# Patient Record
Sex: FEMALE | Race: WHITE | NOT HISPANIC OR LATINO | Employment: OTHER | URBAN - METROPOLITAN AREA
[De-identification: names, ages, dates, MRNs, and addresses within clinical notes are randomized per-mention and may not be internally consistent; named-entity substitution may affect disease eponyms.]

---

## 2017-02-23 ENCOUNTER — PATIENT MESSAGE (OUTPATIENT)
Dept: ENDOCRINOLOGY | Facility: CLINIC | Age: 67
End: 2017-02-23

## 2017-02-23 ENCOUNTER — PATIENT MESSAGE (OUTPATIENT)
Dept: INTERNAL MEDICINE | Facility: CLINIC | Age: 67
End: 2017-02-23

## 2017-03-08 ENCOUNTER — OFFICE VISIT (OUTPATIENT)
Dept: INTERNAL MEDICINE | Facility: CLINIC | Age: 67
End: 2017-03-08
Payer: MEDICARE

## 2017-03-08 VITALS
HEART RATE: 81 BPM | DIASTOLIC BLOOD PRESSURE: 60 MMHG | OXYGEN SATURATION: 98 % | BODY MASS INDEX: 20.82 KG/M2 | HEIGHT: 68 IN | WEIGHT: 137.38 LBS | SYSTOLIC BLOOD PRESSURE: 108 MMHG

## 2017-03-08 DIAGNOSIS — Z13.220 SCREENING, LIPID: ICD-10-CM

## 2017-03-08 DIAGNOSIS — Z12.31 ENCOUNTER FOR SCREENING MAMMOGRAM FOR BREAST CANCER: ICD-10-CM

## 2017-03-08 DIAGNOSIS — Z87.74 H/O ATRIAL SEPTAL DEFECT REPAIR: ICD-10-CM

## 2017-03-08 DIAGNOSIS — Z13.6 ENCOUNTER FOR SCREENING FOR CARDIOVASCULAR DISORDERS: ICD-10-CM

## 2017-03-08 DIAGNOSIS — Z13.21 ENCOUNTER FOR VITAMIN DEFICIENCY SCREENING: ICD-10-CM

## 2017-03-08 DIAGNOSIS — Z12.83 SKIN CANCER SCREENING: ICD-10-CM

## 2017-03-08 DIAGNOSIS — Z79.899 OTHER LONG TERM (CURRENT) DRUG THERAPY: ICD-10-CM

## 2017-03-08 DIAGNOSIS — N32.81 OAB (OVERACTIVE BLADDER): ICD-10-CM

## 2017-03-08 DIAGNOSIS — Z00.00 ANNUAL PHYSICAL EXAM: Primary | ICD-10-CM

## 2017-03-08 DIAGNOSIS — M81.0 OSTEOPOROSIS: ICD-10-CM

## 2017-03-08 DIAGNOSIS — Z85.89 HISTORY OF SQUAMOUS CELL CARCINOMA: ICD-10-CM

## 2017-03-08 PROCEDURE — 99204 OFFICE O/P NEW MOD 45 MIN: CPT | Mod: S$PBB,,, | Performed by: FAMILY MEDICINE

## 2017-03-08 PROCEDURE — 99999 PR PBB SHADOW E&M-EST. PATIENT-LVL IV: CPT | Mod: PBBFAC,,, | Performed by: FAMILY MEDICINE

## 2017-03-08 PROCEDURE — 99214 OFFICE O/P EST MOD 30 MIN: CPT | Mod: PBBFAC | Performed by: FAMILY MEDICINE

## 2017-03-08 RX ORDER — TOLTERODINE 4 MG/1
CAPSULE, EXTENDED RELEASE ORAL
Qty: 90 CAPSULE | Refills: 1 | Status: SHIPPED | OUTPATIENT
Start: 2017-03-08 | End: 2017-03-27 | Stop reason: SDUPTHER

## 2017-03-08 RX ORDER — TOLTERODINE TARTRATE 2 MG/1
2 TABLET, EXTENDED RELEASE ORAL 2 TIMES DAILY
COMMUNITY
End: 2017-03-08 | Stop reason: SDUPTHER

## 2017-03-08 RX ORDER — TOLTERODINE TARTRATE 2 MG/1
2 TABLET, EXTENDED RELEASE ORAL 2 TIMES DAILY PRN
Qty: 90 TABLET | Refills: 1 | Status: SHIPPED | OUTPATIENT
Start: 2017-03-08 | End: 2017-09-05 | Stop reason: SDUPTHER

## 2017-03-08 RX ORDER — TOLTERODINE 4 MG/1
CAPSULE, EXTENDED RELEASE ORAL
Refills: 0 | COMMUNITY
Start: 2017-01-29 | End: 2017-03-08 | Stop reason: SDUPTHER

## 2017-03-08 RX ORDER — RALOXIFENE HYDROCHLORIDE 60 MG/1
TABLET, FILM COATED ORAL
Refills: 1 | COMMUNITY
Start: 2017-01-29 | End: 2017-03-16 | Stop reason: SDUPTHER

## 2017-03-08 NOTE — PROGRESS NOTES
Subjective:       Patient ID: Dia Ovalles is a 66 y.o. female.    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 Denies f/n/v/d/constipation/cp/sob/urinary sx.  Sleeping well, eating well, does cardio and weight training regularly.  ASD repair at age 26, she was developing heart failure when it was discovered.  Osteoporosis: no dexa in over 2 years, on Raloxifene for 2 years  OTC: Vitamin D, Biotin  GYN: March 2016 mmg utd and normal, has one abnormal in the past 8 years ago and biopsy 15 years ago, pap utd and normal, hx of abnormal pap 10 years ago but subsequent normal, last was April 2016  Colonoscopy done 10/2015 was normal  Eye exam utd  Dental utd  Vaccines: shingles utd, flu shot in Sept, she had both pneumonia vaccines, tdap due    Review of Systems   Constitutional: Negative for activity change, appetite change, fatigue and fever.   Respiratory: Negative for cough and shortness of breath.    Cardiovascular: Negative for chest pain, palpitations and leg swelling.   Gastrointestinal: Negative for constipation, diarrhea, nausea and vomiting.   Genitourinary: Negative for difficulty urinating and dysuria.   Skin: Negative for rash.   Neurological: Negative for dizziness and light-headedness.   Psychiatric/Behavioral: Negative for sleep disturbance.       Objective:      Physical Exam   Constitutional: She appears well-developed and well-nourished.   HENT:   Head: Normocephalic and atraumatic.   Mouth/Throat: No oropharyngeal exudate.   Neck: Normal range of motion. Neck supple. No thyromegaly present.   Cardiovascular: Normal rate, regular rhythm and normal heart sounds.    Pulmonary/Chest: Effort normal and breath sounds normal. No respiratory distress.   Abdominal: Soft. Bowel sounds are normal. She exhibits no distension.   Musculoskeletal: She exhibits no edema.    Lymphadenopathy:     She has no cervical adenopathy.   Neurological: She is alert.   Skin: Skin is warm and dry.   Psychiatric: She has a normal mood and affect.   Nursing note and vitals reviewed.      Assessment:       1. Annual physical exam    2. History of squamous cell carcinoma    3. H/O atrial septal defect repair    4. Osteoporosis    5. OAB (overactive bladder)    6. Encounter for screening mammogram for breast cancer    7. Encounter for vitamin deficiency screening    8. Screening, lipid    9. Encounter for screening for cardiovascular disorders     10. Other long term (current) drug therapy     11. Skin cancer screening        Plan:       Dia was seen today for establish care.    Diagnoses and all orders for this visit:    Annual physical exam  -     CBC auto differential; Future  -     Comprehensive metabolic panel; Future  -     TSH; Future    History of squamous cell carcinoma  -     Ambulatory Referral to Dermatology    H/O atrial septal defect repair    Osteoporosis  -     DXA Bone Density Spine And Hip; Future  -     TSH; Future  -     Vitamin D; Future    OAB (overactive bladder)  -     tolterodine (DETROL LA) 4 MG 24 hr capsule; TAKE 1 C PO D FOR BLADDER SPASM  -     tolterodine (DETROL) 2 MG Tab; Take 1 tablet (2 mg total) by mouth 2 (two) times daily as needed.  -     CBC auto differential; Future  -     Comprehensive metabolic panel; Future    Encounter for screening mammogram for breast cancer  -     Mammo Digital Screening Bilat with CAD; Future    Encounter for vitamin deficiency screening  -     Vitamin D; Future    Screening, lipid  -     Lipid panel; Future    Encounter for screening for cardiovascular disorders   -     Lipid panel; Future    Other long term (current) drug therapy   -     TSH; Future    Skin cancer screening  -     Ambulatory Referral to Dermatology

## 2017-03-08 NOTE — MR AVS SNAPSHOT
[facility]  [address]  [facility] [address]  Phone: [phone]  Fax: [phone]                  Dia Ovalles   3/8/2017 2:30 PM   Office Visit    Description:  Female : 1950   Provider:  Shelley Leija MD   Department:  [facility]           Reason for Visit     Establish Care           Diagnoses this Visit        Comments    Annual physical exam    -  Primary     History of squamous cell carcinoma         H/O atrial septal defect repair         Osteoporosis         OAB (overactive bladder)         Encounter for screening mammogram for breast cancer         Encounter for vitamin deficiency screening         Screening, lipid         Encounter for screening for cardiovascular disorders         Other long term (current) drug therapy         Skin cancer screening                To Do List           Future Appointments        Provider Department Dept Phone    3/16/2017 1:00 PM [facility] - Endo/Diab/Metab [phone]      Goals (5 Years of Data)     None      Follow-Up and Disposition     Return in about 1 year (around 3/8/2018), or if symptoms worsen or fail to improve.       These Medications        Disp Refills Start End    tolterodine (DETROL LA) 4 MG 24 hr capsule 90 capsule 1 3/8/2017     TAKE 1 C PO D FOR BLADDER SPASM    Pharmacy: [facility] - [address] Ph #: [phone]       tolterodine (DETROL) 2 MG Tab 90 tablet 1 3/8/2017     Take 1 tablet (2 mg total) by mouth 2 (two) times daily as needed. - Oral    Pharmacy: [facility] - [address] Ph #: [phone]         [facility] On Call     [facility] On Call Nurse Care Line -  Assistance  Registered nurses in the [facility] On Call Center provide clinical advisement, health education, appointment booking, and other advisory services.  Call for this free service at  "[phone].             Medications           Message regarding Medications     Verify the changes and/or additions to your medication regime listed below are the same as discussed with your clinician today.  If any of these changes or additions are incorrect, please notify your healthcare provider.        START taking these NEW medications        Refills    tolterodine (DETROL LA) 4 MG 24 hr capsule 1    Sig: TAKE 1 C PO D FOR BLADDER SPASM    Class: Normal    tolterodine (DETROL) 2 MG Tab 1    Sig: Take 1 tablet (2 mg total) by mouth 2 (two) times daily as needed.    Class: Normal    Route: Oral           Verify that the below list of medications is an accurate representation of the medications you are currently taking.  If none reported, the list may be blank. If incorrect, please contact your healthcare provider. Carry this list with you in case of emergency.           Current Medications     raloxifene (EVISTA) 60 mg tablet TK 1 T PO QD    tolterodine (DETROL LA) 4 MG 24 hr capsule TAKE 1 C PO D FOR BLADDER SPASM    tolterodine (DETROL) 2 MG Tab Take 1 tablet (2 mg total) by mouth 2 (two) times daily as needed.           Clinical Reference Information           Your Vitals Were     BP Pulse Height Weight SpO2 BMI    108/60 81 5' 8" (1.727 m) 62.3 kg (137 lb 5.6 oz) 98% 20.88 kg/m2      Blood Pressure          Most Recent Value    BP  108/60      Allergies as of 3/8/2017     Codeine      Immunizations Administered on Date of Encounter - 3/8/2017     None      Orders Placed During Today's Visit      Normal Orders This Visit    Ambulatory Referral to Dermatology     Future Labs/Procedures Expected by Expires    CBC auto differential  3/8/2017 5/7/2018    Comprehensive metabolic panel  3/8/2017 5/7/2018    DXA Bone Density Spine And Hip  3/8/2017 3/8/2018    Lipid panel  3/8/2017 5/7/2018    Mammo Digital Screening Bilat with CAD  3/8/2017 (Approximate) 3/8/2018    TSH  3/8/2017 5/7/2018    Vitamin D  3/8/2017 " 5/7/2018      Language Assistance Services     ATTENTION: Language assistance services are available, free of charge. Please call [phone].      ATENCIÓN: Si habla cosme, tiene a buck disposición servicios gratuitos de asistencia lingüística. Llame al [phone].     CHÚ Ý: N?u b?n nói Ti?ng Vi?t, có các d?ch v? h? tr? ngôn ng? mi?n phí dành cho b?n. G?i s? [phone].         [facility] complies with applicable Federal civil rights laws and does not discriminate on the basis of race, color, national origin, age, disability, or sex.

## 2017-03-13 ENCOUNTER — HOSPITAL ENCOUNTER (OUTPATIENT)
Dept: RADIOLOGY | Facility: CLINIC | Age: 67
Discharge: HOME | End: 2017-03-13
Attending: FAMILY MEDICINE
Payer: MEDICARE

## 2017-03-13 DIAGNOSIS — M81.0 OSTEOPOROSIS: ICD-10-CM

## 2017-03-13 PROCEDURE — 77080 DXA BONE DENSITY AXIAL: CPT | Mod: TC

## 2017-03-13 PROCEDURE — 77080 DXA BONE DENSITY AXIAL: CPT | Mod: 26,,, | Performed by: INTERNAL MEDICINE

## 2017-03-14 ENCOUNTER — TELEPHONE (OUTPATIENT)
Dept: INTERNAL MEDICINE | Facility: CLINIC | Age: 67
End: 2017-03-14

## 2017-03-15 ENCOUNTER — LAB VISIT (OUTPATIENT)
Dept: LAB | Facility: HOSPITAL | Age: 67
End: 2017-03-15
Attending: FAMILY MEDICINE
Payer: MEDICARE

## 2017-03-15 DIAGNOSIS — Z00.00 ANNUAL PHYSICAL EXAM: ICD-10-CM

## 2017-03-15 DIAGNOSIS — Z13.220 SCREENING, LIPID: ICD-10-CM

## 2017-03-15 DIAGNOSIS — M81.0 OSTEOPOROSIS: ICD-10-CM

## 2017-03-15 DIAGNOSIS — Z13.6 ENCOUNTER FOR SCREENING FOR CARDIOVASCULAR DISORDERS: ICD-10-CM

## 2017-03-15 DIAGNOSIS — Z79.899 OTHER LONG TERM (CURRENT) DRUG THERAPY: ICD-10-CM

## 2017-03-15 DIAGNOSIS — Z13.21 ENCOUNTER FOR VITAMIN DEFICIENCY SCREENING: ICD-10-CM

## 2017-03-15 DIAGNOSIS — N32.81 OAB (OVERACTIVE BLADDER): ICD-10-CM

## 2017-03-15 LAB
25(OH)D3+25(OH)D2 SERPL-MCNC: 91 NG/ML
ALBUMIN SERPL BCP-MCNC: 3.6 G/DL
ALP SERPL-CCNC: 51 U/L
ALT SERPL W/O P-5'-P-CCNC: 16 U/L
ANION GAP SERPL CALC-SCNC: 9 MMOL/L
AST SERPL-CCNC: 23 U/L
BASOPHILS # BLD AUTO: 0.03 K/UL
BASOPHILS NFR BLD: 0.5 %
BILIRUB SERPL-MCNC: 0.5 MG/DL
BUN SERPL-MCNC: 21 MG/DL
CALCIUM SERPL-MCNC: 8.9 MG/DL
CHLORIDE SERPL-SCNC: 106 MMOL/L
CHOLEST/HDLC SERPL: 3.5 {RATIO}
CO2 SERPL-SCNC: 25 MMOL/L
CREAT SERPL-MCNC: 0.9 MG/DL
DIFFERENTIAL METHOD: NORMAL
EOSINOPHIL # BLD AUTO: 0.2 K/UL
EOSINOPHIL NFR BLD: 3.3 %
ERYTHROCYTE [DISTWIDTH] IN BLOOD BY AUTOMATED COUNT: 13.9 %
EST. GFR  (AFRICAN AMERICAN): >60 ML/MIN/1.73 M^2
EST. GFR  (NON AFRICAN AMERICAN): >60 ML/MIN/1.73 M^2
GLUCOSE SERPL-MCNC: 102 MG/DL
HCT VFR BLD AUTO: 42.5 %
HDL/CHOLESTEROL RATIO: 28.7 %
HDLC SERPL-MCNC: 174 MG/DL
HDLC SERPL-MCNC: 50 MG/DL
HGB BLD-MCNC: 13.7 G/DL
LDLC SERPL CALC-MCNC: 111.2 MG/DL
LYMPHOCYTES # BLD AUTO: 1.8 K/UL
LYMPHOCYTES NFR BLD: 32.5 %
MCH RBC QN AUTO: 29.4 PG
MCHC RBC AUTO-ENTMCNC: 32.2 %
MCV RBC AUTO: 91 FL
MONOCYTES # BLD AUTO: 0.3 K/UL
MONOCYTES NFR BLD: 6 %
NEUTROPHILS # BLD AUTO: 3.2 K/UL
NEUTROPHILS NFR BLD: 57.5 %
NONHDLC SERPL-MCNC: 124 MG/DL
PLATELET # BLD AUTO: 242 K/UL
PMV BLD AUTO: 10.5 FL
POTASSIUM SERPL-SCNC: 4.1 MMOL/L
PROT SERPL-MCNC: 6.5 G/DL
RBC # BLD AUTO: 4.66 M/UL
SODIUM SERPL-SCNC: 140 MMOL/L
TRIGL SERPL-MCNC: 64 MG/DL
TSH SERPL DL<=0.005 MIU/L-ACNC: 1.9 UIU/ML
WBC # BLD AUTO: 5.48 K/UL

## 2017-03-15 PROCEDURE — 36415 COLL VENOUS BLD VENIPUNCTURE: CPT

## 2017-03-15 PROCEDURE — 84443 ASSAY THYROID STIM HORMONE: CPT

## 2017-03-15 PROCEDURE — 80061 LIPID PANEL: CPT

## 2017-03-15 PROCEDURE — 82306 VITAMIN D 25 HYDROXY: CPT

## 2017-03-15 PROCEDURE — 85025 COMPLETE CBC W/AUTO DIFF WBC: CPT

## 2017-03-15 PROCEDURE — 80053 COMPREHEN METABOLIC PANEL: CPT

## 2017-03-16 ENCOUNTER — OFFICE VISIT (OUTPATIENT)
Dept: ENDOCRINOLOGY | Facility: CLINIC | Age: 67
End: 2017-03-16
Payer: MEDICARE

## 2017-03-16 VITALS
HEART RATE: 84 BPM | BODY MASS INDEX: 20.78 KG/M2 | SYSTOLIC BLOOD PRESSURE: 100 MMHG | WEIGHT: 137.13 LBS | HEIGHT: 68 IN | DIASTOLIC BLOOD PRESSURE: 80 MMHG

## 2017-03-16 DIAGNOSIS — M81.0 OSTEOPOROSIS: Primary | ICD-10-CM

## 2017-03-16 PROCEDURE — 99204 OFFICE O/P NEW MOD 45 MIN: CPT | Mod: S$PBB,,, | Performed by: INTERNAL MEDICINE

## 2017-03-16 PROCEDURE — 99213 OFFICE O/P EST LOW 20 MIN: CPT | Mod: PBBFAC | Performed by: INTERNAL MEDICINE

## 2017-03-16 PROCEDURE — 99999 PR PBB SHADOW E&M-EST. PATIENT-LVL III: CPT | Mod: PBBFAC,,, | Performed by: INTERNAL MEDICINE

## 2017-03-16 RX ORDER — BIOTIN 1 MG
5000 TABLET ORAL DAILY
COMMUNITY

## 2017-03-16 RX ORDER — RALOXIFENE HYDROCHLORIDE 60 MG/1
60 TABLET, FILM COATED ORAL DAILY
Qty: 90 TABLET | Refills: 3 | Status: SHIPPED | OUTPATIENT
Start: 2017-03-16 | End: 2018-03-12 | Stop reason: SDUPTHER

## 2017-03-16 RX ORDER — ACETAMINOPHEN 500 MG
1000 TABLET ORAL EVERY OTHER DAY
COMMUNITY

## 2017-03-16 NOTE — PROGRESS NOTES
Chief Complaint: Osteoporosis      HPI:  Dia Ovalles is 66 y.o. female here today for the first time for evaluation and management of Osteoporosis. Diagnosed with BMD from 2017     The patient recently relocated to [place] for [facility] and was referred to our office by her Endocrinologist in [place], [place] - Dr. Luna   She has a long standing history of Osteoporosis   She reports taking Fosamax for 10 years or more     Started Evista in  per Endo in [place]   Tolerating well without complications   She denies family history of breast cancer   Denies personal history of DVT       Past medical history is significant for heart surgery at age 26 to repair Atrial septal defect     Last BMD - see below:   Results for orders placed during the hospital encounter of 17   DXA Bone Density Spine And Hip    Narrative : 1950 ORDERING PHYSICIAN: EIRKA Leija LOCATION: [facility]    HISTORY: 65 y/o female with no hx of fractures. She had menopausal symptoms at 51 y/o. Pts Mother had a wrist fx. Pt is taking Evista. She exercises daily and does not smoke.    TECHNIQUE: Bone Mineral Density performed using Hologic Horizon A (S/N [id number]) reveals good positioning of lumbar spine and hip.    BONE MINERAL DENSITY RESULTS:  Lumbar Spine: Lumbar bone mineral density L1-L4 is 0.752g/cm2, which is a t-score of -2.7. The z-score is -0.7.    Total Hip: The total hip bone mineral density is 0.764g/cm2.  The t-score is -1.5, and the z-score is -0.1.  Femoral neck BMD is 0.608g/cm2 and the t-score is -2.2.      COMPARISONS: No Previous studies available.    Impression Osteoporosis of the lumbar spine.     RECOMMENDATIONS:  1) Adequate calcium and Vitamin D therapy  2) Appropriate exercise  3) Consider continuing medical therapy with raloxifene if patient tolerates.  4) Consider repeat BMD in 2 years.      EXPLANATION OF RESULTS:  The t-score compares this results to the bone density of a 25 year old of the  same gender. The z-score compares this result to the average bone density to people of the same age and gender. The amounts indicate the number of standard deviations above or below the mean.  * Osteoporosis is generally defined as having a t-score between less than -2.5.  * Osteopenia is generally defined as having a t-score between -1 and -2.5.  * The normal range is generally defined as having a t-score between -1 to 1.      Electronically signed by: DONNA LUCAS MD  Date:     03/15/17  Time:    07:59      Patient reports Fractured foot - during college after a fall       Osteoporosis Risk Factor Assessment::   Menarche occurred at age 15  Menopause occurred at age 52  Denies past history of chronic illness  Denies restrictive dieting as a child/adolescent or young adult  Denies history of falls over age 50  Denies history of fractures to her wrist, hip or spine  Denies loss of height   Reports family history of Osteoporosis - mother   Reports family history of stooped posture and wrist fracture in her mother. States mother used chronic steroids for treatment of asthma   Reports use of hormone replacement therapy for <2 years after menopause   Denies exposure to steroid therapy, anticoagulants, PPI's, TZD's or antiseizure medications    She denies history of calcium disorders, thyroid disease, kidney stones, malabsorption symptoms, anemia or kidney disease    Osteoporosis Risk Factors:   Non modifiable   Denies personal history of fracture as an adult   History of fracture in first - degree relative _ mother with fractured wrist   66 y.o. White female  Dementia:No  Poor health / frail: No    Potentially Modifiable:   denies Tobacco use  Wt Readings from Last 1 Encounters:   03/16/17 62.2 kg (137 lb 2 oz)     denies early menopause  denies Prolonged premenopausal amenorrhea (>1yr )   denies Low calcium intake ( lifelong )   denies Alcoholism   denies Recurrent falls  denies Inadequate physical activity     Current  calcium and vitamin D intake: vitamin D3 _ 2,000 IU's daily   Dietary sources: yogurt, calcium fortified milk, cereal    Exercise: weight training 2x week for 60 minutes, yoga 3 times per week for 60 minutes   Cardio 30 minutes a few times a week     Review of Systems   Constitutional: Negative for malaise/fatigue.   HENT: Negative for hearing loss and sore throat.    Eyes: Negative for blurred vision.   Respiratory: Negative for cough and shortness of breath.    Cardiovascular: Negative for chest pain, palpitations, claudication and leg swelling.   Gastrointestinal: Negative for abdominal pain, constipation, diarrhea, heartburn, nausea and vomiting.   Genitourinary: Positive for frequency.   Musculoskeletal: Negative for falls, joint pain and myalgias.   Skin: Negative for rash.   Neurological: Negative for dizziness, tremors, seizures, weakness and headaches.   Endo/Heme/Allergies: Negative for polydipsia.   Psychiatric/Behavioral: Negative for depression and memory loss. The patient is not nervous/anxious.      Physical Exam   Constitutional: She appears well-developed and well-nourished. No distress.   HENT:   Right Ear: External ear normal.   Left Ear: External ear normal.   Nose: Nose normal.   Hearing normal  Dentition good    Neck: No tracheal deviation present. No thyromegaly present.   Cardiovascular: Normal rate.    No murmur heard.  Pulmonary/Chest: Effort normal and breath sounds normal.   Abdominal: Soft. There is no tenderness. No hernia.   Musculoskeletal: She exhibits no edema.   Gait normal  No clubbing or cyanosis noted   Neurological: She is alert. She displays normal reflexes. No cranial nerve deficit.   Skin: Skin is warm. No rash noted.   No nodules       Psychiatric: She has a normal mood and affect. Judgment normal.   Nursing note and vitals reviewed.      Labs:  Lab Results   Component Value Date     03/15/2017    K 4.1 03/15/2017     03/15/2017    CO2 25 03/15/2017      03/15/2017    BUN 21 03/15/2017    CREATININE 0.9 03/15/2017    CALCIUM 8.9 03/15/2017    PROT 6.5 03/15/2017    ALBUMIN 3.6 03/15/2017    BILITOT 0.5 03/15/2017    ALKPHOS 51 (L) 03/15/2017    AST 23 03/15/2017    ALT 16 03/15/2017    ANIONGAP 9 03/15/2017    ESTGFRAFRICA >60.0 03/15/2017    EGFRNONAA >60.0 03/15/2017     Lab Results   Component Value Date    FBCOLCPY19ZL 91 03/15/2017         Assessment and Plan:  1. Osteoporosis  Risks include race, small frame, family history    Reviewed basics of quantity vs quality  Reassuring she is not fracturing  Emphasized the importance of calcium and vitamin D on bone health, calcium from food sources preferred  Encouraged fall safety and fall precautions   Continue weight bearing exercises as tolerated   Continue current medical therapy with Evista   Repeat BMD due in 2019 , will evaluate need for change in therapy at that time   Continue with yearly mammogram screenings  -     raloxifene (EVISTA) 60 mg tablet; Take 1 tablet (60 mg total) by mouth once daily.  Dispense: 90 tablet; Refill: 3        Patient personally interviewed and examined by Dr. Zambrano   Recommendations were discussed with the patient in detail   The patient verbalized understanding and agrees with the treatment plan as outlined above       RTC in 2 years with repeat BMD

## 2017-03-16 NOTE — MR AVS SNAPSHOT
[facility] - Endo/Diab/Metab  [address]  [facility] [address]  Phone: [phone]  Fax: [phone]                  Dia Ovalles   3/16/2017 1:00 PM   Office Visit    Description:  Female : 1950   Provider:  Mague Cottrell NP   Department:  [facility] - Endo/Diab/Metab           Reason for Visit     Osteoporosis           Diagnoses this Visit        Comments    Osteoporosis    -  Primary            To Do List           Future Appointments        Provider Department Dept Phone    2017 10:00 AM [facility] [id number] SCREEN INT MED [facility] [phone]    2017 11:40 AM Ash Mcduffie MD [facility] Dermatology [phone]      Goals (5 Years of Data)     None      Follow-Up and Disposition     Return in about 2 years (around 3/16/2019).       These Medications        Disp Refills Start End    raloxifene (EVISTA) 60 mg tablet 90 tablet 3 3/16/2017     Take 1 tablet (60 mg total) by mouth once daily. - Oral    Pharmacy: [facility] - [address] #: [phone]         [facility] On Call     [facility] On Call Nurse Care Line -  Assistance  Registered nurses in the [facility] On Call Center provide clinical advisement, health education, appointment booking, and other advisory services.  Call for this free service at [phone].             Medications           Message regarding Medications     Verify the changes and/or additions to your medication regime listed below are the same as discussed with your clinician today.  If any of these changes or additions are incorrect, please notify your healthcare provider.        CHANGE how you are taking these medications     Start Taking Instead of    raloxifene (EVISTA) 60 mg tablet raloxifene (EVISTA) 60 mg tablet    Dosage:  Take 1 tablet (60 mg total) by mouth once daily. Dosage:  TK 1 T PO QD    Reason for Change:  Reorder            Verify that the below  "list of medications is an accurate representation of the medications you are currently taking.  If none reported, the list may be blank. If incorrect, please contact your healthcare provider. Carry this list with you in case of emergency.           Current Medications     biotin 1 mg tablet Take 5,000 mcg by mouth once daily.    cholecalciferol, vitamin D3, (VITAMIN D3) 2,000 unit Cap Take 1 capsule by mouth once daily.    raloxifene (EVISTA) 60 mg tablet Take 1 tablet (60 mg total) by mouth once daily.    tolterodine (DETROL LA) 4 MG 24 hr capsule TAKE 1 C PO D FOR BLADDER SPASM    tolterodine (DETROL) 2 MG Tab Take 1 tablet (2 mg total) by mouth 2 (two) times daily as needed.           Clinical Reference Information           Your Vitals Were     BP Pulse Height Weight BMI    100/80 84 5' 8" (1.727 m) 62.2 kg (137 lb 2 oz) 20.85 kg/m2      Blood Pressure          Most Recent Value    BP  100/80      Allergies as of 3/16/2017     Codeine      Immunizations Administered on Date of Encounter - 3/16/2017     None      Language Assistance Services     ATTENTION: Language assistance services are available, free of charge. Please call [phone].      ATENCIÓN: Si habla cosme, tiene a buck disposición servicios gratuitos de asistencia lingüística. Llame al [phone].     MEJIA Ý: N?u b?n nói Ti?ng Vi?t, có các d?ch v? h? tr? ngôn ng? mi?n phí dành cho b?n. G?i s? [phone].         [facility] complies with applicable Federal civil rights laws and does not discriminate on the basis of race, color, national origin, age, disability, or sex.        "

## 2017-03-27 ENCOUNTER — PATIENT MESSAGE (OUTPATIENT)
Dept: INTERNAL MEDICINE | Facility: CLINIC | Age: 67
End: 2017-03-27

## 2017-03-27 DIAGNOSIS — N32.81 OAB (OVERACTIVE BLADDER): ICD-10-CM

## 2017-03-27 RX ORDER — TOLTERODINE 4 MG/1
CAPSULE, EXTENDED RELEASE ORAL
Qty: 90 CAPSULE | Refills: 1 | Status: SHIPPED | OUTPATIENT
Start: 2017-03-27 | End: 2017-09-27 | Stop reason: SDUPTHER

## 2017-03-27 RX ORDER — TOLTERODINE 4 MG/1
CAPSULE, EXTENDED RELEASE ORAL
Qty: 90 CAPSULE | Refills: 1 | Status: SHIPPED | OUTPATIENT
Start: 2017-03-27 | End: 2017-09-05 | Stop reason: SDUPTHER

## 2017-04-20 ENCOUNTER — HOSPITAL ENCOUNTER (OUTPATIENT)
Dept: RADIOLOGY | Facility: HOSPITAL | Age: 67
Discharge: HOME | End: 2017-04-20
Attending: FAMILY MEDICINE
Payer: MEDICARE

## 2017-04-20 DIAGNOSIS — Z12.31 ENCOUNTER FOR SCREENING MAMMOGRAM FOR BREAST CANCER: ICD-10-CM

## 2017-04-20 PROCEDURE — 77067 SCR MAMMO BI INCL CAD: CPT | Mod: 26,,, | Performed by: RADIOLOGY

## 2017-04-20 PROCEDURE — 77067 SCR MAMMO BI INCL CAD: CPT | Mod: TC

## 2017-05-02 ENCOUNTER — INITIAL CONSULT (OUTPATIENT)
Dept: DERMATOLOGY | Facility: CLINIC | Age: 67
End: 2017-05-02
Payer: MEDICARE

## 2017-05-02 DIAGNOSIS — D23.9 DERMATOFIBROMA: ICD-10-CM

## 2017-05-02 DIAGNOSIS — Z85.828 HISTORY OF NONMELANOMA SKIN CANCER: Primary | ICD-10-CM

## 2017-05-02 DIAGNOSIS — L82.1 SK (SEBORRHEIC KERATOSIS): ICD-10-CM

## 2017-05-02 DIAGNOSIS — L82.0 INFLAMED SEBORRHEIC KERATOSIS: ICD-10-CM

## 2017-05-02 DIAGNOSIS — L81.4 SOLAR LENTIGO: ICD-10-CM

## 2017-05-02 DIAGNOSIS — D22.9 MULTIPLE BENIGN NEVI: ICD-10-CM

## 2017-05-02 DIAGNOSIS — D18.01 ANGIOMA OF SKIN: ICD-10-CM

## 2017-05-02 PROCEDURE — 99212 OFFICE O/P EST SF 10 MIN: CPT | Mod: PBBFAC | Performed by: PATHOLOGY

## 2017-05-02 PROCEDURE — 99999 PR PBB SHADOW E&M-EST. PATIENT-LVL II: CPT | Mod: PBBFAC,,, | Performed by: PATHOLOGY

## 2017-05-02 PROCEDURE — 99203 OFFICE O/P NEW LOW 30 MIN: CPT | Mod: S$PBB,,, | Performed by: PATHOLOGY

## 2017-05-02 NOTE — PROGRESS NOTES
Subjective:       Patient ID:  Dia Ovalles is a 66 y.o. female who presents for   Chief Complaint   Patient presents with    Skin Check     TBSE     HPI  Pt with h/o NMSC (she thinks SCC) to right arm about 5 years ago - both occurred within 1 year.  Recently moved from [place].  This is a high risk patient here to check for the development of new lesions.  Denies any new or changing lesions today.    Review of Systems   Constitutional: Negative for fever, chills, weight loss, weight gain, fatigue, night sweats and malaise.   Skin: Positive for daily sunscreen use and activity-related sunscreen use. Negative for itching, rash and recent sunburn.   Hematologic/Lymphatic: Bruises/bleeds easily.        Objective:    Physical Exam   Constitutional: She appears well-developed and well-nourished. No distress.   Neurological: She is alert and oriented to person, place, and time. She is not disoriented.   Psychiatric: She has a normal mood and affect.   Skin:   Areas Examined (abnormalities noted in diagram):   Scalp / Hair Palpated and Inspected  Head / Face Inspection Performed  Neck Inspection Performed  Chest / Axilla Inspection Performed  Abdomen Inspection Performed  Genitals / Buttocks / Groin Inspection Performed  Back Inspection Performed  RUE Inspected  LUE Inspection Performed  RLE Inspected  LLE Inspection Performed  Nails and Digits Inspection Performed                   Diagram Legend     Erythematous scaling macule/papule c/w actinic keratosis       Vascular papule c/w angioma      Pigmented verrucoid papule/plaque c/w seborrheic keratosis      Yellow umbilicated papule c/w sebaceous hyperplasia      Irregularly shaped tan macule c/w lentigo     1-2 mm smooth white papules consistent with Milia      Movable subcutaneous cyst with punctum c/w epidermal inclusion cyst      Subcutaneous movable cyst c/w pilar cyst      Firm pink to brown papule c/w dermatofibroma      Pedunculated fleshy papule(s) c/w  skin tag(s)      Evenly pigmented macule c/w junctional nevus     Mildly variegated pigmented, slightly irregular-bordered macule c/w mildly atypical nevus      Flesh colored to evenly pigmented papule c/w intradermal nevus       Pink pearly papule/plaque c/w basal cell carcinoma      Erythematous hyperkeratotic cursted plaque c/w SCC      Surgical scar with no sign of skin cancer recurrence      Open and closed comedones      Inflammatory papules and pustules      Verrucoid papule consistent consistent with wart     Erythematous eczematous patches and plaques     Dystrophic onycholytic nail with subungual debris c/w onychomycosis     Umbilicated papule    Erythematous-base heme-crusted tan verrucoid plaque consistent with inflamed seborrheic keratosis     Erythematous Silvery Scaling Plaque c/w Psoriasis     See annotation      Assessment / Plan:        History of nonmelanoma skin cancer - right forearm and arm - no evidence of recurrence  Patient instructed in importance in daily sun protection of at least spf 30. Sun avoidance and topical protection discussed.       Inflamed seborrheic keratosis - Reassurance given to patient. No treatment is necessary.       SK (seborrheic keratosis) - These are benign inherited growths without a malignant potential. Reassurance given to patient. No treatment is necessary.       Solar lentigo - Reassurance given to patient. No treatment is necessary.         Angioma of skin - This is a benign vascular lesion. Reassurance given. No treatment required.       Multiple benign nevi - Patient with several benign appearing nevi. Instructed patient to observe lesion(s) for changes and follow up in clinic if changes are noted.       Dermatofibroma - Reassurance given to patient. No treatment is necessary.   Treatment of benign, asymptomatic lesions may be considered cosmetic.               Return in about 1 year (around 5/2/2018), or if symptoms worsen or fail to improve.

## 2017-05-02 NOTE — MR AVS SNAPSHOT
[facility]  [address]  [place] [address]  Phone: [phone]  Fax: [phone]                  Dia Ovalles   2017 11:40 AM   Initial consult    Description:  Female : 1950   Provider:  Ash Mcduffie MD   Department:  [facility]           Reason for Visit     Skin Check                To Do List           Goals (5 Years of Data)     None      Follow-Up and Disposition     Return in about 1 year (around 2018).      [facility]     [facility] On Call Nurse Care Line -  Assistance  Unless otherwise directed by your provider, please contact [facility], our nurse care line that is available for  assistance.     Registered nurses in the [facility] provide: appointment scheduling, clinical advisement, health education, and other advisory services.  Call: [phone] (toll free)               Medications           Message regarding Medications     Verify the changes and/or additions to your medication regime listed below are the same as discussed with your clinician today.  If any of these changes or additions are incorrect, please notify your healthcare provider.             Verify that the below list of medications is an accurate representation of the medications you are currently taking.  If none reported, the list may be blank. If incorrect, please contact your healthcare provider. Carry this list with you in case of emergency.           Current Medications     biotin 1 mg tablet Take 5,000 mcg by mouth once daily.    cholecalciferol, vitamin D3, (VITAMIN D3) 2,000 unit Cap Take 1 capsule by mouth once daily.    raloxifene (EVISTA) 60 mg tablet Take 1 tablet (60 mg total) by mouth once daily.    tolterodine (DETROL LA) 4 MG 24 hr capsule TAKE 1 C PO D FOR BLADDER SPASM    tolterodine (DETROL LA) 4 MG 24 hr capsule TAKE 1 C PO D FOR BLADDER SPASM    tolterodine (DETROL) 2 MG Tab Take 1 tablet (2 mg total) by mouth 2 (two)  times daily as needed.           Clinical Reference Information           Allergies as of 5/2/2017     Codeine      Immunizations Administered on Date of Encounter - 5/2/2017     None      Instructions    Summer Sun Protection      The [facility] would like to remind you of the importance of sun protection all year round and particularly during the summer when the suns rays are the strongest. It has been proven that both acute and chronic sun exposure damages our cells and leads to skin cancer. Beyond skin cancer, the sun causes 90% of the symptoms of pre-mature skin aging, including wrinkles, lentigines (brown spots), and thin, easily bruised skin. Proper sun protection can help prevent these unwanted conditions.    Many patients report that the dont go in the sun. It has been shown that the average person receives 18 hours of incidental sun exposure per week during activities such as walking through parking lots, driving, or sitting next to windows. This accumulates to several bad sunburns per year!    In choosing sunscreen, you want one that protects against both UVA and UVB rays. It is recommended that you use one of SPF 30 or higher. It is important to apply the sunscreen about 20 minutes prior to sun exposure. Most sunscreens are chemical sunscreens and a reaction must take place in the skin so that they are effective. If they are applied and then you are immediately exposed to the sun or start sweating, this reaction has not had time to take place and you are therefore unprotected. Sunscreen needs to be reapplied every 2 hours if you are participating in water sports or sweating. We recommend Elta MD or Neutrogena Ultra Sheer Dry Touch SPF 55 for daily use; however there are many options and it is most important for you to find one that you will use on a consistent basis.    If you have sensitive skin, you may do best with a sunscreen that contains only physical blockers such as  titanium dioxide or zinc oxide. These are typically thicker and harder to apply, however they afford very good protection. Neutrogena Sensitive Skin, Blue Lizard Sensitive Skin (pink top) or Neutrogena Pure and Free are popular ones.     Aside from sunscreen, clothes with UV protection, wide brimmed hats, and sunglasses are other means of sun protection that we recommend.                        [facility] - DERMATOLOGY  [address]  [place] [address]  Dept: [phone]  Dept Fax: [phone]                                                                               SEBORRHEIC KERATOSES        What causes seborrheic keratoses?    Seborrheic keratoses are harmless, common skin growths that first appear during adult life.  As time goes by, more growths appear.  Some persons have a very large number of them.  Seborrheic keratoses appear on both covered and uncovered parts of the body; they are not caused by sunlight.  The tendency to develop seborrheic keratoses is inherited.    Seborrheic keratoses are harmless and never become malignant.  They begin as slightly raised, light brown spots.  Gradually they thicken and take on a rough wartlike surface.  They slowly darken and may turn black.  These color changes are harmless.  Seborrheic keratoses are superficial and look as if they were stuck on the skin.  Persons who have had several seborrheic keratoses can usually recognize this type of benign growth.  However, if you are concerned or unsure about any growth, consult me.    Treatment    Seborrheic keratoses can easily be removed in the office.  The only reason for removing a seborrheic keratosis is your wish to get rid of it.    ]       Language Assistance Services     ATTENTION: Language assistance services are available, free of charge. Please call [phone].      ATENCIÓN: Si habla cosme, tiene a buck disposición servicios gratuitos de asistencia lingüística. William  al [phone].     MEJIA Ý: N?u b?n nói Ti?ng Vi?t, có các d?ch v? h? tr? ngôn ng? mi?n phí dành cho b?n. G?i s? [phone].         [facility] complies with applicable Federal civil rights laws and does not discriminate on the basis of race, color, national origin, age, disability, or sex.

## 2017-05-02 NOTE — LETTER
May 2, 2017      Shelley Leija MD  [address]  [facility] [address]           [facility]  [address]  [facility] [address]  Phone: [phone]  Fax: [phone]          Patient: Dia Ovalles   MR Number: [id number]   YOB: 1950   Date of Visit: 5/2/2017       Dear Dr. Shelley Leija:    Thank you for referring Dia Ovalles to me for evaluation. Attached you will find relevant portions of my assessment and plan of care.    If you have questions, please do not hesitate to call me. I look forward to following Dia Ovalles along with you.    Sincerely,    Ash Mcduffie MD    Enclosure  CC:  No Recipients    If you would like to receive this communication electronically, please contact [email or web address] or [phone] to request more information on [employer] Link access.    For providers and/or their staff who would like to refer a patient to [facility], please contact us through our one-stop-shop provider referral line, [facility], at [phone].    If you feel you have received this communication in error or would no longer like to receive these types of communications, please e-mail [email or web address]

## 2017-05-02 NOTE — PATIENT INSTRUCTIONS
Summer Sun Protection      The [facility] would like to remind you of the importance of sun protection all year round and particularly during the summer when the suns rays are the strongest. It has been proven that both acute and chronic sun exposure damages our cells and leads to skin cancer. Beyond skin cancer, the sun causes 90% of the symptoms of pre-mature skin aging, including wrinkles, lentigines (brown spots), and thin, easily bruised skin. Proper sun protection can help prevent these unwanted conditions.    Many patients report that the dont go in the sun. It has been shown that the average person receives 18 hours of incidental sun exposure per week during activities such as walking through parking lots, driving, or sitting next to windows. This accumulates to several bad sunburns per year!    In choosing sunscreen, you want one that protects against both UVA and UVB rays. It is recommended that you use one of SPF 30 or higher. It is important to apply the sunscreen about 20 minutes prior to sun exposure. Most sunscreens are chemical sunscreens and a reaction must take place in the skin so that they are effective. If they are applied and then you are immediately exposed to the sun or start sweating, this reaction has not had time to take place and you are therefore unprotected. Sunscreen needs to be reapplied every 2 hours if you are participating in water sports or sweating. We recommend Elta MD or Neutrogena Ultra Sheer Dry Touch SPF 55 for daily use; however there are many options and it is most important for you to find one that you will use on a consistent basis.    If you have sensitive skin, you may do best with a sunscreen that contains only physical blockers such as titanium dioxide or zinc oxide. These are typically thicker and harder to apply, however they afford very good protection. Neutrogena Sensitive Skin, Blue Lizard Sensitive Skin (pink top) or Neutrogena Pure  and Free are popular ones.     Aside from sunscreen, clothes with UV protection, wide brimmed hats, and sunglasses are other means of sun protection that we recommend.                        [facility] - DERMATOLOGY  [address]  [place] [address]  Dept: [phone]  Dept Fax: [phone]                                                                               SEBORRHEIC KERATOSES        What causes seborrheic keratoses?    Seborrheic keratoses are harmless, common skin growths that first appear during adult life.  As time goes by, more growths appear.  Some persons have a very large number of them.  Seborrheic keratoses appear on both covered and uncovered parts of the body; they are not caused by sunlight.  The tendency to develop seborrheic keratoses is inherited.    Seborrheic keratoses are harmless and never become malignant.  They begin as slightly raised, light brown spots.  Gradually they thicken and take on a rough wartlike surface.  They slowly darken and may turn black.  These color changes are harmless.  Seborrheic keratoses are superficial and look as if they were stuck on the skin.  Persons who have had several seborrheic keratoses can usually recognize this type of benign growth.  However, if you are concerned or unsure about any growth, consult me.    Treatment    Seborrheic keratoses can easily be removed in the office.  The only reason for removing a seborrheic keratosis is your wish to get rid of it.    ]

## 2017-06-07 ENCOUNTER — TELEPHONE (OUTPATIENT)
Dept: INTERNAL MEDICINE | Facility: CLINIC | Age: 67
End: 2017-06-07

## 2017-06-07 ENCOUNTER — OFFICE VISIT (OUTPATIENT)
Dept: INTERNAL MEDICINE | Facility: CLINIC | Age: 67
End: 2017-06-07
Payer: MEDICARE

## 2017-06-07 ENCOUNTER — HOSPITAL ENCOUNTER (OUTPATIENT)
Dept: RADIOLOGY | Facility: HOSPITAL | Age: 67
Discharge: HOME | End: 2017-06-07
Attending: INTERNAL MEDICINE
Payer: MEDICARE

## 2017-06-07 VITALS
TEMPERATURE: 99 F | OXYGEN SATURATION: 98 % | WEIGHT: 141.13 LBS | BODY MASS INDEX: 21.39 KG/M2 | HEIGHT: 68 IN | DIASTOLIC BLOOD PRESSURE: 90 MMHG | SYSTOLIC BLOOD PRESSURE: 128 MMHG | HEART RATE: 88 BPM

## 2017-06-07 DIAGNOSIS — M25.461 EFFUSION, RIGHT KNEE: Primary | ICD-10-CM

## 2017-06-07 DIAGNOSIS — M25.461 EFFUSION, RIGHT KNEE: ICD-10-CM

## 2017-06-07 PROCEDURE — 73562 X-RAY EXAM OF KNEE 3: CPT | Mod: 26,RT,, | Performed by: RADIOLOGY

## 2017-06-07 PROCEDURE — 99999 PR PBB SHADOW E&M-EST. PATIENT-LVL IV: CPT | Mod: PBBFAC,,, | Performed by: INTERNAL MEDICINE

## 2017-06-07 PROCEDURE — 73562 X-RAY EXAM OF KNEE 3: CPT | Mod: TC,RT

## 2017-06-07 PROCEDURE — 1159F MED LIST DOCD IN RCRD: CPT | Mod: ,,, | Performed by: INTERNAL MEDICINE

## 2017-06-07 PROCEDURE — 99213 OFFICE O/P EST LOW 20 MIN: CPT | Mod: S$PBB,,, | Performed by: INTERNAL MEDICINE

## 2017-06-07 PROCEDURE — 1125F AMNT PAIN NOTED PAIN PRSNT: CPT | Mod: ,,, | Performed by: INTERNAL MEDICINE

## 2017-06-07 NOTE — TELEPHONE ENCOUNTER
----- Message from Boris Adkins sent at 6/7/2017  9:51 AM CDT -----  Contact: self  Pt called in about wanting to schedule appt. Pt fell couple of weeks ago. Pt is still having pain in knee. Next appt is 7/31. Pt would like to be seen sooner than that. Pt would like the nurse to give her a call back in regards to this matter        Pt can be reached at [phone]      Thank You

## 2017-06-07 NOTE — PROGRESS NOTES
Subjective:       Patient ID: Dia Ovalles is a 66 y.o. female.    Chief Complaint: Knee Pain (rt knee pain from fall 1 mo ago. )    Knee Pain    Incident onset: about 5 weeks ago[place] The injury mechanism was a fall. The pain is present in the right knee. The quality of the pain is described as aching. The pain is at a severity of 3/10. The pain is mild. The pain has been fluctuating since onset. Pertinent negatives include no inability to bear weight, loss of motion, loss of sensation, muscle weakness, numbness or tingling. She reports no foreign bodies present. The symptoms are aggravated by weight bearing. She has tried NSAIDs and ice for the symptoms. The treatment provided mild relief.     Review of Systems   Constitutional: Negative for activity change, chills, fatigue, fever and unexpected weight change.   HENT: Negative for congestion, ear pain, hearing loss, nosebleeds, postnasal drip, rhinorrhea, sinus pressure, sore throat and trouble swallowing.    Eyes: Negative.  Negative for discharge and visual disturbance.   Respiratory: Negative for cough, chest tightness, shortness of breath and wheezing.    Cardiovascular: Negative for chest pain and palpitations.   Gastrointestinal: Negative for abdominal pain, blood in stool, constipation, diarrhea, nausea and vomiting.   Endocrine: Negative for polydipsia and polyuria.   Genitourinary: Negative for difficulty urinating, dysuria, frequency, hematuria, menstrual problem and urgency.   Musculoskeletal: Positive for arthralgias and joint swelling. Negative for neck pain and neck stiffness.   Skin: Negative for rash.   Neurological: Negative for dizziness, tingling, weakness, numbness and headaches.   Psychiatric/Behavioral: Negative for dysphoric mood and sleep disturbance. The patient is not nervous/anxious.        Objective:      Physical Exam   Musculoskeletal:        Right knee: She exhibits effusion. She exhibits normal range  of motion, no swelling, no ecchymosis, no deformity, no laceration, no erythema, normal alignment, no LCL laxity, no bony tenderness, normal meniscus and no MCL laxity. No tenderness found.       Assessment:       1. Effusion, right knee        Plan:   Dia was seen today for knee pain.    Diagnoses and all orders for this visit:    Effusion, right knee  -     Ambulatory referral to Orthopedics  -     X-Ray Knee 3 View Right; Future

## 2017-06-07 NOTE — TELEPHONE ENCOUNTER
Spoke with pt, pt states she fell [place] [employer] hitting her right knee. Pt thought by now the pain would have decreased. She is still experience pain in right knee. U/c apt made for today 05/07/2017 with Dr. Martinez.

## 2017-06-08 ENCOUNTER — HOSPITAL ENCOUNTER (OUTPATIENT)
Dept: RADIOLOGY | Facility: HOSPITAL | Age: 67
Discharge: HOME | End: 2017-06-08
Attending: NURSE PRACTITIONER
Payer: MEDICARE

## 2017-06-08 ENCOUNTER — OFFICE VISIT (OUTPATIENT)
Dept: ORTHOPEDICS | Facility: CLINIC | Age: 67
End: 2017-06-08
Payer: MEDICARE

## 2017-06-08 VITALS — RESPIRATION RATE: 16 BRPM | HEIGHT: 68 IN | WEIGHT: 141.13 LBS | BODY MASS INDEX: 21.39 KG/M2

## 2017-06-08 DIAGNOSIS — M25.561 ACUTE PAIN OF RIGHT KNEE: ICD-10-CM

## 2017-06-08 DIAGNOSIS — M25.561 ACUTE PAIN OF RIGHT KNEE: Primary | ICD-10-CM

## 2017-06-08 PROCEDURE — 99213 OFFICE O/P EST LOW 20 MIN: CPT | Mod: S$PBB,,, | Performed by: NURSE PRACTITIONER

## 2017-06-08 PROCEDURE — 99213 OFFICE O/P EST LOW 20 MIN: CPT | Mod: PBBFAC,25,PO | Performed by: NURSE PRACTITIONER

## 2017-06-08 PROCEDURE — 73560 X-RAY EXAM OF KNEE 1 OR 2: CPT | Mod: 26,50,, | Performed by: RADIOLOGY

## 2017-06-08 PROCEDURE — 1125F AMNT PAIN NOTED PAIN PRSNT: CPT | Mod: ,,, | Performed by: NURSE PRACTITIONER

## 2017-06-08 PROCEDURE — 99999 PR PBB SHADOW E&M-EST. PATIENT-LVL III: CPT | Mod: PBBFAC,,, | Performed by: NURSE PRACTITIONER

## 2017-06-08 PROCEDURE — 1159F MED LIST DOCD IN RCRD: CPT | Mod: ,,, | Performed by: NURSE PRACTITIONER

## 2017-06-08 RX ORDER — MELOXICAM 15 MG/1
15 TABLET ORAL DAILY
Qty: 30 TABLET | Refills: 1 | Status: SHIPPED | OUTPATIENT
Start: 2017-06-08 | End: 2018-03-08

## 2017-06-08 NOTE — LETTER
June 8, 2017      Cheli Martinez MD  [address]  [place] [address]           [facility]  [address]  [place] [address]  Phone: [phone]  Fax: [phone]          Patient: Dia Ovalles   MR Number: [id number]   YOB: 1950   Date of Visit: 6/8/2017       Dear Dr. Cheli Martinez:    Thank you for referring Dia Ovalles to me for evaluation. Attached you will find relevant portions of my assessment and plan of care.    If you have questions, please do not hesitate to call me. I look forward to following Dia Ovalles along with you.    Sincerely,    Jaelyn Gallardo NP    Enclosure  CC:  No Recipients    If you would like to receive this communication electronically, please contact [email or web address] or [phone] to request more information on [employer] Link access.    For providers and/or their staff who would like to refer a patient to [facility], please contact us through our one-stop-shop provider referral line, [facility], at [phone].    If you feel you have received this communication in error or would no longer like to receive these types of communications, please e-mail [email or web address]

## 2017-06-09 NOTE — PROGRESS NOTES
CC: Pain of the Right Knee      HPI: Pt with right knee pain for the past 2 months, since tripping on a blanket at [employer] and falling on the knee. The swelling and bruising have resolved, but there is still aching pain medially. She has taken ibuprofen with some relief, but the pain has not completely resolved and she comes in for further evaluation. She is retired. She is ambulating without assistive device. There is not a limp.    ROS  General: denies fever and chills  Resp: no c/o sob  CVS: no c/o cp  MSK: c/o right knee pain which is medial and aching    PE  General: AAOx3, pleasant and cooperative  Resp: respirations even and unlabored  MSK: right knee exam  0 degrees extension  120 degrees flexion  No warmth or erythema   - effusion    Xray:  Ordered and reviewed by me: Slight valgus position is present on the right. The lateral tibiofemoral joint space at the right knee is moderately narrowed. The other joint spaces are better preserved. Only slight spurring is noted. No erosive change or focal soft tissue swelling is detected    Assessment:  Right knee injury    Plan:  Discussed treatment options with patient and she would like to try a prescription antiinflammatory for the next 2 weeks. If she does not have any relief, she will return for a cortisone injection  Contact information given

## 2017-09-05 DIAGNOSIS — N32.81 OAB (OVERACTIVE BLADDER): ICD-10-CM

## 2017-09-05 RX ORDER — TOLTERODINE 4 MG/1
CAPSULE, EXTENDED RELEASE ORAL
Qty: 90 CAPSULE | Refills: 1 | Status: SHIPPED | OUTPATIENT
Start: 2017-09-05 | End: 2017-09-21 | Stop reason: SDUPTHER

## 2017-09-05 RX ORDER — TOLTERODINE TARTRATE 2 MG/1
2 TABLET, EXTENDED RELEASE ORAL 2 TIMES DAILY PRN
Qty: 90 TABLET | Refills: 1 | Status: SHIPPED | OUTPATIENT
Start: 2017-09-05 | End: 2017-09-13 | Stop reason: SDUPTHER

## 2017-09-13 DIAGNOSIS — N32.81 OAB (OVERACTIVE BLADDER): ICD-10-CM

## 2017-09-13 RX ORDER — TOLTERODINE TARTRATE 2 MG/1
2 TABLET, EXTENDED RELEASE ORAL 2 TIMES DAILY PRN
Qty: 90 TABLET | Refills: 1 | Status: SHIPPED | OUTPATIENT
Start: 2017-09-13 | End: 2019-03-13 | Stop reason: SDUPTHER

## 2017-09-18 ENCOUNTER — PATIENT MESSAGE (OUTPATIENT)
Dept: ORTHOPEDICS | Facility: CLINIC | Age: 67
End: 2017-09-18

## 2017-09-18 ENCOUNTER — TELEPHONE (OUTPATIENT)
Dept: ORTHOPEDICS | Facility: CLINIC | Age: 67
End: 2017-09-18

## 2017-09-18 DIAGNOSIS — M25.531 RIGHT WRIST PAIN: Primary | ICD-10-CM

## 2017-09-18 NOTE — TELEPHONE ENCOUNTER
Pt had surgery for her right wrist fx on 9/14. She will need to make post op and follow up appointments.

## 2017-09-18 NOTE — TELEPHONE ENCOUNTER
LM on  VM asking pt to request Plan of Care and detailed Op notes from the surgery she had out of town on her wrist. We can see her at her 2 week post op, but we require that information at the time of the appt.

## 2017-09-18 NOTE — TELEPHONE ENCOUNTER
----- Message from Kelly Chidi sent at 9/18/2017  8:28 AM CDT -----  Contact: ANA LALA [[id number]]  _  1st Request  x_  2nd Request  _  3rd Request        Who: ANA LALA [[id number]]    Why: New patient states she fractured her right her wrist on 9/14 and would like to schedule an appt to be seen as soon as possible.     What Number to Call Back: [phone]    When to Expect a call back: (Before the end of the day)   -- if call after 3:00 call back will be tomorrow.

## 2017-09-21 DIAGNOSIS — N32.81 OAB (OVERACTIVE BLADDER): ICD-10-CM

## 2017-09-21 RX ORDER — TOLTERODINE 4 MG/1
4 CAPSULE, EXTENDED RELEASE ORAL DAILY
Qty: 90 CAPSULE | Refills: 1 | Status: SHIPPED | OUTPATIENT
Start: 2017-09-21 | End: 2018-03-12 | Stop reason: SDUPTHER

## 2017-09-22 ENCOUNTER — TELEPHONE (OUTPATIENT)
Dept: INTERNAL MEDICINE | Facility: CLINIC | Age: 67
End: 2017-09-22

## 2017-09-22 DIAGNOSIS — Z12.11 COLON CANCER SCREENING: ICD-10-CM

## 2017-09-27 ENCOUNTER — HOSPITAL ENCOUNTER (OUTPATIENT)
Dept: RADIOLOGY | Facility: OTHER | Age: 67
Discharge: HOME | End: 2017-09-27
Attending: ORTHOPAEDIC SURGERY
Payer: MEDICARE

## 2017-09-27 ENCOUNTER — OFFICE VISIT (OUTPATIENT)
Dept: ORTHOPEDICS | Facility: CLINIC | Age: 67
End: 2017-09-27
Payer: MEDICARE

## 2017-09-27 VITALS
HEART RATE: 78 BPM | SYSTOLIC BLOOD PRESSURE: 124 MMHG | WEIGHT: 141 LBS | RESPIRATION RATE: 18 BRPM | DIASTOLIC BLOOD PRESSURE: 78 MMHG | BODY MASS INDEX: 21.37 KG/M2 | HEIGHT: 68 IN

## 2017-09-27 DIAGNOSIS — M25.531 RIGHT WRIST PAIN: ICD-10-CM

## 2017-09-27 DIAGNOSIS — S52.541A CLOSED SMITH'S FRACTURE OF RIGHT RADIUS, INITIAL ENCOUNTER: Primary | ICD-10-CM

## 2017-09-27 PROCEDURE — 99203 OFFICE O/P NEW LOW 30 MIN: CPT | Mod: S$PBB,,, | Performed by: PHYSICIAN ASSISTANT

## 2017-09-27 PROCEDURE — 99999 PR PBB SHADOW E&M-EST. PATIENT-LVL IV: CPT | Mod: PBBFAC,,, | Performed by: PHYSICIAN ASSISTANT

## 2017-09-27 PROCEDURE — 73110 X-RAY EXAM OF WRIST: CPT | Mod: TC,RT

## 2017-09-27 PROCEDURE — 1125F AMNT PAIN NOTED PAIN PRSNT: CPT | Mod: ,,, | Performed by: PHYSICIAN ASSISTANT

## 2017-09-27 PROCEDURE — 1159F MED LIST DOCD IN RCRD: CPT | Mod: ,,, | Performed by: PHYSICIAN ASSISTANT

## 2017-09-27 PROCEDURE — 73110 X-RAY EXAM OF WRIST: CPT | Mod: 26,RT,, | Performed by: INTERNAL MEDICINE

## 2017-09-27 PROCEDURE — 99214 OFFICE O/P EST MOD 30 MIN: CPT | Mod: PBBFAC,25 | Performed by: PHYSICIAN ASSISTANT

## 2017-09-27 RX ORDER — SULFAMETHOXAZOLE AND TRIMETHOPRIM 800; 160 MG/1; MG/1
1 TABLET ORAL 2 TIMES DAILY
Qty: 20 TABLET | Refills: 0 | Status: SHIPPED | OUTPATIENT
Start: 2017-09-27 | End: 2017-09-27 | Stop reason: SDUPTHER

## 2017-09-27 RX ORDER — SULFAMETHOXAZOLE AND TRIMETHOPRIM 800; 160 MG/1; MG/1
1 TABLET ORAL 2 TIMES DAILY
Qty: 20 TABLET | Refills: 0 | Status: SHIPPED | OUTPATIENT
Start: 2017-09-27 | End: 2017-10-07

## 2017-09-27 NOTE — PROGRESS NOTES
"Ms. Ovalels is here today for a follow up visit.  She is 13 days status post right distal radius fracture ORIF at a hospital in [place] on 9/14/17. She reports that she is doing well with mild to moderate pain.  Pain is currently 4/10.  She is taking Tylenol for pain and has been using medical marijuana.  She does not use the codeine pain medication prescribed by her surgeon postoperatively.  She denies fever, chills, and sweats since the time of the surgery.  She reports that she was placed on 7 days of an antibiotic at the time surgery, she is unsure which antibiotic she was taking.    ROS:  Constitutional: no fever or chills  Skin: no rash or suspicious lesions  Musculoskeletal: See HPI.   Neurological: no headaches, lightheadedness, or dizziness.   Psychological/behavioral: no anxiety or depression    Physical exam:    Vitals:    09/27/17 1304   BP: 124/78   Pulse: 78   Resp: 18   Weight: 64 kg (141 lb)   Height: 5' 8" (1.727 m)   PainSc:   4   PainLoc: Wrist     Vital signs are stable, patient is afebrile.  Patient is well dressed and well groomed, no acute distress.  Alert and oriented to person, place, and time.  Right upper extremity: Postoperative splint and dressing removed.  Surgical scar with Prolene sutures intact on the volar aspect of the right wrist.  There are for superficial skin abrasions covered with Xeroform gauze.  Discharge noted on the postoperative dressing.  No purulent drainage noted with wounds were inspected.  No erythema or increased warmth of the skin.  Sutures were removed without difficulty.  Abrasions were redressed and patient was placed in a forearm brace.  Mild edema noted.  Good finger range of motion.  Neurovascularly intact.    RADIOLOGY:  Right Wrist X-Ray, 9/27/17  3 views obtained.  There is cast material partly obscured osseous detail.  There are surgical changes with internal fixation of the distal radius fracture.  Overall alignment is satisfactory and hardware is " intact.   Impression    As above       Comments: I have personally reviewed the imaging and I agree with the above radiologist's report.    Assessment: 13 days status post ORIF right distal radius    Plan:  Dia was seen today for pain.    Diagnoses and all orders for this visit:    Closed Ortiz's fracture of right radius, initial encounter  -     Ambulatory Referral to Physical/Occupational Therapy  -     X-Ray Wrist Complete Right; Future    Other orders  -     Discontinue: sulfamethoxazole-trimethoprim 800-160mg (BACTRIM DS) 800-160 mg Tab; Take 1 tablet by mouth 2 (two) times daily.  -     sulfamethoxazole-trimethoprim 800-160mg (BACTRIM DS) 800-160 mg Tab; Take 1 tablet by mouth 2 (two) times daily.        - PO instruction reviewed and provided to patient  - Orders placed for OT  - Forearm brace provided - full time use  - Bactrim DS prescribed  - Follow up in 4 weeks with X-Ray  - Discussed analgesia options with patient  - Call with questions or concerns

## 2017-09-29 ENCOUNTER — PATIENT MESSAGE (OUTPATIENT)
Dept: INTERNAL MEDICINE | Facility: CLINIC | Age: 67
End: 2017-09-29

## 2017-10-06 ENCOUNTER — CLINICAL SUPPORT (OUTPATIENT)
Dept: REHABILITATION | Facility: HOSPITAL | Age: 67
End: 2017-10-06
Attending: PHYSICIAN ASSISTANT
Payer: MEDICARE

## 2017-10-06 DIAGNOSIS — M25.531 WRIST PAIN, RIGHT: ICD-10-CM

## 2017-10-06 DIAGNOSIS — M25.60 RANGE OF MOTION DEFICIT: ICD-10-CM

## 2017-10-06 DIAGNOSIS — R29.898 DECREASED GRIP STRENGTH OF RIGHT HAND: ICD-10-CM

## 2017-10-06 DIAGNOSIS — S52.541D CLOSED SMITH'S FRACTURE OF RIGHT RADIUS WITH ROUTINE HEALING, SUBSEQUENT ENCOUNTER: Primary | ICD-10-CM

## 2017-10-06 PROCEDURE — G8984 CARRY CURRENT STATUS: HCPCS | Mod: CL

## 2017-10-06 PROCEDURE — G8985 CARRY GOAL STATUS: HCPCS | Mod: CK

## 2017-10-06 PROCEDURE — 97165 OT EVAL LOW COMPLEX 30 MIN: CPT

## 2017-10-06 PROCEDURE — 97018 PARAFFIN BATH THERAPY: CPT | Mod: 59
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
## 2017-10-11 ENCOUNTER — CLINICAL SUPPORT (OUTPATIENT)
Dept: REHABILITATION | Facility: HOSPITAL | Age: 67
End: 2017-10-11
Payer: MEDICARE

## 2017-10-11 DIAGNOSIS — M25.531 WRIST PAIN, RIGHT: ICD-10-CM

## 2017-10-11 DIAGNOSIS — R29.898 DECREASED GRIP STRENGTH OF RIGHT HAND: ICD-10-CM

## 2017-10-11 DIAGNOSIS — S52.541D CLOSED SMITH'S FRACTURE OF RIGHT RADIUS WITH ROUTINE HEALING, SUBSEQUENT ENCOUNTER: Primary | ICD-10-CM

## 2017-10-11 DIAGNOSIS — M25.60 RANGE OF MOTION DEFICIT: ICD-10-CM

## 2017-10-11 PROCEDURE — 97140 MANUAL THERAPY 1/> REGIONS: CPT

## 2017-10-11 PROCEDURE — 97018 PARAFFIN BATH THERAPY: CPT | Mod: 59

## 2017-10-11 PROCEDURE — 97110 THERAPEUTIC EXERCISES: CPT

## 2017-10-11 NOTE — PROGRESS NOTES
"OT Daily Progress Note    Patient:  Dia Ovalles  [facility] #:  [id number]   Date of Note: 10/11/2017   Referring Physician:  Alisha Brasher PA; Dr. ROXANN Rendon   Diagnosis:    Encounter Diagnoses   Name Primary?    Closed Ortiz's fracture of right radius with routine healing, subsequent encounter Yes    Range of motion deficit     Wrist pain, right     Decreased  strength of right hand       Visit 2 of 12, expires 12/31/2017    [facility] / MEDICARE-  10/11/2017    Start Time: 815am  End Time: 915am  Total Time: 60 min  Group Time: 0    Subjective:   Pt reports "My arm is progressing for sure".     Pain: 5 out of 10     Objective:   Patient seen by OT this session.Patient received paraffin bath to R hand(s) for 10 minutes to increase blood flow, circulation, pain management and for tissue elasticity for pain/scar management. Performed brief debriment of  ESCHAR and remement stitch on volar wrist. Performed scar massage with massage wand and manual massage to decrease adhesion and improve tensile glide. Performed desensitization with different textures on volar wrist to decrease hypersensitivity and for nerve re-education.  Provided 4 textures for home use 1-2 x daily x 1-2 min each. .  Performed  Active wrist flex, ext, abd/add, palmar abd/radial add, RD/UD, sup/pro, thumb flex, and opposition x10 reps.  Patient given HEP for  Thumb radial/palmar abduction  to improve flexibility and dexterity. Patient reported good understanding of HEP.     Treatment: Paraffin x 10 min, Therapeutic exercises x 20 min and Manual therapy x 15 min     Assessment:  Continued hypersensitivity along scar adhesion. ROM improving.  Decreased edema noted, moderate scar adhesions remain.   Pt will continue to benefit from skilled OT intervention.   Patient is making good progress toward established goals.   Patient continues to demonstrate limitation  with  ROM, Joint mobility, Stiffness, Decreased gross motor coordination, Decreased " "functional use, Impaired sensation, Decreased strength, Continued pain and scar adhesions and edema.    Goals: ( 6 weeks)   1)  Patient to be IND with HEP and modalities for pain management  2)  Increase ROM 3-5 degrees to increase functional hand use for ADLs/work/leisure activities  3) Decrease edema .2-.3 mm to increase joint mobility /flexibility for functional hand use.   4)  Assess  and pinch and set goals accordingly.   5)  Patient to score at 40-60%  or less on FOTO to demonstrate improved perception of functional R hand  Use.     Plan:   Patient to be treated by Occupational Therapy    1-2    times per week for   6-8                   weeks  during the certification period from   10/6/2017     to  12/6/2017   to achieve the established goals.      Student: Jessie Hollins     " I certify that I was present in the room directing the student in service delivery and guiding them using my skilled judgement. As the co-signing therapist, I have reviewed the student's documentation and am responsible for the treatment, assessment and plan."    Therapist: RODRIGUEZ Ye, RAJESH      "

## 2017-10-18 ENCOUNTER — CLINICAL SUPPORT (OUTPATIENT)
Dept: REHABILITATION | Facility: HOSPITAL | Age: 67
End: 2017-10-18
Payer: MEDICARE

## 2017-10-18 DIAGNOSIS — M25.531 WRIST PAIN, RIGHT: ICD-10-CM

## 2017-10-18 DIAGNOSIS — S52.541D CLOSED SMITH'S FRACTURE OF RIGHT RADIUS WITH ROUTINE HEALING, SUBSEQUENT ENCOUNTER: ICD-10-CM

## 2017-10-18 DIAGNOSIS — M25.60 RANGE OF MOTION DEFICIT: ICD-10-CM

## 2017-10-18 PROCEDURE — 97022 WHIRLPOOL THERAPY: CPT

## 2017-10-18 PROCEDURE — 97140 MANUAL THERAPY 1/> REGIONS: CPT

## 2017-10-18 PROCEDURE — 97110 THERAPEUTIC EXERCISES: CPT

## 2017-10-18 NOTE — PROGRESS NOTES
"OT Daily Progress Note    Patient:  Dia Ovalles  [facility] #:  [id number]   Date of Note: 10/18/2017   Referring Physician:  Alisha Brasher PA; Dr. ROXANN Rendon   Diagnosis:  R distal radius fracture with ORIF 9/14/2017   Encounter Diagnoses   Name Primary?    Closed Ortiz's fracture of right radius with routine healing, subsequent encounter Yes    Range of motion deficit     Wrist pain, right     Decreased  strength of right hand       Visit 3 of 12, expires 12/31/2017  [facility] [place] / MEDICARE-  10/11/2017 (visit 1)     Start Time: 820am  End Time: 915am  Total Time: 55 min  Group Time: 0    Subjective:   Pt reports "My arm is feeling better with the pain but I'm still limited with my range of motion."      Pain: 0 out of 10 (Stiffness)     Objective:   Patient seen by OT this session.Patient received paraffin bath to R hand(s) for 10 minutes to increase blood flow, circulation, pain management and for tissue elasticity for pain/scar management. Performed scar massage with massage wand and manual massage to decrease adhesion and improve tensile glide. Performed desensitization with different textures on volar wrist to decrease hypersensitivity and for nerve re-education. Utilized the fluidotherapy modality x 10 min to decrease hypersensitivity and improve ROM.   Performed Active wrist flex, ext, abd/add, palmar abd/radial add, RD/UD, sup/pro, prayer stretch, thumb flex, and opposition x10 reps each .Thumb radial/palmar abduction  X 10 reps to improve flexibility and dexterity. Patient reported good understanding of HEP.     Treatment: Paraffin x 10 min, Therapeutic exercises x 20 min, Manual therapy x 15 min and fluidotherapy x10      Assessment:  Mild to moderate hypersensitivity along scar adhesion. ROM (supanation) improving. Decreased edema noted, moderate scar adhesions remain.Patient noted that the fludiotherapy improved her dexterity while performing her active range of motion exercises. Pt will continue to " "benefit from skilled OT intervention.   Patient is making good progress toward established goals. Patient continues to demonstrate limitation  with  ROM, Joint mobility, Stiffness, Decreased gross motor coordination, Decreased functional use, Impaired sensation, Decreased strength, Continued pain and scar adhesions and edema.    Goals: ( 6 weeks)   1)  Patient to be IND with HEP and modalities for pain management  2)  Increase ROM 3-5 degrees to increase functional hand use for ADLs/work/leisure activities  3) Decrease edema .2-.3 mm to increase joint mobility /flexibility for functional hand use.   4)  Assess  and pinch and set goals accordingly.   5)  Patient to score at 40-60%  or less on FOTO to demonstrate improved perception of functional R hand  Use.     Plan:   Patient to be treated by Occupational Therapy    1-2    times per week for   6-8                   weeks  during the certification period from   10/6/2017     to  12/6/2017   to achieve the established goals.      Student: PINA Zavaleta    " I certify that I was present in the room directing the student in service delivery and guiding them using my skilled judgement. As the co-signing therapist, I have reviewed the student's documentation and am responsible for the treatment, assessment and plan."    Therapist: RODRIGUEZ Ye,       "

## 2017-10-25 ENCOUNTER — HOSPITAL ENCOUNTER (OUTPATIENT)
Dept: RADIOLOGY | Facility: OTHER | Age: 67
Discharge: HOME | End: 2017-10-25
Attending: PHYSICIAN ASSISTANT
Payer: MEDICARE

## 2017-10-25 ENCOUNTER — OFFICE VISIT (OUTPATIENT)
Dept: ORTHOPEDICS | Facility: CLINIC | Age: 67
End: 2017-10-25
Payer: MEDICARE

## 2017-10-25 VITALS
BODY MASS INDEX: 21.37 KG/M2 | WEIGHT: 141 LBS | DIASTOLIC BLOOD PRESSURE: 79 MMHG | SYSTOLIC BLOOD PRESSURE: 114 MMHG | HEART RATE: 97 BPM | RESPIRATION RATE: 18 BRPM | HEIGHT: 68 IN

## 2017-10-25 DIAGNOSIS — S52.541A CLOSED SMITH'S FRACTURE OF RIGHT RADIUS, INITIAL ENCOUNTER: Primary | ICD-10-CM

## 2017-10-25 DIAGNOSIS — S52.541A CLOSED SMITH'S FRACTURE OF RIGHT RADIUS, INITIAL ENCOUNTER: ICD-10-CM

## 2017-10-25 PROCEDURE — 73110 X-RAY EXAM OF WRIST: CPT | Mod: TC,RT

## 2017-10-25 PROCEDURE — 99213 OFFICE O/P EST LOW 20 MIN: CPT | Mod: PBBFAC,25 | Performed by: PHYSICIAN ASSISTANT

## 2017-10-25 PROCEDURE — 99213 OFFICE O/P EST LOW 20 MIN: CPT | Mod: S$PBB,,, | Performed by: PHYSICIAN ASSISTANT

## 2017-10-25 PROCEDURE — 73110 X-RAY EXAM OF WRIST: CPT | Mod: 26,RT,, | Performed by: RADIOLOGY

## 2017-10-25 PROCEDURE — 99999 PR PBB SHADOW E&M-EST. PATIENT-LVL III: CPT | Mod: PBBFAC,,, | Performed by: PHYSICIAN ASSISTANT
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
## 2017-10-26 ENCOUNTER — CLINICAL SUPPORT (OUTPATIENT)
Dept: REHABILITATION | Facility: HOSPITAL | Age: 67
End: 2017-10-26
Payer: MEDICARE

## 2017-10-26 DIAGNOSIS — R29.898 DECREASED GRIP STRENGTH OF RIGHT HAND: ICD-10-CM

## 2017-10-26 DIAGNOSIS — M25.531 WRIST PAIN, RIGHT: ICD-10-CM

## 2017-10-26 DIAGNOSIS — S52.541D CLOSED SMITH'S FRACTURE OF RIGHT RADIUS WITH ROUTINE HEALING, SUBSEQUENT ENCOUNTER: ICD-10-CM

## 2017-10-26 DIAGNOSIS — M25.60 RANGE OF MOTION DEFICIT: Primary | ICD-10-CM

## 2017-10-26 PROCEDURE — 97022 WHIRLPOOL THERAPY: CPT

## 2017-10-26 PROCEDURE — 97140 MANUAL THERAPY 1/> REGIONS: CPT

## 2017-10-26 PROCEDURE — 97110 THERAPEUTIC EXERCISES: CPT

## 2017-10-26 NOTE — PROGRESS NOTES
"OT Daily Progress Note    Patient:  Dia Ovalles  Clinic #:  [id number]   Date of Note: 10/26/2017   Referring Physician:  Alisha Brasher PA; Dr. ROXANN Rendon   Diagnosis:  R distal radius fracture with ORIF 9/14/2017   Encounter Diagnoses   Name Primary?    Closed Ortiz's fracture of right radius with routine healing, subsequent encounter Yes    Range of motion deficit     Wrist pain, right     Decreased  strength of right hand       Visit 4 of 12, expires 12/31/2017  [facility] / MEDICARE-  10/11/2017 (visit 1)     Start Time: 925am  End Time: 10:25am  Total Time:  60 mins  Group Time: 0    Subjective:   Pt reports " I still have pain- I think it's because I'm not icing it like I use to".     Pain: 2 out of 10 (Infrequent pain)    Objective:   Patient seen by OT this session.Patient received paraffin bath to R hand(s) for 10 minutes to increase blood flow, circulation, pain management and for tissue elasticity for pain/scar management. Utilized the fluidotherapy modality x 10 min to decrease hypersensitivity, increase blood flow, tissue elasticity and improve ROM while performing exercises within the machine. Performed scar massage with massage wand and manual massage to decrease adhesion and improve tensile glide. Performed desensitization with different textures on volar wrist to decrease hypersensitivity and for nerve re-education.  Performed Active wrist flex, ext, abd/add, palmar abd/radial add, RD/UD, sup/pro, prayer stretch, thumb flex, and opposition x10 reps each.   Added #1 wrist flex/ext, RD, UD and 1# hammer stretch for sup and pron x 10 each. Perform yellow theraputty for gross sustained  x 10 reps, key, 2 and 3 pt pinch x 10 reps each. Yellow digiflex for isolated and composite finger flexion for strengthening.  Encouraged continuation of HEP, modality use .    Treatment: Paraffin x 10 min, Fluidotherapy x 10 min, Manual therapy x 15 min and Therapeutic exercises x 25 mins.    " "  Assessment:  Pt continues to have mild to moderate hypersensitivity along scar adhesion. Light purple bruising on mid to proximal forearm on volar side, and around incision. Mild edema and moderate scar adhesions remains. Added dycem for scar management.  Wrist flexion and extension remains limited. Gripping and pinching remains limited. Added theraputty to HEP to improve  and pinch. Advanced strengthening this date as tolerated.   Pt will continue to benefit from skilled OT intervention. Patient is making good progress toward established goals. Patient continues to demonstrate limitation  with  ROM, Joint mobility, Stiffness, Decreased gross motor coordination, Decreased functional use, Impaired sensation, Decreased strength, Continued pain and scar adhesions and edema.    Goals: ( 6 weeks)   1)  Patient to be IND with HEP and modalities for pain management  2)  Increase ROM 3-5 degrees to increase functional hand use for ADLs/work/leisure activities  3) Decrease edema .2-.3 mm to increase joint mobility /flexibility for functional hand use.   4)  Assess  and pinch and set goals accordingly.   5)  Patient to score at 40-60%  or less on FOTO to demonstrate improved perception of functional R hand  Use.     Plan:   Patient to be treated by Occupational Therapy    1-2    times per week for   6-8                   weeks  during the certification period from   10/6/2017     to  12/6/2017   to achieve the established goals.    Student: PINA Zavaleta    " I certify that I was present in the room directing the student in service delivery and guiding them using my skilled judgement. As the co-signing therapist, I have reviewed the student's documentation and am responsible for the treatment, assessment and plan."    Therapist: RODRIGUEZ Martínez, ASHLEY      "

## 2017-11-01 ENCOUNTER — CLINICAL SUPPORT (OUTPATIENT)
Dept: REHABILITATION | Facility: HOSPITAL | Age: 67
End: 2017-11-01
Payer: MEDICARE

## 2017-11-01 DIAGNOSIS — M25.531 WRIST PAIN, RIGHT: Primary | ICD-10-CM

## 2017-11-01 DIAGNOSIS — S52.541A CLOSED SMITH'S FRACTURE OF RIGHT RADIUS, INITIAL ENCOUNTER: ICD-10-CM

## 2017-11-01 DIAGNOSIS — M25.60 RANGE OF MOTION DEFICIT: ICD-10-CM

## 2017-11-01 DIAGNOSIS — R29.898 DECREASED GRIP STRENGTH OF RIGHT HAND: ICD-10-CM

## 2017-11-01 PROCEDURE — 97110 THERAPEUTIC EXERCISES: CPT

## 2017-11-01 PROCEDURE — 97035 APP MDLTY 1+ULTRASOUND EA 15: CPT

## 2017-11-01 PROCEDURE — 97140 MANUAL THERAPY 1/> REGIONS: CPT

## 2017-11-01 NOTE — PROGRESS NOTES
"OT Daily Progress Note    Patient:  Dia Ovalles  [facility] #:  [id number]   Date of Note: 11/01/2017   Referring Physician:  Alisha Brasher PA; Dr. ROXANN Rendon   Diagnosis:  R distal radius fracture with ORIF 9/14/2017   Encounter Diagnoses   Name Primary?    Closed Ortiz's fracture of right radius with routine healing, subsequent encounter Yes    Range of motion deficit     Wrist pain, right     Decreased  strength of right hand       Visit 5 of 12, expires 12/31/2017  [facility] / MEDICARE-  10/11/2017 (visit 1)     Start Time: 9:20am  End Time: 10:20 am  Total Time: 60 mins  Group Time: 0    Subjective:   Pt reports " I'm really worried about the scar- it still looks very red around the incision and still has a lot of scar tissue buildup".    Pain: 2 out of 10    Objective:   Patient seen by OT this session.Patient received paraffin bath to R hand(s) for 10 minutes to increase blood flow, circulation, pain management and for tissue elasticity for pain/scar management. Utilized the fluidotherapy modality x 10 min to decrease hypersensitivity and improve ROM. Performed US 3 mhz with 0.5 cm2 X 8 mins x 100% over dorsal wrist. Performed DTM scar massage with massage wand to decrease adhesion and improve tensile glide. Performed manual passive range of motion on patient's wrist to promote deep muscle stretch and joint stability.  PROM wrist flex, ext, RD/UD and sup/pro. Active wrist flex, ext, abd/add, palmar abd/radial add, RD/UD, sup/pro, prayer stretch, thumb flex, and opposition x10 reps each. Patient reported good understanding of HEP.  Added #1 wrist flex/ext, RD, UD and 1# hammer stretch for sup and pron x 10 each. Perform yellow theraputty for gross sustained  x 10 reps, key, 2 and 3 pt pinch x 10 reps each. Yellow digiflex for isolated and composite finger flexion for strengthening.     Treatment: US x 8 min, Fluidotherapy x 10 min, Manual therapy x 20 min and Therapeutic exercises x 22 mins.    " "  Assessment:  Pt has mild hypersensitivity along scar adhesion. No more visible bruising on mid to proximal forearm on volar side, and around incision. Moderate scar adhesions and redness remains. ROM flexion and extension improving yet remains limited. Gripping and pinching improving.   Advanced strengthening this date as tolerated.  Pt will continue to benefit from skilled OT intervention. Patient is making good progress toward established goals. Patient continues to demonstrate limitation  with  ROM, Joint mobility, Stiffness, Decreased gross motor coordination, Decreased functional use, Impaired sensation, Decreased strength, Continued pain and scar adhesions and edema.    Goals: ( 6 weeks)   1)  Patient to be IND with HEP and modalities for pain management  2)  Increase ROM 3-5 degrees to increase functional hand use for ADLs/work/leisure activities  3) Decrease edema .2-.3 mm to increase joint mobility /flexibility for functional hand use.   4)  Assess  and pinch and set goals accordingly.   5)  Patient to score at 40-60%  or less on FOTO to demonstrate improved perception of functional R hand  Use.     Plan:   Patient to be treated by Occupational Therapy    1-2    times per week for   6-8                   weeks  during the certification period from   10/6/2017     to  12/6/2017   to achieve the established goals.    Student: PINA Zavaleta    " I certify that I was present in the room directing the student in service delivery and guiding them using my skilled judgement. As the co-signing therapist, I have reviewed the student's documentation and am responsible for the treatment, assessment and plan."    Therapist: RODRIGUEZ Hebert CHT      "

## 2017-11-15 ENCOUNTER — CLINICAL SUPPORT (OUTPATIENT)
Dept: REHABILITATION | Facility: HOSPITAL | Age: 67
End: 2017-11-15
Payer: MEDICARE

## 2017-11-15 DIAGNOSIS — S52.541D CLOSED SMITH'S FRACTURE OF RIGHT RADIUS WITH ROUTINE HEALING, SUBSEQUENT ENCOUNTER: ICD-10-CM

## 2017-11-15 DIAGNOSIS — M25.60 RANGE OF MOTION DEFICIT: ICD-10-CM

## 2017-11-15 DIAGNOSIS — R29.898 DECREASED GRIP STRENGTH OF RIGHT HAND: ICD-10-CM

## 2017-11-15 DIAGNOSIS — M25.531 WRIST PAIN, RIGHT: Primary | ICD-10-CM

## 2017-11-15 PROCEDURE — 97140 MANUAL THERAPY 1/> REGIONS: CPT

## 2017-11-15 PROCEDURE — 97022 WHIRLPOOL THERAPY: CPT

## 2017-11-15 PROCEDURE — 97110 THERAPEUTIC EXERCISES: CPT

## 2017-11-15 NOTE — PROGRESS NOTES
"OT Daily Progress Note    Patient:  Dia Ovalles  [facility] #:  [id number]   Date of Note: 11/15/2017 c v  Referring Physician:  Alisha Brasher PA; Dr. ROXANN Rendon   Diagnosis:  R distal radius fracture with ORIF 9/14/2017   Encounter Diagnoses   Name Primary?    Closed Ortiz's fracture of right radius with routine healing, subsequent encounter Yes    Range of motion deficit     Wrist pain, right     Decreased  strength of right hand       Visit 6 of 12, expires 12/31/2017  [facility] / MEDICARE-  10/11/2017 (visit 1)     Start Time: 9:23am  End Time: 10:20 am  Total Time: 57 mins  Group Time: 0    Subjective:   Pt reports " I've noticed the scar isn't as red as it normally is. I have these little bumps at this end of my scar (distal on volar forearm).   Pain: 2 out of 10    Objective:   Patient seen by OT this session.   Patient received paraffin bath to R hand(s) for 10 minutes to increase blood flow, circulation, pain management and for tissue elasticity for pain/scar management.  Performed US 3 mhz with 0.5 cm2 X 8 mins x 100% over dorsal wrist. Performed DTM scar massage with massage wand to decrease adhesion and improve tensile glide. Performed manual passive range of motion on patient's wrist to promote deep muscle stretch and joint stability.  PROM wrist flex, ext, RD/UD and sup/pro. Performed Active wrist flex, ext, abd/add, palmar abd/radial add, RD/UD, sup/pro, prayer stretch, thumb flex, and opposition x10 reps each. Patient reported good understanding of HEP.  #1 wrist flex/ext, RD, UD and 1# hammer stretch for sup and pron x 10 each. Performed  yellow theraputty for gross sustained  x 10 reps, key, 2 and 3 pt pinch x 10 reps each.   Added Yellow digiflex for isolated and composite finger flexion for strengthening. Utilized the fluidotherapy modality x 10 min to decrease hypersensitivity, increase blood flow, tissue elasticity and improve ROM while performing exercises within the machine.  " "Encouraged continuation of HEP, modality use.     Treatment: US x 8 min, Fluidotherapy x 10 min, Manual therapy x 17 min and Therapeutic exercises x 22 mins.      Assessment:  Pt continues to have mild hypersensitivity along scar adhesion.   Pt continues to have moderate scar adhesions however redness has decreased significantly.   ROM flexion and extension is slightly improving. Pt was given silcon scar tape on this visit for scar management. Pt will continue to benefit from skilled OT intervention. Patient is making good progress toward established goals. Patient continues to demonstrate limitation  with  ROM, Joint mobility, Stiffness, Decreased gross motor coordination, Decreased functional use, Impaired sensation, Decreased strength, Continued pain and scar adhesions and edema.    Goals: ( 6 weeks)   1)  Patient to be IND with HEP and modalities for pain management  2)  Increase ROM 3-5 degrees to increase functional hand use for ADLs/work/leisure activities  3) Decrease edema .2-.3 mm to increase joint mobility /flexibility for functional hand use.   4)  Assess  and pinch and set goals accordingly.   5)  Patient to score at 40-60%  or less on FOTO to demonstrate improved perception of functional R hand  Use.     Plan:   Patient to be treated by Occupational Therapy    1-2    times per week for   6-8                   weeks  during the certification period from   10/6/2017     to  12/6/2017   to achieve the established goals.    Student: PINA Zavaleta    " I certify that I was present in the room directing the student in service delivery and guiding them using my skilled judgement. As the co-signing therapist, I have reviewed the student's documentation and am responsible for the treatment, assessment and plan."    Therapist: RODRIGUEZ Hebert, ASHLEY    "

## 2017-11-22 ENCOUNTER — CLINICAL SUPPORT (OUTPATIENT)
Dept: REHABILITATION | Facility: HOSPITAL | Age: 67
End: 2017-11-22
Payer: MEDICARE

## 2017-11-22 DIAGNOSIS — M25.531 WRIST PAIN, RIGHT: Primary | ICD-10-CM

## 2017-11-22 DIAGNOSIS — S52.541D CLOSED SMITH'S FRACTURE OF RIGHT RADIUS WITH ROUTINE HEALING, SUBSEQUENT ENCOUNTER: ICD-10-CM

## 2017-11-22 DIAGNOSIS — M25.60 RANGE OF MOTION DEFICIT: ICD-10-CM

## 2017-11-22 DIAGNOSIS — R29.898 DECREASED GRIP STRENGTH OF RIGHT HAND: ICD-10-CM

## 2017-11-22 PROCEDURE — 97022 WHIRLPOOL THERAPY: CPT

## 2017-11-22 PROCEDURE — 97035 APP MDLTY 1+ULTRASOUND EA 15: CPT

## 2017-11-22 PROCEDURE — 97140 MANUAL THERAPY 1/> REGIONS: CPT

## 2017-11-22 PROCEDURE — 97110 THERAPEUTIC EXERCISES: CPT

## 2017-11-22 NOTE — PROGRESS NOTES
"OT Daily Progress Note    Patient:  Dia Ovalles  Clinic #:  [id number]   Date of Note: 11/22/2017  Referring Physician:  Alisha Brasher PA; Dr. ROXANN Rendon   Diagnosis:  R distal radius fracture with ORIF 9/14/2017   Encounter Diagnoses   Name Primary?    Closed Ortiz's fracture of right radius with routine healing, subsequent encounter Yes    Range of motion deficit     Wrist pain, right     Decreased  strength of right hand       Visit 7 of 12, expires 12/31/2017  [facility] / MEDICARE-  10/11/2017 (visit 1)     Start Time: 8:00am  End Time: 9:00 am  Total Time: 60 mins  Group Time: 0    Subjective:   "I've noticed that my scar is looking A LOT better- it's not as red. Also, I noticed it's flattened down quite a bit. I think that scar tape helped".    Pain: 2-3 out of 10    Objective:   Patient seen by OT this session.   Patient received paraffin bath to R hand(s) for 10 minutes to increase blood flow, circulation, pain management and for tissue elasticity for pain/scar management.  Performed US 3 mhz with 0.5 cm2 X 8 mins x 100% over dorsal wrist. Performed DTM scar massage with massage wand to decrease adhesion and improve tensile glide. Performed manual passive range of motion on patient's wrist to promote deep muscle stretch and joint stability.  PROM wrist flex, ext, RD/UD and sup/pro.   Performed Active wrist flex, ext, abd/add, palmar abd/radial add, RD/UD, sup/pro, prayer stretch, thumb flex, and opposition x10 reps each.   Added   #1 wrist flex/ext, RD, UD and 1# hammer stretch for sup and pron x 10 each. Upgraded red theraputty for gross sustained  x 10 reps, key, 2 and 3 pt pinch x 10 reps each.   Upgraded to  green digiflex for isolated and composite finger flexion for strengthening x 10 reps each. Utilized the fluidotherapy modality x 10 min to decrease hypersensitivity, increase blood flow, tissue elasticity and improve ROM while performing exercises within the machine.  Encouraged " "continuation of HEP, modality use.     Treatment: Para 10 min US x 8 min, Fluidotherapy x 10 min, Manual therapy x 12 min and Therapeutic exercises x 20 mins.      Assessment:  Pt states that her hypersensitive has reduced since her last visit. Pt currently has mild to moderate scar adhesions however redness has decreased significantly. ROM flexion and extension continues to improve. Pt states that the  silcon scar tape has helped with the appearence of the scar. Pt will continue to benefit from skilled OT intervention. Patient is making good progress toward established goals. Patient continues to demonstrate limitation  with  ROM, Joint mobility, Stiffness, Decreased gross motor coordination, Decreased functional use, Impaired sensation, Decreased strength, Continued pain and scar adhesions and edema.    Goals: ( 6 weeks)   1)  Patient to be IND with HEP and modalities for pain management  2)  Increase ROM 3-5 degrees to increase functional hand use for ADLs/work/leisure activities  3) Decrease edema .2-.3 mm to increase joint mobility /flexibility for functional hand use.   4)  Assess  and pinch and set goals accordingly.   5)  Patient to score at 40-60%  or less on FOTO to demonstrate improved perception of functional R hand  Use.     Plan:   Patient to be treated by Occupational Therapy    1-2    times per week for   6-8                   weeks  during the certification period from   10/6/2017     to  12/6/2017   to achieve the established goals.    Student: PINA Zavaleta    " I certify that I was present in the room directing the student in service delivery and guiding them using my skilled judgement. As the co-signing therapist, I have reviewed the student's documentation and am responsible for the treatment, assessment and plan."    Therapist: RODRIGUEZ Hebert, RAJESH    "

## 2017-11-26 ENCOUNTER — OFFICE VISIT (OUTPATIENT)
Dept: URGENT CARE | Facility: CLINIC | Age: 67
End: 2017-11-26
Payer: MEDICARE

## 2017-11-26 VITALS
SYSTOLIC BLOOD PRESSURE: 118 MMHG | HEIGHT: 68 IN | TEMPERATURE: 99 F | RESPIRATION RATE: 20 BRPM | OXYGEN SATURATION: 97 % | BODY MASS INDEX: 20.92 KG/M2 | HEART RATE: 85 BPM | DIASTOLIC BLOOD PRESSURE: 83 MMHG | WEIGHT: 138 LBS

## 2017-11-26 DIAGNOSIS — J06.9 UPPER RESPIRATORY TRACT INFECTION, UNSPECIFIED TYPE: Primary | ICD-10-CM

## 2017-11-26 DIAGNOSIS — H10.33 ACUTE CONJUNCTIVITIS OF BOTH EYES, UNSPECIFIED ACUTE CONJUNCTIVITIS TYPE: ICD-10-CM

## 2017-11-26 PROCEDURE — 96372 THER/PROPH/DIAG INJ SC/IM: CPT | Mod: S$GLB,,, | Performed by: EMERGENCY MEDICINE

## 2017-11-26 PROCEDURE — 99214 OFFICE O/P EST MOD 30 MIN: CPT | Mod: 25,S$GLB,, | Performed by: EMERGENCY MEDICINE

## 2017-11-26 RX ORDER — DOXYCYCLINE HYCLATE 100 MG
100 TABLET ORAL 2 TIMES DAILY
Qty: 20 TABLET | Refills: 0 | Status: SHIPPED | OUTPATIENT
Start: 2017-11-26 | End: 2017-12-06

## 2017-11-26 RX ORDER — BETAMETHASONE SODIUM PHOSPHATE AND BETAMETHASONE ACETATE 3; 3 MG/ML; MG/ML
6 INJECTION, SUSPENSION INTRA-ARTICULAR; INTRALESIONAL; INTRAMUSCULAR; SOFT TISSUE ONCE
Status: COMPLETED | OUTPATIENT
Start: 2017-11-26 | End: 2017-11-26

## 2017-11-26 RX ORDER — POLYMYXIN B SULFATE AND TRIMETHOPRIM 1; 10000 MG/ML; [USP'U]/ML
SOLUTION OPHTHALMIC
Qty: 1 BOTTLE | Refills: 0 | Status: SHIPPED | OUTPATIENT
Start: 2017-11-26 | End: 2018-03-08

## 2017-11-26 RX ORDER — BENZONATATE 200 MG/1
200 CAPSULE ORAL 3 TIMES DAILY PRN
Qty: 30 CAPSULE | Refills: 0 | Status: SHIPPED | OUTPATIENT
Start: 2017-11-26 | End: 2017-12-06

## 2017-11-26 RX ORDER — ERYTHROMYCIN 5 MG/G
OINTMENT OPHTHALMIC
Qty: 1 TUBE | Refills: 0 | Status: SHIPPED | OUTPATIENT
Start: 2017-11-26 | End: 2018-03-08

## 2017-11-26 RX ADMIN — BETAMETHASONE SODIUM PHOSPHATE AND BETAMETHASONE ACETATE 6 MG: 3; 3 INJECTION, SUSPENSION INTRA-ARTICULAR; INTRALESIONAL; INTRAMUSCULAR; SOFT TISSUE at 10:11

## 2017-11-26 NOTE — PATIENT INSTRUCTIONS
Conjunctivitis, Nonspecific    The membrane that covers the white part of your eye (the conjunctiva) is inflamed. Inflammation happens when your body responds to an injury, allergic reaction, infection, or illness. Symptoms of inflammation in the eye may include redness, irritation, itching, swelling, or burning. These symptoms should go away within the next 24 hours. Conjunctivitis may be related to a particle that was in your eye. If so, it may wash out with your tears or irrigation treatment. Being exposed to liquid chemicals or fumes may also cause this reaction.   Home care  · Apply a cold pack (ice in a plastic bag, wrapped in a towel) over the eye for 20 minutes at a time. This will reduce pain.  · Artificial tears may be prescribed to reduce irritation or redness.  These should be used 3 to 4 times a day.  · You may use acetaminophen or ibuprofen to control pain, unless another medicine was prescribed.(Note: If you have chronic liver or kidney disease, or if you have ever had a stomach ulcer or gastrointestinal bleeding, talk with your healthcare provider before using these medicines.)  Follow-up care  Follow up with your healthcare provider, or as advised.  When to seek medical advice  Call your healthcare provider right away if any of these occur:  · Increased eyelid swelling  · Increased eye pain  · Increased redness or drainage from the eye  · Increased blurry vision or increased sensitivity to light  · Failure of normal vision to return within 24 to 48 hours  Date Last Reviewed: 6/14/2015  © 6581-7961 [employer]. [address] [place] [address], [place] [address]. All rights reserved. This information is not intended as a substitute for professional medical care. Always follow your healthcare professional's instructions.        Viral Upper Respiratory Illness (Adult)  You have a viral upper respiratory illness (URI), which is another term for the common cold. This illness is contagious during  the first few days. It is spread through the air by coughing and sneezing. It may also be spread by direct contact (touching the sick person and then touching your own eyes, nose, or mouth). Frequent handwashing will decrease risk of spread. Most viral illnesses go away within 7 to 10 days with rest and simple home remedies. Sometimes the illness may last for several weeks. Antibiotics will not kill a virus, and they are generally not prescribed for this condition.    Home care  · If symptoms are severe, rest at home for the first 2 to 3 days. When you resume activity, don't let yourself get too tired.  · Avoid being exposed to cigarette smoke (yours or others).  · You may use acetaminophen or ibuprofen to control pain and fever, unless another medicine was prescribed. (Note: If you have chronic liver or kidney disease, have ever had a stomach ulcer or gastrointestinal bleeding, or are taking blood-thinning medicines, talk with your healthcare provider before using these medicines.) Aspirin should never be given to anyone under 18 years of age who is ill with a viral infection or fever. It may cause severe liver or brain damage.  · Your appetite may be poor, so a light diet is fine. Avoid dehydration by drinking 6 to 8 glasses of fluids per day (water, soft drinks, juices, tea, or soup). Extra fluids will help loosen secretions in the nose and lungs.  · Over-the-counter cold medicines will not shorten the length of time youre sick, but they may be helpful for the following symptoms: cough, sore throat, and nasal and sinus congestion. (Note: Do not use decongestants if you have high blood pressure.)  Follow-up care  Follow up with your healthcare provider, or as advised.  When to seek medical advice  Call your healthcare provider right away if any of these occur:  · Cough with lots of colored sputum (mucus)  · Severe headache; face, neck, or ear pain  · Difficulty swallowing due to throat pain  · Fever of 100.4°F  "(38°C)  Call 911, or get immediate medical care  Call emergency services right away if any of these occur:  · Chest pain, shortness of breath, wheezing, or difficulty breathing  · Coughing up blood  · Inability to swallow due to throat pain  Date Last Reviewed: 9/13/2015  © 9285-5300 [employer]. [address] [place] [address], [place] [address]. All rights reserved. This information is not intended as a substitute for professional medical care. Always follow your healthcare professional's instructions.                                                  EYE   If your condition worsens or fails to improve we recommend that you receive another evaluation at the ER immediately or contact your PCP to discuss your concerns or return here. You must understand that you've received an urgent care treatment only and that you may be released before all your medical problems are known or treated. You the patient will arrange for followup care as instructed.                                                            URI   If your condition worsens or fails to improve we recommend that you receive another evaluation at the ER immediately or contact your PCP to discuss your concerns or return here. You must understand that you've received an urgent care treatment only and that you may be released before all your medical problems are known or treated. You the patient will arrange for follouwp care as instructed.   If we discussed that I think your illness is viral, it will not respond to antibiotics and will last 10-14 days. If we discussed "wait and see" antibiotics and if over the next few days the symptoms worsen start the antibiotics I have given you.   If you are female and on BCP and do take the antibiotics, use additional methods to prevent pregnancy while on the antibiotics and for one cycle after.   Flonase (fluticasone) is a nasal spray which is available over the counter and may help with your symptoms.   Zyrtec D, " "Claritin D or Allegra D can also help with symptoms of congestion and drainage.   If you have hypertension avoid using the "D" which is the decongestant   If you just have drainage you can take plain zyrtec, claritin or allegra   If you just have a congested feeling you can take pseudoephedrine (unless you have high blood pressure) which you have to sign for behind the counter. Do not buy the phenylephrine which is on the shelf as it is not effective   Rest and fluids are also important.   Tylenol or ibuprofen can also be used as directed for pain unless you have an allergy to them or medical condition such as stomach ulcers, kidney or liver disease or blood thinners etc for which you should not be taking these type of medications.   If you are flying in the next few days Afrin nose drops for the airplane flight upon take off and landing may help. Other than at those times refrain from using afrin.   If you were prescribed a narcotic do not drive or operate heavy machinery while taking these medications.    Use the eye drops for 24 hours after the last day of eye symptoms.   Do not wear your contact lens ( if you use them) for at least 5 days after you stop having symptoms and are rechecked by your doctor. Throw away the contacts, contact solution and carrying case you were using and start with new material.   If you develop increase eye symptoms or change in your vision seek medical care immediately either with your ophthalomologist or the ER or return here.     "

## 2017-11-26 NOTE — PROGRESS NOTES
"Subjective:       Patient ID: Dia Ovalles is a 67 y.o. female.    Vitals:  height is 5' 8" (1.727 m) and weight is 62.6 kg (138 lb). Her oral temperature is 98.6 °F (37 °C). Her blood pressure is 118/83 and her pulse is 85. Her respiration is 20 and oxygen saturation is 97%.     Chief Complaint: Eye Problem and Cough    Pt with a cold and cough for about one week. She also has a hoarse voice and can't seem to shake it. Now her eyes are itchy and red. She stopped wearing her contacts. No fever      Eye Problem    Both eyes are affected.This is a new problem. The current episode started in the past 7 days. The problem occurs constantly. The problem has been unchanged. The injury mechanism was contact lenses. The pain is at a severity of 0/10. The patient is experiencing no pain. There is known exposure to pink eye. She wears contacts. Associated symptoms include an eye discharge, eye redness and photophobia. Pertinent negatives include no blurred vision, fever, nausea or vomiting. She has tried eye drops for the symptoms. The treatment provided no relief.   Cough   This is a new problem. The current episode started 1 to 4 weeks ago. The problem has been unchanged. The problem occurs every few minutes. The cough is non-productive. Associated symptoms include eye redness, a sore throat and shortness of breath. Pertinent negatives include no chest pain, chills, ear pain, fever, headaches, myalgias or wheezing. Nothing aggravates the symptoms. She has tried OTC cough suppressant (Sudafed, Nyquil) for the symptoms. The treatment provided mild relief.     Review of Systems   Constitution: Negative for chills, fever and malaise/fatigue.   HENT: Positive for congestion, hoarse voice and sore throat. Negative for ear pain.    Eyes: Positive for discharge, photophobia and redness. Negative for blurred vision and pain.   Cardiovascular: Negative for chest pain, dyspnea on exertion and leg swelling.   Respiratory: Positive for " cough and shortness of breath. Negative for sputum production and wheezing.    Musculoskeletal: Negative for myalgias.   Gastrointestinal: Negative for abdominal pain, nausea and vomiting.   Neurological: Negative for headaches.       Objective:      Physical Exam   Constitutional: She is oriented to person, place, and time. She appears well-developed and well-nourished. She is cooperative.  Non-toxic appearance. She does not appear ill. No distress.   HENT:   Head: Normocephalic and atraumatic.   Right Ear: Hearing, tympanic membrane, external ear and ear canal normal.   Left Ear: Hearing, tympanic membrane, external ear and ear canal normal.   Nose: Nose normal. No mucosal edema, rhinorrhea or nasal deformity. No epistaxis. Right sinus exhibits no maxillary sinus tenderness and no frontal sinus tenderness. Left sinus exhibits no maxillary sinus tenderness and no frontal sinus tenderness.   Mouth/Throat: Uvula is midline and mucous membranes are normal. No trismus in the jaw. Normal dentition. No uvula swelling. Posterior oropharyngeal erythema present.   hoarse   Eyes: EOM and lids are normal. Pupils are equal, round, and reactive to light. Right conjunctiva is injected. Left conjunctiva is injected. No scleral icterus.       Sclera clear bilat   Neck: Trachea normal, full passive range of motion without pain and phonation normal. Neck supple.   Cardiovascular: Normal rate, regular rhythm, normal heart sounds, intact distal pulses and normal pulses.    Pulmonary/Chest: Effort normal and breath sounds normal. No respiratory distress.   Abdominal: Soft. Normal appearance and bowel sounds are normal. She exhibits no distension. There is no tenderness.   Musculoskeletal: Normal range of motion. She exhibits no edema or deformity.   Neurological: She is alert and oriented to person, place, and time. She exhibits normal muscle tone. Coordination normal.   Skin: Skin is warm, dry and intact. She is not diaphoretic. No  pallor.   Psychiatric: She has a normal mood and affect. Her speech is normal and behavior is normal. Judgment and thought content normal. Cognition and memory are normal.   Nursing note and vitals reviewed.    VA 20/25 OD 20/40 OS 20/25 OU  Assessment:       1. Upper respiratory tract infection, unspecified type    2. Acute conjunctivitis of both eyes, unspecified acute conjunctivitis type        Plan:         Upper respiratory tract infection, unspecified type    Acute conjunctivitis of both eyes, unspecified acute conjunctivitis type    Other orders  -     betamethasone acetate-betamethasone sodium phosphate injection 6 mg; Inject 1 mL (6 mg total) into the muscle once.  -     benzonatate (TESSALON) 200 MG capsule; Take 1 capsule (200 mg total) by mouth 3 (three) times daily as needed for Cough.  Dispense: 30 capsule; Refill: 0  -     erythromycin (ROMYCIN) ophthalmic ointment; Apply to affected eye nightly  Dispense: 1 Tube; Refill: 0  -     polymyxin B sulf-trimethoprim (POLYTRIM) 10,000 unit- 1 mg/mL Drop; One drop in affected eye(s) tid  Dispense: 1 Bottle; Refill: 0  -     doxycycline (VIBRA-TABS) 100 MG tablet; Take 1 tablet (100 mg total) by mouth 2 (two) times daily.  Dispense: 20 tablet; Refill: 0

## 2017-11-30 ENCOUNTER — CLINICAL SUPPORT (OUTPATIENT)
Dept: REHABILITATION | Facility: HOSPITAL | Age: 67
End: 2017-11-30
Payer: MEDICARE

## 2017-11-30 DIAGNOSIS — M25.531 WRIST PAIN, RIGHT: Primary | ICD-10-CM

## 2017-11-30 DIAGNOSIS — M25.60 RANGE OF MOTION DEFICIT: ICD-10-CM

## 2017-11-30 PROCEDURE — 97110 THERAPEUTIC EXERCISES: CPT

## 2017-11-30 PROCEDURE — 97035 APP MDLTY 1+ULTRASOUND EA 15: CPT

## 2017-11-30 PROCEDURE — 97140 MANUAL THERAPY 1/> REGIONS: CPT

## 2017-11-30 PROCEDURE — 97022 WHIRLPOOL THERAPY: CPT

## 2017-11-30 NOTE — PROGRESS NOTES
"OT Daily Progress Note    Patient:  Dia Ovalles  [facility] #:  [id number]   Date of Note: 11/22/2017  Referring Physician:  Alisha Brasher PA; Dr. ROXANN Rendon   Diagnosis:  R distal radius fracture with ORIF 9/14/2017   Encounter Diagnoses   Name Primary?    Closed Ortiz's fracture of right radius with routine healing, subsequent encounter Yes    Range of motion deficit     Wrist pain, right     Decreased  strength of right hand       Visit 8 of 12, expires 12/31/2017  [facility] / MEDICARE-  10/11/2017 (visit 1) , visit 8    Start Time: 8:10am  End Time: 9:20 am  Total Time: 70mins  Group Time: 0    Subjective:   "I've noticed that my scar is looking A LOT better- it's not as red. Also, I noticed it's flattened down quite a bit. I think that scar tape helped".    Pain: 2-3 out of 10    Objective:   Wrist ROM taken pre heat  Date 11/30/2017    R AROM   Supination/Pronation 90/70 (+8/-5)   Wrist ext/flex 55/40 (+35/+15)   Wrist RD/UD 25/14 (+17/-4)        Strength: (in pounds/psi)    Date 11/30/2017    L/R*   Rung II avg 3 45/18   Lateral pinch 7/4   Tripod pinch 6/4   Tip pinch 5/4       Focus on Therapeutic Outcomes (FOTO) Symptom Scale: Patient score indicates self perception of 41% impairment, limitation or restriction of function upon today's assessment.       Patient seen by OT this session.   Patient received paraffin bath to R hand(s) for 10 minutes to increase blood flow, circulation, pain management and for tissue elasticity for pain/scar management.  Performed US 3 mhz with 0.5 cm2 X 8 mins x 100% over dorsal wrist. Performed DTM scar massage with massage wand to decrease adhesion and improve tensile glide. Performed manual passive range of motion on patient's wrist to promote deep muscle stretch and joint stability.  PROM wrist flex, ext, RD/UD and sup/pro.   Performed Active wrist flex, ext, abd/add, palmar abd/radial add, RD/UD, sup/pro, prayer stretch (3 x 30 sec), thumb flex, and opposition x10 " reps each.     #3 wrist flex/ext, RD, UD and 1.5 # hammer stretch for sup and pron x 10 each. red theraputty for gross sustained  x 10 reps, key, 2 and 3 pt pinch x 10 reps each.  green digiflex for isolated and composite finger flexion for strengthening x 10 reps each. Encouraged continuation of HEP, modality use.       Treatment: Para 10 min (NC) US x 8 min, , Manual therapy x 10 min and Therapeutic exercises x 32 mins. Fluido x 10 min     Assessment:  Pt. Is 11 weeks post op. She is having difficulty with the amount of weight she can lift.  Objective measurements taken pre-heat. AROM with significant improvement noted. Baseline  and pinch taken with ~50% deficit noted. FOTO with 24% improvement noted demonstrating improvement in pt's functional use.  Pt states that her hypersensitive has reduced since her last visit. Pt currently has mild to moderate scar adhesions however redness has decreased significantly. Pt states that the  silcon scar tape has helped with the appearence of the scar. Pt will continue to benefit from skilled OT intervention. Patient is making good progress toward established goals. Patient continues to demonstrate limitation  with  ROM, Joint mobility, Stiffness, Decreased gross motor coordination, Decreased functional use, Impaired sensation, Decreased strength, Continued pain and scar adhesions and edema.    Goals: ( 6 weeks)   1)  Patient to be IND with HEP and modalities for pain management  2)  Increase ROM 3-5 degrees to increase functional hand use for ADLs/work/leisure activities  3) Decrease edema .2-.3 mm to increase joint mobility /flexibility for functional hand use.   4)  Assess  and pinch and set goals accordingly.   5)  Patient to score at 40-60%  or less on FOTO to demonstrate improved perception of functional R hand  Use.     Plan:   Patient to be treated by Occupational Therapy    1-2    times per week for   6-8                   weeks  during the certification  period from   10/6/2017     to  12/6/2017   to achieve the established goals.

## 2017-12-06 ENCOUNTER — OFFICE VISIT (OUTPATIENT)
Dept: ORTHOPEDICS | Facility: CLINIC | Age: 67
End: 2017-12-06
Payer: MEDICARE

## 2017-12-06 ENCOUNTER — HOSPITAL ENCOUNTER (OUTPATIENT)
Dept: RADIOLOGY | Facility: OTHER | Age: 67
Discharge: HOME | End: 2017-12-06
Attending: PHYSICIAN ASSISTANT
Payer: MEDICARE

## 2017-12-06 VITALS
HEIGHT: 68 IN | HEART RATE: 139 BPM | SYSTOLIC BLOOD PRESSURE: 117 MMHG | DIASTOLIC BLOOD PRESSURE: 83 MMHG | BODY MASS INDEX: 20.92 KG/M2 | WEIGHT: 138 LBS | RESPIRATION RATE: 20 BRPM

## 2017-12-06 DIAGNOSIS — S52.541A CLOSED SMITH'S FRACTURE OF RIGHT RADIUS, INITIAL ENCOUNTER: ICD-10-CM

## 2017-12-06 DIAGNOSIS — Z47.89 ORTHOPEDIC AFTERCARE: ICD-10-CM

## 2017-12-06 DIAGNOSIS — S52.541A CLOSED SMITH'S FRACTURE OF RIGHT RADIUS, INITIAL ENCOUNTER: Primary | ICD-10-CM

## 2017-12-06 PROCEDURE — 73110 X-RAY EXAM OF WRIST: CPT | Mod: TC,RT

## 2017-12-06 PROCEDURE — 73110 X-RAY EXAM OF WRIST: CPT | Mod: 26,RT,, | Performed by: RADIOLOGY

## 2017-12-06 PROCEDURE — 99213 OFFICE O/P EST LOW 20 MIN: CPT | Mod: PBBFAC,25 | Performed by: PHYSICIAN ASSISTANT

## 2017-12-06 PROCEDURE — 99999 PR PBB SHADOW E&M-EST. PATIENT-LVL III: CPT | Mod: PBBFAC,,, | Performed by: PHYSICIAN ASSISTANT

## 2017-12-06 PROCEDURE — 99213 OFFICE O/P EST LOW 20 MIN: CPT | Mod: S$PBB,,, | Performed by: PHYSICIAN ASSISTANT

## 2017-12-06 NOTE — PROGRESS NOTES
"Ms. Ovalles is here today for a follow up visit.  She is 12 weeks status post right distal radius fracture ORIF at a hospital in [place] on 9/14/17. She reports that she is doing well with intermittent pains.  She denies any current pain  She is not taking anything for pain.  She is regularly attending OT.  She denies fever, chills, and sweats since the time of the surgery. She does report some continued wrist weakness.    ROS:  Constitutional: no fever or chills  Skin: no rash or suspicious lesions  Musculoskeletal: See HPI.   Neurological: no headaches, lightheadedness, or dizziness.   Psychological/behavioral: no anxiety or depression    Physical exam:    Vitals:    12/06/17 0959   BP: 117/83   Pulse: (!) 139   Resp: 20   Weight: 62.6 kg (138 lb)   Height: 5' 8" (1.727 m)   PainSc:   4   PainLoc: Wrist     Vital signs are stable, patient is afebrile.  Patient is well dressed and well groomed, no acute distress.  Alert and oriented to person, place, and time.  Right upper extremity: Well healing volar surgical scar noted.  No erythema or increased warmth of the skin.  Improved mild edema noted.  Good finger and wrist range of motion.  Neurovascularly intact.    RADIOLOGY:  Right Wrist X-Ray, 12/6/17  3 views right wrist, comparison 10/25/2017.  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  Postop changes distal radius with healing fracture deformity.  Decreased soft tissue swelling at operative site.   Impression    See results above.     Comments: I have personally reviewed the imaging and I agree with the above radiologist's report.    Assessment: 12 weeks status post ORIF right distal radius    Plan:  Dia was seen today for post-op evaluation.    Diagnoses and all orders for this visit:    Closed Ortiz's fracture of right radius, initial encounter    Orthopedic aftercare        - Continue regular OT  - Reviewed X-Ray  - Follow up as needed  - Discussed gradual return to " normal activity level  - Call with questions or concerns

## 2017-12-08 ENCOUNTER — CLINICAL SUPPORT (OUTPATIENT)
Dept: REHABILITATION | Facility: HOSPITAL | Age: 67
End: 2017-12-08
Payer: MEDICARE

## 2017-12-08 DIAGNOSIS — M25.531 WRIST PAIN, RIGHT: Primary | ICD-10-CM

## 2017-12-08 DIAGNOSIS — M25.60 RANGE OF MOTION DEFICIT: ICD-10-CM

## 2017-12-08 PROCEDURE — 97018 PARAFFIN BATH THERAPY: CPT | Mod: 59

## 2017-12-08 PROCEDURE — G8985 CARRY GOAL STATUS: HCPCS | Mod: CJ

## 2017-12-08 PROCEDURE — 97140 MANUAL THERAPY 1/> REGIONS: CPT

## 2017-12-08 PROCEDURE — G8984 CARRY CURRENT STATUS: HCPCS | Mod: CK

## 2017-12-08 PROCEDURE — 97110 THERAPEUTIC EXERCISES: CPT

## 2017-12-08 NOTE — PLAN OF CARE
"OT Daily Progress Note and Updated POC    Patient:  Dia Ovalles  [facility] #:  [id number]   Date of Note: 11/22/2017  Referring Physician:  Alisha Brasher PA; Dr. ROXANN Rendon   Diagnosis:  R distal radius fracture with ORIF 9/14/2017   Encounter Diagnoses   Name Primary?    Closed Ortiz's fracture of right radius with routine healing, subsequent encounter Yes    Range of motion deficit     Wrist pain, right     Decreased  strength of right hand       Visit 9 of 12, expires 12/31/2017  FOTO / MEDICARE-  10/11/2017 (visit 1) , visit 8    G-code: carry  Current: CK  Goal: CJ    Start Time: 9:10 am  End Time:  9:55 am  Total Time: 45 mins  Group Time: 0    Subjective:   "It's feeling stiff and kind of achy, but I know it's because of the cold weather". Pt reports compliance with HEP.     Pain: 2-3 out of 10    Objective:   Pt reassessed on 11/30/17.  Wrist ROM taken pre heat  Date 11/30/2017     R AROM   Supination/Pronation 90/70 (+8/-5)   Wrist ext/flex 55/40 (+35/+15)   Wrist RD/UD 25/14 (+17/-4)         Strength: (in pounds/psi)     Date 11/30/2017     L/R*   Rung II avg 3 45/18   Lateral pinch 7/4   Tripod pinch 6/4   Tip pinch 5/4         Focus on Therapeutic Outcomes (FOTO) Symptom Scale: Patient score indicates self perception of 41% impairment, limitation or restriction of function upon today's assessment.       Patient seen by OT this session.   Patient received paraffin bath to R hand(s) for 10 minutes to increase blood flow, circulation, pain management and for tissue elasticity for pain/scar management.  Performed DTM scar massage with massage wand to decrease adhesion and improve tensile glide. Performed manual passive range of motion on patient's wrist to promote deep muscle stretch and joint stability.  PROM wrist flex, ext, RD/UD and sup/pro.   Performed Active wrist flex, ext, abd/add, palmar abd/radial add, RD/UD, sup/pro, prayer stretch (3 x 30 sec), thumb flex, and opposition x10 reps " each.   Pt performed  #3 wrist flex/ext, RD, UD and 1.5 # hammer stretch for sup and pron x 10 each; performed red theraputty for gross sustained  x 10 reps, key, 2 and 3 pt pinch x 10 reps each.  Pt performed PHG, 25# x 10 reps; green digiflex for composite finger flexion for strengthening x 10 reps each; pinch strengthening with green clothespin (4#) 3 point and lateral key pinch x 10 reps each. Encouraged continuation of HEP, modality use.        Treatment: Para 10 min , Manual therapy x 15 min and Therapeutic exercises x 20 mins     Assessment:  Pt. Is 12 weeks post op. She cont to c/o difficulty with lifting heavier items.  Measurements taken last session, demonstrating improvement. However, she cont to present with weaker  and pinch strength, as well as, limited wrist strength.  AROM with significant improvement noted. Baseline  and pinch taken with ~50% deficit noted.  Pt denied hypersensitivity today.   Pt currently has mild to moderate scar adhesions.   Pt will continue to benefit from skilled OT intervention to cont to advance strengthening and maximize functional use of her R hand. Patient is making good progress toward established goals. Patient continues to demonstrate limitation  with  ROM, Joint mobility, Stiffness, Decreased gross motor coordination, Decreased functional use, Impaired sensation, Decreased strength, Continued pain and scar adhesions and edema.    Goals: ( 6 weeks)   1)  Patient to be IND with HEP and modalities for pain management---met  2)  Increase ROM 3-5 degrees to increase functional hand use for ADLs/work/leisure activities---met  3) Decrease edema .2-.3 mm to increase joint mobility /flexibility for functional hand use. ---not reassessed  4)  Assess  and pinch and set goals accordingly. ---met  5)  Patient to score at 40-60%  or less on FOTO to demonstrate improved perception of functional R hand  Use.---met  Added: 6) Pt will demonstrate increased  strength  by at least 3-5# for picking up heavier items   7)  Pt will demonstrate increased pinch strength by at least 1-3 psi for activities such as opening items and turning keys     Plan:   Patient to cont to be treated by Occupational Therapy    1-2    times per week for   4 more weeks                  weeks  during the certification period from 12/6/17  to  1/6/18  to achieve the established goals. Cont to advance with strengthening as tolerated    Therapist: RODRIGUEZ Ashford        I certify the need for these services furnished under the plan of treatment and while under my care.     ____________________________________________________________________  Physician/Referring Practitioner   Date of Signature

## 2017-12-08 NOTE — PROGRESS NOTES
"OT Daily Progress Note and Updated POC    Patient:  Dia Ovalles  [facility] #:  [id number]   Date of Note: 12/8/2017  Referring Physician:  Alisha Brasher PA; Dr. ROXANN Rendon   Diagnosis:  R distal radius fracture with ORIF 9/14/2017   Encounter Diagnoses   Name Primary?    Closed Ortiz's fracture of right radius with routine healing, subsequent encounter Yes    Range of motion deficit     Wrist pain, right     Decreased  strength of right hand       Visit 9 of 12, expires 12/31/2017  FO / MEDICARE-  10/11/2017 (visit 1) , visit 8    Start Time: 9:10 am  End Time:  9:55 am  Total Time: 45 mins  Group Time: 0    Subjective:   "It's feeling stiff and kind of achy, but I know it's because of the cold weather". Pt reports compliance with HEP.     Pain: 2-3 out of 10    Objective:   Pt reassessed on 11/30/17.  Wrist ROM taken pre heat  Date 11/30/2017     R AROM   Supination/Pronation 90/70 (+8/-5)   Wrist ext/flex 55/40 (+35/+15)   Wrist RD/UD 25/14 (+17/-4)         Strength: (in pounds/psi)     Date 11/30/2017     L/R*   Rung II avg 3 45/18   Lateral pinch 7/4   Tripod pinch 6/4   Tip pinch 5/4         Focus on Therapeutic Outcomes (FOTO) Symptom Scale: Patient score indicates self perception of 41% impairment, limitation or restriction of function upon today's assessment.       Patient seen by OT this session.   Patient received paraffin bath to R hand(s) for 10 minutes to increase blood flow, circulation, pain management and for tissue elasticity for pain/scar management.  Performed DTM scar massage with massage wand to decrease adhesion and improve tensile glide. Performed manual passive range of motion on patient's wrist to promote deep muscle stretch and joint stability.  PROM wrist flex, ext, RD/UD and sup/pro.   Performed Active wrist flex, ext, abd/add, palmar abd/radial add, RD/UD, sup/pro, prayer stretch (3 x 30 sec), thumb flex, and opposition x10 reps each.   Pt performed  #3 wrist flex/ext, " RD, UD and 1.5 # hammer stretch for sup and pron x 10 each; performed red theraputty for gross sustained  x 10 reps, key, 2 and 3 pt pinch x 10 reps each.  Pt performed PHG, 25# x 10 reps; green digiflex for composite finger flexion for strengthening x 10 reps each; pinch strengthening with green clothespin (4#) 3 point and lateral key pinch x 10 reps each. Encouraged continuation of HEP, modality use.        Treatment: Para 10 min , Manual therapy x 15 min and Therapeutic exercises x 20 mins     Assessment:  Pt. Is 12 weeks post op. She cont to c/o difficulty with lifting heavier items.  Measurements taken last session, demonstrating improvement. However, she cont to present with weaker  and pinch strength, as well as, limited wrist strength.  AROM with significant improvement noted. Baseline  and pinch taken with ~50% deficit noted.  Pt denied hypersensitivity today.   Pt currently has mild to moderate scar adhesions.   Pt will continue to benefit from skilled OT intervention to cont to advance strengthening and maximize functional use of her R hand. Patient is making good progress toward established goals. Patient continues to demonstrate limitation  with  ROM, Joint mobility, Stiffness, Decreased gross motor coordination, Decreased functional use, Impaired sensation, Decreased strength, Continued pain and scar adhesions and edema.    Goals: ( 6 weeks)   1)  Patient to be IND with HEP and modalities for pain management---met  2)  Increase ROM 3-5 degrees to increase functional hand use for ADLs/work/leisure activities---met  3) Decrease edema .2-.3 mm to increase joint mobility /flexibility for functional hand use. ---not reassessed  4)  Assess  and pinch and set goals accordingly. ---met  5)  Patient to score at 40-60%  or less on FOTO to demonstrate improved perception of functional R hand  Use.---met  Added: 6) Pt will demonstrate increased  strength by at least 3-5# for picking up heavier  items   7)  Pt will demonstrate increased pinch strength by at least 1-3 psi for activities such as opening items and turning keys     Plan:   Patient to cont to be treated by Occupational Therapy    1-2    times per week for   4 more weeks                  weeks  during the certification period from 12/6/17  to  1/6/18  to achieve the established goals. Cont to advance with strengthening as tolerated    Therapist: RODRIGUEZ Ashford

## 2017-12-14 ENCOUNTER — CLINICAL SUPPORT (OUTPATIENT)
Dept: REHABILITATION | Facility: HOSPITAL | Age: 67
End: 2017-12-14
Payer: MEDICARE

## 2017-12-14 DIAGNOSIS — M25.60 RANGE OF MOTION DEFICIT: ICD-10-CM

## 2017-12-14 DIAGNOSIS — M25.531 WRIST PAIN, RIGHT: Primary | ICD-10-CM

## 2017-12-14 DIAGNOSIS — S52.541A CLOSED SMITH'S FRACTURE OF RIGHT RADIUS, INITIAL ENCOUNTER: Chronic | ICD-10-CM

## 2017-12-14 PROCEDURE — 97110 THERAPEUTIC EXERCISES: CPT

## 2017-12-14 PROCEDURE — 97018 PARAFFIN BATH THERAPY: CPT

## 2017-12-14 NOTE — PROGRESS NOTES
"OT Daily Progress Note and Updated POC    Patient:  Dia Ovalles  Clinic #:  [id number]   Date of Note: 12/14/2017  Referring Physician:  Alisha Brasher PA; Dr. ROXANN Rendon   Diagnosis:  R distal radius fracture with ORIF 9/14/2017   Encounter Diagnoses   Name Primary?    Closed Ortiz's fracture of right radius with routine healing, subsequent encounter Yes    Range of motion deficit     Wrist pain, right     Decreased  strength of right hand       Visit 10 of 12, expires 12/31/2017  FO / MEDICARE-  10/11/2017 (visit 1) , visit 8    Start Time: 10:10AM  End Time:  11  Total Time: 45 mins  Group Time: 0    Subjective:   " I returned to weightbearing in yoga yesterday. I really wasn't in pain and am not sore after."    Pain: 1 out of 10     Objective:   Wrist ROM taken pre heat  Date 11/30/2017 12/14/2017     R AROM R AROM   Supination/Pronation 90/70 (+8/-5) 90/75 (+5)   Wrist ext/flex 55/40 (+35/+15) 63/50 (+7/+10)   Wrist RD/UD  15/28         Strength: (in pounds/psi)     Date 11/30/2017 12/14/2017     L/R* L/R*   Rung II avg 3 45/18 /20 (+2)   Lateral pinch 7/4 Pinch Gauge   Tripod pinch 6/4 Needed to be   Tip pinch 5/4 Calibrated          Focus on Therapeutic Outcomes (FOTO) Symptom Scale: Patient score indicates self perception of 41% impairment, limitation or restriction of function upon today's assessment.       Patient seen by OT this session.   Patient received paraffin bath to R hand(s) for 10 minutes to increase blood flow, circulation, pain management and for tissue elasticity for pain/scar management. Performed DTM scar massage with massage wand to decrease adhesion and improve tensile glide. Performed manual passive range of motion on patient's wrist to promote deep muscle stretch and joint stability.    PROM wrist flex, ext, RD/UD and sup/pro.     Pt performed  #3 wrist flex/ext, RD, UD and 1.5 # hammer stretch for sup and pron 2x10 each  Performed red theraputty for gross sustained "  x 10 reps, key, 2 and 3 pt pinch x 10 reps each.   Pt performed PHG, 35# x 20 reps  Green CP 10 key, 10 3pt, and 10 2pt pinches     Updated HEP to include wrist and FA PREs to be performed 1x/day but skipping on yoga days.     Treatment: Para 10 min , Manual therapy x 10 min and Therapeutic exercises x 30 mins  Paraffin x1  TE x2     Assessment:   Patient is making good progress toward established goals. Wrist and FA ROM both notably improved over the past 2 weeks. Overall strength progressing as well demonstrated by patient's return to yoga activities. Patient continues to demonstrate limitation  with  ROM, Joint mobility, Stiffness, Decreased gross motor coordination, Decreased functional use, Impaired sensation, Decreased strength, Continued pain and scar adhesions and edema.    Updated Goals: to be met by discharge.   1) Patient to achieve FOTO score of 80 points or higher by discharge.   2) Pt will demonstrate increased  strength by at least 5# for picking up heavier items.  3) Pt will demonstrate increased pinch strength by at least 1-3 psi for activities such as opening items and turning keys     Plan:   Patient to cont to be treated by Occupational Therapy    1-2    times per week for  2 more weeks.              weeks  during the certification period from 12/6/17  to  1/6/18  to achieve the established goals. Cont to advance with strengthening as tolerated    Therapist: ANTHONY Montero LOTR, CHT

## 2017-12-14 NOTE — PATIENT INSTRUCTIONS
[facility]  OCCUPATIONAL THERAPY  HOME EXERCISE PROGRAM     Complete the following strengthening exercises using 3 pound weight.  Do 20 repetitions of each, 1x per day.       With right palm up and weight in hand, bend wrist up. Return slowly.        With right palm down and weight in hand, bend wrist up. Then bend wrist down.       With right thumb up and weight in hand, bend wrist up. Return slowly.      Copyright © I. All rights reserved.     ANTHONY Montero, RODRIGUEZ, ASHLEY  Certified Hand Therapist  Occupational Therapist

## 2017-12-20 ENCOUNTER — CLINICAL SUPPORT (OUTPATIENT)
Dept: REHABILITATION | Facility: HOSPITAL | Age: 67
End: 2017-12-20
Payer: MEDICARE

## 2017-12-20 DIAGNOSIS — M25.531 WRIST PAIN, RIGHT: Primary | ICD-10-CM

## 2017-12-20 DIAGNOSIS — M25.60 RANGE OF MOTION DEFICIT: ICD-10-CM

## 2017-12-20 PROCEDURE — 97018 PARAFFIN BATH THERAPY: CPT | Mod: 59

## 2017-12-20 PROCEDURE — 97140 MANUAL THERAPY 1/> REGIONS: CPT

## 2017-12-20 PROCEDURE — 97110 THERAPEUTIC EXERCISES: CPT

## 2017-12-20 NOTE — PROGRESS NOTES
"OT Daily Progress Note    Patient:  Dia Ovalles  [facility] #:  [id number]   Date of Note: 12/20/2017  Referring Physician:  Alisha Brasher PA; Dr. ROXANN Rendon   Diagnosis:  R distal radius fracture with ORIF 9/14/2017   Encounter Diagnoses   Name Primary?    Closed Ortiz's fracture of right radius with routine healing, subsequent encounter Yes    Range of motion deficit     Wrist pain, right     Decreased  strength of right hand       Visit 11 of 12, expires 12/31/2017  [facility] / MEDICARE-  10/11/2017 (visit 1, visit 8)    Start Time: 815 AM  End Time:  9  Total Time: 45 mins  Group Time: 0    Subjective:   "I just have trouble lifting something heavy.But I can tell its progessing."    Pain: 1 out of 10     Objective:   Wrist ROM taken pre heat  Date 11/30/2017 12/14/2017     R AROM R AROM   Supination/Pronation 90/70 (+8/-5) 90/75 (+5)   Wrist ext/flex 55/40 (+35/+15) 63/50 (+7/+10)   Wrist RD/UD  15/28         Strength: (in pounds/psi)  Date 11/30/2017 12/14/2017     L/R* L/R*   Rung II avg 3 45/18 /20 (+2)   Lateral pinch 7/4 Pinch Gauge   Tripod pinch 6/4 Needed to be   Tip pinch 5/4 Calibrated          Focus on Therapeutic Outcomes (FOTO) Symptom Scale: Patient score indicates self perception of 41% impairment, limitation or restriction of function upon today's assessment.       Patient seen by OT this session.   Patient received paraffin bath to R hand(s) for 10 minutes to increase blood flow, circulation, pain management and for tissue elasticity for pain/scar management. Performed DTM scar massage with massage wand to decrease adhesion and improve tensile glide. Performed manual passive range of motion on patient's wrist to promote deep muscle stretch and joint stability.    PROM wrist flex, ext, RD/UD and sup/pro.     Pt performed  #3 wrist flex/ext, RD, UD and 1.5 # hammer stretch for sup and pron 2x10 each  Performed green theraputty for gross sustained  x 10 reps, key, 2 and 3 pt pinch x 10 " reps each.   Pt performed PHG, 55# x 20 reps  Green CP 10 key, 10 3pt, and 10 2pt pinches   Provided green theraputty for upgraded HEP.     Updated HEP to include wrist and FA PREs to be performed 1x/day but skipping on yoga days.     Treatment: Para 10 min , Manual therapy x 10 min and Therapeutic exercises x 25 mins       Assessment:   Patient is making good progress toward established goals. Wrist and FA ROM both notably improved over the past 2 weeks. Overall strength progressing as well demonstrated by patient's return to yoga activities. Patient continues to demonstrate limitation  with  ROM, Joint mobility, Stiffness, Decreased gross motor coordination, Decreased functional use, Impaired sensation, Decreased strength, Continued pain and scar adhesions and edema.    Updated Goals: to be met by discharge.   1) Patient to achieve FOTO score 40% or less to demonstrate improved function of Left hand   2) Pt will demonstrate increased  strength by at least 2-5# for picking up heavier items.  3) Pt will demonstrate increased pinch strength by at least 1-3 psi for activities such as opening items and turning keys     Plan:   Patient to cont to be treated by Occupational Therapy    1-2    times per week for  2 more weeks.              weeks  during the certification period from 12/6/17  to  1/6/18  to achieve the established goals. Cont to advance with strengthening as tolerated

## 2017-12-28 ENCOUNTER — CLINICAL SUPPORT (OUTPATIENT)
Dept: REHABILITATION | Facility: HOSPITAL | Age: 67
End: 2017-12-28
Payer: MEDICARE

## 2017-12-28 DIAGNOSIS — M25.531 WRIST PAIN, RIGHT: Primary | ICD-10-CM

## 2017-12-28 DIAGNOSIS — M25.60 RANGE OF MOTION DEFICIT: ICD-10-CM

## 2017-12-28 DIAGNOSIS — S52.541D CLOSED SMITH'S FRACTURE OF RIGHT RADIUS WITH ROUTINE HEALING, SUBSEQUENT ENCOUNTER: Chronic | ICD-10-CM

## 2017-12-28 DIAGNOSIS — R29.898 DECREASED GRIP STRENGTH OF RIGHT HAND: ICD-10-CM

## 2017-12-28 PROCEDURE — G8984 CARRY CURRENT STATUS: HCPCS | Mod: CJ

## 2017-12-28 PROCEDURE — 97110 THERAPEUTIC EXERCISES: CPT

## 2017-12-28 PROCEDURE — G8985 CARRY GOAL STATUS: HCPCS | Mod: CK

## 2017-12-28 PROCEDURE — 97140 MANUAL THERAPY 1/> REGIONS: CPT

## 2017-12-28 PROCEDURE — 97018 PARAFFIN BATH THERAPY: CPT | Mod: 59

## 2017-12-28 PROCEDURE — G8986 CARRY D/C STATUS: HCPCS | Mod: CJ
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
## 2018-01-30 ENCOUNTER — TELEPHONE (OUTPATIENT)
Dept: ENDOCRINOLOGY | Facility: CLINIC | Age: 68
End: 2018-01-30

## 2018-01-30 NOTE — TELEPHONE ENCOUNTER
----- Message from Elsa Boone sent at 1/30/2018  2:33 PM CST -----  Contact: Self [phone]  ..Refill request.  erythromycin (ROMYCIN) ophthalmic ointment     ..  [facility] - [place], [place] - [address] [place] [id number]  Phone: [phone] Fax: [phone]

## 2018-02-15 ENCOUNTER — OFFICE VISIT (OUTPATIENT)
Dept: OPTOMETRY | Facility: CLINIC | Age: 68
End: 2018-02-15
Payer: MEDICARE

## 2018-02-15 DIAGNOSIS — H52.4 PRESBYOPIA: ICD-10-CM

## 2018-02-15 DIAGNOSIS — H25.13 NS (NUCLEAR SCLEROSIS), BILATERAL: Primary | ICD-10-CM

## 2018-02-15 DIAGNOSIS — H52.03 HYPEROPIA, BILATERAL: ICD-10-CM

## 2018-02-15 DIAGNOSIS — Z46.0 FITTING AND ADJUSTMENT OF SPECTACLES AND CONTACT LENSES: Primary | ICD-10-CM

## 2018-02-15 DIAGNOSIS — H43.393 VITREOUS SYNERESIS OF BOTH EYES: ICD-10-CM

## 2018-02-15 PROCEDURE — 92004 COMPRE OPH EXAM NEW PT 1/>: CPT | Mod: S$PBB,,, | Performed by: OPTOMETRIST

## 2018-02-15 PROCEDURE — 99212 OFFICE O/P EST SF 10 MIN: CPT | Mod: PBBFAC | Performed by: OPTOMETRIST

## 2018-02-15 PROCEDURE — 92015 DETERMINE REFRACTIVE STATE: CPT | Mod: ,,, | Performed by: OPTOMETRIST

## 2018-02-15 PROCEDURE — 92310 CONTACT LENS FITTING OU: CPT | Mod: ,,, | Performed by: OPTOMETRIST

## 2018-02-15 PROCEDURE — 99999 PR PBB SHADOW E&M-EST. PATIENT-LVL II: CPT | Mod: PBBFAC,,, | Performed by: OPTOMETRIST

## 2018-02-15 RX ORDER — CEPHALEXIN 500 MG/1
500 CAPSULE ORAL
COMMUNITY
Start: 2017-09-14 | End: 2018-03-08

## 2018-02-15 RX ORDER — TOLTERODINE TARTRATE 2 MG/1
2 TABLET, EXTENDED RELEASE ORAL
COMMUNITY
End: 2018-03-08

## 2018-02-15 RX ORDER — MELOXICAM 15 MG/1
15 TABLET ORAL
COMMUNITY
End: 2018-03-08

## 2018-02-15 RX ORDER — HYDROCODONE BITARTRATE AND ACETAMINOPHEN 5; 325 MG/1; MG/1
1 TABLET ORAL
COMMUNITY
Start: 2017-09-14 | End: 2018-03-08

## 2018-02-16 NOTE — PROGRESS NOTES
HPI     Patient's age: 67 y.o.    Approximate date of last eye examination:  1 yr   Name of last eye doctor seen: [place]    Pt states that she is here for yearly eye exam and contacts not having any   trouble with eyes just want the current glasses checked    Wears glasses? yes       Wears CLs?:  yes           If yes:              Type of CL worn:  Biofinity MF              Wears full-time or part-time:  full               Sleeps with contact lenses:  no                Headaches?  no  Eye pain/discomfort?  no                                                                                     Flashes?  no  Floaters?  no  Diplopia/Double vision?  no    Patient's Ocular History:         Any eye surgeries? no         Any eye injury?  no         Any treatment for eye disease?  no  Family history of eye disease?  no    Significant patient medical history:         1. Diabetes?  no       If yes, IDDM or NIDDM? no   2. HBP?  no                 ! OTC eyedrops currently using:  Occasional dry eye med - OU   ! Prescription eye meds currently using:  none        Last edited by Edwige Brizuela MA on 2/15/2018 10:06 AM. (History)            Assessment /Plan     For exam results, see Encounter Report.    NS (nuclear sclerosis), bilateral    Vitreous syneresis of both eyes    Presbyopia    Hyperopia, bilateral      Rx specs    CL fit today: changed power ou   Dispensed trials   Ok to order if happy    Remove nightly, replace monthly   Ok to order if happy    RTC 1 year, sooner PRN

## 2018-02-21 ENCOUNTER — PATIENT MESSAGE (OUTPATIENT)
Dept: OPTOMETRY | Facility: CLINIC | Age: 68
End: 2018-02-21

## 2018-02-22 ENCOUNTER — OFFICE VISIT (OUTPATIENT)
Dept: OPTOMETRY | Facility: CLINIC | Age: 68
End: 2018-02-22
Payer: MEDICARE

## 2018-02-22 DIAGNOSIS — H52.4 PRESBYOPIA: Primary | ICD-10-CM

## 2018-02-22 PROCEDURE — 99499 UNLISTED E&M SERVICE: CPT | Mod: S$PBB,,, | Performed by: OPTOMETRIST

## 2018-02-22 NOTE — PROGRESS NOTES
HPI     Patient in for contact lens follow-up. Given trial contacts unable to   read with them    Contact lenses patient currently wearing:  Biofinity      Any problems with Contact Lens comfort?  Yes, seem little scratch, itching    Contact lens wearing time (hours/per day): 7 hour    How long have Contact Lenses been in today?  4 hours    Does patient sleep with the contact lenses in?  no              Last edited by Edwige Brizulea MA on 2/22/2018  4:01 PM. (History)            Assessment /Plan     For exam results, see Encounter Report.    Presbyopia      Switch to monovision OD distance, OS near

## 2018-02-28 ENCOUNTER — CLINICAL SUPPORT (OUTPATIENT)
Dept: AUDIOLOGY | Facility: CLINIC | Age: 68
End: 2018-02-28
Payer: MEDICARE

## 2018-02-28 ENCOUNTER — OFFICE VISIT (OUTPATIENT)
Dept: OTOLARYNGOLOGY | Facility: CLINIC | Age: 68
End: 2018-02-28
Payer: MEDICARE

## 2018-02-28 VITALS — HEIGHT: 68 IN | WEIGHT: 142 LBS | BODY MASS INDEX: 21.52 KG/M2

## 2018-02-28 DIAGNOSIS — H80.91 OTOSCLEROSIS OF RIGHT EAR: ICD-10-CM

## 2018-02-28 DIAGNOSIS — H90.3 SENSORINEURAL HEARING LOSS, BILATERAL: Primary | ICD-10-CM

## 2018-02-28 PROCEDURE — 92550 TYMPANOMETRY & REFLEX THRESH: CPT | Mod: PBBFAC | Performed by: AUDIOLOGIST

## 2018-02-28 PROCEDURE — 92557 COMPREHENSIVE HEARING TEST: CPT | Mod: PBBFAC | Performed by: AUDIOLOGIST

## 2018-02-28 PROCEDURE — 99204 OFFICE O/P NEW MOD 45 MIN: CPT | Mod: S$PBB,,, | Performed by: OTOLARYNGOLOGY

## 2018-02-28 PROCEDURE — 99212 OFFICE O/P EST SF 10 MIN: CPT | Mod: PBBFAC | Performed by: OTOLARYNGOLOGY

## 2018-02-28 PROCEDURE — 99999 PR PBB SHADOW E&M-EST. PATIENT-LVL II: CPT | Mod: PBBFAC,,, | Performed by: OTOLARYNGOLOGY

## 2018-02-28 NOTE — PROGRESS NOTES
Dia Ovalles was seen in the clinic today for an audiological evaluation.  Ms. Ovalles reported bilateral hearing loss.    Audiological testing revealed a mild to moderately-severe mixed hearing loss, AD and a mild to moderate SNHL, AS.  A speech reception threshold was obtained at 40 dBHL for the right ear and at 30 dBHL for the left ear.  Speech discrimination testing was 100% at 80 dBHL for the right ear and 100% at 70 dBHL for the left ear.      Tympanometry testing revealed Type A tympanograms, AU.  Ipsilateral acoustic reflexes were present at 95 dBHL for 1000 Hz and 2000 Hz, AD and at 85 dBHL for 1000 Hz and at 95 dBHL for 2000 Hz, AS.    Recommendations:  1. Otologic evaluation  2. Annual hearing evaluation  3. Hearing protection when in noise   4. Hearing aid consultation following medical clearance

## 2018-02-28 NOTE — PROGRESS NOTES
Subjective:       Patient ID: Dia Ovalles is a 67 y.o. female.    Chief Complaint: No chief complaint on file.    HPI     Ms Ovalles is a 68 yo F who recently retired to [employer] from [place]. She has noticed a decrease in hear hearing over the years. No tinnitus, aural fullness, or otorrhea. She reports episodes of dizziness that happen about twice a year, and last about 12 hours. She states that she feels that the floor is unsteady, but does not feel the room spinning around. She states that she does not have any  head aches or changes in her vision during these episodes. Has audiogram from 2015 that shows mild SNHL in high frequencies.     He currently denies any otologic stx including hearing loss, otalgia, otorrhea, tinnitus, facial nn weakness.    No h/o ear infections, ear surgery, head trauma, loud noise exposure. No FH hearing loss, migraines.        Past Medical History:   Diagnosis Date    Cancer     SCC x 2 on arms         Current Outpatient Prescriptions:     biotin 1 mg tablet, Take 5,000 mcg by mouth once daily., Disp: , Rfl:     cephALEXin (KEFLEX) 500 MG capsule, Take 500 mg by mouth., Disp: , Rfl:     cholecalciferol, vitamin D3, (VITAMIN D3) 2,000 unit Cap, Take 1 capsule by mouth once daily., Disp: , Rfl:     erythromycin (ROMYCIN) ophthalmic ointment, Apply to affected eye nightly, Disp: 1 Tube, Rfl: 0    hydrocodone-acetaminophen 5-325mg (NORCO) 5-325 mg per tablet, Take 1 tablet by mouth., Disp: , Rfl:     meloxicam (MOBIC) 15 MG tablet, Take 1 tablet (15 mg total) by mouth once daily., Disp: 30 tablet, Rfl: 1    meloxicam (MOBIC) 15 MG tablet, Take 15 mg by mouth., Disp: , Rfl:     polymyxin B sulf-trimethoprim (POLYTRIM) 10,000 unit- 1 mg/mL Drop, One drop in affected eye(s) tid, Disp: 1 Bottle, Rfl: 0    raloxifene (EVISTA) 60 mg tablet, Take 1 tablet (60 mg total) by mouth once daily., Disp: 90 tablet, Rfl: 3    tolterodine (DETROL LA) 4 MG 24 hr capsule, Take 1 capsule (4 mg  total) by mouth once daily., Disp: 90 capsule, Rfl: 1    tolterodine (DETROL) 2 MG Tab, Take 1 tablet (2 mg total) by mouth 2 (two) times daily as needed., Disp: 90 tablet, Rfl: 1    tolterodine (DETROL) 2 MG Tab, Take 2 mg by mouth., Disp: , Rfl:     Past Surgical History:   Procedure Laterality Date    ASD REPAIR      BREAST BIOPSY       SECTION      x2    KNEE SURGERY      meniscal repair    open heart surgery      SKIN CANCER EXCISION         Social History   Substance Use Topics    Smoking status: Never Smoker    Smokeless tobacco: Never Used    Alcohol use Yes      Comment: social       Family History   Problem Relation Age of Onset    Stroke Mother     Hypertension Mother     Asthma Mother     Osteoporosis Mother     Cancer Father      lung cancer    Heart disease Maternal Grandfather     Stroke Maternal Grandfather     Diabetes Neg Hx     Melanoma Neg Hx     Psoriasis Neg Hx     Lupus Neg Hx        Review of patient's allergies indicates:   Allergen Reactions    Codeine Nausea Only       Review of Systems   Constitutional: Negative for fever.   HENT: Positive for hearing loss. Negative for congestion, ear discharge, ear pain, postnasal drip, sinus pain, sore throat, trouble swallowing and voice change.    Eyes: Negative for visual disturbance.   Respiratory: Negative for apnea and chest tightness.    Cardiovascular: Negative for chest pain.   Gastrointestinal: Negative for nausea.   Endocrine: Negative for polydipsia.   Genitourinary: Negative for difficulty urinating.   Musculoskeletal: Negative for gait problem.   Skin: Negative for rash and wound.   Neurological: Negative for seizures, facial asymmetry, speech difficulty and numbness.   Psychiatric/Behavioral: Negative for agitation and behavioral problems.       Objective:      Physical Exam   Constitutional: She is oriented to person, place, and time. She appears well-developed and well-nourished.   HENT:   Right Ear:  Tympanic membrane is not scarred, not perforated and not retracted. No middle ear effusion. Decreased hearing is noted.   Left Ear: Tympanic membrane and ear canal normal. Tympanic membrane is not scarred, not perforated and not retracted.  No middle ear effusion. Decreased hearing is noted.   Eyes: EOM are normal. Pupils are equal, round, and reactive to light.   Neck: Normal range of motion. Neck supple.   Cardiovascular: Normal rate.    Pulmonary/Chest: Effort normal. No stridor. No respiratory distress.   Abdominal: Soft.   Musculoskeletal: Normal range of motion. She exhibits no edema.   Lymphadenopathy:     She has no cervical adenopathy.   Neurological: She is oriented to person, place, and time. No cranial nerve deficit.   Skin: Skin is warm and dry. Capillary refill takes less than 2 seconds.   Psychiatric: She has a normal mood and affect. Her behavior is normal.               Assessment:       No diagnosis found.    Plan:         Diagnoses and all orders for this visit:    Asymmetrical hearing loss of both ears    Otosclerosis of right ear        Patient to see Audiology for hearing aid evaluation.    RTC 1 yr with audio.

## 2018-03-07 ENCOUNTER — PATIENT OUTREACH (OUTPATIENT)
Dept: INTERNAL MEDICINE | Facility: CLINIC | Age: 68
End: 2018-03-07

## 2018-03-07 NOTE — PROGRESS NOTES
[facility] is committed to your overall health.  To help you get the most out of each of your visits, we will review your information to make sure you are up to date on all of your recommended tests and/or procedures.       Your PCP  Shelley Leija MD   found that you may be due for:       Health Maintenance Due   Topic Date Due    Hepatitis C Screening  1950    Colonoscopy  08/31/1999    Zoster Vaccine  08/31/2009    Pneumococcal (65+) (1 of 2 - PCV13) 08/31/2014     Medicare does not cover all immunizations to be given in the clinic. Check your benefits to ensure that you do not need to receive your immunizations at the pharmacy.          If you have had any of the above done at another facility, please bring the records or information with you so that your record at [facility] will be complete.  If you would like to schedule any of these, please contact me.     If you are currently taking medication, please bring it with you to your appointment for review.     Also, if you have any type of Advanced Directives, please bring them with you to your office visit so we may scan them into your chart.       Thank you for Choosing [facility] for your healthcare needs.        Additional Information  If you have questions, you can email [email or web address] or call [phone]  to talk to our [facility] staff. Remember, [facility] is NOT to be used for urgent needs. For medical emergencies, dial 911.

## 2018-03-08 ENCOUNTER — OFFICE VISIT (OUTPATIENT)
Dept: INTERNAL MEDICINE | Facility: CLINIC | Age: 68
End: 2018-03-08
Payer: MEDICARE

## 2018-03-08 VITALS
SYSTOLIC BLOOD PRESSURE: 110 MMHG | BODY MASS INDEX: 21.05 KG/M2 | HEART RATE: 82 BPM | WEIGHT: 138.88 LBS | DIASTOLIC BLOOD PRESSURE: 76 MMHG | HEIGHT: 68 IN | OXYGEN SATURATION: 99 %

## 2018-03-08 DIAGNOSIS — Z87.74 H/O ATRIAL SEPTAL DEFECT REPAIR: ICD-10-CM

## 2018-03-08 DIAGNOSIS — Z79.899 OTHER LONG TERM (CURRENT) DRUG THERAPY: ICD-10-CM

## 2018-03-08 DIAGNOSIS — N32.81 OAB (OVERACTIVE BLADDER): ICD-10-CM

## 2018-03-08 DIAGNOSIS — Z13.6 ENCOUNTER FOR LIPID SCREENING FOR CARDIOVASCULAR DISEASE: ICD-10-CM

## 2018-03-08 DIAGNOSIS — M25.511 RIGHT SHOULDER PAIN, UNSPECIFIED CHRONICITY: ICD-10-CM

## 2018-03-08 DIAGNOSIS — M25.50 ARTHRALGIA, UNSPECIFIED JOINT: ICD-10-CM

## 2018-03-08 DIAGNOSIS — Z11.59 NEED FOR HEPATITIS C SCREENING TEST: ICD-10-CM

## 2018-03-08 DIAGNOSIS — Z79.83 LONG TERM USE OF BISPHOSPHONATES: ICD-10-CM

## 2018-03-08 DIAGNOSIS — Z13.220 ENCOUNTER FOR LIPID SCREENING FOR CARDIOVASCULAR DISEASE: ICD-10-CM

## 2018-03-08 DIAGNOSIS — Z00.00 ANNUAL PHYSICAL EXAM: Primary | ICD-10-CM

## 2018-03-08 DIAGNOSIS — Z85.89 HISTORY OF SQUAMOUS CELL CARCINOMA: ICD-10-CM

## 2018-03-08 DIAGNOSIS — M81.0 OSTEOPOROSIS, UNSPECIFIED OSTEOPOROSIS TYPE, UNSPECIFIED PATHOLOGICAL FRACTURE PRESENCE: ICD-10-CM

## 2018-03-08 DIAGNOSIS — M25.60 JOINT STIFFNESS: ICD-10-CM

## 2018-03-08 PROCEDURE — 99397 PER PM REEVAL EST PAT 65+ YR: CPT | Mod: S$PBB,,, | Performed by: FAMILY MEDICINE

## 2018-03-08 PROCEDURE — 99213 OFFICE O/P EST LOW 20 MIN: CPT | Mod: PBBFAC,PN | Performed by: FAMILY MEDICINE

## 2018-03-08 PROCEDURE — 99999 PR PBB SHADOW E&M-EST. PATIENT-LVL III: CPT | Mod: PBBFAC,,, | Performed by: FAMILY MEDICINE
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
## 2018-03-12 ENCOUNTER — HOSPITAL ENCOUNTER (OUTPATIENT)
Dept: RADIOLOGY | Facility: OTHER | Age: 68
Discharge: HOME | End: 2018-03-12
Attending: FAMILY MEDICINE
Payer: MEDICARE

## 2018-03-12 DIAGNOSIS — N32.81 OAB (OVERACTIVE BLADDER): ICD-10-CM

## 2018-03-12 DIAGNOSIS — M25.511 RIGHT SHOULDER PAIN, UNSPECIFIED CHRONICITY: ICD-10-CM

## 2018-03-12 DIAGNOSIS — M25.50 ARTHRALGIA, UNSPECIFIED JOINT: ICD-10-CM

## 2018-03-12 DIAGNOSIS — M25.60 JOINT STIFFNESS: ICD-10-CM

## 2018-03-12 DIAGNOSIS — M81.0 OSTEOPOROSIS: ICD-10-CM

## 2018-03-12 PROCEDURE — 73030 X-RAY EXAM OF SHOULDER: CPT | Mod: TC,FY,RT

## 2018-03-12 PROCEDURE — 73030 X-RAY EXAM OF SHOULDER: CPT | Mod: 26,RT,, | Performed by: RADIOLOGY

## 2018-03-12 RX ORDER — TOLTERODINE 4 MG/1
4 CAPSULE, EXTENDED RELEASE ORAL DAILY
Qty: 90 CAPSULE | Refills: 1 | Status: SHIPPED | OUTPATIENT
Start: 2018-03-12 | End: 2018-08-28 | Stop reason: SDUPTHER

## 2018-03-13 ENCOUNTER — PATIENT MESSAGE (OUTPATIENT)
Dept: ENDOCRINOLOGY | Facility: CLINIC | Age: 68
End: 2018-03-13

## 2018-03-14 RX ORDER — RALOXIFENE HYDROCHLORIDE 60 MG/1
60 TABLET, FILM COATED ORAL DAILY
Qty: 90 TABLET | Refills: 3 | Status: SHIPPED | OUTPATIENT
Start: 2018-03-14 | End: 2019-03-13 | Stop reason: SDUPTHER

## 2018-03-20 ENCOUNTER — CLINICAL SUPPORT (OUTPATIENT)
Dept: REHABILITATION | Facility: HOSPITAL | Age: 68
End: 2018-03-20
Attending: FAMILY MEDICINE
Payer: MEDICARE

## 2018-03-20 DIAGNOSIS — M25.611 DECREASED ROM OF RIGHT SHOULDER: ICD-10-CM

## 2018-03-20 DIAGNOSIS — M25.60 RANGE OF MOTION DEFICIT: ICD-10-CM

## 2018-03-20 DIAGNOSIS — R29.898 DECREASED STRENGTH OF UPPER EXTREMITY: ICD-10-CM

## 2018-03-20 DIAGNOSIS — M25.531 WRIST PAIN, RIGHT: Primary | ICD-10-CM

## 2018-03-20 PROCEDURE — 97161 PT EVAL LOW COMPLEX 20 MIN: CPT | Mod: PO

## 2018-03-20 PROCEDURE — 97140 MANUAL THERAPY 1/> REGIONS: CPT | Mod: PO

## 2018-03-20 PROCEDURE — G8978 MOBILITY CURRENT STATUS: HCPCS | Mod: CK,PO

## 2018-03-20 PROCEDURE — G8979 MOBILITY GOAL STATUS: HCPCS | Mod: CJ,PO

## 2018-03-20 NOTE — PLAN OF CARE
"                                                  Physical Therapy Initial Evaluation       Date: 03/20/2018    Patient Name: Dia Ovalles  Clinic Number: [id number]  Age: 67 y.o.  Gender: female    Diagnosis:   Encounter Diagnoses   Name Primary?    Range of motion deficit     Wrist pain, right Yes    Decreased ROM of right shoulder     Decreased strength of upper extremity        Referring Physician: Shelley Leija MD  Treatment Orders: PT Eval and Treat    Evaluation Date: 3/20/18  Visit # authorized: 20  Authorization period: 12/31/18   Plan of care Expiration: 5/20/18   MD referral: y    History     Past Medical History:   Diagnosis Date    Cancer     SCC x 2 on arms       Current Outpatient Prescriptions   Medication Sig    biotin 1 mg tablet Take 5,000 mcg by mouth once daily.    cholecalciferol, vitamin D3, (VITAMIN D3) 2,000 unit Cap Take 1 capsule by mouth once daily.    raloxifene (EVISTA) 60 mg tablet Take 1 tablet (60 mg total) by mouth once daily.    tolterodine (DETROL LA) 4 MG 24 hr capsule Take 1 capsule (4 mg total) by mouth once daily.    tolterodine (DETROL) 2 MG Tab Take 1 tablet (2 mg total) by mouth 2 (two) times daily as needed.     No current facility-administered medications for this visit.        Review of patient's allergies indicates:   Allergen Reactions    Codeine Nausea Only         Subjective     Patient states:  The pt reports that she broke her R wrist and had a plate put in mid Sept 2017.  She notes that she was immobilized for a couple of months in a large immobilizer and then after that was in a brace  She notes she did not really start using her arm at all until December 2017.  She notes that she finished therapy in the beginning of January 2018.  The pt reports that she went back to Yoga in attempt to "loosen it up". She reports with hx of frozen shoulder on the L about 2 years ago. She reports that therapy really helped with that.  She notes that her R shoulder " "began feeling similarly to the L so she mentioned it to her MD and PT was recommended.  She notes she is R hand dominant. The pt reports that lifting, especially overhead or out to the side increase pain.  The pt also reports that reaching back, "like to put on a jacket" also increases her pain.  The pt reports that her shoulder and arm in general were really weak initially following the R wrist surgery but she feels that is improving with the yoga and just day to day activity. The pt is primarily limited by ROM loss and pain.  The pt does report waking at night due to pain if she attempts to sleep on that side.   Diagnostic Tests: Xray + for mild AC joint degeneration   Pain Scale: Dia rates pain on a scale of 0-10 to be 4 at worst; 2 currently; 2 at best .  Onset: gradual and after immobilization from the R wrist surgery   Radicular symptoms:  Denies   Aggravating factors:   Overhead activity, lifting out to the side and reaching behind her back   Easing factors:  Stretching, aleeve very occasionally   Prior Therapy: PT for L shoulder and OT for R wrist   Functional Deficits Leading to Referral: decreased ROM and mobility and pain   Prior functional status: Independent prior to R wrist surgery   DME owned/used: none   Occupation:  Retired, yoga                        Pts goals:  To reduce pain and improve mobility     Objective     Posture:  Very minimally increased Thoracic kyphosis and rounded shoulders   Dermatomes: NL   Myotomes: WNL   DTRs: WNL   Palpation: No tenderness or notable soft tissue or body deformity noted     Shoulder Range of Motion:   ACTIVE ROM LEFT RIGHT   Flexion 155 146   Abduction 155 145   Horizontal Abd 40 33   ER/0 60 52   ER/90 75  75   IR T12 T12     PASSIVE ROM LEFT RIGHT   Flexion 152 160   Abduction 150 128   IR/90deg 80 85   ER/90deg 85 85   ER/0deg 45 49         STRENGTH LEFT RIGHT   Flexion 5/5 5/5   Abduction 4+/5 4/5   Extension 5/5 5/5   IR (neutral) 4+/5 4/5   ER " (neutral) 4+/5 4/5   Middle Trap 4/5 4/5   Lower Trap 4/5 4/5       Joint Mobility: 2/3 mildly restricted on the R (posterior and inferior)    Scapular Control/Dyskinesis:    Normal / Subtle / Obvious   Left Normal    Right Normal        Functional Limitations Reports - G Codes  Category: Mobility  Tool: FOTO Shoulder Survey  Score: 40% Limitation    TEST SCORE  Modifier  Impairment Limitation Restriction    0  CH  0 % impaired, limited or restricted   1-23  CI  @ least 1% but less than 20% impaired, limited or restricted   24-47  CJ  @ least 20%<40% impaired, limited or restricted   48-71  CK  @ least 40%<60% impaired, limited or restricted   72-95  CL  @ least 60% <80% impaired, limited or restricted     CM  @ least 80%<100% impaired limited or restricted   120  CN  100% impaired, limited or restricted     Current ():  40% Limitation  Goal (): 31% Limitation      TREATMENT     Time In: 2:30 pm   Time Out: 3:30 pm     PT Evaluation Completed? Yes  Discussed Plan of Care with patient: Yes    Dia received 10 minutes of manual therapy including:  GH joint mobilization (psoterior and inferior glide Grade IV)   GH joint distraction grade IV   Posterior capsule stretch R     Written Home Exercises Provided: tband brestuart, tband row and ext   Dia demo good understanding of the education provided. Patient demo good return demo of skill of exercises.      Assessment     Patient presents to the clinic with minimally limited A/PROM and GH joint mobility B. The pt also with mildly limited RTC strength and postural endurance B. The pt would benefit from postural education and endurance training, RTC and periscapular musculature strengthening, joint mobilization and HEP to improve mobility and function.   Pt prognosis is Excellent.  Pt will benefit from skilled outpatient physical therapy to address the above stated deficits, provide pt/family education and to maximize pt's level of independence.      Medical necessity is demonstrated by the following IMPAIRMENTS/PROBLEMS:  1. Increased Pain  2. Decreased GHJ Mobility & Decreased ROM  3. Decreased Core & UE Strength  4. Postural Imbalance  5. Decreased Tolerance to Functional Activities    Pt's spiritual, cultural and educational needs considered and pt agreeable to plan of care and goals as stated below:     Anticipated Barriers for physical therapy: none     History  Co-morbidities and personal factors that may impact the plan of care Examination  Body Structures and Functions, activity limitations and participation restrictions that may impact the plan of care    Clinical Presentation   Co-morbidities:   prior wrist surgery    Prior cardiac surgery         Personal Factors:   no deficits Body Regions:   upper extremities    Body Systems:    ROM  strength            Participation Restrictions:   None      Activity limitations:   Learning and applying knowledge  no deficits    General Tasks and Commands  no deficits    Communication  no deficits    Mobility  no deficits    Self care  no deficits    Domestic Life  no deficits    Interactions/Relationships  no deficits    Life Areas  no deficits    Community and Social Life  no deficits         stable and uncomplicated                      low   low  low Decision Making/ Complexity Score:  low       Short Term GOALS: 3 weeks. Pt agrees with goals set.  1. Patient demonstrates independence with HEP.   2. Patient demonstrates independence with Postural Awareness.   3. Patient demonstrates independence with body mechanics.     Long Term Goals 6 Weeks. Pt agrees with goals set:  1. Pt will increase ROM to B WNL to improve functional reach pain free.   2. Pt will increase strength to 5/5 to improve tolerance to all functional activities pain free.   3. Pt demonstrates improved function per FOTO Shoulder Survey to 30% Limitation or less.       Plan     Outpatient physical therapy 1 time weekly to include: pt ed,  hep, therapeutic exercises, neuromuscular re-education/ balance exercises, joint mobilizations, aquatic therapy and modalities prn. Cont PT for  4-6 weeks. Pt may be seen by PTA as part of the rehabilitation team.    Therapist: Nuha Jansen, PT  3/20/2018

## 2018-03-29 ENCOUNTER — CLINICAL SUPPORT (OUTPATIENT)
Dept: REHABILITATION | Facility: HOSPITAL | Age: 68
End: 2018-03-29
Attending: FAMILY MEDICINE
Payer: MEDICARE

## 2018-03-29 DIAGNOSIS — M25.611 DECREASED ROM OF RIGHT SHOULDER: ICD-10-CM

## 2018-03-29 DIAGNOSIS — M25.60 RANGE OF MOTION DEFICIT: Primary | ICD-10-CM

## 2018-03-29 PROCEDURE — 97110 THERAPEUTIC EXERCISES: CPT | Mod: PO

## 2018-03-29 NOTE — PROGRESS NOTES
Name: Dia Ovalles  Clinic Number: [id number]  Date of Treatment: 03/29/2018  Diagnosis:   Encounter Diagnoses   Name Primary?    Range of motion deficit Yes    Decreased ROM of right shoulder        Physician: Shelley Leija MD    Evaluation Date: 3/20/18  Visit # authorized: 20  Authorization period: 12/31/18   Plan of care Expiration: 5/20/18   MD referral: y    Time in: 08:00 am   Time Out: 08:45 am   Total Treatment Time: 45 minutes   Billable Time: 45 minutes       Subjective:    Dia Ovalles reports improvement of symptoms.  Patient reports their pain to be 2/10 on a 0-10 scale with 0 being no pain and 10 being the worst pain imaginable.    Objective      Dia Ovalles was instructed in and performed therapeutic exercises to develop strength, endurance, ROM, flexibility and posture for 45  minutes including:     Supine wand flexion: x 20   Prone rows/ext: 2 x 10   Theraband rows: 2 x 10 Orange  Theraband extension: 2 x 10 Orange        Dia Ovalles received the following manual therapy techniques: Joint mobilizations were applied to the: GH joint  for 8 minutes.       Written Home Exercises Provided: Patient given updated home exercise program print out via [employer]  Pt demo good understanding of the education provided. Dia Ovalles demonstrated good return demonstration of activities.     Assessment:     Patient tolerated therapy session well. Demos decreased periscap / rotator cuff muscle strength and will continue to benefit from skilled physical therapy to address.   Pt will continue to benefit from skilled PT intervention. Medical Necessity is demonstrated by:  Unable to participate in daily activities, Continued inability to participate in vocational pursuits, Pain limits function of effected part for some activities, Unable to participate fully in daily activities, Requires skilled supervision to complete and progress HEP and Weakness.    Patient is making good progress towards established  goals.    Short Term GOALS: 3 weeks. Pt agrees with goals set.  1. Patient demonstrates independence with HEP.   2. Patient demonstrates independence with Postural Awareness.   3. Patient demonstrates independence with body mechanics.      Long Term Goals 6 Weeks. Pt agrees with goals set:  1. Pt will increase ROM to B WNL to improve functional reach pain free.   2. Pt will increase strength to 5/5 to improve tolerance to all functional activities pain free.   3. Pt demonstrates improved function per FOTO Shoulder Survey to 30% Limitation or less.     New/Revised goals: None at this time.       Plan:  Continue with established Plan of Care towards PT goals.

## 2018-04-05 ENCOUNTER — CLINICAL SUPPORT (OUTPATIENT)
Dept: REHABILITATION | Facility: HOSPITAL | Age: 68
End: 2018-04-05
Attending: FAMILY MEDICINE
Payer: MEDICARE

## 2018-04-05 DIAGNOSIS — M25.611 DECREASED ROM OF RIGHT SHOULDER: ICD-10-CM

## 2018-04-05 DIAGNOSIS — R29.898 DECREASED STRENGTH OF UPPER EXTREMITY: ICD-10-CM

## 2018-04-05 PROCEDURE — 97140 MANUAL THERAPY 1/> REGIONS: CPT | Mod: PO

## 2018-04-05 PROCEDURE — 97110 THERAPEUTIC EXERCISES: CPT | Mod: PO

## 2018-04-05 NOTE — PROGRESS NOTES
Name: Dia Ovalles  Clinic Number: [id number]  Date of Treatment: 04/05/2018  Diagnosis:   Encounter Diagnoses   Name Primary?    Decreased ROM of right shoulder     Decreased strength of upper extremity        Physician: Shelley Leija MD    Evaluation Date: 3/20/18  Visit # authorized: 3/8 (POC) 20 INS  Authorization period: 12/31/18   Plan of care Expiration: 5/20/18   MD referral: y    Time in: 2:00 pm   Time Out: 2:55 pm   Total Treatment Time: 55 minutes   Billable Time: 55 minutes       Subjective:    Dia Ovalles reports she continues to feel improvement and notes the stretching really helped her mobility last session. Patient reports their pain to be 2/10 on a 0-10 scale with 0 being no pain and 10 being the worst pain imaginable.    Objective      Dia Ovalles was instructed in and performed therapeutic exercises to develop strength, endurance, ROM, flexibility and posture for 45  minutes including:     Supine flexion 1# B 30x   Side lying coronal ABD 15 x 1# and 15 x 0# R, 30 x 1# L  Prone rows/ext: 2 x 10 1# B   Side lying ER 1# B   Theraband rows: 2 x 10 Orange  Theraband extension: 2 x 10 Orange    Dia Ovalles received the following manual therapy techniques: Joint mobilizations were applied to the: GH joint  for 10 minutes.       Written Home Exercises Provided: Patient given updated home exercise program print out via [employer]  Pt demo good understanding of the education provided. Dia Ovalles demonstrated good return demonstration of activities.     Assessment:     Patient tolerated all treatment well and demos good scapular mechanics and stabilization with all exercises.  The pt also demos good muscular endurance with the addition of weights with exercises. Will continue to increase functional strengthening as tolerated.   Pt will continue to benefit from skilled PT intervention. Medical Necessity is demonstrated by:  Unable to participate in daily activities, Continued inability to  participate in vocational pursuits, Pain limits function of effected part for some activities, Unable to participate fully in daily activities, Requires skilled supervision to complete and progress HEP and Weakness.    Patient is making good progress towards established goals.    Short Term GOALS: 3 weeks. Pt agrees with goals set.  1. Patient demonstrates independence with HEP.   2. Patient demonstrates independence with Postural Awareness.   3. Patient demonstrates independence with body mechanics.      Long Term Goals 6 Weeks. Pt agrees with goals set:  1. Pt will increase ROM to B WNL to improve functional reach pain free.   2. Pt will increase strength to 5/5 to improve tolerance to all functional activities pain free.   3. Pt demonstrates improved function per FOTO Shoulder Survey to 30% Limitation or less.     New/Revised goals: None at this time.       Plan:  Continue with established Plan of Care towards PT goals.

## 2018-04-12 ENCOUNTER — CLINICAL SUPPORT (OUTPATIENT)
Dept: REHABILITATION | Facility: HOSPITAL | Age: 68
End: 2018-04-12
Attending: FAMILY MEDICINE
Payer: MEDICARE

## 2018-04-12 DIAGNOSIS — M25.611 DECREASED ROM OF RIGHT SHOULDER: ICD-10-CM

## 2018-04-12 DIAGNOSIS — R29.898 DECREASED STRENGTH OF UPPER EXTREMITY: ICD-10-CM

## 2018-04-12 PROCEDURE — 97140 MANUAL THERAPY 1/> REGIONS: CPT | Mod: PO

## 2018-04-12 PROCEDURE — 97110 THERAPEUTIC EXERCISES: CPT | Mod: PO

## 2018-04-12 NOTE — PROGRESS NOTES
Name: Dia Ovalles  Clinic Number: [id number]  Date of Treatment: 04/12/2018  Diagnosis:   Encounter Diagnoses   Name Primary?    Decreased ROM of right shoulder     Decreased strength of upper extremity        Physician: Shelley Leija MD    Evaluation Date: 3/20/18  Visit # authorized: 3/8 (POC) 20 INS  Authorization period: 12/31/18   Plan of care Expiration: 5/20/18   MD referral: y    Time in: 2:00 pm   Time Out: 3:00 pm   Total Treatment Time: 60 minutes   Billable Time: 60 minutes       Subjective:    Dia Ovalles reports that she definitely feels improvement and the only time she really has stiffness or discomfort anymore is when she lifts her arm out to the side.  Patient reports their pain to be 2/10 on a 0-10 scale with 0 being no pain and 10 being the worst pain imaginable.    Objective      Dia Ovalles was instructed in and performed therapeutic exercises to develop strength, endurance, ROM, flexibility and posture for 50 minutes including:     Supine flexion 1# B 30x   Side lying coronal ABD 15 x 1# and 15 x 0# R, 30 x 1# L  Prone rows/ext: 2 x 10 1# B   Side lying ER 1# B   Theraband rows: 2 x 10 Orange  Theraband extension: 2 x 10 Orange    Dia Ovalles received the following manual therapy techniques: Joint mobilizations were applied to the: GH joint  for 10 minutes.   GH PROM   GH distraction grade III  GH posterior glides grade IV  GH inferior glides grade IV     Written Home Exercises Provided: Patient given updated home exercise program print out via [employer]  Pt demo good understanding of the education provided. Dia Ovalles demonstrated good return demonstration of activities.     Assessment:   The pt tolerated all treatment well and demos improved GH joint mobility and A/PROM.  The pt also with improved RTC strength and endurance.  Will continue to increase as tolerated.   Pt will continue to benefit from skilled PT intervention. Medical Necessity is demonstrated by:  Unable to  participate in daily activities, Continued inability to participate in vocational pursuits, Pain limits function of effected part for some activities, Unable to participate fully in daily activities, Requires skilled supervision to complete and progress HEP and Weakness.    Patient is making good progress towards established goals.    Short Term GOALS: 3 weeks. Pt agrees with goals set.  1. Patient demonstrates independence with HEP.   2. Patient demonstrates independence with Postural Awareness.   3. Patient demonstrates independence with body mechanics.      Long Term Goals 6 Weeks. Pt agrees with goals set:  1. Pt will increase ROM to B WNL to improve functional reach pain free.   2. Pt will increase strength to 5/5 to improve tolerance to all functional activities pain free.   3. Pt demonstrates improved function per FOTO Shoulder Survey to 30% Limitation or less.     New/Revised goals: None at this time.       Plan:  Continue with established Plan of Care towards PT goals.

## 2018-04-13 ENCOUNTER — PATIENT MESSAGE (OUTPATIENT)
Dept: INTERNAL MEDICINE | Facility: CLINIC | Age: 68
End: 2018-04-13

## 2018-04-13 DIAGNOSIS — Z12.39 SCREENING FOR BREAST CANCER: Primary | ICD-10-CM

## 2018-04-19 ENCOUNTER — OFFICE VISIT (OUTPATIENT)
Dept: DERMATOLOGY | Facility: CLINIC | Age: 68
End: 2018-04-19
Payer: MEDICARE

## 2018-04-19 DIAGNOSIS — D22.9 MULTIPLE BENIGN NEVI: ICD-10-CM

## 2018-04-19 DIAGNOSIS — L81.4 SOLAR LENTIGO: ICD-10-CM

## 2018-04-19 DIAGNOSIS — L82.0 INFLAMED SEBORRHEIC KERATOSIS: Primary | ICD-10-CM

## 2018-04-19 DIAGNOSIS — D18.01 ANGIOMA OF SKIN: ICD-10-CM

## 2018-04-19 DIAGNOSIS — L82.1 SK (SEBORRHEIC KERATOSIS): ICD-10-CM

## 2018-04-19 PROCEDURE — 99212 OFFICE O/P EST SF 10 MIN: CPT | Mod: PBBFAC | Performed by: PATHOLOGY

## 2018-04-19 PROCEDURE — 99213 OFFICE O/P EST LOW 20 MIN: CPT | Mod: S$PBB,,, | Performed by: PATHOLOGY

## 2018-04-19 PROCEDURE — 99999 PR PBB SHADOW E&M-EST. PATIENT-LVL II: CPT | Mod: PBBFAC,,, | Performed by: PATHOLOGY

## 2018-04-19 NOTE — PROGRESS NOTES
Subjective:       Patient ID:  Dia Ovalles is a 67 y.o. female who presents for   Chief Complaint   Patient presents with    Skin Check     UBSE     HPI  Pt with h/o NMSC (she thinks SCC) to right arm about 6 years ago - both occurred within 1 year.  This is a high risk patient here to check for the development of new lesions.  Denies any new or changing lesions today.    Review of Systems   Constitutional: Negative for fever, chills and fatigue.   Skin: Positive for daily sunscreen use.   Hematologic/Lymphatic: Bruises/bleeds easily.        Objective:    Physical Exam   Constitutional: She appears well-developed and well-nourished. No distress.   Neurological: She is alert and oriented to person, place, and time. She is not disoriented.   Psychiatric: She has a normal mood and affect.   Skin:   Areas Examined (abnormalities noted in diagram):   Scalp / Hair Palpated and Inspected  Head / Face Inspection Performed  Neck Inspection Performed  Chest / Axilla Inspection Performed  Abdomen Inspection Performed  Genitals / Buttocks / Groin Inspection Performed  Back Inspection Performed  RUE Inspected  LUE Inspection Performed  RLE Inspected  LLE Inspection Performed  Nails and Digits Inspection Performed                   Diagram Legend     Erythematous scaling macule/papule c/w actinic keratosis       Vascular papule c/w angioma      Pigmented verrucoid papule/plaque c/w seborrheic keratosis      Yellow umbilicated papule c/w sebaceous hyperplasia      Irregularly shaped tan macule c/w lentigo     1-2 mm smooth white papules consistent with Milia      Movable subcutaneous cyst with punctum c/w epidermal inclusion cyst      Subcutaneous movable cyst c/w pilar cyst      Firm pink to brown papule c/w dermatofibroma      Pedunculated fleshy papule(s) c/w skin tag(s)      Evenly pigmented macule c/w junctional nevus     Mildly variegated pigmented, slightly irregular-bordered macule c/w mildly atypical nevus      Flesh  colored to evenly pigmented papule c/w intradermal nevus       Pink pearly papule/plaque c/w basal cell carcinoma      Erythematous hyperkeratotic cursted plaque c/w SCC      Surgical scar with no sign of skin cancer recurrence      Open and closed comedones      Inflammatory papules and pustules      Verrucoid papule consistent consistent with wart     Erythematous eczematous patches and plaques     Dystrophic onycholytic nail with subungual debris c/w onychomycosis     Umbilicated papule    Erythematous-base heme-crusted tan verrucoid plaque consistent with inflamed seborrheic keratosis     Erythematous Silvery Scaling Plaque c/w Psoriasis     See annotation      Assessment / Plan:        Inflamed seborrheic keratosis - Cryosurgery procedure note:    Verbal consent from the patient is obtained. Liquid nitrogen cryosurgery is applied to 2 lesions to produce a freeze injury. The patient is aware that blisters may form and is instructed on wound care with gentle cleansing and use of vaseline ointment to keep moist until healed. The patient is supplied a handout on cryosurgery and is instructed to call if lesions do not completely resolve.      Solar lentigo - Reassurance given to patient. No treatment is necessary.       SK (seborrheic keratosis) - These are benign inherited growths without a malignant potential. Reassurance given to patient. No treatment is necessary.       Angioma of skin - This is a benign vascular lesion. Reassurance given. No treatment required.       Multiple benign nevi - Patient with several benign appearing nevi. Instructed patient to observe lesion(s) for changes and follow up in clinic if changes are noted.                No Follow-up on file.

## 2018-04-19 NOTE — PROGRESS NOTES
Dia Ovalles is a 67 y.o. female patient.   No diagnosis found.  Past Medical History:   Diagnosis Date    Cancer     SCC x 2 on arms     Past Surgical History Pertinent Negatives:   Procedure Date Noted    APPENDECTOMY 03/08/2017    CATARACT EXTRACTION 02/15/2018    CHOLECYSTECTOMY 03/08/2017    CORNEAL TRANSPLANT 02/15/2018    HYSTERECTOMY 03/08/2017    RETINAL DETACHMENT SURGERY 02/15/2018    STRABISMUS SURGERY 02/15/2018    TONSILLECTOMY 03/08/2017     Scheduled Meds:  Continuous Infusions:  PRN Meds:    Review of patient's allergies indicates:   Allergen Reactions    Codeine Nausea Only     There are no hospital problems to display for this patient.    There were no vitals taken for this visit.    Subjective  Objective   Assessment & Plan       Enrique Steward MA  4/19/2018

## 2018-04-20 ENCOUNTER — CLINICAL SUPPORT (OUTPATIENT)
Dept: REHABILITATION | Facility: HOSPITAL | Age: 68
End: 2018-04-20
Attending: FAMILY MEDICINE
Payer: MEDICARE

## 2018-04-20 DIAGNOSIS — M25.611 DECREASED ROM OF RIGHT SHOULDER: ICD-10-CM

## 2018-04-20 DIAGNOSIS — R29.898 DECREASED STRENGTH OF UPPER EXTREMITY: ICD-10-CM

## 2018-04-20 PROCEDURE — 97110 THERAPEUTIC EXERCISES: CPT | Mod: PO

## 2018-04-20 PROCEDURE — 97140 MANUAL THERAPY 1/> REGIONS: CPT | Mod: PO

## 2018-04-27 NOTE — PROGRESS NOTES
Name: Dia Ovalles  Clinic Number: [id number]  Date of Treatment: 04/27/2018  Diagnosis:   Encounter Diagnoses   Name Primary?    Decreased ROM of right shoulder     Decreased strength of upper extremity        Physician: Shelley Leija MD    Evaluation Date: 3/20/18  Visit # authorized: 4/8 (POC) 20 INS  Authorization period: 12/31/18   Plan of care Expiration: 5/20/18   MD referral: y    Time in: 1:30 pm   Time Out: 2:30 pm   Total Treatment Time: 60 minutes   Billable Time: 30 minutes       Subjective:    Dia Ovalles reports that she continues to feel significant improvement and notes that she feels her ROM has improved a lot.  Patient reports their pain to be 2/10 on a 0-10 scale with 0 being no pain and 10 being the worst pain imaginable.    Objective      Dia Ovalles was instructed in and performed therapeutic exercises to develop strength, endurance, ROM, flexibility and posture for 50 minutes including:     Supine flexion 1# B 30x   Side lying coronal ABD 15 x 1# and 15 x 0# R, 30 x 1# L  Prone rows/ext: 2 x 10 1# B   Side lying ER 1# B   Theraband rows: 2 x 10 Orange  Theraband extension: 2 x 10 Orange    Dia Ovalles received the following manual therapy techniques: Joint mobilizations were applied to the: GH joint  for 10 minutes.   GH PROM   GH distraction grade III  GH posterior glides grade IV  GH inferior glides grade IV     Written Home Exercises Provided: Patient given updated home exercise program print out via HEP 2Go  Pt demo good understanding of the education provided. Dia Ovalles demonstrated good return demonstration of activities.     Assessment:   The pt tolerated all treatment well and continues to demo improving ROM and GH joint mobility. The pt also with improving RTC strength and postural awareness.  Will see 1-2 more times and will then d/c.   Pt will continue to benefit from skilled PT intervention. Medical Necessity is demonstrated by:  Unable to participate in daily  activities, Continued inability to participate in vocational pursuits, Pain limits function of effected part for some activities, Unable to participate fully in daily activities, Requires skilled supervision to complete and progress HEP and Weakness.    Patient is making good progress towards established goals.    Short Term GOALS: 3 weeks. Pt agrees with goals set.  1. Patient demonstrates independence with HEP.   2. Patient demonstrates independence with Postural Awareness.   3. Patient demonstrates independence with body mechanics.      Long Term Goals 6 Weeks. Pt agrees with goals set:  1. Pt will increase ROM to B WNL to improve functional reach pain free.   2. Pt will increase strength to 5/5 to improve tolerance to all functional activities pain free.   3. Pt demonstrates improved function per FOTO Shoulder Survey to 30% Limitation or less.     New/Revised goals: None at this time.       Plan:  Continue with established Plan of Care towards PT goals.

## 2018-05-03 ENCOUNTER — CLINICAL SUPPORT (OUTPATIENT)
Dept: REHABILITATION | Facility: HOSPITAL | Age: 68
End: 2018-05-03
Attending: FAMILY MEDICINE
Payer: MEDICARE

## 2018-05-03 DIAGNOSIS — R29.898 DECREASED STRENGTH OF UPPER EXTREMITY: ICD-10-CM

## 2018-05-03 DIAGNOSIS — M25.611 DECREASED ROM OF RIGHT SHOULDER: ICD-10-CM

## 2018-05-03 PROCEDURE — G8979 MOBILITY GOAL STATUS: HCPCS | Mod: CI,PO

## 2018-05-03 PROCEDURE — G8980 MOBILITY D/C STATUS: HCPCS | Mod: CI,PO

## 2018-05-03 PROCEDURE — 97140 MANUAL THERAPY 1/> REGIONS: CPT | Mod: PO

## 2018-05-03 PROCEDURE — 97110 THERAPEUTIC EXERCISES: CPT | Mod: PO

## 2018-05-11 NOTE — PROGRESS NOTES
Name: Dia Ovalles  Clinic Number: [id number]  Date of Treatment: 05/10/2018  Diagnosis:   Encounter Diagnoses   Name Primary?    Decreased ROM of right shoulder     Decreased strength of upper extremity        Physician: Shelley Leija MD    Evaluation Date: 3/20/18  Visit # authorized: 5/8 (POC) 20 INS  Authorization period: 12/31/18   Plan of care Expiration: 5/20/18   MD referral: y    Time in: 1:30 pm   Time Out: 2:30 pm   Total Treatment Time: 60 minutes   Billable Time: 30 minutes       Subjective:    Dia Ovalles reports that she feels she is much improved and finally feels normal again.  She notes that she no longer feels pain or stiffness in the R shoulder.    Objective      Dia Ovalles was instructed in and performed therapeutic exercises to develop strength, endurance, ROM, flexibility and posture for 50 minutes including:     Supine flexion 1# B 30x   Side lying coronal ABD 15 x 1# and 15 x 0# R, 30 x 1# L  Prone rows/ext: 2 x 10 1# B   Side lying ER 1# B   Theraband rows: 2 x 10 Orange  Theraband extension: 2 x 10 Orange    Dia Ovalles received the following manual therapy techniques: Joint mobilizations were applied to the: GH joint  for 10 minutes.   GH PROM   GH distraction grade III  GH posterior glides grade IV  GH inferior glides grade IV     Written Home Exercises Provided: Patient given updated home exercise program print out via HEP 2Go  Pt demo good understanding of the education provided. Dia Ovalles demonstrated good return demonstration of activities.     Assessment:   The pt demos ROM recovery, improved RTC strength, decreased pain and improved postural awareness and endurance.  The pt has met all goals set by the PT and is appropriate for d/c at this time.  The pt demos good understanding of HEP.     Patient is making good progress towards established goals.    Short Term GOALS: 3 weeks. Pt agrees with goals set.  1. Patient demonstrates independence with HEP-MET.   2. Patient  demonstrates independence with Postural Awareness-MET.   3. Patient demonstrates independence with body mechanics-MET.      Long Term Goals 6 Weeks. Pt agrees with goals set:  1. Pt will increase ROM to B WNL to improve functional reach pain free-MET.   2. Pt will increase strength to 5/5 to improve tolerance to all functional activities pain free-MET.   3. Pt demonstrates improved function per FOTO Shoulder Survey to 30% Limitation or less.-MET.    Plan:  D/C with HEP at this time.  Nuha Jansen, PT

## 2018-05-14 ENCOUNTER — HOSPITAL ENCOUNTER (OUTPATIENT)
Dept: RADIOLOGY | Facility: OTHER | Age: 68
Discharge: HOME | End: 2018-05-14
Attending: FAMILY MEDICINE
Payer: MEDICARE

## 2018-05-14 VITALS — WEIGHT: 138 LBS | BODY MASS INDEX: 20.92 KG/M2 | HEIGHT: 68 IN

## 2018-05-14 DIAGNOSIS — Z12.39 SCREENING FOR BREAST CANCER: ICD-10-CM

## 2018-05-14 PROCEDURE — 77067 SCR MAMMO BI INCL CAD: CPT | Mod: 26,,, | Performed by: RADIOLOGY

## 2018-05-14 PROCEDURE — 77067 SCR MAMMO BI INCL CAD: CPT | Mod: TC

## 2018-05-14 PROCEDURE — 77063 BREAST TOMOSYNTHESIS BI: CPT | Mod: 26,,, | Performed by: RADIOLOGY

## 2018-07-19 ENCOUNTER — OFFICE VISIT (OUTPATIENT)
Dept: OPTOMETRY | Facility: CLINIC | Age: 68
End: 2018-07-19
Payer: MEDICARE

## 2018-07-19 DIAGNOSIS — H52.4 PRESBYOPIA: Primary | ICD-10-CM

## 2018-07-19 PROCEDURE — 99499 UNLISTED E&M SERVICE: CPT | Mod: S$PBB,,, | Performed by: OPTOMETRIST

## 2018-07-19 NOTE — PROGRESS NOTES
HPI     67yr old female here for trouble with CL. Patient notes having trouble   with reading small print when wearing CL. Pt states she has to take CL out   to see reading material. Pt just put a new pair of lens in this AM. Pt   feel she did better when wearing Multifocal Contact lens. Pt currently   wearing Monovision CL.     Last edited by Benoit Gracia on 7/19/2018 10:47 AM. (History)            Assessment /Plan     For exam results, see Encounter Report.    Presbyopia      Change to modified monovision  OD biofinity toric  OS monovision near MF    Dispensed +4.00/+2.50N, order +4.50/+2.50N   Ok to give to pt to try, ok to order supply if happy with vision and comfort

## 2018-08-06 ENCOUNTER — OFFICE VISIT (OUTPATIENT)
Dept: OBSTETRICS AND GYNECOLOGY | Facility: CLINIC | Age: 68
End: 2018-08-06
Payer: MEDICARE

## 2018-08-06 ENCOUNTER — PATIENT MESSAGE (OUTPATIENT)
Dept: OPTOMETRY | Facility: CLINIC | Age: 68
End: 2018-08-06

## 2018-08-06 VITALS
DIASTOLIC BLOOD PRESSURE: 74 MMHG | SYSTOLIC BLOOD PRESSURE: 122 MMHG | WEIGHT: 138.88 LBS | HEIGHT: 68 IN | BODY MASS INDEX: 21.05 KG/M2

## 2018-08-06 DIAGNOSIS — Z01.419 WELL WOMAN EXAM WITH ROUTINE GYNECOLOGICAL EXAM: Primary | ICD-10-CM

## 2018-08-06 DIAGNOSIS — N89.5 VAGINAL STENOSIS: ICD-10-CM

## 2018-08-06 DIAGNOSIS — N95.2 VAGINAL ATROPHY: ICD-10-CM

## 2018-08-06 DIAGNOSIS — Z78.0 POSTMENOPAUSE: ICD-10-CM

## 2018-08-06 PROCEDURE — 99999 PR PBB SHADOW E&M-EST. PATIENT-LVL III: CPT | Mod: PBBFAC,,, | Performed by: NURSE PRACTITIONER

## 2018-08-06 PROCEDURE — 99213 OFFICE O/P EST LOW 20 MIN: CPT | Mod: 25,PBBFAC | Performed by: NURSE PRACTITIONER

## 2018-08-06 PROCEDURE — 88175 CYTOPATH C/V AUTO FLUID REDO: CPT

## 2018-08-06 PROCEDURE — G0101 CA SCREEN;PELVIC/BREAST EXAM: HCPCS | Mod: S$PBB,,, | Performed by: NURSE PRACTITIONER

## 2018-08-06 RX ORDER — ESTRADIOL 10 UG/1
INSERT VAGINAL
Qty: 8 TABLET | Refills: 0 | Status: SHIPPED | OUTPATIENT
Start: 2018-08-06 | End: 2018-08-28 | Stop reason: SDUPTHER

## 2018-08-06 RX ORDER — ESTRADIOL 10 UG/1
INSERT VAGINAL
Qty: 24 TABLET | Refills: 4 | Status: SHIPPED | OUTPATIENT
Start: 2018-08-06 | End: 2018-08-06 | Stop reason: SDUPTHER

## 2018-08-06 NOTE — PROGRESS NOTES
HISTORY OF PRESENT ILLNESS:    Dia Ovalles is a 67 y.o. female , presents for a routine exam and has no complaints.    -Here to get established, moved from [place] to retire here (went to school in [place]  -Not currently sexually active, but hoping to meet someone.   -States her previous Gyn told her she would need vaginal hormones.     Past Medical History:   Diagnosis Date    Breast cyst     Cancer     SCC x 2 on arms    OAB (overactive bladder) 3/8/2017    Osteoporosis 3/8/2017       Past Surgical History:   Procedure Laterality Date    ASD REPAIR      open heart surgery    BREAST BIOPSY       SECTION      x2    KNEE SURGERY      meniscal repair    SKIN CANCER EXCISION          MEDICATIONS AND ALLERGIES:      Current Outpatient Prescriptions:     biotin 1 mg tablet, Take 5,000 mcg by mouth once daily., Disp: , Rfl:     cholecalciferol, vitamin D3, (VITAMIN D3) 2,000 unit Cap, Take 1 capsule by mouth once daily., Disp: , Rfl:     raloxifene (EVISTA) 60 mg tablet, Take 1 tablet (60 mg total) by mouth once daily., Disp: 90 tablet, Rfl: 3    tolterodine (DETROL LA) 4 MG 24 hr capsule, Take 1 capsule (4 mg total) by mouth once daily., Disp: 90 capsule, Rfl: 1    tolterodine (DETROL) 2 MG Tab, Take 1 tablet (2 mg total) by mouth 2 (two) times daily as needed., Disp: 90 tablet, Rfl: 1    estradiol (VAGIFEM) 10 mcg Tab, Insert one tablet vaginally twice weekly H.S., Disp: 8 tablet, Rfl: 0    Review of patient's allergies indicates:   Allergen Reactions    Codeine Nausea Only       Family History   Problem Relation Age of Onset    Stroke Mother     Hypertension Mother     Asthma Mother     Osteoporosis Mother     Cancer Father         lung cancer    Heart disease Maternal Grandfather     Stroke Maternal Grandfather     Breast cancer Neg Hx     Colon cancer Neg Hx     Ovarian cancer Neg Hx        Social History     Social History    Marital status:      Spouse name:  "N/A    Number of children: 2    Years of education: N/A     Occupational History    retired      Social History Main Topics    Smoking status: Never Smoker    Smokeless tobacco: Never Used    Alcohol use Yes      Comment: social    Drug use: Unknown    Sexual activity: Not on file     Other Topics Concern    Not on file     Social History Narrative    No narrative on file       OB HISTORY: Number of C/S:2    COMPREHENSIVE GYN HISTORY:  PAP History:  Denies abnormal Paps. UNKNOWN DATE OF LAST PAP.  Infection History: Denies STDs. Denies PID.  Benign History: Denies uterine fibroids. Denies ovarian cysts. Denies endometriosis.  Denies other conditions.  Cancer History: Denies cervical cancer. Denies uterine cancer or hyperplasia. Denies ovarian cancer. Denies vulvar cancer or pre-cancer. Denies vaginal cancer or pre-cancer. Denies breast cancer. Denies colon cancer.  Sexual Activity History: Reports currently being sexually active  Menstrual History: Denies menses. Pt is  not on HRT.     ROS:  GENERAL: No weight changes. No swelling. No fatigue. No fever.  CARDIOVASCULAR: No chest pain. No shortness of breath. No leg cramps.   NEUROLOGICAL: No headaches. No vision changes.  BREASTS: No pain. No lumps. No discharge.  ABDOMEN: No pain. No nausea. No vomiting. No diarrhea. No constipation.  REPRODUCTIVE: No abnormal bleeding.   VULVA: No pain. No lesions. No itching.  VAGINA: No relaxation. No itching. No odor. No discharge. No lesions.  URINARY: No incontinence. No nocturia. No frequency. No dysuria.    /74   Ht 5' 8" (1.727 m)   Wt 63 kg (138 lb 14.2 oz)   BMI 21.12 kg/m²     PE:  APPEARANCE: Well nourished, well developed, in no acute distress.  AFFECT: WNL, alert and oriented x 3.  SKIN: No hirsutism or acne.  NECK: Neck symmetric without masses or thyromegaly.  NODES: No inguinal, cervical, axillary or femoral lymph node enlargement.  CHEST: Good respiratory effort.   ABDOMEN: Soft. No " tenderness or masses.  BREASTS: Symmetrical, no skin changes or visible lesions. No palpable masses, nipple discharge bilaterally.  PELVIC: ATROPHIC EXTERNAL FEMALE GENITALIA without lesions. Normal hair distribution. Adequate perineal body, normal urethral meatus. VAGINA DRY / ATROPHIC /STENOTIC without lesions or discharge. CERVIX STENOTIC without lesions, discharge or tenderness. CAN OPEN SPECULUM ONE CLICK, EXAM PAINFUL. No significant cystocele or rectocele. Bimanual exam shows uterus to be normal size, regular, mobile and nontender. Adnexa without masses or tenderness.  RECTAL: Rectovaginal exam confirms above with normal sphincter tone, no masses.  EXTREMITIES: No edema.    DIAGNOSIS:  1. Well woman exam with routine gynecological exam    2. Postmenopause    3. Vaginal atrophy    4. Vaginal stenosis        PLAN:    Orders Placed This Encounter    Liquid-based pap smear, screening    estradiol (VAGIFEM) 10 mcg Tab   Up to date on mammogram  Reports normal colonoscopy in [place] 2 years ago.  Reports last BMD 2018,in [place].    COUNSELING:  The patient was counseled today on:  -options for treatment of vaginal atrophy with water based vaginal lubricants vs vaginal estrogen cream, tablets, ring and she chose tablets;  -use of Vagifem, potential side effects and need for vaginal dilators;  -A.C.S. Pap and pelvic exam guidelines (no pap after age 65) and recommendations for yearly mammogram;  -to see her PCP for other health maintenance.    FOLLOW-UP in two years.

## 2018-08-28 ENCOUNTER — PATIENT MESSAGE (OUTPATIENT)
Dept: OBSTETRICS AND GYNECOLOGY | Facility: CLINIC | Age: 68
End: 2018-08-28

## 2018-08-28 DIAGNOSIS — N95.2 VAGINAL ATROPHY: ICD-10-CM

## 2018-08-28 DIAGNOSIS — N32.81 OAB (OVERACTIVE BLADDER): ICD-10-CM

## 2018-08-28 RX ORDER — ESTRADIOL 10 UG/1
INSERT VAGINAL
Qty: 24 TABLET | Refills: 4 | Status: SHIPPED | OUTPATIENT
Start: 2018-08-28 | End: 2018-08-28 | Stop reason: SDUPTHER

## 2018-08-28 RX ORDER — ESTRADIOL 10 UG/1
INSERT VAGINAL
Qty: 24 TABLET | Refills: 4 | Status: SHIPPED | OUTPATIENT
Start: 2018-08-28 | End: 2019-10-08 | Stop reason: SDUPTHER

## 2018-08-28 RX ORDER — TOLTERODINE 4 MG/1
4 CAPSULE, EXTENDED RELEASE ORAL DAILY
Qty: 90 CAPSULE | Refills: 1 | Status: SHIPPED | OUTPATIENT
Start: 2018-08-28 | End: 2019-01-20 | Stop reason: SDUPTHER

## 2018-09-13 ENCOUNTER — HOSPITAL ENCOUNTER (OUTPATIENT)
Dept: RADIOLOGY | Facility: HOSPITAL | Age: 68
Discharge: HOME | End: 2018-09-13
Attending: PHYSICIAN ASSISTANT
Payer: MEDICARE

## 2018-09-13 ENCOUNTER — OFFICE VISIT (OUTPATIENT)
Dept: ORTHOPEDICS | Facility: CLINIC | Age: 68
End: 2018-09-13
Payer: MEDICARE

## 2018-09-13 VITALS
DIASTOLIC BLOOD PRESSURE: 79 MMHG | WEIGHT: 141.44 LBS | BODY MASS INDEX: 21.44 KG/M2 | HEIGHT: 68 IN | HEART RATE: 78 BPM | SYSTOLIC BLOOD PRESSURE: 120 MMHG

## 2018-09-13 DIAGNOSIS — M17.11 PRIMARY OSTEOARTHRITIS OF RIGHT KNEE: Primary | ICD-10-CM

## 2018-09-13 DIAGNOSIS — M25.561 RIGHT KNEE PAIN, UNSPECIFIED CHRONICITY: ICD-10-CM

## 2018-09-13 PROCEDURE — 99213 OFFICE O/P EST LOW 20 MIN: CPT | Mod: 25,S$PBB,, | Performed by: PHYSICIAN ASSISTANT

## 2018-09-13 PROCEDURE — 73564 X-RAY EXAM KNEE 4 OR MORE: CPT | Mod: 26,RT,, | Performed by: RADIOLOGY

## 2018-09-13 PROCEDURE — 99213 OFFICE O/P EST LOW 20 MIN: CPT | Mod: PBBFAC,25 | Performed by: PHYSICIAN ASSISTANT

## 2018-09-13 PROCEDURE — 73562 X-RAY EXAM OF KNEE 3: CPT | Mod: TC,LT

## 2018-09-13 PROCEDURE — 99999 PR PBB SHADOW E&M-EST. PATIENT-LVL III: CPT | Mod: PBBFAC,,, | Performed by: PHYSICIAN ASSISTANT

## 2018-09-13 PROCEDURE — 20610 DRAIN/INJ JOINT/BURSA W/O US: CPT | Mod: S$PBB,RT,, | Performed by: PHYSICIAN ASSISTANT

## 2018-09-13 PROCEDURE — 73562 X-RAY EXAM OF KNEE 3: CPT | Mod: 26,XS,LT, | Performed by: RADIOLOGY

## 2018-09-13 PROCEDURE — 20610 DRAIN/INJ JOINT/BURSA W/O US: CPT | Mod: PBBFAC | Performed by: PHYSICIAN ASSISTANT

## 2018-09-13 RX ORDER — METHYLPREDNISOLONE ACETATE 80 MG/ML
80 INJECTION, SUSPENSION INTRA-ARTICULAR; INTRALESIONAL; INTRAMUSCULAR; SOFT TISSUE
Status: COMPLETED | OUTPATIENT
Start: 2018-09-13 | End: 2018-09-13

## 2018-09-13 RX ORDER — MELOXICAM 15 MG/1
15 TABLET ORAL DAILY
Qty: 30 TABLET | Refills: 1 | Status: SHIPPED | OUTPATIENT
Start: 2018-09-13 | End: 2018-10-13

## 2018-09-13 RX ADMIN — METHYLPREDNISOLONE ACETATE 80 MG: 80 INJECTION, SUSPENSION INTRALESIONAL; INTRAMUSCULAR; INTRASYNOVIAL; SOFT TISSUE at 02:09

## 2018-09-13 NOTE — PROGRESS NOTES
Subjective:      Patient ID: Dia Ovalles is a 68 y.o. female.    Chief Complaint: No chief complaint on file.    HPI  68 year old female presents with chief complaint of right knee pain x 2.5 weeks. Pain began after she was hiking in [place]. It hurts when she does yoga. Pain is lateral. Advil prn gives some relief. She took mobic in the past with good relief. She had some swelling but she iced. She does not use assistive devices.   Review of Systems   Constitution: Negative for chills, fever and night sweats.   Cardiovascular: Negative for chest pain.   Respiratory: Negative for cough and shortness of breath.    Hematologic/Lymphatic: Does not bruise/bleed easily.   Skin: Negative for color change.   Gastrointestinal: Negative for heartburn.   Genitourinary: Negative for dysuria.   Neurological: Negative for numbness and paresthesias.   Psychiatric/Behavioral: Negative for altered mental status.   Allergic/Immunologic: Negative for persistent infections.         Objective:            General    Vitals reviewed.  Constitutional: She is oriented to person, place, and time. She appears well-developed and well-nourished.   Cardiovascular: Normal rate.    Neurological: She is alert and oriented to person, place, and time.             Right Knee Exam:  ROM 0-130 degrees  +crepitus  No effusion  TTP lateral joint line      X-ray: ordered and reviewed by myself. No fracture or dislocation.  Small effusion. Moderate narrowing of lateral tibiofemoral cartilage space accompanied by osteophyte production.      Assessment:       Encounter Diagnosis   Name Primary?    Primary osteoarthritis of right knee Yes          Plan:       Discussed treatment options with patient. She is going to [place] soon and would like an injection today. Mobic sent to pharmacy. She will d/c advil. RTC prn.     PROCEDURE:  I have explained the risks, benefits, and alternatives of the procedure in detail.  The patient voices understanding and all  questions have been answered.  The patient agrees to proceed as planned. So after I performed a sterile prep of the skin in the normal fashion the right knee is injected using a 22 gauge needle from the anterolateral approach with a combination of 4cc 1% plain lidocaine and 80 mg of depo medrol.  The patient is cautioned and immediate relief of pain is secondary to the local anesthetic and will be temporary.  After the anesthetic wears off there may be a increase in pain that may last for a few hours or a few days and they should use ice to help alleviate this flair up of pain.

## 2018-11-07 ENCOUNTER — PATIENT MESSAGE (OUTPATIENT)
Dept: ORTHOPEDICS | Facility: CLINIC | Age: 68
End: 2018-11-07

## 2018-11-08 RX ORDER — MELOXICAM 15 MG/1
15 TABLET ORAL DAILY
Qty: 30 TABLET | Refills: 2 | Status: SHIPPED | OUTPATIENT
Start: 2018-11-08 | End: 2018-12-06 | Stop reason: SDUPTHER

## 2018-12-06 RX ORDER — MELOXICAM 15 MG/1
15 TABLET ORAL DAILY
Qty: 90 TABLET | Refills: 0 | Status: SHIPPED | OUTPATIENT
Start: 2018-12-06 | End: 2019-01-05

## 2019-01-18 ENCOUNTER — OFFICE VISIT (OUTPATIENT)
Dept: ORTHOPEDICS | Facility: CLINIC | Age: 69
End: 2019-01-18
Payer: MEDICARE

## 2019-01-18 VITALS
HEIGHT: 68 IN | BODY MASS INDEX: 21.18 KG/M2 | WEIGHT: 139.75 LBS | SYSTOLIC BLOOD PRESSURE: 131 MMHG | DIASTOLIC BLOOD PRESSURE: 91 MMHG | HEART RATE: 98 BPM

## 2019-01-18 DIAGNOSIS — M17.11 PRIMARY OSTEOARTHRITIS OF RIGHT KNEE: Primary | ICD-10-CM

## 2019-01-18 PROCEDURE — 99213 OFFICE O/P EST LOW 20 MIN: CPT | Mod: PBBFAC,25 | Performed by: NURSE PRACTITIONER

## 2019-01-18 PROCEDURE — 20610 PR DRAIN/INJECT LARGE JOINT/BURSA: ICD-10-PCS | Mod: S$PBB,RT,, | Performed by: NURSE PRACTITIONER

## 2019-01-18 PROCEDURE — 99999 PR PBB SHADOW E&M-EST. PATIENT-LVL III: CPT | Mod: PBBFAC,,, | Performed by: NURSE PRACTITIONER

## 2019-01-18 PROCEDURE — 99213 PR OFFICE/OUTPT VISIT, EST, LEVL III, 20-29 MIN: ICD-10-PCS | Mod: S$PBB,25,, | Performed by: NURSE PRACTITIONER

## 2019-01-18 PROCEDURE — 20610 DRAIN/INJ JOINT/BURSA W/O US: CPT | Mod: PBBFAC | Performed by: NURSE PRACTITIONER

## 2019-01-18 PROCEDURE — 99213 OFFICE O/P EST LOW 20 MIN: CPT | Mod: S$PBB,25,, | Performed by: NURSE PRACTITIONER

## 2019-01-18 PROCEDURE — 99999 PR PBB SHADOW E&M-EST. PATIENT-LVL III: ICD-10-PCS | Mod: PBBFAC,,, | Performed by: NURSE PRACTITIONER

## 2019-01-18 PROCEDURE — 20610 DRAIN/INJ JOINT/BURSA W/O US: CPT | Mod: S$PBB,RT,, | Performed by: NURSE PRACTITIONER

## 2019-01-18 RX ORDER — TRIAMCINOLONE ACETONIDE 40 MG/ML
40 INJECTION, SUSPENSION INTRA-ARTICULAR; INTRAMUSCULAR
Status: COMPLETED | OUTPATIENT
Start: 2019-01-18 | End: 2019-01-18

## 2019-01-18 RX ADMIN — TRIAMCINOLONE ACETONIDE 40 MG: 40 INJECTION, SUSPENSION INTRA-ARTICULAR; INTRAMUSCULAR at 04:01

## 2019-01-18 NOTE — PROGRESS NOTES
CC: Pain of the Right Knee      HPI: Pt with right knee pain for the past several months. The pain is located to the outside of the knee and is aching. It started after she went on a hike with her daughter in [place]. It is worse when going down stairs. She was seen by ROCKY Wayne in September and had a cortisone injection which helped her pain until now. She does have a history of meniscus repair in the right knee many years ago. She has taken mobic with good relief. She is ambulating without assistive device. There is not a limp.    ROS  General: denies fever and chills  Resp: no c/o sob  CVS: no c/o cp  MSK: c/o right knee pain which is aching and lateral     PE  General: AAOx3, pleasant and cooperative  Resp: respirations even and unlabored  MSK: right knee exam  0 degrees extension  120 degrees flexion  No warmth or erythema   - effusion    Xray:  Reviewed by me: Right: No fracture or dislocation.  Small effusion. Moderate narrowing of lateral tibiofemoral cartilage space accompanied by osteophyte production    Assessment:  Right knee djd    Plan:  Cortisone injection right knee today  Appt with Dr. Jane to discuss unicompartmental arthroplasty  RICE  mobic prn    Knee Injection Procedure Note    Diagnosis: right knee degenerative arthritis  Indications: right knee pain  Procedure Details: Verbal consent was obtained for the procedure. The injection site was identified and the skin was prepared with alcohol. The right knee was injected from an anterolateral approach with 1 ml of Kenalog and 4 ml Lidocaine under sterile technique using a 22 gauge needle. The needle was removed and the area cleansed and dressed.  Complications:  Patient tolerated the procedure well.    she was advised to rest the knee today, using ice and elevation as needed for comfort and swelling.Immediate relief of the knee pain may be short lived and secondary to the lidocaine. she may have an increase in discomfort tonight followed  by steady improvement over the next several days. It may take 1-2 weeks following the injection to get the full benefit of the medication.

## 2019-01-20 DIAGNOSIS — N32.81 OAB (OVERACTIVE BLADDER): ICD-10-CM

## 2019-01-21 RX ORDER — TOLTERODINE 4 MG/1
CAPSULE, EXTENDED RELEASE ORAL
Qty: 90 CAPSULE | Refills: 0 | Status: SHIPPED | OUTPATIENT
Start: 2019-01-21 | End: 2019-03-13 | Stop reason: SDUPTHER

## 2019-02-15 DIAGNOSIS — M25.561 RIGHT KNEE PAIN, UNSPECIFIED CHRONICITY: Primary | ICD-10-CM

## 2019-02-18 ENCOUNTER — OFFICE VISIT (OUTPATIENT)
Dept: OPTOMETRY | Facility: CLINIC | Age: 69
End: 2019-02-18

## 2019-02-18 ENCOUNTER — OFFICE VISIT (OUTPATIENT)
Dept: OPTOMETRY | Facility: CLINIC | Age: 69
End: 2019-02-18
Payer: MEDICARE

## 2019-02-18 DIAGNOSIS — H04.123 DRY EYE SYNDROME, BILATERAL: Primary | ICD-10-CM

## 2019-02-18 DIAGNOSIS — H25.13 NS (NUCLEAR SCLEROSIS), BILATERAL: ICD-10-CM

## 2019-02-18 DIAGNOSIS — Z46.0 FITTING AND ADJUSTMENT OF SPECTACLES AND CONTACT LENSES: ICD-10-CM

## 2019-02-18 DIAGNOSIS — H52.4 PRESBYOPIA: ICD-10-CM

## 2019-02-18 DIAGNOSIS — Z46.0 FITTING AND ADJUSTMENT OF SPECTACLES AND CONTACT LENSES: Primary | ICD-10-CM

## 2019-02-18 PROCEDURE — 92012 INTRM OPH EXAM EST PATIENT: CPT | Mod: S$PBB,,, | Performed by: OPTOMETRIST

## 2019-02-18 PROCEDURE — 92015 PR REFRACTION: ICD-10-PCS | Mod: ,,, | Performed by: OPTOMETRIST

## 2019-02-18 PROCEDURE — 99212 OFFICE O/P EST SF 10 MIN: CPT | Mod: PBBFAC | Performed by: OPTOMETRIST

## 2019-02-18 PROCEDURE — 92012 PR EYE EXAM, EST PATIENT,INTERMED: ICD-10-PCS | Mod: S$PBB,,, | Performed by: OPTOMETRIST

## 2019-02-18 PROCEDURE — 99999 PR PBB SHADOW E&M-EST. PATIENT-LVL II: CPT | Mod: PBBFAC,,, | Performed by: OPTOMETRIST

## 2019-02-18 PROCEDURE — 99999 PR PBB SHADOW E&M-EST. PATIENT-LVL I: ICD-10-PCS | Mod: PBBFAC,,, | Performed by: OPTOMETRIST

## 2019-02-18 PROCEDURE — 92015 DETERMINE REFRACTIVE STATE: CPT | Mod: ,,, | Performed by: OPTOMETRIST

## 2019-02-18 PROCEDURE — 92310 CONTACT LENS FITTING OU: CPT | Mod: S$GLB,,, | Performed by: OPTOMETRIST

## 2019-02-18 PROCEDURE — 92310 PR CONTACT LENS FITTING (NO CHANGE): ICD-10-PCS | Mod: S$GLB,,, | Performed by: OPTOMETRIST

## 2019-02-18 PROCEDURE — 99999 PR PBB SHADOW E&M-EST. PATIENT-LVL II: ICD-10-PCS | Mod: PBBFAC,,, | Performed by: OPTOMETRIST

## 2019-02-18 PROCEDURE — 99999 PR PBB SHADOW E&M-EST. PATIENT-LVL I: CPT | Mod: PBBFAC,,, | Performed by: OPTOMETRIST

## 2019-02-20 NOTE — PROGRESS NOTES
HPI     Here for annual exam  Happy with current contact lenses.  Not happy with glasses she scratched them last month, and had lenses   remade  Occasional floater  No flashes      Last edited by Lina Brennan on 2/18/2019  3:10 PM. (History)            Assessment /Plan     For exam results, see Encounter Report.    Dry eye syndrome, bilateral   Pt only able to wear contacts for a few hours per day 2/2 comfort/ dryness issues    NS (nuclear sclerosis), bilateral   MIld, monitor    Presbyopia  Fitting and adjustment of spectacles and contact lenses    Rx specs     CL fit today: changed power ou              Dispensed trials of dailies              Ok to order if happy                  Remove nightly, replace daily              Ok to order if happy     RTC 1 year, sooner PRN

## 2019-02-25 ENCOUNTER — OFFICE VISIT (OUTPATIENT)
Dept: ORTHOPEDICS | Facility: CLINIC | Age: 69
End: 2019-02-25
Payer: MEDICARE

## 2019-02-25 ENCOUNTER — PATIENT MESSAGE (OUTPATIENT)
Dept: OPTOMETRY | Facility: CLINIC | Age: 69
End: 2019-02-25

## 2019-02-25 ENCOUNTER — HOSPITAL ENCOUNTER (OUTPATIENT)
Dept: RADIOLOGY | Facility: HOSPITAL | Age: 69
Discharge: HOME | End: 2019-02-25
Attending: ORTHOPAEDIC SURGERY
Payer: MEDICARE

## 2019-02-25 VITALS — HEIGHT: 68 IN | WEIGHT: 137.56 LBS | BODY MASS INDEX: 20.85 KG/M2

## 2019-02-25 DIAGNOSIS — M17.11 PRIMARY OSTEOARTHRITIS OF RIGHT KNEE: Primary | ICD-10-CM

## 2019-02-25 DIAGNOSIS — M25.561 RIGHT KNEE PAIN, UNSPECIFIED CHRONICITY: ICD-10-CM

## 2019-02-25 PROCEDURE — 73560 XR KNEE ORTHO RIGHT: ICD-10-PCS | Mod: 26,59,LT, | Performed by: RADIOLOGY

## 2019-02-25 PROCEDURE — 99999 PR PBB SHADOW E&M-EST. PATIENT-LVL III: ICD-10-PCS | Mod: PBBFAC,,, | Performed by: ORTHOPAEDIC SURGERY

## 2019-02-25 PROCEDURE — 99999 PR PBB SHADOW E&M-EST. PATIENT-LVL III: CPT | Mod: PBBFAC,,, | Performed by: ORTHOPAEDIC SURGERY

## 2019-02-25 PROCEDURE — 99213 OFFICE O/P EST LOW 20 MIN: CPT | Mod: PBBFAC,25 | Performed by: ORTHOPAEDIC SURGERY

## 2019-02-25 PROCEDURE — 73560 X-RAY EXAM OF KNEE 1 OR 2: CPT | Mod: 26,59,LT, | Performed by: RADIOLOGY

## 2019-02-25 PROCEDURE — 99214 PR OFFICE/OUTPT VISIT, EST, LEVL IV, 30-39 MIN: ICD-10-PCS | Mod: S$PBB,,, | Performed by: ORTHOPAEDIC SURGERY

## 2019-02-25 PROCEDURE — 73562 XR KNEE ORTHO RIGHT: ICD-10-PCS | Mod: 26,RT,, | Performed by: RADIOLOGY

## 2019-02-25 PROCEDURE — 73560 X-RAY EXAM OF KNEE 1 OR 2: CPT | Mod: TC,RT

## 2019-02-25 PROCEDURE — 99214 OFFICE O/P EST MOD 30 MIN: CPT | Mod: S$PBB,,, | Performed by: ORTHOPAEDIC SURGERY

## 2019-02-25 PROCEDURE — 73562 X-RAY EXAM OF KNEE 3: CPT | Mod: 26,RT,, | Performed by: RADIOLOGY

## 2019-02-25 NOTE — LETTER
February 25, 2019      Jaelyn Gallardo NP  [address]  [place] [id number]           [facility]  [address]  [place] [address]  Phone: [phone]          Patient: Dia Ovalles   MR Number: [id number]   YOB: 1950   Date of Visit: 2/25/2019       Dear Jaelyn Gallardo:    Thank you for referring Dia Ovalles to me for evaluation. Attached you will find relevant portions of my assessment and plan of care.    If you have questions, please do not hesitate to call me. I look forward to following Dia Ovalles along with you.    Sincerely,    Manjit Jane MD    Enclosure  CC:  No Recipients    If you would like to receive this communication electronically, please contact [email or web address] or [phone] to request more information on [facility] access.    For providers and/or their staff who would like to refer a patient to [facility], please contact us through our one-stop-shop provider referral line, [facility], at [phone].    If you feel you have received this communication in error or would no longer like to receive these types of communications, please e-mail [email or web address]

## 2019-02-25 NOTE — PROGRESS NOTES
Subjective:      Patient ID: Dia Ovalles is a 68 y.o. female.    Chief Complaint: Pain of the Right Knee    HPI  Dia Ovalles is a 68 year old female here with a 6 month history of right knee pain. The patient is retired.  She used to run an insurance brokerage. There was not a history of trauma.  The pain is mild to severe. The pain is located in the lateral aspect of the knee. There is radiation.  The pain radaites to the a little distal on the lateral leg.  The pain is described as achy. The patient has had prior surgery - R lateral meniscus repair  at [facility]. It is aggravated by Other: stairs, yoga.  It is alleviated by rest. There is not numbness or tingling of the lower extremity.  There is not back pain.  Mobic helps some.  CSI help - last 2019.  She does not have difficulty getting in or out of a car, getting dressed, or going up or down stairs.  The patient does not use an assistive device.    Past Medical History:   Diagnosis Date    Breast cyst     Cancer     SCC x 2 on arms    OAB (overactive bladder) 3/8/2017    Osteoporosis 3/8/2017       Past Surgical History:   Procedure Laterality Date    ASD REPAIR      open heart surgery    BREAST BIOPSY       SECTION      x2    KNEE SURGERY      meniscal repair    SKIN CANCER EXCISION         Family History   Problem Relation Age of Onset    Stroke Mother     Hypertension Mother     Asthma Mother     Osteoporosis Mother     Cancer Father         lung cancer    Heart disease Maternal Grandfather     Stroke Maternal Grandfather     Breast cancer Neg Hx     Colon cancer Neg Hx     Ovarian cancer Neg Hx        Social History     Socioeconomic History    Marital status:      Spouse name: Not on file    Number of children: 2    Years of education: Not on file    Highest education level: Not on file   Social Needs    Financial resource strain: Not on file    Food insecurity - worry: Not on file    Food insecurity -  inability: Not on file    Transportation needs - medical: Not on file    Transportation needs - non-medical: Not on file   Occupational History    Occupation: retired   Tobacco Use    Smoking status: Never Smoker    Smokeless tobacco: Never Used   Substance and Sexual Activity    Alcohol use: Yes     Comment: social    Drug use: Not on file    Sexual activity: Not on file   Other Topics Concern    Are you pregnant or think you may be? Not Asked    Breast-feeding Not Asked   Social History Narrative    Not on file       Current Outpatient Medications on File Prior to Visit   Medication Sig Dispense Refill    biotin 1 mg tablet Take 5,000 mcg by mouth once daily.      cholecalciferol, vitamin D3, (VITAMIN D3) 2,000 unit Cap Take 1 capsule by mouth once daily.      estradiol (VAGIFEM) 10 mcg Tab Insert one tablet vaginally twice weekly H.S. 24 tablet 4    raloxifene (EVISTA) 60 mg tablet Take 1 tablet (60 mg total) by mouth once daily. 90 tablet 3    tolterodine (DETROL LA) 4 MG 24 hr capsule TAKE 1 CAPSULE DAILY 90 capsule 0    tolterodine (DETROL) 2 MG Tab Take 1 tablet (2 mg total) by mouth 2 (two) times daily as needed. 90 tablet 1     No current facility-administered medications on file prior to visit.        Review of patient's allergies indicates:   Allergen Reactions    Codeine Nausea Only       Review of Systems   Constitution: Negative for chills, fever and night sweats.   HENT: Negative for hearing loss and stridor.    Eyes: Negative for blurred vision, double vision and redness.   Cardiovascular: Negative for chest pain, claudication and leg swelling.   Respiratory: Negative for cough and shortness of breath.    Endocrine: Negative for polydipsia, polyphagia and polyuria.   Hematologic/Lymphatic: Negative for adenopathy and bleeding problem. Does not bruise/bleed easily.   Skin: Negative for poor wound healing and rash.   Musculoskeletal: Positive for joint pain.   Gastrointestinal:  Negative for abdominal pain, diarrhea and heartburn.   Genitourinary: Negative for bladder incontinence and dysuria.   Neurological: Negative for aphonia, focal weakness, headaches, numbness, paresthesias and sensory change.   Psychiatric/Behavioral: Negative for altered mental status. The patient is not nervous/anxious.    Allergic/Immunologic: Negative for persistent infections.         Objective:        Body mass index is 20.92 kg/m².      General    Constitutional: She is oriented to person, place, and time. She appears well-developed and well-nourished. No distress.   HENT:   Head: Normocephalic and atraumatic.   Right Ear: External ear normal.   Left Ear: External ear normal.   Nose: Nose normal.   Mouth/Throat: No oropharyngeal exudate.   Eyes: Conjunctivae and EOM are normal. Right eye exhibits no discharge. Left eye exhibits no discharge. No scleral icterus.   Neck: Normal range of motion. No tracheal deviation present.   Cardiovascular: Normal rate and intact distal pulses.    Pulmonary/Chest: Effort normal. No stridor. No respiratory distress. She has no wheezes.   Abdominal: She exhibits no distension.   Neurological: She is alert and oriented to person, place, and time. No cranial nerve deficit. She exhibits normal muscle tone. Coordination normal.   Psychiatric: She has a normal mood and affect. Her behavior is normal. Judgment and thought content normal.     General Musculoskeletal Exam   Gait: normal       Right Knee Exam     Inspection   Erythema: absent  Scars: absent  Swelling: absent  Effusion: present  Deformity: present (valgus)  Bruising: absent    Tenderness   The patient is tender to palpation of the lateral joint line.    Range of Motion   Extension: 0   Flexion: 120     Tests   Ligament Examination Lachman: normal (-1 to 2mm) PCL-Posterior Drawer: normal (0 to 2mm)     MCL - Valgus: normal (0 to 2mm)  LCL - Varus: normal  Posterior Sag Test: negative  Patella   Passive Patellar Tilt:  neutral    Other   Sensation: normal    Left Knee Exam     Inspection   Erythema: absent  Scars: absent  Swelling: absent  Effusion: absent  Deformity: absent  Bruising: absent    Tenderness   The patient is experiencing no tenderness.     Range of Motion   Extension: 0   Flexion: 130     Tests   Stability Lachman: normal (-1 to 2mm) PCL-Posterior Drawer: normal (0 to 2mm)  MCL - Valgus: normal (0 to 2mm)  LCL - Varus: normal (0 to 2mm)  Posterior Sag Test: negative  Patella   Passive Patellar Tilt: neutral    Other   Sensation: normal    Muscle Strength   Right Lower Extremity   Hip Abduction: 5/5   Quadriceps:  5/5   Hamstrin/5   Left Lower Extremity   Hip Abduction: 5/5   Quadriceps:  5/5   Hamstrin/5     Reflexes     Left Side  Quadriceps:  2+    Right Side   Quadriceps:  2+    Vascular Exam     Right Pulses  Dorsalis Pedis:      2+          Left Pulses  Dorsalis Pedis:      2+          Edema  Right Lower Leg: absent  Left Lower Leg: absent      Radiographs taken today and reviewed by me demonstrate moderate arthritic change of the right KNEE(s).There is not bone destruction.  There is not a fracture. The lateral compartment is most involved.  There is a mild valgus deformity.            Assessment:       Encounter Diagnosis   Name Primary?    Primary osteoarthritis of right knee Yes          Plan:       Dia was seen today for pain.    Diagnoses and all orders for this visit:    Primary osteoarthritis of right knee    Treatment options as well as risks and benefits have been discussed.  She as decided to consider Euflexxa injections.  Literature was given.  Dia Ovalles will call if  she wishes to proceed.

## 2019-02-26 ENCOUNTER — OFFICE VISIT (OUTPATIENT)
Dept: OPTOMETRY | Facility: CLINIC | Age: 69
End: 2019-02-26
Payer: MEDICARE

## 2019-02-26 DIAGNOSIS — H52.4 PRESBYOPIA: Primary | ICD-10-CM

## 2019-02-26 PROCEDURE — 99499 UNLISTED E&M SERVICE: CPT | Mod: S$PBB,,, | Performed by: OPTOMETRIST

## 2019-02-26 PROCEDURE — 99211 OFF/OP EST MAY X REQ PHY/QHP: CPT | Mod: PBBFAC | Performed by: OPTOMETRIST

## 2019-02-26 PROCEDURE — 99999 PR PBB SHADOW E&M-EST. PATIENT-LVL I: ICD-10-PCS | Mod: PBBFAC,,, | Performed by: OPTOMETRIST

## 2019-02-26 PROCEDURE — 99499 NO LOS: ICD-10-PCS | Mod: S$PBB,,, | Performed by: OPTOMETRIST

## 2019-02-26 PROCEDURE — 99999 PR PBB SHADOW E&M-EST. PATIENT-LVL I: CPT | Mod: PBBFAC,,, | Performed by: OPTOMETRIST

## 2019-02-26 NOTE — PROGRESS NOTES
Assessment /Plan     For exam results, see Encounter Report.    Presbyopia    Increased power OU    OK to order if happy with vision and comfort

## 2019-02-28 ENCOUNTER — PATIENT OUTREACH (OUTPATIENT)
Dept: ADMINISTRATIVE | Facility: HOSPITAL | Age: 69
End: 2019-02-28

## 2019-02-28 DIAGNOSIS — Z78.0 POSTMENOPAUSAL: Primary | ICD-10-CM

## 2019-03-07 RX ORDER — MELOXICAM 15 MG/1
15 TABLET ORAL DAILY
Qty: 30 TABLET | Refills: 1 | Status: SHIPPED | OUTPATIENT
Start: 2019-03-07 | End: 2019-03-07 | Stop reason: SDUPTHER

## 2019-03-07 RX ORDER — MELOXICAM 15 MG/1
15 TABLET ORAL DAILY
Qty: 90 TABLET | Refills: 0 | Status: SHIPPED | OUTPATIENT
Start: 2019-03-07 | End: 2019-04-06

## 2019-03-13 ENCOUNTER — TELEPHONE (OUTPATIENT)
Dept: INTERNAL MEDICINE | Facility: CLINIC | Age: 69
End: 2019-03-13

## 2019-03-13 ENCOUNTER — OFFICE VISIT (OUTPATIENT)
Dept: INTERNAL MEDICINE | Facility: CLINIC | Age: 69
End: 2019-03-13
Payer: MEDICARE

## 2019-03-13 VITALS
OXYGEN SATURATION: 98 % | HEIGHT: 68 IN | DIASTOLIC BLOOD PRESSURE: 78 MMHG | SYSTOLIC BLOOD PRESSURE: 110 MMHG | BODY MASS INDEX: 20.75 KG/M2 | WEIGHT: 136.88 LBS | HEART RATE: 81 BPM

## 2019-03-13 DIAGNOSIS — M81.0 OSTEOPOROSIS, UNSPECIFIED OSTEOPOROSIS TYPE, UNSPECIFIED PATHOLOGICAL FRACTURE PRESENCE: ICD-10-CM

## 2019-03-13 DIAGNOSIS — N32.81 OAB (OVERACTIVE BLADDER): ICD-10-CM

## 2019-03-13 DIAGNOSIS — Z79.899 OTHER LONG TERM (CURRENT) DRUG THERAPY: ICD-10-CM

## 2019-03-13 DIAGNOSIS — Z13.220 ENCOUNTER FOR LIPID SCREENING FOR CARDIOVASCULAR DISEASE: ICD-10-CM

## 2019-03-13 DIAGNOSIS — Z13.6 ENCOUNTER FOR LIPID SCREENING FOR CARDIOVASCULAR DISEASE: ICD-10-CM

## 2019-03-13 DIAGNOSIS — M17.11 OSTEOARTHRITIS OF RIGHT KNEE, UNSPECIFIED OSTEOARTHRITIS TYPE: ICD-10-CM

## 2019-03-13 DIAGNOSIS — M19.019 OSTEOARTHRITIS OF SHOULDER, UNSPECIFIED LATERALITY, UNSPECIFIED OSTEOARTHRITIS TYPE: ICD-10-CM

## 2019-03-13 DIAGNOSIS — Z00.00 ANNUAL PHYSICAL EXAM: Primary | ICD-10-CM

## 2019-03-13 PROCEDURE — 99999 PR PBB SHADOW E&M-EST. PATIENT-LVL III: CPT | Mod: PBBFAC,,, | Performed by: FAMILY MEDICINE

## 2019-03-13 PROCEDURE — 99397 PR PREVENTIVE VISIT,EST,65 & OVER: ICD-10-PCS | Mod: S$PBB,,, | Performed by: FAMILY MEDICINE

## 2019-03-13 PROCEDURE — 99213 OFFICE O/P EST LOW 20 MIN: CPT | Mod: PBBFAC,PN | Performed by: FAMILY MEDICINE

## 2019-03-13 PROCEDURE — 99397 PER PM REEVAL EST PAT 65+ YR: CPT | Mod: S$PBB,,, | Performed by: FAMILY MEDICINE

## 2019-03-13 PROCEDURE — 99999 PR PBB SHADOW E&M-EST. PATIENT-LVL III: ICD-10-PCS | Mod: PBBFAC,,, | Performed by: FAMILY MEDICINE

## 2019-03-13 RX ORDER — TOLTERODINE 4 MG/1
4 CAPSULE, EXTENDED RELEASE ORAL DAILY
Qty: 90 CAPSULE | Refills: 1 | Status: SHIPPED | OUTPATIENT
Start: 2019-03-13 | End: 2019-10-31 | Stop reason: SDUPTHER

## 2019-03-13 RX ORDER — DULOXETIN HYDROCHLORIDE 20 MG/1
20 CAPSULE, DELAYED RELEASE ORAL DAILY
Qty: 30 CAPSULE | Refills: 3 | Status: SHIPPED | OUTPATIENT
Start: 2019-03-13 | End: 2019-05-06 | Stop reason: SDUPTHER

## 2019-03-13 RX ORDER — RALOXIFENE HYDROCHLORIDE 60 MG/1
60 TABLET, FILM COATED ORAL DAILY
Qty: 60 TABLET | Refills: 0 | Status: SHIPPED | OUTPATIENT
Start: 2019-03-13 | End: 2019-04-17 | Stop reason: SDUPTHER

## 2019-03-13 RX ORDER — TOLTERODINE TARTRATE 2 MG/1
2 TABLET, EXTENDED RELEASE ORAL 2 TIMES DAILY PRN
Qty: 90 TABLET | Refills: 1 | Status: SHIPPED | OUTPATIENT
Start: 2019-03-13 | End: 2020-03-05
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
## 2019-03-15 ENCOUNTER — HOSPITAL ENCOUNTER (OUTPATIENT)
Dept: RADIOLOGY | Facility: OTHER | Age: 69
Discharge: HOME | End: 2019-03-15
Attending: FAMILY MEDICINE
Payer: MEDICARE

## 2019-03-15 DIAGNOSIS — Z78.0 POSTMENOPAUSAL: ICD-10-CM

## 2019-03-15 PROCEDURE — 77080 DEXA BONE DENSITY SPINE HIP: ICD-10-PCS | Mod: 26,,, | Performed by: RADIOLOGY

## 2019-03-15 PROCEDURE — 77080 DXA BONE DENSITY AXIAL: CPT | Mod: TC

## 2019-03-15 PROCEDURE — 77080 DXA BONE DENSITY AXIAL: CPT | Mod: 26,,, | Performed by: RADIOLOGY

## 2019-03-19 ENCOUNTER — OFFICE VISIT (OUTPATIENT)
Dept: DERMATOLOGY | Facility: CLINIC | Age: 69
End: 2019-03-19
Payer: MEDICARE

## 2019-03-19 DIAGNOSIS — L81.4 SOLAR LENTIGO: ICD-10-CM

## 2019-03-19 DIAGNOSIS — D18.00 ANGIOMA: ICD-10-CM

## 2019-03-19 DIAGNOSIS — D22.9 MULTIPLE BENIGN NEVI: ICD-10-CM

## 2019-03-19 DIAGNOSIS — Z85.828 HISTORY OF SKIN CANCER: ICD-10-CM

## 2019-03-19 DIAGNOSIS — L82.1 SEBORRHEIC KERATOSES: Primary | ICD-10-CM

## 2019-03-19 DIAGNOSIS — Z12.83 SCREENING FOR SKIN CANCER: ICD-10-CM

## 2019-03-19 PROCEDURE — 99213 OFFICE O/P EST LOW 20 MIN: CPT | Mod: PBBFAC | Performed by: PATHOLOGY

## 2019-03-19 PROCEDURE — 99999 PR PBB SHADOW E&M-EST. PATIENT-LVL III: CPT | Mod: PBBFAC,,, | Performed by: PATHOLOGY

## 2019-03-19 PROCEDURE — 99214 OFFICE O/P EST MOD 30 MIN: CPT | Mod: S$PBB,,, | Performed by: PATHOLOGY

## 2019-03-19 PROCEDURE — 99214 PR OFFICE/OUTPT VISIT, EST, LEVL IV, 30-39 MIN: ICD-10-PCS | Mod: S$PBB,,, | Performed by: PATHOLOGY

## 2019-03-19 PROCEDURE — 99999 PR PBB SHADOW E&M-EST. PATIENT-LVL III: ICD-10-PCS | Mod: PBBFAC,,, | Performed by: PATHOLOGY

## 2019-03-19 NOTE — PROGRESS NOTES
Subjective:       Patient ID:  Dia Ovalles is a 68 y.o. female who presents for   Chief Complaint   Patient presents with    Skin Check     skin check      68 year old female established patient with history of NMSC to right arm. Diagnosed and treated in approximately 2012 with no recurrence.   Last seen April 2018 with no concerning lesions noted at that time.   This is a high risk patient here to check for the development of new lesions.    Patient complains of lesion(s): spots to left torso, posterior right knee  Location: torso, knee  Duration: 1 year  Symptoms: no itching bleeding growth change in color  Relieving factors/Previous treatments: no treatment          Review of Systems   Constitutional: Negative for fever, chills, weight loss, weight gain, fatigue, night sweats and malaise.   Skin: Positive for daily sunscreen use, activity-related sunscreen use and wears hat. Negative for recent sunburn.   Hematologic/Lymphatic: Bruises/bleeds easily ( on meloxicam).        Objective:    Physical Exam   Constitutional: She appears well-developed and well-nourished. No distress.   Neurological: She is alert and oriented to person, place, and time. She is not disoriented.   Psychiatric: She has a normal mood and affect.   Skin:   Areas Examined (abnormalities noted in diagram):   Scalp / Hair Palpated and Inspected  Head / Face Inspection Performed  Neck Inspection Performed  Chest / Axilla Inspection Performed  Abdomen Inspection Performed  Genitals / Buttocks / Groin Inspection Performed  Back Inspection Performed  RUE Inspected  LUE Inspection Performed  RLE Inspected  LLE Inspection Performed  Nails and Digits Inspection Performed                   Diagram Legend     Erythematous scaling macule/papule c/w actinic keratosis       Vascular papule c/w angioma      Pigmented verrucoid papule/plaque c/w seborrheic keratosis      Yellow umbilicated papule c/w sebaceous hyperplasia      Irregularly shaped tan  macule c/w lentigo     1-2 mm smooth white papules consistent with Milia      Movable subcutaneous cyst with punctum c/w epidermal inclusion cyst      Subcutaneous movable cyst c/w pilar cyst      Firm pink to brown papule c/w dermatofibroma      Pedunculated fleshy papule(s) c/w skin tag(s)      Evenly pigmented macule c/w junctional nevus     Mildly variegated pigmented, slightly irregular-bordered macule c/w mildly atypical nevus      Flesh colored to evenly pigmented papule c/w intradermal nevus       Pink pearly papule/plaque c/w basal cell carcinoma      Erythematous hyperkeratotic cursted plaque c/w SCC      Surgical scar with no sign of skin cancer recurrence      Open and closed comedones      Inflammatory papules and pustules      Verrucoid papule consistent consistent with wart     Erythematous eczematous patches and plaques     Dystrophic onycholytic nail with subungual debris c/w onychomycosis     Umbilicated papule    Erythematous-base heme-crusted tan verrucoid plaque consistent with inflamed seborrheic keratosis     Erythematous Silvery Scaling Plaque c/w Psoriasis     See annotation      Assessment / Plan:        Seborrheic keratoses - These are benign inherited growths without a malignant potential. Reassurance given to patient. No treatment is necessary.       History of skin cancer  - Area of previous NMSC examined. Site well healed with no signs of recurrence.    Total body skin examination performed today including at least 12 points as noted in physical examination. No lesions suspicious for malignancy noted.      Screening for skin cancer    Angioma - This is a benign vascular lesion. Reassurance given. No treatment required.       Multiple benign nevi - Patient with several benign appearing nevi. Instructed patient to observe lesion(s) for changes and follow up in clinic if changes are noted.     Solar lentigo - This is a benign hyperpigmented sun induced lesion. Daily sun protection will  reduce the number of new lesions. Treatment of these benign lesions are considered cosmetic.        Follow-up in about 1 year (around 3/19/2020).

## 2019-03-26 ENCOUNTER — PATIENT MESSAGE (OUTPATIENT)
Dept: ORTHOPEDICS | Facility: CLINIC | Age: 69
End: 2019-03-26

## 2019-03-26 ENCOUNTER — PATIENT MESSAGE (OUTPATIENT)
Dept: OPTOMETRY | Facility: CLINIC | Age: 69
End: 2019-03-26

## 2019-03-27 DIAGNOSIS — M17.11 PRIMARY OSTEOARTHRITIS OF RIGHT KNEE: Primary | ICD-10-CM

## 2019-04-04 ENCOUNTER — TELEPHONE (OUTPATIENT)
Dept: OPTOMETRY | Facility: CLINIC | Age: 69
End: 2019-04-04

## 2019-04-08 ENCOUNTER — OFFICE VISIT (OUTPATIENT)
Dept: ORTHOPEDICS | Facility: CLINIC | Age: 69
End: 2019-04-08
Payer: MEDICARE

## 2019-04-08 ENCOUNTER — PATIENT MESSAGE (OUTPATIENT)
Dept: INTERNAL MEDICINE | Facility: CLINIC | Age: 69
End: 2019-04-08

## 2019-04-08 VITALS — DIASTOLIC BLOOD PRESSURE: 84 MMHG | HEART RATE: 71 BPM | RESPIRATION RATE: 20 BRPM | SYSTOLIC BLOOD PRESSURE: 141 MMHG

## 2019-04-08 DIAGNOSIS — M17.11 PRIMARY OSTEOARTHRITIS OF RIGHT KNEE: ICD-10-CM

## 2019-04-08 PROCEDURE — 99499 UNLISTED E&M SERVICE: CPT | Mod: S$PBB,,, | Performed by: NURSE PRACTITIONER

## 2019-04-08 PROCEDURE — 20610 DRAIN/INJ JOINT/BURSA W/O US: CPT | Mod: S$PBB,RT,, | Performed by: NURSE PRACTITIONER

## 2019-04-08 PROCEDURE — 99499 NO LOS: ICD-10-PCS | Mod: S$PBB,,, | Performed by: NURSE PRACTITIONER

## 2019-04-08 PROCEDURE — 20610 DRAIN/INJ JOINT/BURSA W/O US: CPT | Mod: PBBFAC | Performed by: NURSE PRACTITIONER

## 2019-04-08 PROCEDURE — 20610 PR DRAIN/INJECT LARGE JOINT/BURSA: ICD-10-PCS | Mod: S$PBB,RT,, | Performed by: NURSE PRACTITIONER

## 2019-04-08 PROCEDURE — 99213 OFFICE O/P EST LOW 20 MIN: CPT | Mod: PBBFAC | Performed by: NURSE PRACTITIONER

## 2019-04-08 PROCEDURE — 99999 PR PBB SHADOW E&M-EST. PATIENT-LVL III: CPT | Mod: PBBFAC,,, | Performed by: NURSE PRACTITIONER

## 2019-04-08 PROCEDURE — 99999 PR PBB SHADOW E&M-EST. PATIENT-LVL III: ICD-10-PCS | Mod: PBBFAC,,, | Performed by: NURSE PRACTITIONER

## 2019-04-08 RX ADMIN — Medication 20 MG: at 08:04

## 2019-04-08 NOTE — PROGRESS NOTES
Dia Ovalles presents to clinic today for the first right knee Euflexxa injection.    Exam demonstrates the no effusion in the  right knee, and the skin is intact.    Diagnosis: osteoarthritis knee    After time out was performed and patient ID, side, and site were verified, the  right  knee was sterilly prepped in the standard fashion.  A 22-gauge needle was introduced into right knee joint from an danielle-lateral site without complication and knee was then injected with 2 ml of Euflexxa.  Sterile dressing was applied.  The patient was instructed to resume activities as tolerated and to call with any problems.     We will see Dia Ovalles back next week for the second injection.

## 2019-04-15 ENCOUNTER — OFFICE VISIT (OUTPATIENT)
Dept: ORTHOPEDICS | Facility: CLINIC | Age: 69
End: 2019-04-15
Payer: MEDICARE

## 2019-04-15 VITALS
HEART RATE: 83 BPM | WEIGHT: 136.69 LBS | DIASTOLIC BLOOD PRESSURE: 77 MMHG | BODY MASS INDEX: 20.78 KG/M2 | SYSTOLIC BLOOD PRESSURE: 111 MMHG

## 2019-04-15 DIAGNOSIS — M17.11 PRIMARY OSTEOARTHRITIS OF RIGHT KNEE: ICD-10-CM

## 2019-04-15 PROCEDURE — 99999 PR PBB SHADOW E&M-EST. PATIENT-LVL III: ICD-10-PCS | Mod: PBBFAC,,, | Performed by: NURSE PRACTITIONER

## 2019-04-15 PROCEDURE — 99999 PR PBB SHADOW E&M-EST. PATIENT-LVL III: CPT | Mod: PBBFAC,,, | Performed by: NURSE PRACTITIONER

## 2019-04-15 PROCEDURE — 20610 DRAIN/INJ JOINT/BURSA W/O US: CPT | Mod: S$PBB,RT,, | Performed by: NURSE PRACTITIONER

## 2019-04-15 PROCEDURE — 99499 NO LOS: ICD-10-PCS | Mod: S$PBB,,, | Performed by: NURSE PRACTITIONER

## 2019-04-15 PROCEDURE — 99499 UNLISTED E&M SERVICE: CPT | Mod: S$PBB,,, | Performed by: NURSE PRACTITIONER

## 2019-04-15 PROCEDURE — 20610 DRAIN/INJ JOINT/BURSA W/O US: CPT | Mod: PBBFAC | Performed by: NURSE PRACTITIONER

## 2019-04-15 PROCEDURE — 99213 OFFICE O/P EST LOW 20 MIN: CPT | Mod: PBBFAC | Performed by: NURSE PRACTITIONER

## 2019-04-15 PROCEDURE — 20610 PR DRAIN/INJECT LARGE JOINT/BURSA: ICD-10-PCS | Mod: S$PBB,RT,, | Performed by: NURSE PRACTITIONER

## 2019-04-15 RX ADMIN — Medication 20 MG: at 09:04

## 2019-04-15 NOTE — PROGRESS NOTES
Dia Ovalles presents to clinic today for the second right knee Euflexxa injection.    Exam demonstrates the no effusion in the  right knee, and the skin is intact.    Diagnosis: osteoarthritis knee    After time out was performed and patient ID, side, and site were verified, the  right knee was sterilly prepped in the standard fashion.  A 22-gauge needle was introduced into right knee joint from an danielle-lateral site without complication and knee was then injected with 2 ml of Euflexxa.  Sterile dressing was applied.  The patient was instructed to resume activities as tolerated and to call with any problems.     We will see Dia Ovalles back next week for the third injection.

## 2019-04-17 ENCOUNTER — OFFICE VISIT (OUTPATIENT)
Dept: ENDOCRINOLOGY | Facility: CLINIC | Age: 69
End: 2019-04-17
Payer: MEDICARE

## 2019-04-17 VITALS
WEIGHT: 138.13 LBS | HEART RATE: 72 BPM | HEIGHT: 68 IN | SYSTOLIC BLOOD PRESSURE: 106 MMHG | DIASTOLIC BLOOD PRESSURE: 80 MMHG | BODY MASS INDEX: 20.93 KG/M2

## 2019-04-17 DIAGNOSIS — M81.0 OSTEOPOROSIS, UNSPECIFIED OSTEOPOROSIS TYPE, UNSPECIFIED PATHOLOGICAL FRACTURE PRESENCE: Primary | ICD-10-CM

## 2019-04-17 PROCEDURE — 99213 OFFICE O/P EST LOW 20 MIN: CPT | Mod: S$PBB,,, | Performed by: INTERNAL MEDICINE

## 2019-04-17 PROCEDURE — 99213 PR OFFICE/OUTPT VISIT, EST, LEVL III, 20-29 MIN: ICD-10-PCS | Mod: S$PBB,,, | Performed by: INTERNAL MEDICINE

## 2019-04-17 PROCEDURE — 99999 PR PBB SHADOW E&M-EST. PATIENT-LVL III: ICD-10-PCS | Mod: PBBFAC,,, | Performed by: INTERNAL MEDICINE

## 2019-04-17 PROCEDURE — 99999 PR PBB SHADOW E&M-EST. PATIENT-LVL III: CPT | Mod: PBBFAC,,, | Performed by: INTERNAL MEDICINE

## 2019-04-17 PROCEDURE — 99213 OFFICE O/P EST LOW 20 MIN: CPT | Mod: PBBFAC | Performed by: INTERNAL MEDICINE

## 2019-04-17 RX ORDER — RALOXIFENE HYDROCHLORIDE 60 MG/1
60 TABLET, FILM COATED ORAL DAILY
Qty: 90 TABLET | Refills: 4 | Status: SHIPPED | OUTPATIENT
Start: 2019-04-17 | End: 2020-02-27 | Stop reason: SDUPTHER
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
## 2019-04-22 ENCOUNTER — OFFICE VISIT (OUTPATIENT)
Dept: ORTHOPEDICS | Facility: CLINIC | Age: 69
End: 2019-04-22
Payer: MEDICARE

## 2019-04-22 VITALS
HEART RATE: 93 BPM | DIASTOLIC BLOOD PRESSURE: 75 MMHG | SYSTOLIC BLOOD PRESSURE: 120 MMHG | BODY MASS INDEX: 20.72 KG/M2 | HEIGHT: 68 IN | WEIGHT: 136.69 LBS

## 2019-04-22 DIAGNOSIS — M17.11 PRIMARY OSTEOARTHRITIS OF RIGHT KNEE: ICD-10-CM

## 2019-04-22 PROCEDURE — 20610 PR DRAIN/INJECT LARGE JOINT/BURSA: ICD-10-PCS | Mod: S$PBB,RT,, | Performed by: NURSE PRACTITIONER

## 2019-04-22 PROCEDURE — 99499 NO LOS: ICD-10-PCS | Mod: S$PBB,,, | Performed by: NURSE PRACTITIONER

## 2019-04-22 PROCEDURE — 20610 DRAIN/INJ JOINT/BURSA W/O US: CPT | Mod: PBBFAC | Performed by: NURSE PRACTITIONER

## 2019-04-22 PROCEDURE — 99499 UNLISTED E&M SERVICE: CPT | Mod: S$PBB,,, | Performed by: NURSE PRACTITIONER

## 2019-04-22 PROCEDURE — 20610 DRAIN/INJ JOINT/BURSA W/O US: CPT | Mod: S$PBB,RT,, | Performed by: NURSE PRACTITIONER

## 2019-04-22 PROCEDURE — 99213 OFFICE O/P EST LOW 20 MIN: CPT | Mod: PBBFAC,25 | Performed by: NURSE PRACTITIONER

## 2019-04-22 PROCEDURE — 99999 PR PBB SHADOW E&M-EST. PATIENT-LVL III: ICD-10-PCS | Mod: PBBFAC,,, | Performed by: NURSE PRACTITIONER

## 2019-04-22 PROCEDURE — 99999 PR PBB SHADOW E&M-EST. PATIENT-LVL III: CPT | Mod: PBBFAC,,, | Performed by: NURSE PRACTITIONER

## 2019-04-22 RX ADMIN — Medication 20 MG: at 09:04

## 2019-04-22 NOTE — PROGRESS NOTES
Dia Ovalles presents to clinic today for the third right knee Euflexxa injection.    Exam demonstrates the no effusion in the  right knee, and the skin is intact.    Diagnosis: osteoarthritis knee    After time out was performed and patient ID, side, and site were verified, the  right  knee was sterilly prepped in the standard fashion.  A 22-gauge needle was introduced into right knee joint from an danielle-lateral site without complication and knee was then injected with 2 ml of Euflexxa.  Sterile dressing was applied.  The patient was instructed to resume activities as tolerated and to call with any problems.     She reports good relief and will f/u as needed.

## 2019-05-06 ENCOUNTER — OFFICE VISIT (OUTPATIENT)
Dept: INTERNAL MEDICINE | Facility: CLINIC | Age: 69
End: 2019-05-06
Payer: MEDICARE

## 2019-05-06 VITALS
HEART RATE: 81 BPM | SYSTOLIC BLOOD PRESSURE: 98 MMHG | DIASTOLIC BLOOD PRESSURE: 62 MMHG | WEIGHT: 138.13 LBS | OXYGEN SATURATION: 98 % | HEIGHT: 68 IN | BODY MASS INDEX: 20.93 KG/M2

## 2019-05-06 DIAGNOSIS — M17.11 OSTEOARTHRITIS OF RIGHT KNEE, UNSPECIFIED OSTEOARTHRITIS TYPE: ICD-10-CM

## 2019-05-06 DIAGNOSIS — M19.019 OSTEOARTHRITIS OF SHOULDER, UNSPECIFIED LATERALITY, UNSPECIFIED OSTEOARTHRITIS TYPE: ICD-10-CM

## 2019-05-06 PROCEDURE — 99999 PR PBB SHADOW E&M-EST. PATIENT-LVL III: ICD-10-PCS | Mod: PBBFAC,,, | Performed by: FAMILY MEDICINE

## 2019-05-06 PROCEDURE — 99213 PR OFFICE/OUTPT VISIT, EST, LEVL III, 20-29 MIN: ICD-10-PCS | Mod: S$PBB,,, | Performed by: FAMILY MEDICINE

## 2019-05-06 PROCEDURE — 99213 OFFICE O/P EST LOW 20 MIN: CPT | Mod: S$PBB,,, | Performed by: FAMILY MEDICINE

## 2019-05-06 PROCEDURE — 99999 PR PBB SHADOW E&M-EST. PATIENT-LVL III: CPT | Mod: PBBFAC,,, | Performed by: FAMILY MEDICINE

## 2019-05-06 PROCEDURE — 99213 OFFICE O/P EST LOW 20 MIN: CPT | Mod: PBBFAC,PN | Performed by: FAMILY MEDICINE

## 2019-05-06 RX ORDER — DULOXETIN HYDROCHLORIDE 20 MG/1
40 CAPSULE, DELAYED RELEASE ORAL DAILY
Qty: 60 CAPSULE | Refills: 3 | Status: SHIPPED | OUTPATIENT
Start: 2019-05-06 | End: 2019-09-21 | Stop reason: SDUPTHER
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
## 2019-05-15 ENCOUNTER — HOSPITAL ENCOUNTER (OUTPATIENT)
Dept: RADIOLOGY | Facility: OTHER | Age: 69
Discharge: HOME | End: 2019-05-15
Attending: FAMILY MEDICINE
Payer: MEDICARE

## 2019-05-15 DIAGNOSIS — Z12.39 SCREENING FOR BREAST CANCER: ICD-10-CM

## 2019-05-15 PROCEDURE — 77063 BREAST TOMOSYNTHESIS BI: CPT | Mod: 26,,, | Performed by: INTERNAL MEDICINE

## 2019-05-15 PROCEDURE — 77067 MAMMO DIGITAL SCREENING BILAT WITH TOMOSYNTHESIS_CAD: ICD-10-PCS | Mod: 26,,, | Performed by: INTERNAL MEDICINE

## 2019-05-15 PROCEDURE — 77067 SCR MAMMO BI INCL CAD: CPT | Mod: TC

## 2019-05-15 PROCEDURE — 77067 SCR MAMMO BI INCL CAD: CPT | Mod: 26,,, | Performed by: INTERNAL MEDICINE

## 2019-05-15 PROCEDURE — 77063 MAMMO DIGITAL SCREENING BILAT WITH TOMOSYNTHESIS_CAD: ICD-10-PCS | Mod: 26,,, | Performed by: INTERNAL MEDICINE

## 2019-05-30 RX ORDER — MELOXICAM 15 MG/1
15 TABLET ORAL DAILY
Qty: 90 TABLET | Refills: 0 | Status: SHIPPED | OUTPATIENT
Start: 2019-05-30 | End: 2019-06-06 | Stop reason: SDUPTHER

## 2019-06-06 RX ORDER — MELOXICAM 15 MG/1
15 TABLET ORAL DAILY
Qty: 90 TABLET | Refills: 0 | Status: SHIPPED | OUTPATIENT
Start: 2019-06-06 | End: 2019-06-27 | Stop reason: SDUPTHER

## 2019-06-27 RX ORDER — MELOXICAM 15 MG/1
15 TABLET ORAL DAILY
Qty: 90 TABLET | Refills: 0 | Status: SHIPPED | OUTPATIENT
Start: 2019-06-27 | End: 2019-07-27

## 2019-08-28 ENCOUNTER — OFFICE VISIT (OUTPATIENT)
Dept: INTERNAL MEDICINE | Facility: CLINIC | Age: 69
End: 2019-08-28
Payer: MEDICARE

## 2019-08-28 VITALS
WEIGHT: 135.5 LBS | OXYGEN SATURATION: 98 % | BODY MASS INDEX: 20.54 KG/M2 | SYSTOLIC BLOOD PRESSURE: 118 MMHG | HEART RATE: 82 BPM | DIASTOLIC BLOOD PRESSURE: 70 MMHG | HEIGHT: 68 IN

## 2019-08-28 DIAGNOSIS — R09.81 NASAL CONGESTION: ICD-10-CM

## 2019-08-28 DIAGNOSIS — M17.11 OSTEOARTHRITIS OF RIGHT KNEE, UNSPECIFIED OSTEOARTHRITIS TYPE: ICD-10-CM

## 2019-08-28 DIAGNOSIS — R05.9 COUGH: ICD-10-CM

## 2019-08-28 DIAGNOSIS — R05.9 COUGH: Primary | ICD-10-CM

## 2019-08-28 PROCEDURE — 99999 PR PBB SHADOW E&M-EST. PATIENT-LVL IV: ICD-10-PCS | Mod: PBBFAC,,, | Performed by: FAMILY MEDICINE

## 2019-08-28 PROCEDURE — 99999 PR PBB SHADOW E&M-EST. PATIENT-LVL IV: CPT | Mod: PBBFAC,,, | Performed by: FAMILY MEDICINE

## 2019-08-28 PROCEDURE — 90686 IIV4 VACC NO PRSV 0.5 ML IM: CPT | Mod: PBBFAC,PN

## 2019-08-28 PROCEDURE — 99214 PR OFFICE/OUTPT VISIT, EST, LEVL IV, 30-39 MIN: ICD-10-PCS | Mod: S$PBB,,, | Performed by: FAMILY MEDICINE

## 2019-08-28 PROCEDURE — 99214 OFFICE O/P EST MOD 30 MIN: CPT | Mod: S$PBB,,, | Performed by: FAMILY MEDICINE

## 2019-08-28 PROCEDURE — 99214 OFFICE O/P EST MOD 30 MIN: CPT | Mod: PBBFAC,PN | Performed by: FAMILY MEDICINE

## 2019-08-28 RX ORDER — MELOXICAM 15 MG/1
TABLET ORAL
COMMUNITY
Start: 2019-08-12 | End: 2021-08-20 | Stop reason: SDUPTHER

## 2019-08-28 RX ORDER — OMEPRAZOLE 40 MG/1
CAPSULE, DELAYED RELEASE ORAL
Qty: 90 CAPSULE | Refills: 0 | OUTPATIENT
Start: 2019-08-28

## 2019-08-28 RX ORDER — FLUTICASONE PROPIONATE 50 MCG
1 SPRAY, SUSPENSION (ML) NASAL DAILY
Qty: 16 G | Refills: 6 | Status: SHIPPED | OUTPATIENT
Start: 2019-08-28 | End: 2021-04-13 | Stop reason: CLARIF

## 2019-08-28 RX ORDER — OMEPRAZOLE 40 MG/1
40 CAPSULE, DELAYED RELEASE ORAL DAILY
Qty: 30 CAPSULE | Refills: 0 | Status: SHIPPED | OUTPATIENT
Start: 2019-08-28 | End: 2019-09-23 | Stop reason: SDUPTHER
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
## 2019-08-28 NOTE — PROGRESS NOTES
After obtaining consent, and per orders of Dr. Leija, injection of FLU Lot [id number] Exp 06/30/20 given in the LD by Arielle Tsai. Patient tolerated well and band aid applied. Patient instructed to remain in clinic for 15 minutes afterwards, and to report any adverse reaction to me immediately.

## 2019-09-10 ENCOUNTER — HOSPITAL ENCOUNTER (OUTPATIENT)
Dept: RADIOLOGY | Facility: OTHER | Age: 69
Discharge: HOME | End: 2019-09-10
Attending: FAMILY MEDICINE
Payer: MEDICARE

## 2019-09-10 DIAGNOSIS — R05.9 COUGH: ICD-10-CM

## 2019-09-10 PROCEDURE — 71046 X-RAY EXAM CHEST 2 VIEWS: CPT | Mod: 26,,, | Performed by: RADIOLOGY

## 2019-09-10 PROCEDURE — 71046 XR CHEST PA AND LATERAL: ICD-10-PCS | Mod: 26,,, | Performed by: RADIOLOGY

## 2019-09-10 PROCEDURE — 71046 X-RAY EXAM CHEST 2 VIEWS: CPT | Mod: TC,FY

## 2019-09-15 ENCOUNTER — PATIENT MESSAGE (OUTPATIENT)
Dept: INTERNAL MEDICINE | Facility: CLINIC | Age: 69
End: 2019-09-15

## 2019-09-17 ENCOUNTER — TELEPHONE (OUTPATIENT)
Dept: INTERNAL MEDICINE | Facility: CLINIC | Age: 69
End: 2019-09-17

## 2019-09-17 NOTE — TELEPHONE ENCOUNTER
LM for pt to call to see what's going on with her chest pain and set up an urgent appt or referral to ER.

## 2019-09-17 NOTE — TELEPHONE ENCOUNTER
Please call and find out what's going on with regard to her chest pain.  Offer urgent visit or possible referral to ER.

## 2019-09-18 ENCOUNTER — TELEPHONE (OUTPATIENT)
Dept: INTERNAL MEDICINE | Facility: CLINIC | Age: 69
End: 2019-09-18

## 2019-09-18 NOTE — TELEPHONE ENCOUNTER
Spoke to patient, she was coughing a lot over the weekend, she forgot her omeprazole at home, and was out of town in [place]. She went to the pharmacy and purchased the omeprazole OTC and the cough has subsided quite a bit. She states the chest pain has since resolved as well. Advised that if she feels like the chest pain comes back, to go to UC or ER. Patient understood and verbalized.

## 2019-09-18 NOTE — TELEPHONE ENCOUNTER
----- Message from Anabela Salmeron sent at 9/18/2019 10:47 AM CDT -----  Contact: Pt  Type:  Patient Returning Call     Who Called: ANA LALA [[id number]]   Who Left Message for Patient: Ryanne Cohen MA       Does the patient know what this is regarding?:returning a call     Best Call Back Number:[phone]     Additional Information: No

## 2019-09-21 DIAGNOSIS — M17.11 OSTEOARTHRITIS OF RIGHT KNEE, UNSPECIFIED OSTEOARTHRITIS TYPE: ICD-10-CM

## 2019-09-21 DIAGNOSIS — M19.019 OSTEOARTHRITIS OF SHOULDER, UNSPECIFIED LATERALITY, UNSPECIFIED OSTEOARTHRITIS TYPE: ICD-10-CM

## 2019-09-23 DIAGNOSIS — R05.9 COUGH: ICD-10-CM

## 2019-09-23 RX ORDER — DULOXETIN HYDROCHLORIDE 20 MG/1
CAPSULE, DELAYED RELEASE ORAL
Qty: 180 CAPSULE | Refills: 1 | Status: SHIPPED | OUTPATIENT
Start: 2019-09-23 | End: 2020-03-16 | Stop reason: SDUPTHER

## 2019-09-24 RX ORDER — OMEPRAZOLE 40 MG/1
CAPSULE, DELAYED RELEASE ORAL
Qty: 30 CAPSULE | Refills: 0 | Status: SHIPPED | OUTPATIENT
Start: 2019-09-24 | End: 2019-11-01 | Stop reason: SDUPTHER

## 2019-10-02 ENCOUNTER — OFFICE VISIT (OUTPATIENT)
Dept: INTERNAL MEDICINE | Facility: CLINIC | Age: 69
End: 2019-10-02
Payer: MEDICARE

## 2019-10-02 VITALS
OXYGEN SATURATION: 98 % | TEMPERATURE: 98 F | HEART RATE: 88 BPM | HEIGHT: 68 IN | BODY MASS INDEX: 20.43 KG/M2 | SYSTOLIC BLOOD PRESSURE: 108 MMHG | WEIGHT: 134.81 LBS | DIASTOLIC BLOOD PRESSURE: 72 MMHG

## 2019-10-02 DIAGNOSIS — R49.0 HOARSENESS OF VOICE: Primary | ICD-10-CM

## 2019-10-02 DIAGNOSIS — R05.9 COUGH: ICD-10-CM

## 2019-10-02 PROCEDURE — 99214 OFFICE O/P EST MOD 30 MIN: CPT | Mod: PBBFAC,PN | Performed by: FAMILY MEDICINE

## 2019-10-02 PROCEDURE — 99999 PR PBB SHADOW E&M-EST. PATIENT-LVL IV: CPT | Mod: PBBFAC,,, | Performed by: FAMILY MEDICINE

## 2019-10-02 PROCEDURE — 99999 PR PBB SHADOW E&M-EST. PATIENT-LVL IV: ICD-10-PCS | Mod: PBBFAC,,, | Performed by: FAMILY MEDICINE

## 2019-10-02 PROCEDURE — 99213 OFFICE O/P EST LOW 20 MIN: CPT | Mod: S$PBB,,, | Performed by: FAMILY MEDICINE

## 2019-10-02 PROCEDURE — 99213 PR OFFICE/OUTPT VISIT, EST, LEVL III, 20-29 MIN: ICD-10-PCS | Mod: S$PBB,,, | Performed by: FAMILY MEDICINE

## 2019-10-02 RX ORDER — FAMOTIDINE 20 MG/1
20 TABLET, FILM COATED ORAL 2 TIMES DAILY
Qty: 60 TABLET | Refills: 0 | Status: SHIPPED | OUTPATIENT
Start: 2019-10-02 | End: 2021-03-29

## 2019-10-02 RX ORDER — NEOMYCIN SULFATE, POLYMYXIN B SULFATE AND DEXAMETHASONE 3.5; 10000; 1 MG/ML; [USP'U]/ML; MG/ML
1 SUSPENSION/ DROPS OPHTHALMIC 3 TIMES DAILY
Qty: 10 ML | Refills: 1 | Status: CANCELLED | OUTPATIENT
Start: 2019-10-02
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
## 2019-10-04 ENCOUNTER — TELEPHONE (OUTPATIENT)
Dept: INTERNAL MEDICINE | Facility: CLINIC | Age: 69
End: 2019-10-04

## 2019-10-04 NOTE — TELEPHONE ENCOUNTER
Pharmacy requesting RX change:    Famotidine 20mg tablets BID NOT COVERED    Alternatives: Ranitidine, Cimetidine

## 2019-10-08 ENCOUNTER — TELEPHONE (OUTPATIENT)
Dept: OBSTETRICS AND GYNECOLOGY | Facility: CLINIC | Age: 69
End: 2019-10-08

## 2019-10-08 ENCOUNTER — OFFICE VISIT (OUTPATIENT)
Dept: OBSTETRICS AND GYNECOLOGY | Facility: CLINIC | Age: 69
End: 2019-10-08
Payer: MEDICARE

## 2019-10-08 VITALS
BODY MASS INDEX: 22.85 KG/M2 | DIASTOLIC BLOOD PRESSURE: 68 MMHG | SYSTOLIC BLOOD PRESSURE: 103 MMHG | HEIGHT: 64 IN | WEIGHT: 133.81 LBS

## 2019-10-08 DIAGNOSIS — Z78.0 POSTMENOPAUSE: ICD-10-CM

## 2019-10-08 DIAGNOSIS — N95.2 VAGINAL ATROPHY: ICD-10-CM

## 2019-10-08 DIAGNOSIS — N89.5 VAGINAL STENOSIS: ICD-10-CM

## 2019-10-08 DIAGNOSIS — Z01.419 WELL WOMAN EXAM WITH ROUTINE GYNECOLOGICAL EXAM: Primary | ICD-10-CM

## 2019-10-08 PROCEDURE — 99999 PR PBB SHADOW E&M-EST. PATIENT-LVL III: ICD-10-PCS | Mod: PBBFAC,,, | Performed by: NURSE PRACTITIONER

## 2019-10-08 PROCEDURE — G0101 PR CA SCREEN;PELVIC/BREAST EXAM: ICD-10-PCS | Mod: ,,, | Performed by: NURSE PRACTITIONER

## 2019-10-08 PROCEDURE — 99999 PR PBB SHADOW E&M-EST. PATIENT-LVL III: CPT | Mod: PBBFAC,,, | Performed by: NURSE PRACTITIONER

## 2019-10-08 PROCEDURE — G0101 CA SCREEN;PELVIC/BREAST EXAM: HCPCS | Mod: ,,, | Performed by: NURSE PRACTITIONER

## 2019-10-08 PROCEDURE — 99213 OFFICE O/P EST LOW 20 MIN: CPT | Mod: PBBFAC | Performed by: NURSE PRACTITIONER

## 2019-10-08 RX ORDER — ESTRADIOL 10 UG/1
INSERT VAGINAL
Qty: 24 TABLET | Refills: 4 | Status: SHIPPED | OUTPATIENT
Start: 2019-10-08 | End: 2020-02-04 | Stop reason: SDUPTHER

## 2019-10-08 NOTE — PROGRESS NOTES
HISTORY OF PRESENT ILLNESS:    Dia Ovalles is a 69 y.o. female , presents for a routine exam and Vagifem refills for vaginal dryness.    Past Medical History:   Diagnosis Date    Breast cyst     Cancer     SCC x 2 on arms    OA (osteoarthritis) of shoulder 3/13/2019    OAB (overactive bladder) 3/8/2017    Osteoporosis 3/8/2017       Past Surgical History:   Procedure Laterality Date    ASD REPAIR      open heart surgery    BREAST BIOPSY       SECTION      x2    KNEE SURGERY      meniscal repair    SKIN CANCER EXCISION          MEDICATIONS AND ALLERGIES:      Current Outpatient Medications:     biotin 1 mg tablet, Take 5,000 mcg by mouth once daily., Disp: , Rfl:     calcium carbonate (CALCIUM 600 ORAL), Take 1,200 mg by mouth., Disp: , Rfl:     cholecalciferol, vitamin D3, (VITAMIN D3) 2,000 unit Cap, Take 1 capsule by mouth every other day. , Disp: , Rfl:     DULoxetine (CYMBALTA) 20 MG capsule, TAKE 2 CAPSULES ONCE DAILY, Disp: 180 capsule, Rfl: 1    estradiol (VAGIFEM) 10 mcg Tab, Insert one tablet vaginally twice weekly H.S., Disp: 24 tablet, Rfl: 4    famotidine (PEPCID) 20 MG tablet, Take 1 tablet (20 mg total) by mouth 2 (two) times daily., Disp: 60 tablet, Rfl: 0    fluticasone propionate (FLONASE) 50 mcg/actuation nasal spray, 1 spray (50 mcg total) by Each Nostril route once daily., Disp: 16 g, Rfl: 6    meloxicam (MOBIC) 15 MG tablet, as needed. , Disp: , Rfl:     omeprazole (PRILOSEC) 40 MG capsule, TAKE 1 CAPSULE(40 MG) BY MOUTH EVERY DAY, Disp: 30 capsule, Rfl: 0    raloxifene (EVISTA) 60 mg tablet, Take 1 tablet (60 mg total) by mouth once daily., Disp: 90 tablet, Rfl: 4    tolterodine (DETROL LA) 4 MG 24 hr capsule, Take 1 capsule (4 mg total) by mouth once daily., Disp: 90 capsule, Rfl: 1    tolterodine (DETROL) 2 MG Tab, Take 1 tablet (2 mg total) by mouth 2 (two) times daily as needed., Disp: 90 tablet, Rfl: 1    Review of patient's allergies indicates:    Allergen Reactions    Codeine Nausea Only       Family History   Problem Relation Age of Onset    Stroke Mother     Hypertension Mother     Asthma Mother     Osteoporosis Mother     Cancer Father         lung cancer    Heart disease Maternal Grandfather     Stroke Maternal Grandfather     Breast cancer Neg Hx     Colon cancer Neg Hx     Ovarian cancer Neg Hx        Social History     Socioeconomic History    Marital status:      Spouse name: Not on file    Number of children: 2    Years of education: Not on file    Highest education level: Not on file   Occupational History    Occupation: retired   Social Needs    Financial resource strain: Not on file    Food insecurity:     Worry: Not on file     Inability: Not on file    Transportation needs:     Medical: Not on file     Non-medical: Not on file   Tobacco Use    Smoking status: Never Smoker    Smokeless tobacco: Never Used   Substance and Sexual Activity    Alcohol use: Yes     Comment: social    Drug use: Never    Sexual activity: Yes     Partners: Male     Birth control/protection: Post-menopausal   Lifestyle    Physical activity:     Days per week: Not on file     Minutes per session: Not on file    Stress: Not on file   Relationships    Social connections:     Talks on phone: Not on file     Gets together: Not on file     Attends Sabianist service: Not on file     Active member of club or organization: Not on file     Attends meetings of clubs or organizations: Not on file     Relationship status: Not on file   Other Topics Concern    Are you pregnant or think you may be? Not Asked    Breast-feeding Not Asked   Social History Narrative    Not on file       OB HISTORY: Number of C/S:2     COMPREHENSIVE GYN HISTORY:  PAP History:  Denies abnormal Paps. LAST PAP 8-6-18 NORMAL.  Infection History: Denies STDs. Denies PID.  Benign History: Denies uterine fibroids. Denies ovarian cysts. Denies endometriosis.  Denies other  "conditions.  Cancer History: Denies cervical cancer. Denies uterine cancer or hyperplasia. Denies ovarian cancer. Denies vulvar cancer or pre-cancer. Denies vaginal cancer or pre-cancer. Denies breast cancer. Denies colon cancer.  Sexual Activity History: Reports currently being sexually active  Menstrual History: Denies menses. Pt is  not on HRT.     ROS:  GENERAL: No weight changes. No swelling. No fatigue. No fever.  CARDIOVASCULAR: No chest pain. No shortness of breath. No leg cramps.   NEUROLOGICAL: No headaches. No vision changes.  MSK: OCC RIGHT KNEE PAIN.  BREASTS: No pain. No lumps. No discharge.  ABDOMEN: No pain. No nausea. No vomiting. No diarrhea. No constipation.  REPRODUCTIVE: No abnormal bleeding.   VULVA: No pain. No lesions. No itching.  VAGINA: No relaxation. No itching. No odor. No discharge. No lesions.  URINARY: No incontinence. No nocturia. No frequency. No dysuria.    /68 (BP Location: Left arm, Patient Position: Sitting, BP Method: Small (Automatic))   Ht 5' 4" (1.626 m)   Wt 60.7 kg (133 lb 12.8 oz)   BMI 22.97 kg/m²   8-6-18 Wt 63 kg (138 lb 14.2 oz)     PE:  APPEARANCE: Well nourished, well developed, in no acute distress.  AFFECT: WNL, alert and oriented x 3.  SKIN: No hirsutism or acne.  NECK: Neck symmetric without masses or thyromegaly.  NODES: No inguinal, cervical, axillary or femoral lymph node enlargement.  CHEST: Good respiratory effort.   ABDOMEN: Soft. No tenderness or masses.  BREASTS: Symmetrical, no skin changes or visible lesions. No palpable masses, nipple discharge bilaterally.  PELVIC: ATROPHIC EXTERNAL FEMALE GENITALIA without lesions. Normal hair distribution. Adequate perineal body, normal urethral meatus. VAGINA DRY/STENOTIC without lesions or discharge. CERVIX STENOTIC without lesions, discharge or tenderness. No significant cystocele or rectocele. Bimanual exam shows uterus to be normal size, regular, mobile and nontender. Adnexa without masses or " tenderness.  RECTAL: Rectovaginal exam confirms above with normal sphincter tone, no masses.  EXTREMITIES: No edema.    DIAGNOSIS:  1. Well woman exam with routine gynecological exam    2. Postmenopause    3. Vaginal atrophy    4. Vaginal stenosis        PLAN:    Orders Placed This Encounter    estradiol (VAGIFEM) 10 mcg Tab   up to date on mammogram and DEXA and pt states up to date on colon screening    COUNSELING:  The patient was counseled today on:  -Vagifem use and potential side effects;  -osteoporosis prevention and regular weight bearing exercise;  -A.C.S. Pap and pelvic exam guidelines (pap every 3 years, no pap after age 65) and recommendations for yearly mammogram;  -to see her PCP for other health maintenance.    FOLLOW-UP with Dr MIRANDA Clemens annually for Rx refills.

## 2019-10-31 DIAGNOSIS — N32.81 OAB (OVERACTIVE BLADDER): ICD-10-CM

## 2019-10-31 RX ORDER — TOLTERODINE 4 MG/1
CAPSULE, EXTENDED RELEASE ORAL
Qty: 90 CAPSULE | Refills: 0 | Status: SHIPPED | OUTPATIENT
Start: 2019-10-31 | End: 2020-01-13

## 2019-11-01 DIAGNOSIS — R05.9 COUGH: ICD-10-CM

## 2019-11-01 RX ORDER — OMEPRAZOLE 40 MG/1
40 CAPSULE, DELAYED RELEASE ORAL EVERY MORNING
Qty: 90 CAPSULE | Refills: 0 | Status: SHIPPED | OUTPATIENT
Start: 2019-11-01 | End: 2020-02-21 | Stop reason: SDUPTHER

## 2019-11-01 NOTE — TELEPHONE ENCOUNTER
Spoke with pt and informed her that her prescription for a 90 day supply of Omeprazole was sent to [facility].

## 2019-11-13 ENCOUNTER — TELEPHONE (OUTPATIENT)
Dept: OTOLARYNGOLOGY | Facility: CLINIC | Age: 69
End: 2019-11-13

## 2019-11-13 NOTE — TELEPHONE ENCOUNTER
----- Message from Maylin Jerez sent at 11/13/2019  1:53 PM CST -----  Contact: pt at [phone]  Lloyd [id number] your call about her upcoming appt.

## 2019-11-20 ENCOUNTER — OFFICE VISIT (OUTPATIENT)
Dept: OTOLARYNGOLOGY | Facility: CLINIC | Age: 69
End: 2019-11-20
Payer: MEDICARE

## 2019-11-20 VITALS
DIASTOLIC BLOOD PRESSURE: 90 MMHG | SYSTOLIC BLOOD PRESSURE: 134 MMHG | BODY MASS INDEX: 21.02 KG/M2 | HEART RATE: 86 BPM | HEIGHT: 68 IN | WEIGHT: 138.69 LBS

## 2019-11-20 DIAGNOSIS — K21.9 LARYNGOPHARYNGEAL REFLUX (LPR): Primary | ICD-10-CM

## 2019-11-20 DIAGNOSIS — R05.3 CHRONIC COUGH: ICD-10-CM

## 2019-11-20 PROCEDURE — 99999 PR PBB SHADOW E&M-EST. PATIENT-LVL III: CPT | Mod: PBBFAC,,, | Performed by: OTOLARYNGOLOGY

## 2019-11-20 PROCEDURE — 1126F PR PAIN SEVERITY QUANTIFIED, NO PAIN PRESENT: ICD-10-PCS | Mod: ,,, | Performed by: OTOLARYNGOLOGY

## 2019-11-20 PROCEDURE — 31575 LARYNGOSCOPY: ICD-10-PCS | Mod: S$PBB,,, | Performed by: OTOLARYNGOLOGY

## 2019-11-20 PROCEDURE — 1159F PR MEDICATION LIST DOCUMENTED IN MEDICAL RECORD: ICD-10-PCS | Mod: ,,, | Performed by: OTOLARYNGOLOGY

## 2019-11-20 PROCEDURE — 1126F AMNT PAIN NOTED NONE PRSNT: CPT | Mod: ,,, | Performed by: OTOLARYNGOLOGY

## 2019-11-20 PROCEDURE — 99214 OFFICE O/P EST MOD 30 MIN: CPT | Mod: 25,S$PBB,, | Performed by: OTOLARYNGOLOGY

## 2019-11-20 PROCEDURE — 99999 PR PBB SHADOW E&M-EST. PATIENT-LVL III: ICD-10-PCS | Mod: PBBFAC,,, | Performed by: OTOLARYNGOLOGY

## 2019-11-20 PROCEDURE — 31575 DIAGNOSTIC LARYNGOSCOPY: CPT | Mod: PBBFAC | Performed by: OTOLARYNGOLOGY

## 2019-11-20 PROCEDURE — 99214 PR OFFICE/OUTPT VISIT, EST, LEVL IV, 30-39 MIN: ICD-10-PCS | Mod: 25,S$PBB,, | Performed by: OTOLARYNGOLOGY

## 2019-11-20 PROCEDURE — 1159F MED LIST DOCD IN RCRD: CPT | Mod: ,,, | Performed by: OTOLARYNGOLOGY

## 2019-11-20 PROCEDURE — 99213 OFFICE O/P EST LOW 20 MIN: CPT | Mod: PBBFAC | Performed by: OTOLARYNGOLOGY

## 2019-11-20 NOTE — PROCEDURES
Laryngoscopy  Date/Time: 11/20/2019 4:00 PM  Performed by: Abad Cherry MD  Authorized by: Abad Cherry MD     Consent Done?:  Yes (Verbal)  Anesthesia:     Local anesthetic:  Lidocaine 4% and Jose Martin-Synephrine 1/2%    Patient tolerance:  Patient tolerated the procedure well with no immediate complications  Laryngoscopy:     Areas examined:  Nasopharynx, oropharynx, hypopharynx, larynx, vocal cords and nasal cavities    Laryngoscope size:  4 mm  Nose Intranasal:      Mucosa no polyps     No mucosa lesions present     Septum gross deformity     Turbinates not enlarged  Nasopharynx:      No mucosa lesions     Adenoids not present     Posterior choanae patent     Eustachian tube patent  Larynx/hypopharynx:      No epiglottis lesions     No epiglottis edema     No AE folds lesions     No vocal cord polyps     Equal and normal bilateral     No hypopharynx lesions     No piriform sinus pooling     No piriform sinus lesions     Post cricoid edema     No post cricoid erythema

## 2019-11-20 NOTE — Clinical Note
November 20, 2019      Shelley Leija MD  [address]  [address]  [place] [address]           [facility] Otorhinol[facility]ology  [address]  [facility] [address]  Phone: [phone]  Fax: [phone]          Patient: Dia Ovalles   MR Number: [id number]   YOB: 1950   Date of Visit: 11/20/2019       Dear Dr. Shelley Leija:    Thank you for referring Dia Ovalles to me for evaluation. Attached you will find relevant portions of my assessment and plan of care.    If you have questions, please do not hesitate to call me. I look forward to following Dia Ovalles along with you.    Sincerely,    Abad Cherry MD    Enclosure  CC:  No Recipients    If you would like to receive this communication electronically, please contact [email or web address] or [phone] to request more information on [employer] access.    For providers and/or their staff who would like to refer a patient to [facility], please contact us through our one-stop-shop provider referral line, [facility], at [phone].    If you feel you have received this communication in error or would no longer like to receive these types of communications, please e-mail [email or web address]

## 2019-11-20 NOTE — PROGRESS NOTES
Subjective:      Dia Ovalles is a 69 y.o. female who was referred to me by Dr. Shelley Leija in consultation for chronic cough.    She was in her usual state of health until 6 months ago when she had the gradual onset of daily, nonproductive cough with associated postnasal drip.  This is mild during the day and much worse at night.  She has been treated with omeprazole 40mg daily but feels this has not helped much.  She notes occasional hoarseness and globus sensation as well, though denies hemoptysis.  She notes mild nasal congestion, more on the right, and denies facial pressure, rhinorrhea, hyposmia, aural fullness, pruritic symptoms or notable sinus infections.    Current sinonasal medications include Flonase which she uses rarely.  The last course of antibiotics was a long time ago.  She does not regularly use nasal decongestant sprays.    She does not recall previously having allergy testing.    She denies a history of asthma.    She denies a history of reflux symptoms.  She has not previously had an EGD.    She denies a diagnosis of obstructive sleep apnea.     She has not had sinonasal surgery.    She does not recall a prior history of nasal trauma.    SNOT-22 score = 21, NOSE score = 20%, ETDQ-7 score = 1.1        Past Medical History  She has a past medical history of Breast cyst, Cancer, OA (osteoarthritis) of shoulder, OAB (overactive bladder), and Osteoporosis.    Past Surgical History  She has a past surgical history that includes Skin cancer excision; ASD repair;  section; Knee surgery; and Breast biopsy.    Family History  Her family history includes Asthma in her mother; Cancer in her father; Heart disease in her maternal grandfather; Hypertension in her mother; Osteoporosis in her mother; Stroke in her maternal grandfather and mother.    Social History  She reports that she has never smoked. She has never used smokeless tobacco. She reports that she drinks alcohol. She reports that she  "does not use drugs.    Allergies  She is allergic to codeine.    Medications   She has a current medication list which includes the following prescription(s): biotin, calcium carbonate, cholecalciferol (vitamin d3), duloxetine, estradiol, famotidine, fluticasone propionate, meloxicam, omeprazole, raloxifene, tolterodine, and tolterodine.    Review of Systems  Review of Systems   Constitutional: Negative for fatigue, fever and unexpected weight change.   HENT: Positive for congestion, postnasal drip, sore throat and trouble swallowing. Negative for dental problem, ear discharge, ear pain, facial swelling, hearing loss, hoarse voice, nosebleeds, rhinorrhea, sinus pressure, tinnitus and voice change.    Eyes: Negative for photophobia, discharge, itching and visual disturbance.   Respiratory: Positive for cough. Negative for apnea, shortness of breath and wheezing.    Cardiovascular: Positive for chest pain. Negative for palpitations.   Gastrointestinal: Negative for abdominal pain, nausea and vomiting.   Endocrine: Negative for cold intolerance and heat intolerance.   Genitourinary: Negative for difficulty urinating.   Musculoskeletal: Positive for arthralgias. Negative for back pain, myalgias and neck pain.   Skin: Negative for rash.   Allergic/Immunologic: Negative for environmental allergies and food allergies.   Neurological: Negative for dizziness, seizures, syncope, weakness and headaches.   Hematological: Negative for adenopathy. Bruises/bleeds easily.   Psychiatric/Behavioral: Negative for decreased concentration, dysphoric mood and sleep disturbance. The patient is not nervous/anxious.           Objective:     BP (!) 134/90   Pulse 86   Ht 5' 8" (1.727 m)   Wt 62.9 kg (138 lb 10.7 oz)   BMI 21.08 kg/m²        Constitutional:   She appears well-developed. She is cooperative. Normal speech.  No hoarse voice.      Head:  Normocephalic. Salivary glands normal.  Facial strength is normal.      Ears:    Right " Ear: No drainage or tenderness. Tympanic membrane is not perforated. Tympanic membrane mobility is normal. No middle ear effusion. No decreased hearing is noted.   Left Ear: No drainage or tenderness. Tympanic membrane is not perforated. Tympanic membrane mobility is normal.  No middle ear effusion. No decreased hearing is noted.     Nose:  Septal deviation present. No mucosal edema, rhinorrhea or polyps. No epistaxis. Turbinates normal, no turbinate masses and no turbinate hypertrophy.  Right sinus exhibits no maxillary sinus tenderness and no frontal sinus tenderness. Left sinus exhibits no maxillary sinus tenderness and no frontal sinus tenderness.     Mouth/Throat  Oropharynx clear and moist without lesions or asymmetry and normal uvula midline. She does not have dentures. Normal dentition. No oral lesions or mucous membrane lesions. No oropharyngeal exudate or posterior oropharyngeal erythema. Mirror exam not performed due to patient tolerance.  Mirror exam not performed due to patient tolerance.      Neck:  Neck normal without thyromegaly masses, asymmetry, normal tracheal structure, crepitus, and tenderness, thyroid normal, trachea normal and no adenopathy. Normal range of motion present.     She has no cervical adenopathy.     Cardiovascular:   Regular rhythm.      Pulmonary/Chest:   Effort normal.     Psychiatric:   She has a normal mood and affect. Her speech is normal and behavior is normal.     Neurological:   No cranial nerve deficit.     Skin:   No rash noted.       Procedure    Flexible laryngoscopy performed.  See procedure note.     Left nasal valve     Left PITH with posterior exudate     Right nasal valve     Larynx with mild reflux changes        Data Reviewed    WBC (K/uL)   Date Value   03/15/2019 5.76     Eosinophil% (%)   Date Value   03/15/2019 2.1     Eos # (K/uL)   Date Value   03/15/2019 0.1     Platelets (K/uL)   Date Value   03/15/2019 252     Glucose (mg/dL)   Date Value   03/15/2019  83     No results found for: IGE    No sinus imaging available.     9/19/19 Chest x-ray wnl.       Assessment:     1. Laryngopharyngeal reflux (LPR)    2. Chronic cough         Plan:     I had a long discussion with the patient regarding her condition and the further workup and management options.  I reassured her as to the benign nature of today's findings, which are consistent with silent reflux findings.  I recommended continuance of nightly Pepcid and omeprazole for empiric treatment of her reflux.  I also counseled her on the benefit of reducing her caffeine intake and provided additional dietary recommendations.  I also suggested that she use Flonase for symptomatic nasal congestion.    Follow up if symptoms worsen or fail to improve.

## 2019-11-20 NOTE — LETTER
2019    Shelley Leija MD  [address]  [address]  [place] [address]           OTOLARYNGOLOGY AND COMMUNICATION SCIENCES    Keagan Dean MD, FACS          SURGICAL AND MEDICAL DISEASES OF HEAD AND NECK  MD Keagan Davila MD, FACS  Mihir Carlton III, MD    OTOLOGY, NEUROTOLOGY and SKULL-BASE SURGERY  To Mendoza MD, FACS  Tino Holder MD, Director    PEDIATRIC OTOLARYNGOLOGY & OTOLOGY  CODEY Garcia MD, FAAP  Desi Zazueta MD, FACS    FACIAL PLASTIC and RECONSTRUCTIVE SURGERY  LEONEL Ryan III, MD, FACS    RHINOLOGY and SINUS SURGERY  Abad Cherry MD, MPH, FACS  Director   LEONEL Ryan III, MD, FACS    LARYNGOLOGY  Rangel Treadwell MD    SPEECH-LANGUAGE PATHOLOGY  Sayda Morales, PhD, [facility]SLP  Yen Everett, MS, CCC-SLP  Tabatha Cheng, MS, CCC-SLP  Tiana Ramirez MA, [facility]SLP    AUDIOLOGY SECTION  Ailyn Portillo, MS, CCC-A  LAURI Knox, CCC-A  Dora Fish, CCC-A  Dora Reddy, CCC-A  Jose Villagran Jr., LAURI, CCA-A  LAURI Romeo, CCC-A  Dora Coyne, CCC-A    [facility]  Head and Neck Surgical Oncology  BETY Nixon, FNP-C  Pedatric Otolaryngology  BETY Garrison, PNP-C       Re:  Dia Ovalles  :  1950    Dear Dr. Leija,    Thank you for referring your patient, Dia Ovalles, to us for evaluation and treatment.  I have enclosed a copy of my clinic note for your review and records.  If you have any questions please do not hesitate to contact our office.     Thank you for allowing me to participate in the care of your patient.    Sincerely,        Abad Cherry MD, MPH, FACS    Director, Rhinology and Sinus Surgery  Department of Otorhinolaryngology  [facility]    Encl:  Progress note     [facility] [address] [place] [address]  phone [phone]  fax [phone]   [email or web address]

## 2019-11-20 NOTE — PATIENT INSTRUCTIONS
"Consider reducing the amount of caffeine you drink.  This will help the acid reflux.  Try decaffeinated drinks, particularly coffee and soda.  If you make your own coffee, try one scoop of decaf with one scoop of regular to make a "half-caf" drink.  "

## 2019-12-17 DIAGNOSIS — M17.11 PRIMARY OSTEOARTHRITIS OF RIGHT KNEE: Primary | ICD-10-CM

## 2019-12-20 ENCOUNTER — OFFICE VISIT (OUTPATIENT)
Dept: ORTHOPEDICS | Facility: CLINIC | Age: 69
End: 2019-12-20
Payer: MEDICARE

## 2019-12-20 ENCOUNTER — HOSPITAL ENCOUNTER (OUTPATIENT)
Dept: RADIOLOGY | Facility: HOSPITAL | Age: 69
Discharge: HOME | End: 2019-12-20
Attending: NURSE PRACTITIONER
Payer: MEDICARE

## 2019-12-20 VITALS
BODY MASS INDEX: 20.46 KG/M2 | WEIGHT: 135 LBS | SYSTOLIC BLOOD PRESSURE: 125 MMHG | HEIGHT: 68 IN | HEART RATE: 94 BPM | TEMPERATURE: 98 F | DIASTOLIC BLOOD PRESSURE: 86 MMHG

## 2019-12-20 DIAGNOSIS — M17.11 PRIMARY OSTEOARTHRITIS OF RIGHT KNEE: Primary | ICD-10-CM

## 2019-12-20 DIAGNOSIS — M17.11 PRIMARY OSTEOARTHRITIS OF RIGHT KNEE: ICD-10-CM

## 2019-12-20 PROCEDURE — 73564 X-RAY EXAM KNEE 4 OR MORE: CPT | Mod: TC,RT

## 2019-12-20 PROCEDURE — 20610 DRAIN/INJ JOINT/BURSA W/O US: CPT | Mod: S$PBB,RT,, | Performed by: NURSE PRACTITIONER

## 2019-12-20 PROCEDURE — 99999 PR PBB SHADOW E&M-EST. PATIENT-LVL III: ICD-10-PCS | Mod: PBBFAC,,, | Performed by: NURSE PRACTITIONER

## 2019-12-20 PROCEDURE — 73564 X-RAY EXAM KNEE 4 OR MORE: CPT | Mod: 26,RT,, | Performed by: RADIOLOGY

## 2019-12-20 PROCEDURE — 20610 DRAIN/INJ JOINT/BURSA W/O US: CPT | Mod: PBBFAC,RT | Performed by: NURSE PRACTITIONER

## 2019-12-20 PROCEDURE — 99499 NO LOS: ICD-10-PCS | Mod: S$PBB,,, | Performed by: NURSE PRACTITIONER

## 2019-12-20 PROCEDURE — 73562 X-RAY EXAM OF KNEE 3: CPT | Mod: 26,59,LT, | Performed by: RADIOLOGY

## 2019-12-20 PROCEDURE — 99499 UNLISTED E&M SERVICE: CPT | Mod: S$PBB,,, | Performed by: NURSE PRACTITIONER

## 2019-12-20 PROCEDURE — 73562 XR KNEE ORTHO RIGHT WITH FLEXION: ICD-10-PCS | Mod: 26,59,LT, | Performed by: RADIOLOGY

## 2019-12-20 PROCEDURE — 73564 XR KNEE ORTHO RIGHT WITH FLEXION: ICD-10-PCS | Mod: 26,RT,, | Performed by: RADIOLOGY

## 2019-12-20 PROCEDURE — 20610 PR DRAIN/INJECT LARGE JOINT/BURSA: ICD-10-PCS | Mod: S$PBB,RT,, | Performed by: NURSE PRACTITIONER

## 2019-12-20 PROCEDURE — 99213 OFFICE O/P EST LOW 20 MIN: CPT | Mod: PBBFAC,25 | Performed by: NURSE PRACTITIONER

## 2019-12-20 PROCEDURE — 99999 PR PBB SHADOW E&M-EST. PATIENT-LVL III: CPT | Mod: PBBFAC,,, | Performed by: NURSE PRACTITIONER

## 2019-12-20 RX ADMIN — Medication 20 MG: at 02:12

## 2019-12-27 ENCOUNTER — OFFICE VISIT (OUTPATIENT)
Dept: ORTHOPEDICS | Facility: CLINIC | Age: 69
End: 2019-12-27
Payer: MEDICARE

## 2019-12-27 VITALS — HEIGHT: 68 IN | WEIGHT: 135 LBS | BODY MASS INDEX: 20.46 KG/M2

## 2019-12-27 DIAGNOSIS — M17.11 PRIMARY OSTEOARTHRITIS OF RIGHT KNEE: ICD-10-CM

## 2019-12-27 PROCEDURE — 20610 DRAIN/INJ JOINT/BURSA W/O US: CPT | Mod: S$PBB,RT,, | Performed by: NURSE PRACTITIONER

## 2019-12-27 PROCEDURE — 99999 PR PBB SHADOW E&M-EST. PATIENT-LVL II: ICD-10-PCS | Mod: PBBFAC,,, | Performed by: NURSE PRACTITIONER

## 2019-12-27 PROCEDURE — 99999 PR PBB SHADOW E&M-EST. PATIENT-LVL II: CPT | Mod: PBBFAC,,, | Performed by: NURSE PRACTITIONER

## 2019-12-27 PROCEDURE — 99212 OFFICE O/P EST SF 10 MIN: CPT | Mod: PBBFAC | Performed by: NURSE PRACTITIONER

## 2019-12-27 PROCEDURE — 20610 PR DRAIN/INJECT LARGE JOINT/BURSA: ICD-10-PCS | Mod: S$PBB,RT,, | Performed by: NURSE PRACTITIONER

## 2019-12-27 PROCEDURE — 99499 NO LOS: ICD-10-PCS | Mod: S$PBB,,, | Performed by: NURSE PRACTITIONER

## 2019-12-27 PROCEDURE — 20610 DRAIN/INJ JOINT/BURSA W/O US: CPT | Mod: PBBFAC | Performed by: NURSE PRACTITIONER

## 2019-12-27 PROCEDURE — 99499 UNLISTED E&M SERVICE: CPT | Mod: S$PBB,,, | Performed by: NURSE PRACTITIONER

## 2019-12-27 RX ADMIN — Medication 20 MG: at 03:12

## 2020-01-03 ENCOUNTER — OFFICE VISIT (OUTPATIENT)
Dept: ORTHOPEDICS | Facility: CLINIC | Age: 70
End: 2020-01-03
Payer: MEDICARE

## 2020-01-03 DIAGNOSIS — M17.11 PRIMARY OSTEOARTHRITIS OF RIGHT KNEE: ICD-10-CM

## 2020-01-03 PROCEDURE — 99499 NO LOS: ICD-10-PCS | Mod: HCNC,S$GLB,, | Performed by: NURSE PRACTITIONER

## 2020-01-03 PROCEDURE — 99999 PR PBB SHADOW E&M-EST. PATIENT-LVL II: CPT | Mod: PBBFAC,HCNC,, | Performed by: NURSE PRACTITIONER

## 2020-01-03 PROCEDURE — 99999 PR PBB SHADOW E&M-EST. PATIENT-LVL II: ICD-10-PCS | Mod: PBBFAC,HCNC,, | Performed by: NURSE PRACTITIONER

## 2020-01-03 PROCEDURE — 99499 UNLISTED E&M SERVICE: CPT | Mod: HCNC,S$GLB,, | Performed by: NURSE PRACTITIONER

## 2020-01-03 PROCEDURE — 20610 DRAIN/INJ JOINT/BURSA W/O US: CPT | Mod: HCNC,RT,S$GLB, | Performed by: NURSE PRACTITIONER

## 2020-01-03 PROCEDURE — 20610 PR DRAIN/INJECT LARGE JOINT/BURSA: ICD-10-PCS | Mod: HCNC,RT,S$GLB, | Performed by: NURSE PRACTITIONER

## 2020-01-03 RX ADMIN — Medication 20 MG: at 03:01

## 2020-01-03 NOTE — PROGRESS NOTES
Dia Ovalles presents to clinic today for the third right knee Euflexxa injection.    Exam demonstrates the no effusion in the  right knee, and the skin is intact.    Diagnosis: osteoarthritis right knee    After time out was performed and patient ID, side, and site were verified, the  right  knee was sterilly prepped in the standard fashion.  A 22-gauge needle was introduced into right knee joint from an danielle-lateral site without complication and knee was then injected with 2 ml of Euflexxa.  Sterile dressing was applied.  The patient was instructed to resume activities as tolerated and to call with any problems.

## 2020-01-12 ENCOUNTER — PATIENT MESSAGE (OUTPATIENT)
Dept: INTERNAL MEDICINE | Facility: CLINIC | Age: 70
End: 2020-01-12

## 2020-01-12 DIAGNOSIS — N32.81 OAB (OVERACTIVE BLADDER): Primary | ICD-10-CM

## 2020-01-13 RX ORDER — OXYBUTYNIN CHLORIDE 5 MG/1
5 TABLET ORAL 2 TIMES DAILY
Qty: 90 TABLET | Refills: 0 | Status: SHIPPED | OUTPATIENT
Start: 2020-01-13 | End: 2020-01-22 | Stop reason: SDUPTHER

## 2020-01-13 NOTE — TELEPHONE ENCOUNTER
Informed pt that Dr. Leija recommends that she f/u with either gyn or urology. Pt verbalized understanding.     Called oxybutynin into [facility] per pt's request. Pt requested to see a new gyn and stated that she need a referral for gyn for her insurance. Signed referral for gyn appt.

## 2020-01-13 NOTE — TELEPHONE ENCOUNTER
I sent her my chart message.  We can try the oxybutynin to see if helpful, we can start low-dose and then see if we can get the extended release approved.  I recommended that she follow-up with her gynecologist or urologist for further recommendations.

## 2020-01-22 DIAGNOSIS — N32.81 OAB (OVERACTIVE BLADDER): ICD-10-CM

## 2020-01-23 RX ORDER — OXYBUTYNIN CHLORIDE 5 MG/1
5 TABLET ORAL 2 TIMES DAILY
Qty: 90 TABLET | Refills: 3 | Status: SHIPPED | OUTPATIENT
Start: 2020-01-23 | End: 2020-02-04 | Stop reason: SDUPTHER

## 2020-02-04 ENCOUNTER — OFFICE VISIT (OUTPATIENT)
Dept: OBSTETRICS AND GYNECOLOGY | Facility: CLINIC | Age: 70
End: 2020-02-04
Payer: MEDICARE

## 2020-02-04 VITALS
DIASTOLIC BLOOD PRESSURE: 64 MMHG | SYSTOLIC BLOOD PRESSURE: 100 MMHG | WEIGHT: 140.19 LBS | BODY MASS INDEX: 21.25 KG/M2 | HEIGHT: 68 IN

## 2020-02-04 DIAGNOSIS — N95.2 VAGINAL ATROPHY: ICD-10-CM

## 2020-02-04 DIAGNOSIS — N32.81 OVERACTIVE BLADDER: Primary | ICD-10-CM

## 2020-02-04 DIAGNOSIS — N32.81 OAB (OVERACTIVE BLADDER): ICD-10-CM

## 2020-02-04 PROCEDURE — 99999 PR PBB SHADOW E&M-EST. PATIENT-LVL III: CPT | Mod: PBBFAC,HCNC,, | Performed by: OBSTETRICS & GYNECOLOGY

## 2020-02-04 PROCEDURE — 1101F PT FALLS ASSESS-DOCD LE1/YR: CPT | Mod: HCNC,CPTII,S$GLB, | Performed by: OBSTETRICS & GYNECOLOGY

## 2020-02-04 PROCEDURE — 1125F AMNT PAIN NOTED PAIN PRSNT: CPT | Mod: HCNC,S$GLB,, | Performed by: OBSTETRICS & GYNECOLOGY

## 2020-02-04 PROCEDURE — 1159F PR MEDICATION LIST DOCUMENTED IN MEDICAL RECORD: ICD-10-PCS | Mod: HCNC,S$GLB,, | Performed by: OBSTETRICS & GYNECOLOGY

## 2020-02-04 PROCEDURE — 99999 PR PBB SHADOW E&M-EST. PATIENT-LVL III: ICD-10-PCS | Mod: PBBFAC,HCNC,, | Performed by: OBSTETRICS & GYNECOLOGY

## 2020-02-04 PROCEDURE — 99214 OFFICE O/P EST MOD 30 MIN: CPT | Mod: HCNC,S$GLB,, | Performed by: OBSTETRICS & GYNECOLOGY

## 2020-02-04 PROCEDURE — 99214 PR OFFICE/OUTPT VISIT, EST, LEVL IV, 30-39 MIN: ICD-10-PCS | Mod: HCNC,S$GLB,, | Performed by: OBSTETRICS & GYNECOLOGY

## 2020-02-04 PROCEDURE — 1101F PR PT FALLS ASSESS DOC 0-1 FALLS W/OUT INJ PAST YR: ICD-10-PCS | Mod: HCNC,CPTII,S$GLB, | Performed by: OBSTETRICS & GYNECOLOGY

## 2020-02-04 PROCEDURE — 1159F MED LIST DOCD IN RCRD: CPT | Mod: HCNC,S$GLB,, | Performed by: OBSTETRICS & GYNECOLOGY

## 2020-02-04 PROCEDURE — 1125F PR PAIN SEVERITY QUANTIFIED, PAIN PRESENT: ICD-10-PCS | Mod: HCNC,S$GLB,, | Performed by: OBSTETRICS & GYNECOLOGY

## 2020-02-04 RX ORDER — ESTRADIOL 10 UG/1
INSERT VAGINAL
Qty: 24 TABLET | Refills: 4 | Status: SHIPPED | OUTPATIENT
Start: 2020-02-04 | End: 2021-03-29

## 2020-02-04 RX ORDER — OXYBUTYNIN CHLORIDE 5 MG/1
5 TABLET ORAL 2 TIMES DAILY
Qty: 90 TABLET | Refills: 3 | Status: SHIPPED | OUTPATIENT
Start: 2020-02-04 | End: 2020-11-12

## 2020-02-04 NOTE — PROGRESS NOTES
"CC: overactive bladder    Dia Ovalles is a 69 y.o. female  presents for overactive bladder.   She is on detrol and vagifem for   Vaginal atrophy. She would like to discuss meds and consider refills      Past Medical History:   Diagnosis Date    Breast cyst     Cancer     SCC x 2 on arms    OA (osteoarthritis) of shoulder 3/13/2019    OAB (overactive bladder) 3/8/2017    Osteoporosis 3/8/2017       Past Surgical History:   Procedure Laterality Date    ASD REPAIR      open heart surgery    BREAST BIOPSY       SECTION      x2    KNEE SURGERY      meniscal repair    SKIN CANCER EXCISION         OB History    Para Term  AB Living   2 2 2     2   SAB TAB Ectopic Multiple Live Births                  # Outcome Date GA Lbr Jimmie/2nd Weight Sex Delivery Anes PTL Lv   2 Term            1 Term                Family History   Problem Relation Age of Onset    Stroke Mother     Hypertension Mother     Asthma Mother     Osteoporosis Mother     Cancer Father         lung cancer    Heart disease Maternal Grandfather     Stroke Maternal Grandfather     Breast cancer Neg Hx     Colon cancer Neg Hx     Ovarian cancer Neg Hx        Social History     Tobacco Use    Smoking status: Never Smoker    Smokeless tobacco: Never Used   Substance Use Topics    Alcohol use: Yes     Comment: social    Drug use: Never       /64   Ht 5' 8" (1.727 m)   Wt 63.6 kg (140 lb 3.4 oz)   LMP  (LMP Unknown)   BMI 21.32 kg/m²     ROS:  GENERAL: Denies weight gain or weight loss. Feeling well overall.   SKIN: Denies rash or lesions.   HEAD: Denies head injury or headache.   NODES: Denies enlarged lymph nodes.   CHEST: Denies chest pain or shortness of breath.   CARDIOVASCULAR: Denies palpitations or left sided chest pain.   ABDOMEN: No abdominal pain, constipation, diarrhea, nausea, vomiting or rectal bleeding.   URINARY: No frequency, dysuria, hematuria, or burning on urination.  REPRODUCTIVE: See " HPI.   BREASTS: The patient performs breast self-examination and denies pain, lumps, or nipple discharge.   HEMATOLOGIC: No easy bruisability or excessive bleeding.  MUSCULOSKELETAL: Denies joint pain or swelling.   NEUROLOGIC: Denies syncope or weakness.   PSYCHIATRIC: Denies depression, anxiety or mood swings.    Physical Exam:    APPEARANCE: Well nourished, well developed, in no acute distress.  AFFECT: WNL, alert and oriented x 3  SKIN: No acne or hirsutism  NECK: Neck symmetric without masses or thyromegaly  NODES: No inguinal, cervical, axillary, or femoral lymph node enlargement  CHEST: Good respiratory effect  PE_ deferred    ASSESSMENT AND PLAN  1. Overactive bladder     2. Vaginal atrophy  estradiol (VAGIFEM) 10 mcg Tab   3. OAB (overactive bladder)  oxybutynin (DITROPAN) 5 MG Tab         Patient was counseled today on A.C.S. Pap guidelines and recommendations for yearly pelvic exams, mammograms and monthly self breast exams; to see her PCP for other health maintenance.     Follow up if symptoms worsen or fail to improve.   Follow up for annual

## 2020-02-10 ENCOUNTER — OFFICE VISIT (OUTPATIENT)
Dept: OPTOMETRY | Facility: CLINIC | Age: 70
End: 2020-02-10

## 2020-02-10 ENCOUNTER — OFFICE VISIT (OUTPATIENT)
Dept: OPTOMETRY | Facility: CLINIC | Age: 70
End: 2020-02-10
Payer: MEDICARE

## 2020-02-10 DIAGNOSIS — H04.123 DRY EYE SYNDROME, BILATERAL: ICD-10-CM

## 2020-02-10 DIAGNOSIS — H52.03 HYPEROPIA, BILATERAL: ICD-10-CM

## 2020-02-10 DIAGNOSIS — H25.13 NS (NUCLEAR SCLEROSIS), BILATERAL: Primary | ICD-10-CM

## 2020-02-10 DIAGNOSIS — H43.813 PVD (POSTERIOR VITREOUS DETACHMENT), BILATERAL: ICD-10-CM

## 2020-02-10 DIAGNOSIS — H52.4 PRESBYOPIA: ICD-10-CM

## 2020-02-10 DIAGNOSIS — H43.393 VITREOUS SYNERESIS OF BOTH EYES: ICD-10-CM

## 2020-02-10 DIAGNOSIS — Z46.0 FITTING AND ADJUSTMENT OF SPECTACLES AND CONTACT LENSES: Primary | ICD-10-CM

## 2020-02-10 DIAGNOSIS — Z46.0 FITTING AND ADJUSTMENT OF SPECTACLES AND CONTACT LENSES: ICD-10-CM

## 2020-02-10 PROCEDURE — 99999 PR PBB SHADOW E&M-EST. PATIENT-LVL II: CPT | Mod: PBBFAC,HCNC,, | Performed by: OPTOMETRIST

## 2020-02-10 PROCEDURE — 92310 PR CONTACT LENS FITTING (NO CHANGE): ICD-10-PCS | Mod: CSM,,, | Performed by: OPTOMETRIST

## 2020-02-10 PROCEDURE — 92014 COMPRE OPH EXAM EST PT 1/>: CPT | Mod: HCNC,S$GLB,, | Performed by: OPTOMETRIST

## 2020-02-10 PROCEDURE — 99999 PR PBB SHADOW E&M-EST. PATIENT-LVL I: CPT | Mod: PBBFAC,,, | Performed by: OPTOMETRIST

## 2020-02-10 PROCEDURE — 92015 PR REFRACTION: ICD-10-PCS | Mod: HCNC,S$GLB,, | Performed by: OPTOMETRIST

## 2020-02-10 PROCEDURE — 92015 DETERMINE REFRACTIVE STATE: CPT | Mod: HCNC,S$GLB,, | Performed by: OPTOMETRIST

## 2020-02-10 PROCEDURE — 99999 PR PBB SHADOW E&M-EST. PATIENT-LVL II: ICD-10-PCS | Mod: PBBFAC,HCNC,, | Performed by: OPTOMETRIST

## 2020-02-10 PROCEDURE — 92310 CONTACT LENS FITTING OU: CPT | Mod: CSM,,, | Performed by: OPTOMETRIST

## 2020-02-10 PROCEDURE — 92014 PR EYE EXAM, EST PATIENT,COMPREHESV: ICD-10-PCS | Mod: HCNC,S$GLB,, | Performed by: OPTOMETRIST

## 2020-02-10 PROCEDURE — 99999 PR PBB SHADOW E&M-EST. PATIENT-LVL I: ICD-10-PCS | Mod: PBBFAC,,, | Performed by: OPTOMETRIST

## 2020-02-10 NOTE — PROGRESS NOTES
HPI     Pt here for annual cl fit  No complaints about va  Patient denies diplopia, headaches, flashes/floaters, pain, and   itching/burning/tearing.    Pt happy with daily cls  Pt does not use any eye drops.      Last edited by Nuha Lang on 2/10/2020  3:15 PM. (History)            Assessment /Plan     For exam results, see Encounter Report.      Vitreous syneresis of both eyes  PVD (posterior vitreous detachment), bilateral   Stable    Dry eye syndrome, bilateral    NS (nuclear sclerosis), bilateral              MIld, monitor     Presbyopia  Fitting and adjustment of spectacles and contact lenses     Rx specs     CL fit today: changed power OS              Dispensed trials of dailies              Ok to order if happy                  Remove nightly, replace daily              Ok to order if happy     RTC 1 year, sooner PRN

## 2020-02-11 ENCOUNTER — PATIENT MESSAGE (OUTPATIENT)
Dept: OPTOMETRY | Facility: CLINIC | Age: 70
End: 2020-02-11

## 2020-02-21 DIAGNOSIS — R05.9 COUGH: ICD-10-CM

## 2020-02-21 RX ORDER — OMEPRAZOLE 40 MG/1
40 CAPSULE, DELAYED RELEASE ORAL EVERY MORNING
Qty: 90 CAPSULE | Refills: 0 | Status: SHIPPED | OUTPATIENT
Start: 2020-02-21 | End: 2020-03-05 | Stop reason: SDUPTHER

## 2020-02-24 NOTE — TELEPHONE ENCOUNTER
Spoke with pt and informed her that Dr Bravo refilled her Omeprazole and sent it to [facility]. Pt verbalized understanding.

## 2020-02-27 DIAGNOSIS — M81.0 OSTEOPOROSIS, UNSPECIFIED OSTEOPOROSIS TYPE, UNSPECIFIED PATHOLOGICAL FRACTURE PRESENCE: ICD-10-CM

## 2020-02-27 RX ORDER — RALOXIFENE HYDROCHLORIDE 60 MG/1
60 TABLET, FILM COATED ORAL DAILY
Qty: 90 TABLET | Refills: 1 | Status: SHIPPED | OUTPATIENT
Start: 2020-02-27 | End: 2020-08-17 | Stop reason: SDUPTHER

## 2020-03-03 ENCOUNTER — PATIENT MESSAGE (OUTPATIENT)
Dept: OPTOMETRY | Facility: CLINIC | Age: 70
End: 2020-03-03

## 2020-03-05 ENCOUNTER — OFFICE VISIT (OUTPATIENT)
Dept: INTERNAL MEDICINE | Facility: CLINIC | Age: 70
End: 2020-03-05
Payer: MEDICARE

## 2020-03-05 VITALS
HEART RATE: 78 BPM | BODY MASS INDEX: 20.78 KG/M2 | SYSTOLIC BLOOD PRESSURE: 100 MMHG | HEIGHT: 68 IN | OXYGEN SATURATION: 97 % | DIASTOLIC BLOOD PRESSURE: 68 MMHG | WEIGHT: 137.13 LBS | TEMPERATURE: 98 F

## 2020-03-05 DIAGNOSIS — Z79.899 OTHER LONG TERM (CURRENT) DRUG THERAPY: ICD-10-CM

## 2020-03-05 DIAGNOSIS — N32.81 OAB (OVERACTIVE BLADDER): ICD-10-CM

## 2020-03-05 DIAGNOSIS — Z00.00 ANNUAL PHYSICAL EXAM: Primary | ICD-10-CM

## 2020-03-05 DIAGNOSIS — M81.0 OSTEOPOROSIS, UNSPECIFIED OSTEOPOROSIS TYPE, UNSPECIFIED PATHOLOGICAL FRACTURE PRESENCE: ICD-10-CM

## 2020-03-05 DIAGNOSIS — Z13.6 ENCOUNTER FOR LIPID SCREENING FOR CARDIOVASCULAR DISEASE: ICD-10-CM

## 2020-03-05 DIAGNOSIS — Z85.89 HISTORY OF SQUAMOUS CELL CARCINOMA: ICD-10-CM

## 2020-03-05 DIAGNOSIS — R05.9 COUGH: ICD-10-CM
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
## 2020-03-05 DIAGNOSIS — Z13.220 ENCOUNTER FOR LIPID SCREENING FOR CARDIOVASCULAR DISEASE: ICD-10-CM

## 2020-03-05 DIAGNOSIS — Z51.81 ENCOUNTER FOR MONITORING LONG-TERM PROTON PUMP INHIBITOR THERAPY: ICD-10-CM

## 2020-03-05 DIAGNOSIS — Z79.899 ENCOUNTER FOR MONITORING LONG-TERM PROTON PUMP INHIBITOR THERAPY: ICD-10-CM

## 2020-03-05 DIAGNOSIS — Z12.31 VISIT FOR SCREENING MAMMOGRAM: ICD-10-CM

## 2020-03-05 DIAGNOSIS — S99.921A INJURY OF TOE ON RIGHT FOOT, INITIAL ENCOUNTER: ICD-10-CM

## 2020-03-05 DIAGNOSIS — K21.9 LARYNGOPHARYNGEAL REFLUX (LPR): ICD-10-CM

## 2020-03-05 PROCEDURE — 99999 PR PBB SHADOW E&M-EST. PATIENT-LVL III: CPT | Mod: PBBFAC,HCNC,, | Performed by: FAMILY MEDICINE

## 2020-03-05 PROCEDURE — 99397 PER PM REEVAL EST PAT 65+ YR: CPT | Mod: HCNC,S$GLB,, | Performed by: FAMILY MEDICINE

## 2020-03-05 PROCEDURE — 99999 PR PBB SHADOW E&M-EST. PATIENT-LVL III: ICD-10-PCS | Mod: PBBFAC,HCNC,, | Performed by: FAMILY MEDICINE

## 2020-03-05 PROCEDURE — 99397 PR PREVENTIVE VISIT,EST,65 & OVER: ICD-10-PCS | Mod: HCNC,S$GLB,, | Performed by: FAMILY MEDICINE

## 2020-03-05 RX ORDER — OMEPRAZOLE 40 MG/1
40 CAPSULE, DELAYED RELEASE ORAL NIGHTLY
Qty: 90 CAPSULE | Refills: 1 | Status: SHIPPED | OUTPATIENT
Start: 2020-03-05 | End: 2021-03-29
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
## 2020-03-12 ENCOUNTER — LAB VISIT (OUTPATIENT)
Dept: LAB | Facility: OTHER | Age: 70
End: 2020-03-12
Attending: FAMILY MEDICINE
Payer: MEDICARE

## 2020-03-12 DIAGNOSIS — Z13.6 ENCOUNTER FOR LIPID SCREENING FOR CARDIOVASCULAR DISEASE: ICD-10-CM

## 2020-03-12 DIAGNOSIS — Z13.220 ENCOUNTER FOR LIPID SCREENING FOR CARDIOVASCULAR DISEASE: ICD-10-CM

## 2020-03-12 DIAGNOSIS — Z51.81 ENCOUNTER FOR MONITORING LONG-TERM PROTON PUMP INHIBITOR THERAPY: ICD-10-CM

## 2020-03-12 DIAGNOSIS — K21.9 LARYNGOPHARYNGEAL REFLUX (LPR): ICD-10-CM

## 2020-03-12 DIAGNOSIS — R05.9 COUGH: ICD-10-CM

## 2020-03-12 DIAGNOSIS — Z79.899 ENCOUNTER FOR MONITORING LONG-TERM PROTON PUMP INHIBITOR THERAPY: ICD-10-CM

## 2020-03-12 DIAGNOSIS — Z79.899 OTHER LONG TERM (CURRENT) DRUG THERAPY: ICD-10-CM

## 2020-03-12 DIAGNOSIS — Z00.00 ANNUAL PHYSICAL EXAM: ICD-10-CM

## 2020-03-12 DIAGNOSIS — M81.0 OSTEOPOROSIS, UNSPECIFIED OSTEOPOROSIS TYPE, UNSPECIFIED PATHOLOGICAL FRACTURE PRESENCE: ICD-10-CM

## 2020-03-12 LAB
25(OH)D3+25(OH)D2 SERPL-MCNC: 70 NG/ML (ref 30–96)
ALBUMIN SERPL BCP-MCNC: 4 G/DL (ref 3.5–5.2)
ALP SERPL-CCNC: 63 U/L (ref 55–135)
ALT SERPL W/O P-5'-P-CCNC: 19 U/L (ref 10–44)
ANION GAP SERPL CALC-SCNC: 10 MMOL/L (ref 8–16)
AST SERPL-CCNC: 24 U/L (ref 10–40)
BASOPHILS # BLD AUTO: 0.05 K/UL (ref 0–0.2)
BASOPHILS NFR BLD: 0.8 % (ref 0–1.9)
BILIRUB SERPL-MCNC: 0.5 MG/DL (ref 0.1–1)
BUN SERPL-MCNC: 30 MG/DL (ref 8–23)
CALCIUM SERPL-MCNC: 9.1 MG/DL (ref 8.7–10.5)
CHLORIDE SERPL-SCNC: 104 MMOL/L (ref 95–110)
CHOLEST SERPL-MCNC: 185 MG/DL (ref 120–199)
CHOLEST/HDLC SERPL: 2.8 {RATIO} (ref 2–5)
CO2 SERPL-SCNC: 26 MMOL/L (ref 23–29)
CREAT SERPL-MCNC: 0.8 MG/DL (ref 0.5–1.4)
DIFFERENTIAL METHOD: NORMAL
EOSINOPHIL # BLD AUTO: 0.1 K/UL (ref 0–0.5)
EOSINOPHIL NFR BLD: 1.2 % (ref 0–8)
ERYTHROCYTE [DISTWIDTH] IN BLOOD BY AUTOMATED COUNT: 13 % (ref 11.5–14.5)
EST. GFR  (AFRICAN AMERICAN): >60 ML/MIN/1.73 M^2
EST. GFR  (NON AFRICAN AMERICAN): >60 ML/MIN/1.73 M^2
ESTIMATED AVG GLUCOSE: 105 MG/DL (ref 68–131)
GLUCOSE SERPL-MCNC: 88 MG/DL (ref 70–110)
HBA1C MFR BLD HPLC: 5.3 % (ref 4–5.6)
HCT VFR BLD AUTO: 41.3 % (ref 37–48.5)
HDLC SERPL-MCNC: 66 MG/DL (ref 40–75)
HDLC SERPL: 35.7 % (ref 20–50)
HGB BLD-MCNC: 13.3 G/DL (ref 12–16)
IMM GRANULOCYTES # BLD AUTO: 0.01 K/UL (ref 0–0.04)
IMM GRANULOCYTES NFR BLD AUTO: 0.2 % (ref 0–0.5)
LDLC SERPL CALC-MCNC: 107.8 MG/DL (ref 63–159)
LYMPHOCYTES # BLD AUTO: 1.3 K/UL (ref 1–4.8)
LYMPHOCYTES NFR BLD: 21.8 % (ref 18–48)
MAGNESIUM SERPL-MCNC: 1.8 MG/DL (ref 1.6–2.6)
MCH RBC QN AUTO: 29.1 PG (ref 27–31)
MCHC RBC AUTO-ENTMCNC: 32.2 G/DL (ref 32–36)
MCV RBC AUTO: 90 FL (ref 82–98)
MONOCYTES # BLD AUTO: 0.4 K/UL (ref 0.3–1)
MONOCYTES NFR BLD: 6 % (ref 4–15)
NEUTROPHILS # BLD AUTO: 4.2 K/UL (ref 1.8–7.7)
NEUTROPHILS NFR BLD: 70 % (ref 38–73)
NONHDLC SERPL-MCNC: 119 MG/DL
NRBC BLD-RTO: 0 /100 WBC
PLATELET # BLD AUTO: 267 K/UL (ref 150–350)
PMV BLD AUTO: 10.2 FL (ref 9.2–12.9)
POTASSIUM SERPL-SCNC: 4.2 MMOL/L (ref 3.5–5.1)
PROT SERPL-MCNC: 6.7 G/DL (ref 6–8.4)
RBC # BLD AUTO: 4.57 M/UL (ref 4–5.4)
SODIUM SERPL-SCNC: 140 MMOL/L (ref 136–145)
TRIGL SERPL-MCNC: 56 MG/DL (ref 30–150)
TSH SERPL DL<=0.005 MIU/L-ACNC: 1.27 UIU/ML (ref 0.4–4)
VIT B12 SERPL-MCNC: 631 PG/ML (ref 210–950)
WBC # BLD AUTO: 5.97 K/UL (ref 3.9–12.7)

## 2020-03-12 PROCEDURE — 83036 HEMOGLOBIN GLYCOSYLATED A1C: CPT | Mod: HCNC

## 2020-03-12 PROCEDURE — 85025 COMPLETE CBC W/AUTO DIFF WBC: CPT | Mod: HCNC

## 2020-03-12 PROCEDURE — 82306 VITAMIN D 25 HYDROXY: CPT | Mod: HCNC

## 2020-03-12 PROCEDURE — 36415 COLL VENOUS BLD VENIPUNCTURE: CPT | Mod: HCNC

## 2020-03-12 PROCEDURE — 80061 LIPID PANEL: CPT | Mod: HCNC

## 2020-03-12 PROCEDURE — 84443 ASSAY THYROID STIM HORMONE: CPT | Mod: HCNC

## 2020-03-12 PROCEDURE — 82607 VITAMIN B-12: CPT | Mod: HCNC

## 2020-03-12 PROCEDURE — 83735 ASSAY OF MAGNESIUM: CPT | Mod: HCNC

## 2020-03-12 PROCEDURE — 80053 COMPREHEN METABOLIC PANEL: CPT | Mod: HCNC

## 2020-03-16 DIAGNOSIS — M19.019 OSTEOARTHRITIS OF SHOULDER, UNSPECIFIED LATERALITY, UNSPECIFIED OSTEOARTHRITIS TYPE: ICD-10-CM

## 2020-03-16 DIAGNOSIS — M17.11 OSTEOARTHRITIS OF RIGHT KNEE, UNSPECIFIED OSTEOARTHRITIS TYPE: ICD-10-CM

## 2020-03-16 RX ORDER — DULOXETIN HYDROCHLORIDE 20 MG/1
40 CAPSULE, DELAYED RELEASE ORAL DAILY
Qty: 180 CAPSULE | Refills: 1 | Status: SHIPPED | OUTPATIENT
Start: 2020-03-16 | End: 2020-06-26

## 2020-04-07 ENCOUNTER — TELEPHONE (OUTPATIENT)
Dept: DERMATOLOGY | Facility: CLINIC | Age: 70
End: 2020-04-07

## 2020-04-07 NOTE — TELEPHONE ENCOUNTER
Spoke to pt in regards to rescheduling appt due to the ongoing COVID-19 outbreak. Pt is aware and rescheduled.    JENNIFER

## 2020-05-13 ENCOUNTER — LAB VISIT (OUTPATIENT)
Dept: PRIMARY CARE CLINIC | Facility: CLINIC | Age: 70
End: 2020-05-13
Payer: MEDICARE

## 2020-05-13 DIAGNOSIS — Z00.6 RESEARCH STUDY PATIENT: ICD-10-CM

## 2020-05-13 DIAGNOSIS — Z36.9: Primary | ICD-10-CM

## 2020-05-13 LAB — SARS-COV-2 IGG SERPLBLD QL IA.RAPID: NEGATIVE

## 2020-05-13 PROCEDURE — U0002 COVID-19 LAB TEST NON-CDC: HCPCS | Mod: HCNC

## 2020-05-13 PROCEDURE — 86769 SARS-COV-2 COVID-19 ANTIBODY: CPT | Mod: HCNC

## 2020-05-13 NOTE — RESEARCH
Date of Consent:05/13/2020    Sponsor: [facility]    Study Title/IRB Number: Observational study of Sars-CoV2 Immunoglobulin G (IgG) seroprevalence among the [facility] population over time 2020.163  Principle Investigator: Darlyn Sanchez, PhD    Did the patient need translation services? No   name: N/A    Prior to the Informed Consent (IC) being signed, or any study protocol required data collection, testing, procedure, or intervention being performed, the following was done and/or discussed:   Patient was given a paper copy of the IC for review    Patient was given study FAQ   Purpose of the study and qualifications to participate    Study design and tests or procedures done at this visit   Confidentiality and HIPAA Authorization for Release of Medical Records for the research trial/ subject's rights/research related injury   Risk, Benefits, Alternative Treatments, Compensation and Costs   Participation in the research trial is voluntary and patient may withdraw at anytime   Contact information for study related questions    Patient verbalizes understanding of the above: Yes  Contact information for PI and IRB given to patient: Yes  Patient able to adequately summarize: the purpose of the study, the risks associated with the study, and all procedures, testing, and follow-ups associated with the study: Yes    The consent was discussed verbally with the patient and all questions were answered satisfactorily. Patient gave verbal consent for the Seroprevalence research study with an IRB approval date of 05/08/2020.      The Consent, Consent Witness and name of Clinical Research Coordinator consenting was captured and documented in REDCap.    All Inclusion and Exclusion Criteria reviewed, subject meets all Inclusion criteria and does not meet any Exclusion Criteria at this time.     Patient Eligibility was confirmed.    Patient responded to survey questions.    The following biospecimen  collection procedures were collected:    -Nasopharyngeal Swab Collection  -Blood collection

## 2020-05-14 LAB — SARS-COV-2 RNA RESP QL NAA+PROBE: NOT DETECTED

## 2020-05-26 ENCOUNTER — OFFICE VISIT (OUTPATIENT)
Dept: URGENT CARE | Facility: CLINIC | Age: 70
End: 2020-05-26
Payer: MEDICARE

## 2020-05-26 VITALS
HEART RATE: 71 BPM | OXYGEN SATURATION: 96 % | SYSTOLIC BLOOD PRESSURE: 116 MMHG | TEMPERATURE: 99 F | DIASTOLIC BLOOD PRESSURE: 76 MMHG | RESPIRATION RATE: 18 BRPM

## 2020-05-26 DIAGNOSIS — H10.9 CONJUNCTIVITIS OF LEFT EYE, UNSPECIFIED CONJUNCTIVITIS TYPE: Primary | ICD-10-CM

## 2020-05-26 PROCEDURE — 99214 OFFICE O/P EST MOD 30 MIN: CPT | Mod: S$GLB,,, | Performed by: NURSE PRACTITIONER

## 2020-05-26 PROCEDURE — 99214 PR OFFICE/OUTPT VISIT, EST, LEVL IV, 30-39 MIN: ICD-10-PCS | Mod: S$GLB,,, | Performed by: NURSE PRACTITIONER

## 2020-05-26 RX ORDER — POLYMYXIN B SULFATE AND TRIMETHOPRIM 1; 10000 MG/ML; [USP'U]/ML
1 SOLUTION OPHTHALMIC EVERY 4 HOURS
Qty: 10 ML | Refills: 0 | Status: SHIPPED | OUTPATIENT
Start: 2020-05-26 | End: 2020-06-02

## 2020-05-26 NOTE — PROGRESS NOTES
Subjective:       Patient ID: Dia Ovalles is a 69 y.o. female.    Vitals:  temperature is 98.8 °F (37.1 °C). Her blood pressure is 116/76 and her pulse is 71. Her respiration is 18 and oxygen saturation is 96%.     Chief Complaint: Eye Pain    Patient presents with c.o left eye pain x 3 days. Pain is intermittent. Denies any blurred vision. Denies nasal congestion or sinus pressure. Patient does wear glasses or contacts. Patient reports crust upon waking this morning.     Eye Pain    The left eye is affected. This is a new problem. Episode onset: 3 days. The problem occurs intermittently. The problem has been unchanged. There was no injury mechanism. There is no known exposure to pink eye. She wears contacts. Associated symptoms include an eye discharge and eye redness. Pertinent negatives include no blurred vision, double vision, fever, itching, nausea, photophobia or vomiting. Treatments tried: visine. The treatment provided no relief.       Constitution: Negative for chills and fever.   HENT: Negative for congestion and sinus pain.    Eyes: Positive for eye discharge, eye pain and eye redness. Negative for eye trauma, foreign body in eye, eye itching, photophobia, vision loss, double vision, blurred vision and eyelid swelling.   Gastrointestinal: Negative for nausea and vomiting.   Genitourinary: Negative for history of kidney stones.   Skin: Negative for rash.   Allergic/Immunologic: Negative for seasonal allergies and itching.   Neurological: Negative for headaches.       Objective:      Physical Exam   Constitutional: She is oriented to person, place, and time. Vital signs are normal. She appears well-developed and well-nourished. She is active and cooperative.  Non-toxic appearance. She does not have a sickly appearance. She does not appear ill. No distress.   HENT:   Head: Normocephalic and atraumatic.   Right Ear: Abnromal external ear normal.   Left Ear: Abnormal external ear normal.   Nose: Nose  abnormal. No mucosal edema, rhinorrhea or nasal deformity. No epistaxis. Right sinus exhibits no maxillary sinus tenderness and no frontal sinus tenderness. Left sinus exhibits no maxillary sinus tenderness and no frontal sinus tenderness.   Mouth/Throat: Uvula is midline, oropharynx is clear and moist and mucous membranes are normal. No trismus in the jaw. Normal dentition. No uvula swelling. No oropharyngeal exudate, posterior oropharyngeal edema or posterior oropharyngeal erythema. Tonsils are 2+ on the right. Tonsils are 2+ on the left. No tonsillar exudate.   Eyes: Pupils are equal, round, and reactive to light. EOM and lids are normal. Right conjunctiva is not injected. Right conjunctiva has no hemorrhage. Left conjunctiva is injected. Left conjunctiva has no hemorrhage. No scleral icterus.   Slit lamp exam:       The left eye shows no corneal abrasion, no corneal flare, no corneal ulcer, no foreign body, no hyphema, no hypopyon and no fluorescein uptake.   Neck: Trachea normal, full passive range of motion without pain and phonation normal. Neck supple. No neck rigidity. No edema and no erythema present.   Cardiovascular: Normal rate, regular rhythm, normal heart sounds, intact distal pulses and normal pulses.   Pulses:       Radial pulses are 2+ on the right side, and 2+ on the left side.   Pulmonary/Chest: Effort normal and breath sounds normal. No respiratory distress. She has no decreased breath sounds. She has no rhonchi.   Musculoskeletal: Normal range of motion. She exhibits no edema or deformity.   Neurological: She is alert and oriented to person, place, and time. She exhibits normal muscle tone. Coordination and gait normal.   Skin: Skin is warm, dry, intact, not diaphoretic and not pale. Capillary refill takes less than 2 seconds.   Psychiatric: She has a normal mood and affect. Her speech is normal and behavior is normal. Judgment and thought content normal. Cognition and memory are normal.    Nursing note and vitals reviewed.        Assessment:       1. Conjunctivitis of left eye, unspecified conjunctivitis type        Plan:         Conjunctivitis of left eye, unspecified conjunctivitis type  -     polymyxin B sulf-trimethoprim (POLYTRIM) 10,000 unit- 1 mg/mL Drop; Place 1 drop into the left eye every 4 (four) hours. for 7 days  Dispense: 10 mL; Refill: 0      Patient Instructions   If you were prescribed a narcotic or controlled medication, do not drive or operate heavy equipment or machinery while taking these medications.  You must understand that you've received an Urgent Care treatment only and that you may be released before all your medical problems are known or treated. You, the patient, will arrange for follow up care as instructed.  Follow up with your PCP or specialty clinic as directed within 2-5 days if not improved or as needed.  You can call [phone] to schedule an appointment with the appropriate provider.  If your condition worsens we recommend that you receive another evaluation at the emergency room immediately or contact your primary medical clinics after hours call service to discuss your concerns.  Please return here or go to the Emergency Department for any concerns or worsening of condition.      Conjunctivitis, Nonspecific    The membrane that covers the white part of your eye (the conjunctiva) is inflamed. Inflammation happens when your body responds to an injury, allergic reaction, infection, or illness. Symptoms of inflammation in the eye may include redness, irritation, itching, swelling, or burning. These symptoms should go away within the next 24 hours. Conjunctivitis may be related to a particle that was in your eye. If so, it may wash out with your tears or irrigation treatment. Being exposed to liquid chemicals or fumes may also cause this reaction.   Home care  · Apply a cold pack (ice in a plastic bag, wrapped in a towel) over the eye for 20 minutes at a time.  This will reduce pain.  · Artificial tears may be prescribed to reduce irritation or redness.  These should be used 3 to 4 times a day.  · You may use acetaminophen or ibuprofen to control pain, unless another medicine was prescribed.(Note: If you have chronic liver or kidney disease, or if you have ever had a stomach ulcer or gastrointestinal bleeding, talk with your healthcare provider before using these medicines.)  Follow-up care  Follow up with your healthcare provider, or as advised.  When to seek medical advice  Call your healthcare provider right away if any of these occur:  · Increased eyelid swelling  · Increased eye pain  · Increased redness or drainage from the eye  · Increased blurry vision or increased sensitivity to light  · Failure of normal vision to return within 24 to 48 hours  Date Last Reviewed: 6/14/2015  © 8272-4551 [employer] [address] [place] [address], [place] [address]. All rights reserved. This information is not intended as a substitute for professional medical care. Always follow your healthcare professional's instructions.

## 2020-05-26 NOTE — PATIENT INSTRUCTIONS
If you were prescribed a narcotic or controlled medication, do not drive or operate heavy equipment or machinery while taking these medications.  You must understand that you've received an Urgent Care treatment only and that you may be released before all your medical problems are known or treated. You, the patient, will arrange for follow up care as instructed.  Follow up with your PCP or specialty clinic as directed within 2-5 days if not improved or as needed.  You can call [phone] to schedule an appointment with the appropriate provider.  If your condition worsens we recommend that you receive another evaluation at the emergency room immediately or contact your primary medical clinics after hours call service to discuss your concerns.  Please return here or go to the Emergency Department for any concerns or worsening of condition.      Conjunctivitis, Nonspecific    The membrane that covers the white part of your eye (the conjunctiva) is inflamed. Inflammation happens when your body responds to an injury, allergic reaction, infection, or illness. Symptoms of inflammation in the eye may include redness, irritation, itching, swelling, or burning. These symptoms should go away within the next 24 hours. Conjunctivitis may be related to a particle that was in your eye. If so, it may wash out with your tears or irrigation treatment. Being exposed to liquid chemicals or fumes may also cause this reaction.   Home care  · Apply a cold pack (ice in a plastic bag, wrapped in a towel) over the eye for 20 minutes at a time. This will reduce pain.  · Artificial tears may be prescribed to reduce irritation or redness.  These should be used 3 to 4 times a day.  · You may use acetaminophen or ibuprofen to control pain, unless another medicine was prescribed.(Note: If you have chronic liver or kidney disease, or if you have ever had a stomach ulcer or gastrointestinal bleeding, talk with your healthcare provider before  using these medicines.)  Follow-up care  Follow up with your healthcare provider, or as advised.  When to seek medical advice  Call your healthcare provider right away if any of these occur:  · Increased eyelid swelling  · Increased eye pain  · Increased redness or drainage from the eye  · Increased blurry vision or increased sensitivity to light  · Failure of normal vision to return within 24 to 48 hours  Date Last Reviewed: 6/14/2015  © 9298-0559 [employer]. [address] [place] [address], [place] [address]. All rights reserved. This information is not intended as a substitute for professional medical care. Always follow your healthcare professional's instructions.

## 2020-06-02 ENCOUNTER — PATIENT OUTREACH (OUTPATIENT)
Dept: ADMINISTRATIVE | Facility: OTHER | Age: 70
End: 2020-06-02

## 2020-06-02 NOTE — PROGRESS NOTES
Chart reviewed.   Immunizations: updated  Orders placed: n/a  Upcoming appts to satisfy AGUTAM topics: Mammogram 7/02

## 2020-06-04 ENCOUNTER — OFFICE VISIT (OUTPATIENT)
Dept: DERMATOLOGY | Facility: CLINIC | Age: 70
End: 2020-06-04
Payer: MEDICARE

## 2020-06-04 DIAGNOSIS — D22.9 MULTIPLE BENIGN NEVI: ICD-10-CM

## 2020-06-04 DIAGNOSIS — D18.01 HEMANGIOMA OF SKIN: ICD-10-CM

## 2020-06-04 DIAGNOSIS — L82.1 SEBORRHEIC KERATOSES: ICD-10-CM

## 2020-06-04 DIAGNOSIS — L81.4 SOLAR LENTIGO: ICD-10-CM

## 2020-06-04 DIAGNOSIS — L30.9 DERMATITIS: ICD-10-CM

## 2020-06-04 DIAGNOSIS — Z85.828 HISTORY OF SKIN CANCER: Primary | ICD-10-CM

## 2020-06-04 PROCEDURE — 1159F MED LIST DOCD IN RCRD: CPT | Mod: HCNC,S$GLB,, | Performed by: PATHOLOGY

## 2020-06-04 PROCEDURE — 99214 OFFICE O/P EST MOD 30 MIN: CPT | Mod: HCNC,S$GLB,, | Performed by: PATHOLOGY

## 2020-06-04 PROCEDURE — 99214 PR OFFICE/OUTPT VISIT, EST, LEVL IV, 30-39 MIN: ICD-10-PCS | Mod: HCNC,S$GLB,, | Performed by: PATHOLOGY

## 2020-06-04 PROCEDURE — 1101F PT FALLS ASSESS-DOCD LE1/YR: CPT | Mod: HCNC,CPTII,S$GLB, | Performed by: PATHOLOGY

## 2020-06-04 PROCEDURE — 1101F PR PT FALLS ASSESS DOC 0-1 FALLS W/OUT INJ PAST YR: ICD-10-PCS | Mod: HCNC,CPTII,S$GLB, | Performed by: PATHOLOGY

## 2020-06-04 PROCEDURE — 99999 PR PBB SHADOW E&M-EST. PATIENT-LVL II: ICD-10-PCS | Mod: PBBFAC,HCNC,, | Performed by: PATHOLOGY

## 2020-06-04 PROCEDURE — 1126F AMNT PAIN NOTED NONE PRSNT: CPT | Mod: HCNC,S$GLB,, | Performed by: PATHOLOGY

## 2020-06-04 PROCEDURE — 99999 PR PBB SHADOW E&M-EST. PATIENT-LVL II: CPT | Mod: PBBFAC,HCNC,, | Performed by: PATHOLOGY

## 2020-06-04 PROCEDURE — 1159F PR MEDICATION LIST DOCUMENTED IN MEDICAL RECORD: ICD-10-PCS | Mod: HCNC,S$GLB,, | Performed by: PATHOLOGY

## 2020-06-04 PROCEDURE — 1126F PR PAIN SEVERITY QUANTIFIED, NO PAIN PRESENT: ICD-10-PCS | Mod: HCNC,S$GLB,, | Performed by: PATHOLOGY

## 2020-06-04 RX ORDER — MOMETASONE FUROATE 1 MG/ML
SOLUTION TOPICAL DAILY
Qty: 60 ML | Refills: 3 | Status: SHIPPED | OUTPATIENT
Start: 2020-06-04 | End: 2020-11-12

## 2020-06-04 NOTE — PROGRESS NOTES
Subjective:       Patient ID:  Dia Ovalles is a 69 y.o. female who presents for   Chief Complaint   Patient presents with    Skin Check     TBSE     HPI  Pt present today for TBSE, denies any area of concern    69 year old female established patient with history of NMSC to right arm. Diagnosed and treated in approximately 2012 with no recurrence.       Review of Systems   Constitutional: Negative for fever, chills, weight loss, weight gain, fatigue, night sweats and malaise.   Skin: Positive for daily sunscreen use, activity-related sunscreen use and wears hat. Negative for itching, rash and recent sunburn.   Hematologic/Lymphatic: Bruises/bleeds easily ( on meloxicam).        Objective:    Physical Exam   Constitutional: She appears well-developed and well-nourished. No distress.   Neurological: She is alert and oriented to person, place, and time. She is not disoriented.   Psychiatric: She has a normal mood and affect.   Skin:   Areas Examined (abnormalities noted in diagram):   Scalp / Hair Palpated and Inspected  Head / Face Inspection Performed  Neck Inspection Performed  Chest / Axilla Inspection Performed  Abdomen Inspection Performed  Genitals / Buttocks / Groin Inspection Performed  Back Inspection Performed  RUE Inspected  LUE Inspection Performed  RLE Inspected  LLE Inspection Performed  Nails and Digits Inspection Performed                   Diagram Legend     Erythematous scaling macule/papule c/w actinic keratosis       Vascular papule c/w angioma      Pigmented verrucoid papule/plaque c/w seborrheic keratosis      Yellow umbilicated papule c/w sebaceous hyperplasia      Irregularly shaped tan macule c/w lentigo     1-2 mm smooth white papules consistent with Milia      Movable subcutaneous cyst with punctum c/w epidermal inclusion cyst      Subcutaneous movable cyst c/w pilar cyst      Firm pink to brown papule c/w dermatofibroma      Pedunculated fleshy papule(s) c/w skin tag(s)      Evenly  pigmented macule c/w junctional nevus     Mildly variegated pigmented, slightly irregular-bordered macule c/w mildly atypical nevus      Flesh colored to evenly pigmented papule c/w intradermal nevus       Pink pearly papule/plaque c/w basal cell carcinoma      Erythematous hyperkeratotic cursted plaque c/w SCC      Surgical scar with no sign of skin cancer recurrence      Open and closed comedones      Inflammatory papules and pustules      Verrucoid papule consistent consistent with wart     Erythematous eczematous patches and plaques     Dystrophic onycholytic nail with subungual debris c/w onychomycosis     Umbilicated papule    Erythematous-base heme-crusted tan verrucoid plaque consistent with inflamed seborrheic keratosis     Erythematous Silvery Scaling Plaque c/w Psoriasis     See annotation      Assessment / Plan:        History of skin cancer - Area of previous NMSC examined. Site well healed with no signs of recurrence.    Total body skin examination performed today including at least 12 points as noted in physical examination. No lesions suspicious for malignancy noted.      Solar lentigo - This is a benign hyperpigmented sun induced lesion. Daily sun protection will reduce the number of new lesions. Treatment of these benign lesions are considered cosmetic.      Seborrheic keratoses - These are benign inherited growths without a malignant potential. Reassurance given to patient. No treatment is necessary.       Multiple benign nevi - Patient with several benign appearing nevi. Instructed patient to observe lesion(s) for changes and follow up in clinic if changes are noted.       Hemangioma of skin - This is a benign vascular lesion. Reassurance given. No treatment required.       Dermatitis - posterior scalp  -     mometasone (ELOCON) 0.1 % solution; Apply topically once daily. To affected areas of scalp  Dispense: 60 mL; Refill: 3             No follow-ups on file.

## 2020-07-02 ENCOUNTER — HOSPITAL ENCOUNTER (OUTPATIENT)
Dept: RADIOLOGY | Facility: OTHER | Age: 70
Discharge: HOME | End: 2020-07-02
Attending: FAMILY MEDICINE
Payer: MEDICARE

## 2020-07-02 DIAGNOSIS — Z12.31 VISIT FOR SCREENING MAMMOGRAM: ICD-10-CM

## 2020-07-02 PROCEDURE — 77067 SCR MAMMO BI INCL CAD: CPT | Mod: TC,HCNC

## 2020-07-02 PROCEDURE — 77067 SCR MAMMO BI INCL CAD: CPT | Mod: 26,HCNC,, | Performed by: RADIOLOGY

## 2020-07-02 PROCEDURE — 77067 MAMMO DIGITAL SCREENING BILAT WITH TOMOSYNTHESIS_CAD: ICD-10-PCS | Mod: 26,HCNC,, | Performed by: RADIOLOGY

## 2020-07-02 PROCEDURE — 77063 BREAST TOMOSYNTHESIS BI: CPT | Mod: 26,HCNC,, | Performed by: RADIOLOGY

## 2020-07-02 PROCEDURE — 77063 MAMMO DIGITAL SCREENING BILAT WITH TOMOSYNTHESIS_CAD: ICD-10-PCS | Mod: 26,HCNC,, | Performed by: RADIOLOGY

## 2020-07-16 ENCOUNTER — OFFICE VISIT (OUTPATIENT)
Dept: OBSTETRICS AND GYNECOLOGY | Facility: CLINIC | Age: 70
End: 2020-07-16
Payer: MEDICARE

## 2020-07-16 VITALS
DIASTOLIC BLOOD PRESSURE: 76 MMHG | WEIGHT: 138.13 LBS | BODY MASS INDEX: 20.93 KG/M2 | SYSTOLIC BLOOD PRESSURE: 122 MMHG | HEIGHT: 68 IN

## 2020-07-16 DIAGNOSIS — Z01.419 ENCOUNTER FOR GYNECOLOGICAL EXAMINATION WITHOUT ABNORMAL FINDING: Primary | ICD-10-CM

## 2020-07-16 DIAGNOSIS — N39.41 URGE INCONTINENCE: ICD-10-CM

## 2020-07-16 DIAGNOSIS — N95.2 VAGINAL ATROPHY: ICD-10-CM

## 2020-07-16 PROCEDURE — G0101 PR CA SCREEN;PELVIC/BREAST EXAM: ICD-10-PCS | Mod: HCNC,S$GLB,, | Performed by: OBSTETRICS & GYNECOLOGY

## 2020-07-16 PROCEDURE — G0101 CA SCREEN;PELVIC/BREAST EXAM: HCPCS | Mod: HCNC,S$GLB,, | Performed by: OBSTETRICS & GYNECOLOGY

## 2020-07-16 PROCEDURE — 99999 PR PBB SHADOW E&M-EST. PATIENT-LVL IV: ICD-10-PCS | Mod: PBBFAC,HCNC,, | Performed by: OBSTETRICS & GYNECOLOGY

## 2020-07-16 PROCEDURE — 99999 PR PBB SHADOW E&M-EST. PATIENT-LVL IV: CPT | Mod: PBBFAC,HCNC,, | Performed by: OBSTETRICS & GYNECOLOGY

## 2020-07-16 RX ORDER — OXYBUTYNIN CHLORIDE 10 MG/1
10 TABLET, EXTENDED RELEASE ORAL DAILY
Qty: 30 TABLET | Refills: 2 | Status: SHIPPED | OUTPATIENT
Start: 2020-07-16 | End: 2020-10-10

## 2020-07-16 RX ORDER — PRASTERONE 6.5 MG/1
6.5 INSERT VAGINAL DAILY
Qty: 30 EACH | Refills: 12 | Status: SHIPPED | OUTPATIENT
Start: 2020-07-16 | End: 2020-08-13

## 2020-07-16 NOTE — PROGRESS NOTES
"CC: Well woman exam    Dia Ovalles is a 69 y.o. female  presents for a well woman exam.  She has dyspareunia and difficulty with penetration with a new partner from last year.  She still has urge sx with ditropan 5 mg. Will increase dose    Past Medical History:   Diagnosis Date    Breast cyst     Cancer     SCC x 2 on arms    OA (osteoarthritis) of shoulder 3/13/2019    OAB (overactive bladder) 3/8/2017    Osteoporosis 3/8/2017       Past Surgical History:   Procedure Laterality Date    ASD REPAIR      open heart surgery    BREAST BIOPSY       SECTION      x2    KNEE SURGERY      meniscal repair    SKIN CANCER EXCISION         OB History    Para Term  AB Living   2 2 2     2   SAB TAB Ectopic Multiple Live Births                  # Outcome Date GA Lbr Jimmie/2nd Weight Sex Delivery Anes PTL Lv   2 Term            1 Term                Family History   Problem Relation Age of Onset    Stroke Mother     Hypertension Mother     Asthma Mother     Osteoporosis Mother     Cancer Father         lung cancer    Heart disease Maternal Grandfather     Stroke Maternal Grandfather     Breast cancer Neg Hx     Colon cancer Neg Hx     Ovarian cancer Neg Hx        Social History     Tobacco Use    Smoking status: Never Smoker    Smokeless tobacco: Never Used   Substance Use Topics    Alcohol use: Yes     Comment: social    Drug use: Never       /76   Ht 5' 8" (1.727 m)   Wt 62.6 kg (138 lb 1.9 oz)   LMP  (LMP Unknown)   BMI 21.00 kg/m²     ROS:  GENERAL: Denies weight gain or weight loss. Feeling well overall.   SKIN: Denies rash or lesions.   HEAD: Denies head injury or headache.   NODES: Denies enlarged lymph nodes.   CHEST: Denies chest pain or shortness of breath.   CARDIOVASCULAR: Denies palpitations or left sided chest pain.   ABDOMEN: No abdominal pain, constipation, diarrhea, nausea, vomiting or rectal bleeding.   URINARY: No frequency, dysuria, hematuria, or " burning on urination.  REPRODUCTIVE: See HPI.   BREASTS: The patient performs breast self-examination and denies pain, lumps, or nipple discharge.   HEMATOLOGIC: No easy bruisability or excessive bleeding.  MUSCULOSKELETAL: Denies joint pain or swelling.   NEUROLOGIC: Denies syncope or weakness.   PSYCHIATRIC: Denies depression, anxiety or mood swings.    Physical Exam:    APPEARANCE: Well nourished, well developed, in no acute distress.  AFFECT: WNL, alert and oriented x 3  SKIN: No acne or hirsutism  NECK: Neck symmetric without masses or thyromegaly  NODES: No inguinal, cervical, axillary, or femoral lymph node enlargement  CHEST: Good respiratory effect  ABDOMEN: Soft.  No tenderness or masses.  No hepatosplenomegaly.  No hernias.  BREASTS: Symmetrical, no skin changes or visible lesions.  No palpable masses, nipple discharge bilaterally.  PELVIC: atrophic external genitalia without lesions.  Normal hair distribution.  Adequate perineal body, normal urethral meatus.  Vagina dry and well rugated without lesions or discharge.  BUT she is able to insert the speculum with no difficulty.  Cervix pink, without lesions, discharge or tenderness.  No significant cystocele or rectocele.  Bimanual exam shows uterus to be normal size, regular, mobile and nontender.  Adnexa without masses or tenderness.    EXTREMITIES: No edema.    ASSESSMENT AND PLAN  1. Encounter for gynecological examination without abnormal finding     2. Vaginal atrophy  prasterone, dhea, (INTRAROSA) 6.5 mg Inst   3. Urge incontinence  oxybutynin (DITROPAN XL) 10 MG 24 hr tablet       Patient was counseled today on A.C.S. Pap guidelines and recommendations for yearly pelvic exams, mammograms and monthly self breast exams; to see her PCP for other health maintenance.     No follow-ups on file.

## 2020-07-19 ENCOUNTER — OFFICE VISIT (OUTPATIENT)
Dept: URGENT CARE | Facility: CLINIC | Age: 70
End: 2020-07-19
Payer: MEDICARE

## 2020-07-19 VITALS
SYSTOLIC BLOOD PRESSURE: 122 MMHG | BODY MASS INDEX: 20.61 KG/M2 | HEART RATE: 78 BPM | RESPIRATION RATE: 19 BRPM | WEIGHT: 136 LBS | TEMPERATURE: 97 F | OXYGEN SATURATION: 98 % | DIASTOLIC BLOOD PRESSURE: 80 MMHG | HEIGHT: 68 IN

## 2020-07-19 DIAGNOSIS — Z20.822 EXPOSURE TO COVID-19 VIRUS: ICD-10-CM

## 2020-07-19 DIAGNOSIS — R05.9 COUGH: Primary | ICD-10-CM

## 2020-07-19 PROCEDURE — 99214 PR OFFICE/OUTPT VISIT, EST, LEVL IV, 30-39 MIN: ICD-10-PCS | Mod: S$GLB,,, | Performed by: PHYSICIAN ASSISTANT

## 2020-07-19 PROCEDURE — 99214 OFFICE O/P EST MOD 30 MIN: CPT | Mod: S$GLB,,, | Performed by: PHYSICIAN ASSISTANT

## 2020-07-19 PROCEDURE — U0003 INFECTIOUS AGENT DETECTION BY NUCLEIC ACID (DNA OR RNA); SEVERE ACUTE RESPIRATORY SYNDROME CORONAVIRUS 2 (SARS-COV-2) (CORONAVIRUS DISEASE [COVID-19]), AMPLIFIED PROBE TECHNIQUE, MAKING USE OF HIGH THROUGHPUT TECHNOLOGIES AS DESCRIBED BY CMS-2020-01-R: HCPCS | Mod: HCNC

## 2020-07-19 RX ORDER — BENZONATATE 100 MG/1
100 CAPSULE ORAL EVERY 6 HOURS PRN
Qty: 30 CAPSULE | Refills: 0 | Status: SHIPPED | OUTPATIENT
Start: 2020-07-19 | End: 2020-07-26

## 2020-07-19 NOTE — PATIENT INSTRUCTIONS
If your condition worsens or fails to improve we recommend that you receive another evaluation at the ER immediately or contact your PCP to discuss your concerns or return here. You must understand that you've received an urgent care treatment only and that you may be released before all your medical problems are known or treated. You the patient will arrange for follouwp care as instructed.     You can used cough medication prescribed as directed as needed for cough.    Rest and fluids are important.     Below are suggestions for symptomatic relief:              -Salt water gargles to soothe throat pain.              -Chloroseptic spray also helps to numb throat pain.              -Warm face compresses to help with facial sinus pain/pressure.              -Vicks vapor rub at night.              -Simple foods like chicken noodle soup.    -  Tylenol or ibuprofen can also be used as directed for pain unless you have an allergy to them or medical condition such as stomach ulcers, kidney or liver disease or blood thinners etc for which you should not be taking these type of medications.       Instructions for Patients with Confirmed or Suspected COVID-19    If you are awaiting your test result, you will either be called or it will be released to the patient portal.  If you have any questions about your test, please visit [email or web address] or call our COVID-19 information line at [phone].      Preventing the Spread of Coronavirus Disease 2019 (COVID-19) in Homes and Residential Communities -- Patients     Prevention steps for people with confirmed or suspected COVID-19 (including persons under investigation) who do not need to be hospitalized and people with confirmed COVID-19 who were hospitalized and determined to be medically stable to go home.      Stay home except to get medical care.    Separate yourself from other people and animals in your home.    Call ahead before visiting your doctor.     Wear a face mask.    Cover your coughs and sneezes.    Clean your hands often.    Avoid sharing personal household items.    Clean all high-touch surfaces every day.    Monitor your symptoms. Seek prompt medical attention if your illness is worsening (e.g., difficulty breathing). Before seeking care, call your healthcare provider.    If you have a medical emergency and must call 911, notify the dispatcher that you have or are being evaluated for COVID-19. If possible, put on a face mask before emergency medical services arrive.    Use the following symptom-based strategy to return to normal activity following a suspected or confirmed case of COVID-19. Continue isolation until:   o At least 3 days (72 hours) have passed since recovery defined as resolution of fever without the use of fever-reducing medications and improvement in respiratory symptoms (e.g. cough, shortness of breath), and   o At least 10 days have passed since symptoms first appeared.     Precautions for household members, intimate partners and caregivers in a non-healthcare setting of a patient with symptomatic laboratory-confirmed COVID-19 or a patient under investigation.     Household members, intimate partners and caregivers in a non-healthcare setting may have close contact with a person with symptomatic, laboratory-confirmed COVID-19 or a person under investigation. Close contacts should monitor their health; they should call their healthcare provider right away if they develop symptoms suggestive of COVID-19 (e.g., fever, cough, shortness of breath). Close contacts should also follow these recommendations:     · Stay home for the duration of the time recommended by healthcare provider, except to get medical care. Separate yourself from other people and animals in the home.  · Monitor the patients symptoms. If the patient is getting sicker, call his or her healthcare provider. If the patient has a medical emergency and you need to  call 911, notify the dispatch personnel that the patient has or is being evaluated for COVID-19.   · Wear a facemask when around other people such as sharing a room or vehicle and before entering a healthcare provider's office.  · Cover coughs and sneezes with a tissue. Throw used tissues in a lined trash can immediately and wash hands.  · Clean hands often with soap and water for at least 20 seconds or with an alcohol-based hand , rubbing hands together until they feel dry. Avoid touching your eyes, nose, and mouth with unwashed hands.  · Clean all high-touch; surfaces every day, including counters, tabletops, doorknobs, bathroom fixtures, toilets, phones, keyboards, tablets, bedside tables, etc. Use a household cleaning spray or wipe according to label instructions.  · Avoid sharing personal household items such as dishes, drinking glasses, cups, towels, bedding, etc. After these items are used, they should be washed thoroughly with soap and water.  · Use the following symptom-based strategy to return to normal activity following a suspected or confirmed case of COVID-19. Continue isolation until:   · At least 3 days (72 hours) have passed since recovery defined as resolution of fever without the use of fever-reducing medications and improvement in respiratory symptoms (e.g. cough, shortness of breath), and   · At least 10 days have passed since symptoms first appeared.

## 2020-07-19 NOTE — PROGRESS NOTES
"Subjective:       Patient ID: Dia Ovalles is a 69 y.o. female.    Vitals:  height is 5' 8" (1.727 m) and weight is 61.7 kg (136 lb). Her temperature is 97.3 °F (36.3 °C). Her blood pressure is 122/80 and her pulse is 78. Her respiration is 19 and oxygen saturation is 98%.     Chief Complaint: Cough    Mrs. Ovalles is a 68yo female who presents to the urgent care for evaluation with c/o mild dry cough (mainly at night) that began 2 days ago. States she is concerned because she was around a friend that tested positive for COVID-19. Denies fever, chills, body aches, SOB, difficutly breathing or swallowing, CP, abdominal pain, N/VDD, rash. States she has not yet taken any medications for symptoms.     Cough  This is a new problem. The current episode started in the past 7 days. The problem has been unchanged. The problem occurs every few hours. Pertinent negatives include no chills, ear pain, eye redness, fever, hemoptysis, myalgias, rash, sore throat, shortness of breath or wheezing.       Constitution: Negative for chills, sweating, fatigue and fever.   HENT: Negative for ear pain, congestion, sinus pain, sinus pressure, sore throat and voice change.    Neck: Negative for painful lymph nodes.   Eyes: Negative for eye redness.   Respiratory: Positive for cough. Negative for chest tightness, sputum production, bloody sputum, COPD, shortness of breath, stridor, wheezing and asthma.    Gastrointestinal: Negative for nausea and vomiting.   Musculoskeletal: Negative for muscle ache.   Skin: Negative for rash.   Allergic/Immunologic: Negative for seasonal allergies and asthma.   Hematologic/Lymphatic: Negative for swollen lymph nodes.       Objective:      Physical Exam   Constitutional: She is oriented to person, place, and time. She appears well-developed. She is cooperative.  Non-toxic appearance. She does not appear ill. No distress.      Comments:Patient is sitting pleasantly on exam table in no acute distress. " Nontoxic appearing.    HENT:   Head: Normocephalic and atraumatic.   Ears:   Right Ear: Hearing, tympanic membrane, external ear and ear canal normal.   Left Ear: Hearing, tympanic membrane, external ear and ear canal normal.   Nose: Rhinorrhea present. No mucosal edema, nasal deformity or congestion. No epistaxis. Right sinus exhibits no maxillary sinus tenderness and no frontal sinus tenderness. Left sinus exhibits no maxillary sinus tenderness and no frontal sinus tenderness.   Mouth/Throat: Uvula is midline and mucous membranes are normal. No trismus in the jaw. Normal dentition. No uvula swelling. Posterior oropharyngeal erythema and cobblestoning present. No oropharyngeal exudate or posterior oropharyngeal edema. No tonsillar exudate.   Eyes: Pupils are equal, round, and reactive to light. Conjunctivae and lids are normal. No scleral icterus.   Neck: Trachea normal, normal range of motion, full passive range of motion without pain and phonation normal. Neck supple. No neck rigidity. No edema and no erythema present.   Cardiovascular: Normal rate, regular rhythm, normal heart sounds and normal pulses.   Pulmonary/Chest: Effort normal and breath sounds normal. No stridor. No respiratory distress. She has no decreased breath sounds. She has no wheezes. She has no rhonchi. She has no rales. She exhibits no tenderness.   Abdominal: Normal appearance.   Musculoskeletal: Normal range of motion.         General: No deformity.   Lymphadenopathy:     She has no cervical adenopathy.   Neurological: She is alert and oriented to person, place, and time. She exhibits normal muscle tone. Coordination normal.   Skin: Skin is warm, dry, intact, not diaphoretic and not pale. Psychiatric: Her speech is normal and behavior is normal. Mood, judgment and thought content normal.   Nursing note and vitals reviewed.        COVID-19 testing completed today. Advised on symptomatic care. Educated on COVID-19 and COVID-19 precautions.  Advised on return/follow-up precautions. Advised on ER precautions. Answered all patient questions. Patient verbalized understanding and voiced agreement with current treatment plan.      Assessment:       1. Cough    2. Exposure to Covid-19 Virus        Plan:         Cough  -     COVID-19 Routine Screening  -     benzonatate (TESSALON PERLES) 100 MG capsule; Take 1 capsule (100 mg total) by mouth every 6 (six) hours as needed.  Dispense: 30 capsule; Refill: 0    Exposure to Covid-19 Virus  -     COVID-19 Routine Screening      Patient Instructions       If your condition worsens or fails to improve we recommend that you receive another evaluation at the ER immediately or contact your PCP to discuss your concerns or return here. You must understand that you've received an urgent care treatment only and that you may be released before all your medical problems are known or treated. You the patient will arrange for follouwp care as instructed.     You can used cough medication prescribed as directed as needed for cough.    Rest and fluids are important.     Below are suggestions for symptomatic relief:              -Salt water gargles to soothe throat pain.              -Chloroseptic spray also helps to numb throat pain.              -Warm face compresses to help with facial sinus pain/pressure.              -Vicks vapor rub at night.              -Simple foods like chicken noodle soup.    -  Tylenol or ibuprofen can also be used as directed for pain unless you have an allergy to them or medical condition such as stomach ulcers, kidney or liver disease or blood thinners etc for which you should not be taking these type of medications.       Instructions for Patients with Confirmed or Suspected COVID-19    If you are awaiting your test result, you will either be called or it will be released to the patient portal.  If you have any questions about your test, please visit [email or web address] or call our COVID-19  information line at [phone].      Preventing the Spread of Coronavirus Disease 2019 (COVID-19) in Homes and Residential Communities -- Patients     Prevention steps for people with confirmed or suspected COVID-19 (including persons under investigation) who do not need to be hospitalized and people with confirmed COVID-19 who were hospitalized and determined to be medically stable to go home.      Stay home except to get medical care.    Separate yourself from other people and animals in your home.    Call ahead before visiting your doctor.    Wear a face mask.    Cover your coughs and sneezes.    Clean your hands often.    Avoid sharing personal household items.    Clean all high-touch surfaces every day.    Monitor your symptoms. Seek prompt medical attention if your illness is worsening (e.g., difficulty breathing). Before seeking care, call your healthcare provider.    If you have a medical emergency and must call 911, notify the dispatcher that you have or are being evaluated for COVID-19. If possible, put on a face mask before emergency medical services arrive.    Use the following symptom-based strategy to return to normal activity following a suspected or confirmed case of COVID-19. Continue isolation until:   o At least 3 days (72 hours) have passed since recovery defined as resolution of fever without the use of fever-reducing medications and improvement in respiratory symptoms (e.g. cough, shortness of breath), and   o At least 10 days have passed since symptoms first appeared.     Precautions for household members, intimate partners and caregivers in a non-healthcare setting of a patient with symptomatic laboratory-confirmed COVID-19 or a patient under investigation.     Household members, intimate partners and caregivers in a non-healthcare setting may have close contact with a person with symptomatic, laboratory-confirmed COVID-19 or a person under investigation. Close contacts  should monitor their health; they should call their healthcare provider right away if they develop symptoms suggestive of COVID-19 (e.g., fever, cough, shortness of breath). Close contacts should also follow these recommendations:     · Stay home for the duration of the time recommended by healthcare provider, except to get medical care. Separate yourself from other people and animals in the home.  · Monitor the patients symptoms. If the patient is getting sicker, call his or her healthcare provider. If the patient has a medical emergency and you need to call 911, notify the dispatch personnel that the patient has or is being evaluated for COVID-19.   · Wear a facemask when around other people such as sharing a room or vehicle and before entering a healthcare provider's office.  · Cover coughs and sneezes with a tissue. Throw used tissues in a lined trash can immediately and wash hands.  · Clean hands often with soap and water for at least 20 seconds or with an alcohol-based hand , rubbing hands together until they feel dry. Avoid touching your eyes, nose, and mouth with unwashed hands.  · Clean all high-touch; surfaces every day, including counters, tabletops, doorknobs, bathroom fixtures, toilets, phones, keyboards, tablets, bedside tables, etc. Use a household cleaning spray or wipe according to label instructions.  · Avoid sharing personal household items such as dishes, drinking glasses, cups, towels, bedding, etc. After these items are used, they should be washed thoroughly with soap and water.  · Use the following symptom-based strategy to return to normal activity following a suspected or confirmed case of COVID-19. Continue isolation until:   · At least 3 days (72 hours) have passed since recovery defined as resolution of fever without the use of fever-reducing medications and improvement in respiratory symptoms (e.g. cough, shortness of breath), and   · At least 10 days have passed since  symptoms first appeared.

## 2020-07-20 LAB — SARS-COV-2 RNA RESP QL NAA+PROBE: NOT DETECTED

## 2020-10-14 ENCOUNTER — OFFICE VISIT (OUTPATIENT)
Dept: URGENT CARE | Facility: CLINIC | Age: 70
End: 2020-10-14
Payer: MEDICARE

## 2020-10-14 VITALS
SYSTOLIC BLOOD PRESSURE: 98 MMHG | DIASTOLIC BLOOD PRESSURE: 69 MMHG | BODY MASS INDEX: 19.55 KG/M2 | RESPIRATION RATE: 18 BRPM | HEIGHT: 68 IN | WEIGHT: 129 LBS | OXYGEN SATURATION: 96 % | TEMPERATURE: 97 F | HEART RATE: 76 BPM

## 2020-10-14 DIAGNOSIS — R05.9 COUGH: Primary | ICD-10-CM

## 2020-10-14 DIAGNOSIS — Z20.822 EXPOSURE TO COVID-19 VIRUS: ICD-10-CM

## 2020-10-14 LAB
CTP QC/QA: YES
SARS-COV-2 RDRP RESP QL NAA+PROBE: NEGATIVE

## 2020-10-14 PROCEDURE — U0002 COVID-19 LAB TEST NON-CDC: HCPCS | Mod: QW,S$GLB,, | Performed by: NURSE PRACTITIONER

## 2020-10-14 PROCEDURE — U0002: ICD-10-PCS | Mod: QW,S$GLB,, | Performed by: NURSE PRACTITIONER

## 2020-10-14 PROCEDURE — 99213 OFFICE O/P EST LOW 20 MIN: CPT | Mod: S$GLB,,, | Performed by: NURSE PRACTITIONER

## 2020-10-14 PROCEDURE — 99213 PR OFFICE/OUTPT VISIT, EST, LEVL III, 20-29 MIN: ICD-10-PCS | Mod: S$GLB,,, | Performed by: NURSE PRACTITIONER

## 2020-10-14 NOTE — PROGRESS NOTES
"Subjective:       Patient ID: Dia Ovalles is a 70 y.o. female.    Vitals:  height is 5' 8" (1.727 m) and weight is 58.5 kg (129 lb). Her temperature is 97 °F (36.1 °C). Her blood pressure is 98/69 and her pulse is 76. Her respiration is 18 and oxygen saturation is 96%.     Chief Complaint: Cough    70 year old female c/o cough that started 2 days ago.  She was exposed to COVID.  Denies SOB, chest pain, loss of taste/smell, or fever.    Cough  This is a new problem. The current episode started in the past 7 days. The problem has been gradually worsening. The problem occurs every few minutes. The cough is non-productive. Associated symptoms include nasal congestion. Pertinent negatives include no chest pain, chills, ear congestion, ear pain, eye redness, fever, headaches, heartburn, hemoptysis, myalgias, postnasal drip, rash, rhinorrhea, sore throat, shortness of breath, sweats, weight loss or wheezing. The symptoms are aggravated by lying down. She has tried nothing for the symptoms. There is no history of asthma, bronchiectasis, bronchitis, COPD, emphysema, environmental allergies or pneumonia.       Constitution: Negative for chills, sweating, fatigue and fever.   HENT: Negative for ear pain, congestion, postnasal drip, sinus pain, sinus pressure, sore throat and voice change.    Neck: Negative for painful lymph nodes.   Cardiovascular: Negative for chest pain.   Eyes: Negative for eye redness.   Respiratory: Positive for cough. Negative for chest tightness, sputum production, bloody sputum, COPD, shortness of breath, stridor, wheezing and asthma.    Gastrointestinal: Negative for nausea, vomiting and heartburn.   Musculoskeletal: Negative for muscle ache.   Skin: Negative for rash.   Allergic/Immunologic: Negative for environmental allergies, seasonal allergies and asthma.   Neurological: Negative for headaches.   Hematologic/Lymphatic: Negative for swollen lymph nodes.       Objective:      Physical Exam "   Constitutional: She is oriented to person, place, and time. She appears well-developed.  Non-toxic appearance. She does not appear ill. No distress.      Comments:ambulatory   HENT:   Head: Normocephalic and atraumatic.   Nose: Nose normal. No congestion.   Mouth/Throat: Mucous membranes are moist.   Cardiovascular: Normal heart sounds and normal pulses.   Pulmonary/Chest: Effort normal and breath sounds normal. No stridor. No respiratory distress. She has no wheezes.   Abdominal: Normal appearance.   Neurological: She is alert and oriented to person, place, and time.   Skin: Skin is not diaphoretic. Psychiatric: Her behavior is normal.   Nursing note and vitals reviewed.     Results for orders placed or performed in visit on 10/14/20   POCT COVID-19 Rapid Screening   Result Value Ref Range    POC Rapid COVID Negative Negative     Acceptable Yes                Assessment:       1. Cough    2. Exposure to COVID-19 virus        Plan:         Cough    Exposure to COVID-19 virus  -     POCT COVID-19 Rapid Screening

## 2020-11-12 ENCOUNTER — OFFICE VISIT (OUTPATIENT)
Dept: ENDOCRINOLOGY | Facility: CLINIC | Age: 70
End: 2020-11-12
Payer: MEDICARE

## 2020-11-12 VITALS
DIASTOLIC BLOOD PRESSURE: 74 MMHG | OXYGEN SATURATION: 97 % | BODY MASS INDEX: 19.76 KG/M2 | RESPIRATION RATE: 16 BRPM | WEIGHT: 130.38 LBS | HEIGHT: 68 IN | SYSTOLIC BLOOD PRESSURE: 106 MMHG | HEART RATE: 76 BPM

## 2020-11-12 DIAGNOSIS — M81.0 OSTEOPOROSIS, UNSPECIFIED OSTEOPOROSIS TYPE, UNSPECIFIED PATHOLOGICAL FRACTURE PRESENCE: ICD-10-CM

## 2020-11-12 PROCEDURE — 1157F ADVNC CARE PLAN IN RCRD: CPT | Mod: HCNC,S$GLB,, | Performed by: INTERNAL MEDICINE

## 2020-11-12 PROCEDURE — 3008F PR BODY MASS INDEX (BMI) DOCUMENTED: ICD-10-PCS | Mod: HCNC,CPTII,S$GLB, | Performed by: INTERNAL MEDICINE

## 2020-11-12 PROCEDURE — 3288F FALL RISK ASSESSMENT DOCD: CPT | Mod: HCNC,CPTII,S$GLB, | Performed by: INTERNAL MEDICINE

## 2020-11-12 PROCEDURE — 99999 PR PBB SHADOW E&M-EST. PATIENT-LVL IV: CPT | Mod: PBBFAC,HCNC,, | Performed by: INTERNAL MEDICINE

## 2020-11-12 PROCEDURE — 3288F PR FALLS RISK ASSESSMENT DOCUMENTED: ICD-10-PCS | Mod: HCNC,CPTII,S$GLB, | Performed by: INTERNAL MEDICINE

## 2020-11-12 PROCEDURE — 99213 PR OFFICE/OUTPT VISIT, EST, LEVL III, 20-29 MIN: ICD-10-PCS | Mod: HCNC,S$GLB,, | Performed by: INTERNAL MEDICINE

## 2020-11-12 PROCEDURE — 99213 OFFICE O/P EST LOW 20 MIN: CPT | Mod: HCNC,S$GLB,, | Performed by: INTERNAL MEDICINE

## 2020-11-12 PROCEDURE — 99999 PR PBB SHADOW E&M-EST. PATIENT-LVL IV: ICD-10-PCS | Mod: PBBFAC,HCNC,, | Performed by: INTERNAL MEDICINE

## 2020-11-12 PROCEDURE — 1101F PR PT FALLS ASSESS DOC 0-1 FALLS W/OUT INJ PAST YR: ICD-10-PCS | Mod: HCNC,CPTII,S$GLB, | Performed by: INTERNAL MEDICINE

## 2020-11-12 PROCEDURE — 1159F PR MEDICATION LIST DOCUMENTED IN MEDICAL RECORD: ICD-10-PCS | Mod: HCNC,S$GLB,, | Performed by: INTERNAL MEDICINE

## 2020-11-12 PROCEDURE — 1126F PR PAIN SEVERITY QUANTIFIED, NO PAIN PRESENT: ICD-10-PCS | Mod: HCNC,S$GLB,, | Performed by: INTERNAL MEDICINE

## 2020-11-12 PROCEDURE — 3008F BODY MASS INDEX DOCD: CPT | Mod: HCNC,CPTII,S$GLB, | Performed by: INTERNAL MEDICINE

## 2020-11-12 PROCEDURE — 1126F AMNT PAIN NOTED NONE PRSNT: CPT | Mod: HCNC,S$GLB,, | Performed by: INTERNAL MEDICINE

## 2020-11-12 PROCEDURE — 1101F PT FALLS ASSESS-DOCD LE1/YR: CPT | Mod: HCNC,CPTII,S$GLB, | Performed by: INTERNAL MEDICINE

## 2020-11-12 PROCEDURE — 1159F MED LIST DOCD IN RCRD: CPT | Mod: HCNC,S$GLB,, | Performed by: INTERNAL MEDICINE

## 2020-11-12 PROCEDURE — 1157F PR ADVANCE CARE PLAN OR EQUIV PRESENT IN MEDICAL RECORD: ICD-10-PCS | Mod: HCNC,S$GLB,, | Performed by: INTERNAL MEDICINE

## 2020-11-12 RX ORDER — RALOXIFENE HYDROCHLORIDE 60 MG/1
60 TABLET, FILM COATED ORAL DAILY
Qty: 90 TABLET | Refills: 3 | Status: SHIPPED | OUTPATIENT
Start: 2020-11-12 | End: 2022-02-22 | Stop reason: SDUPTHER

## 2020-11-12 RX ORDER — PRASTERONE 6.5 MG/1
INSERT VAGINAL
COMMUNITY
Start: 2020-11-02 | End: 2021-08-20

## 2020-11-12 NOTE — ASSESSMENT & PLAN NOTE
Risk factors:   Postmenopausal, family history of osteoporosis, two years of MHT  Current treatment: raloxifene daily   Balance is good  Needs to restart yoga for balance, continue walking     Vitamin D --> has cut back a little bit.
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
## 2020-12-08 DIAGNOSIS — M17.11 PRIMARY OSTEOARTHRITIS OF RIGHT KNEE: Primary | ICD-10-CM

## 2020-12-23 ENCOUNTER — OFFICE VISIT (OUTPATIENT)
Dept: ORTHOPEDICS | Facility: CLINIC | Age: 70
End: 2020-12-23
Payer: MEDICARE

## 2020-12-23 DIAGNOSIS — M17.11 PRIMARY OSTEOARTHRITIS OF RIGHT KNEE: ICD-10-CM

## 2020-12-23 PROCEDURE — 20610 PR DRAIN/INJECT LARGE JOINT/BURSA: ICD-10-PCS | Mod: HCNC,RT,S$GLB, | Performed by: NURSE PRACTITIONER

## 2020-12-23 PROCEDURE — 3288F FALL RISK ASSESSMENT DOCD: CPT | Mod: HCNC,CPTII,S$GLB, | Performed by: NURSE PRACTITIONER

## 2020-12-23 PROCEDURE — 99499 UNLISTED E&M SERVICE: CPT | Mod: HCNC,S$GLB,, | Performed by: NURSE PRACTITIONER

## 2020-12-23 PROCEDURE — 3288F PR FALLS RISK ASSESSMENT DOCUMENTED: ICD-10-PCS | Mod: HCNC,CPTII,S$GLB, | Performed by: NURSE PRACTITIONER

## 2020-12-23 PROCEDURE — 1125F AMNT PAIN NOTED PAIN PRSNT: CPT | Mod: HCNC,S$GLB,, | Performed by: NURSE PRACTITIONER

## 2020-12-23 PROCEDURE — 1101F PR PT FALLS ASSESS DOC 0-1 FALLS W/OUT INJ PAST YR: ICD-10-PCS | Mod: HCNC,CPTII,S$GLB, | Performed by: NURSE PRACTITIONER

## 2020-12-23 PROCEDURE — 99499 NO LOS: ICD-10-PCS | Mod: HCNC,S$GLB,, | Performed by: NURSE PRACTITIONER

## 2020-12-23 PROCEDURE — 1157F ADVNC CARE PLAN IN RCRD: CPT | Mod: HCNC,S$GLB,, | Performed by: NURSE PRACTITIONER

## 2020-12-23 PROCEDURE — 20610 DRAIN/INJ JOINT/BURSA W/O US: CPT | Mod: HCNC,RT,S$GLB, | Performed by: NURSE PRACTITIONER

## 2020-12-23 PROCEDURE — 99999 PR PBB SHADOW E&M-EST. PATIENT-LVL III: CPT | Mod: PBBFAC,HCNC,, | Performed by: NURSE PRACTITIONER

## 2020-12-23 PROCEDURE — 99999 PR PBB SHADOW E&M-EST. PATIENT-LVL III: ICD-10-PCS | Mod: PBBFAC,HCNC,, | Performed by: NURSE PRACTITIONER

## 2020-12-23 PROCEDURE — 1101F PT FALLS ASSESS-DOCD LE1/YR: CPT | Mod: HCNC,CPTII,S$GLB, | Performed by: NURSE PRACTITIONER

## 2020-12-23 PROCEDURE — 1157F PR ADVANCE CARE PLAN OR EQUIV PRESENT IN MEDICAL RECORD: ICD-10-PCS | Mod: HCNC,S$GLB,, | Performed by: NURSE PRACTITIONER

## 2020-12-23 PROCEDURE — 1125F PR PAIN SEVERITY QUANTIFIED, PAIN PRESENT: ICD-10-PCS | Mod: HCNC,S$GLB,, | Performed by: NURSE PRACTITIONER

## 2020-12-23 NOTE — PROGRESS NOTES
Dia Ovalles presents to clinic today for the first right knee orthovisc injection.    Exam demonstrates the no effusion in the  right knee, and the skin is intact.    Diagnosis: primary osteoarthritis right knee    After time out was performed and patient ID, side, and site were verified, the  right  knee was sterilly prepped in the standard fashion.  A 22-gauge needle was introduced into right knee joint from an danielle-lateral site without complication and knee was then injected with 2 ml of orthovisc.  Sterile dressing was applied.  The patient was instructed to resume activities as tolerated and to call with any problems.     We will see Dai Ovalles back next week for the second injection.

## 2020-12-30 ENCOUNTER — OFFICE VISIT (OUTPATIENT)
Dept: ORTHOPEDICS | Facility: CLINIC | Age: 70
End: 2020-12-30
Payer: MEDICARE

## 2020-12-30 DIAGNOSIS — M17.11 PRIMARY OSTEOARTHRITIS OF RIGHT KNEE: ICD-10-CM

## 2020-12-30 PROCEDURE — 1101F PR PT FALLS ASSESS DOC 0-1 FALLS W/OUT INJ PAST YR: ICD-10-PCS | Mod: HCNC,CPTII,S$GLB, | Performed by: NURSE PRACTITIONER

## 2020-12-30 PROCEDURE — 1157F ADVNC CARE PLAN IN RCRD: CPT | Mod: HCNC,S$GLB,, | Performed by: NURSE PRACTITIONER

## 2020-12-30 PROCEDURE — 20610 DRAIN/INJ JOINT/BURSA W/O US: CPT | Mod: HCNC,RT,S$GLB, | Performed by: NURSE PRACTITIONER

## 2020-12-30 PROCEDURE — 99999 PR PBB SHADOW E&M-EST. PATIENT-LVL III: ICD-10-PCS | Mod: PBBFAC,HCNC,, | Performed by: NURSE PRACTITIONER

## 2020-12-30 PROCEDURE — 3288F FALL RISK ASSESSMENT DOCD: CPT | Mod: HCNC,CPTII,S$GLB, | Performed by: NURSE PRACTITIONER

## 2020-12-30 PROCEDURE — 99499 UNLISTED E&M SERVICE: CPT | Mod: HCNC,S$GLB,, | Performed by: NURSE PRACTITIONER

## 2020-12-30 PROCEDURE — 1101F PT FALLS ASSESS-DOCD LE1/YR: CPT | Mod: HCNC,CPTII,S$GLB, | Performed by: NURSE PRACTITIONER

## 2020-12-30 PROCEDURE — 99499 NO LOS: ICD-10-PCS | Mod: HCNC,S$GLB,, | Performed by: NURSE PRACTITIONER

## 2020-12-30 PROCEDURE — 1157F PR ADVANCE CARE PLAN OR EQUIV PRESENT IN MEDICAL RECORD: ICD-10-PCS | Mod: HCNC,S$GLB,, | Performed by: NURSE PRACTITIONER

## 2020-12-30 PROCEDURE — 1125F PR PAIN SEVERITY QUANTIFIED, PAIN PRESENT: ICD-10-PCS | Mod: HCNC,S$GLB,, | Performed by: NURSE PRACTITIONER

## 2020-12-30 PROCEDURE — 3288F PR FALLS RISK ASSESSMENT DOCUMENTED: ICD-10-PCS | Mod: HCNC,CPTII,S$GLB, | Performed by: NURSE PRACTITIONER

## 2020-12-30 PROCEDURE — 1125F AMNT PAIN NOTED PAIN PRSNT: CPT | Mod: HCNC,S$GLB,, | Performed by: NURSE PRACTITIONER

## 2020-12-30 PROCEDURE — 20610 PR DRAIN/INJECT LARGE JOINT/BURSA: ICD-10-PCS | Mod: HCNC,RT,S$GLB, | Performed by: NURSE PRACTITIONER

## 2020-12-30 PROCEDURE — 99999 PR PBB SHADOW E&M-EST. PATIENT-LVL III: CPT | Mod: PBBFAC,HCNC,, | Performed by: NURSE PRACTITIONER

## 2020-12-30 NOTE — PROGRESS NOTES
Dia Ovalles presents to clinic today for the second right knee orthovisc injection.    Exam demonstrates the no effusion in the  right knee, and the skin is intact.    Diagnosis: primary osteoarthritis right knee    After time out was performed and patient ID, side, and site were verified, the  right  knee was sterilly prepped in the standard fashion.  A 22-gauge needle was introduced into right knee joint from an danielle-lateral site without complication and knee was then injected with 2 ml of orthovisc.  Sterile dressing was applied.  The patient was instructed to resume activities as tolerated and to call with any problems.     We will see Dia Ovalles back next week for the third injection.

## 2021-01-06 ENCOUNTER — OFFICE VISIT (OUTPATIENT)
Dept: ORTHOPEDICS | Facility: CLINIC | Age: 71
End: 2021-01-06
Payer: MEDICARE

## 2021-01-06 DIAGNOSIS — M17.11 PRIMARY OSTEOARTHRITIS OF RIGHT KNEE: ICD-10-CM

## 2021-01-06 PROCEDURE — 99499 UNLISTED E&M SERVICE: CPT | Mod: HCNC,S$GLB,, | Performed by: NURSE PRACTITIONER

## 2021-01-06 PROCEDURE — 1101F PT FALLS ASSESS-DOCD LE1/YR: CPT | Mod: HCNC,CPTII,S$GLB, | Performed by: NURSE PRACTITIONER

## 2021-01-06 PROCEDURE — 20610 DRAIN/INJ JOINT/BURSA W/O US: CPT | Mod: HCNC,RT,S$GLB, | Performed by: NURSE PRACTITIONER

## 2021-01-06 PROCEDURE — 1126F PR PAIN SEVERITY QUANTIFIED, NO PAIN PRESENT: ICD-10-PCS | Mod: HCNC,S$GLB,, | Performed by: NURSE PRACTITIONER

## 2021-01-06 PROCEDURE — 99999 PR PBB SHADOW E&M-EST. PATIENT-LVL III: CPT | Mod: PBBFAC,HCNC,, | Performed by: NURSE PRACTITIONER

## 2021-01-06 PROCEDURE — 1126F AMNT PAIN NOTED NONE PRSNT: CPT | Mod: HCNC,S$GLB,, | Performed by: NURSE PRACTITIONER

## 2021-01-06 PROCEDURE — 3288F PR FALLS RISK ASSESSMENT DOCUMENTED: ICD-10-PCS | Mod: HCNC,CPTII,S$GLB, | Performed by: NURSE PRACTITIONER

## 2021-01-06 PROCEDURE — 99499 NO LOS: ICD-10-PCS | Mod: HCNC,S$GLB,, | Performed by: NURSE PRACTITIONER

## 2021-01-06 PROCEDURE — 1157F PR ADVANCE CARE PLAN OR EQUIV PRESENT IN MEDICAL RECORD: ICD-10-PCS | Mod: HCNC,S$GLB,, | Performed by: NURSE PRACTITIONER

## 2021-01-06 PROCEDURE — 20610 PR DRAIN/INJECT LARGE JOINT/BURSA: ICD-10-PCS | Mod: HCNC,RT,S$GLB, | Performed by: NURSE PRACTITIONER

## 2021-01-06 PROCEDURE — 1101F PR PT FALLS ASSESS DOC 0-1 FALLS W/OUT INJ PAST YR: ICD-10-PCS | Mod: HCNC,CPTII,S$GLB, | Performed by: NURSE PRACTITIONER

## 2021-01-06 PROCEDURE — 3288F FALL RISK ASSESSMENT DOCD: CPT | Mod: HCNC,CPTII,S$GLB, | Performed by: NURSE PRACTITIONER

## 2021-01-06 PROCEDURE — 1157F ADVNC CARE PLAN IN RCRD: CPT | Mod: HCNC,S$GLB,, | Performed by: NURSE PRACTITIONER

## 2021-01-06 PROCEDURE — 99999 PR PBB SHADOW E&M-EST. PATIENT-LVL III: ICD-10-PCS | Mod: PBBFAC,HCNC,, | Performed by: NURSE PRACTITIONER

## 2021-01-19 ENCOUNTER — IMMUNIZATION (OUTPATIENT)
Dept: INTERNAL MEDICINE | Facility: CLINIC | Age: 71
End: 2021-01-19
Payer: MEDICARE

## 2021-01-19 DIAGNOSIS — Z23 NEED FOR VACCINATION: Primary | ICD-10-CM

## 2021-01-19 PROCEDURE — 91300 COVID-19, MRNA, LNP-S, PF, 30 MCG/0.3 ML DOSE VACCINE: CPT | Mod: PBBFAC | Performed by: INTERNAL MEDICINE

## 2021-02-09 ENCOUNTER — IMMUNIZATION (OUTPATIENT)
Dept: INTERNAL MEDICINE | Facility: CLINIC | Age: 71
End: 2021-02-09
Payer: MEDICARE

## 2021-02-09 DIAGNOSIS — Z23 NEED FOR VACCINATION: Primary | ICD-10-CM

## 2021-02-09 PROCEDURE — 91300 COVID-19, MRNA, LNP-S, PF, 30 MCG/0.3 ML DOSE VACCINE: CPT | Mod: PBBFAC | Performed by: INTERNAL MEDICINE

## 2021-02-09 PROCEDURE — 0002A COVID-19, MRNA, LNP-S, PF, 30 MCG/0.3 ML DOSE VACCINE: CPT | Mod: PBBFAC | Performed by: INTERNAL MEDICINE

## 2021-02-22 ENCOUNTER — TELEPHONE (OUTPATIENT)
Dept: OPHTHALMOLOGY | Facility: CLINIC | Age: 71
End: 2021-02-22

## 2021-02-22 ENCOUNTER — OFFICE VISIT (OUTPATIENT)
Dept: OPTOMETRY | Facility: CLINIC | Age: 71
End: 2021-02-22
Payer: MEDICARE

## 2021-02-22 DIAGNOSIS — H04.123 DRY EYE SYNDROME OF BOTH EYES: ICD-10-CM

## 2021-02-22 DIAGNOSIS — H52.4 HYPEROPIA WITH ASTIGMATISM AND PRESBYOPIA, BILATERAL: ICD-10-CM

## 2021-02-22 DIAGNOSIS — H52.203 HYPEROPIA WITH ASTIGMATISM AND PRESBYOPIA, BILATERAL: ICD-10-CM

## 2021-02-22 DIAGNOSIS — H52.03 HYPEROPIA WITH ASTIGMATISM AND PRESBYOPIA, BILATERAL: ICD-10-CM

## 2021-02-22 DIAGNOSIS — H25.13 NS (NUCLEAR SCLEROSIS), BILATERAL: Primary | ICD-10-CM

## 2021-02-22 DIAGNOSIS — H02.9 LESION OF EYELID: ICD-10-CM

## 2021-02-22 DIAGNOSIS — H43.813 POSTERIOR VITREOUS DETACHMENT OF BOTH EYES: ICD-10-CM

## 2021-02-22 PROCEDURE — 92015 PR REFRACTION: ICD-10-PCS | Mod: HCNC,S$GLB,, | Performed by: OPTOMETRIST

## 2021-02-22 PROCEDURE — 1126F PR PAIN SEVERITY QUANTIFIED, NO PAIN PRESENT: ICD-10-PCS | Mod: HCNC,S$GLB,, | Performed by: OPTOMETRIST

## 2021-02-22 PROCEDURE — 1157F ADVNC CARE PLAN IN RCRD: CPT | Mod: HCNC,S$GLB,, | Performed by: OPTOMETRIST

## 2021-02-22 PROCEDURE — 99999 PR PBB SHADOW E&M-EST. PATIENT-LVL III: CPT | Mod: PBBFAC,HCNC,, | Performed by: OPTOMETRIST

## 2021-02-22 PROCEDURE — 99999 PR PBB SHADOW E&M-EST. PATIENT-LVL III: ICD-10-PCS | Mod: PBBFAC,HCNC,, | Performed by: OPTOMETRIST

## 2021-02-22 PROCEDURE — 92014 COMPRE OPH EXAM EST PT 1/>: CPT | Mod: HCNC,S$GLB,, | Performed by: OPTOMETRIST

## 2021-02-22 PROCEDURE — 92014 PR EYE EXAM, EST PATIENT,COMPREHESV: ICD-10-PCS | Mod: HCNC,S$GLB,, | Performed by: OPTOMETRIST

## 2021-02-22 PROCEDURE — 92015 DETERMINE REFRACTIVE STATE: CPT | Mod: HCNC,S$GLB,, | Performed by: OPTOMETRIST

## 2021-02-22 PROCEDURE — 1157F PR ADVANCE CARE PLAN OR EQUIV PRESENT IN MEDICAL RECORD: ICD-10-PCS | Mod: HCNC,S$GLB,, | Performed by: OPTOMETRIST

## 2021-02-22 PROCEDURE — 1126F AMNT PAIN NOTED NONE PRSNT: CPT | Mod: HCNC,S$GLB,, | Performed by: OPTOMETRIST

## 2021-03-15 ENCOUNTER — HOSPITAL ENCOUNTER (OUTPATIENT)
Dept: RADIOLOGY | Facility: CLINIC | Age: 71
Discharge: HOME | End: 2021-03-15
Attending: INTERNAL MEDICINE
Payer: MEDICARE

## 2021-03-15 DIAGNOSIS — M81.0 OSTEOPOROSIS, UNSPECIFIED OSTEOPOROSIS TYPE, UNSPECIFIED PATHOLOGICAL FRACTURE PRESENCE: ICD-10-CM

## 2021-03-15 PROCEDURE — 77080 DEXA BONE DENSITY SPINE HIP: ICD-10-PCS | Mod: 26,,, | Performed by: INTERNAL MEDICINE

## 2021-03-15 PROCEDURE — 77080 DXA BONE DENSITY AXIAL: CPT | Mod: TC

## 2021-03-15 PROCEDURE — 77080 DXA BONE DENSITY AXIAL: CPT | Mod: 26,,, | Performed by: INTERNAL MEDICINE

## 2021-03-18 ENCOUNTER — PATIENT MESSAGE (OUTPATIENT)
Dept: RESEARCH | Facility: HOSPITAL | Age: 71
End: 2021-03-18

## 2021-03-23 ENCOUNTER — OFFICE VISIT (OUTPATIENT)
Dept: OPHTHALMOLOGY | Facility: CLINIC | Age: 71
End: 2021-03-23
Attending: OPHTHALMOLOGY
Payer: MEDICARE

## 2021-03-23 DIAGNOSIS — H25.13 NS (NUCLEAR SCLEROSIS), BILATERAL: ICD-10-CM

## 2021-03-23 PROCEDURE — 1101F PT FALLS ASSESS-DOCD LE1/YR: CPT | Mod: CPTII,S$GLB,, | Performed by: OPHTHALMOLOGY

## 2021-03-23 PROCEDURE — 1157F PR ADVANCE CARE PLAN OR EQUIV PRESENT IN MEDICAL RECORD: ICD-10-PCS | Mod: S$GLB,,, | Performed by: OPHTHALMOLOGY

## 2021-03-23 PROCEDURE — 1159F MED LIST DOCD IN RCRD: CPT | Mod: S$GLB,,, | Performed by: OPHTHALMOLOGY

## 2021-03-23 PROCEDURE — 1157F ADVNC CARE PLAN IN RCRD: CPT | Mod: S$GLB,,, | Performed by: OPHTHALMOLOGY

## 2021-03-23 PROCEDURE — 1126F PR PAIN SEVERITY QUANTIFIED, NO PAIN PRESENT: ICD-10-PCS | Mod: S$GLB,,, | Performed by: OPHTHALMOLOGY

## 2021-03-23 PROCEDURE — 99204 PR OFFICE/OUTPT VISIT, NEW, LEVL IV, 45-59 MIN: ICD-10-PCS | Mod: S$GLB,,, | Performed by: OPHTHALMOLOGY

## 2021-03-23 PROCEDURE — 99999 PR PBB SHADOW E&M-EST. PATIENT-LVL III: ICD-10-PCS | Mod: PBBFAC,,, | Performed by: OPHTHALMOLOGY

## 2021-03-23 PROCEDURE — 3288F PR FALLS RISK ASSESSMENT DOCUMENTED: ICD-10-PCS | Mod: CPTII,S$GLB,, | Performed by: OPHTHALMOLOGY

## 2021-03-23 PROCEDURE — 99999 PR PBB SHADOW E&M-EST. PATIENT-LVL III: CPT | Mod: PBBFAC,,, | Performed by: OPHTHALMOLOGY

## 2021-03-23 PROCEDURE — 1126F AMNT PAIN NOTED NONE PRSNT: CPT | Mod: S$GLB,,, | Performed by: OPHTHALMOLOGY

## 2021-03-23 PROCEDURE — 99204 OFFICE O/P NEW MOD 45 MIN: CPT | Mod: S$GLB,,, | Performed by: OPHTHALMOLOGY

## 2021-03-23 PROCEDURE — 1159F PR MEDICATION LIST DOCUMENTED IN MEDICAL RECORD: ICD-10-PCS | Mod: S$GLB,,, | Performed by: OPHTHALMOLOGY

## 2021-03-23 PROCEDURE — 3288F FALL RISK ASSESSMENT DOCD: CPT | Mod: CPTII,S$GLB,, | Performed by: OPHTHALMOLOGY

## 2021-03-23 PROCEDURE — 1101F PR PT FALLS ASSESS DOC 0-1 FALLS W/OUT INJ PAST YR: ICD-10-PCS | Mod: CPTII,S$GLB,, | Performed by: OPHTHALMOLOGY

## 2021-03-23 PROCEDURE — 92136 IOL MASTER - OU - BOTH EYES: ICD-10-PCS | Mod: RT,S$GLB,, | Performed by: OPHTHALMOLOGY

## 2021-03-23 PROCEDURE — 92136 OPHTHALMIC BIOMETRY: CPT | Mod: RT,S$GLB,, | Performed by: OPHTHALMOLOGY

## 2021-03-23 RX ORDER — PREDNISOLONE ACETATE-GATIFLOXACIN-BROMFENAC .75; 5; 1 MG/ML; MG/ML; MG/ML
1 SUSPENSION/ DROPS OPHTHALMIC 3 TIMES DAILY
Qty: 5 ML | Refills: 3 | Status: SHIPPED | OUTPATIENT
Start: 2021-03-23 | End: 2021-06-30 | Stop reason: ALTCHOICE

## 2021-03-23 RX ORDER — TETRACAINE HYDROCHLORIDE 5 MG/ML
1 SOLUTION OPHTHALMIC
Status: CANCELLED | OUTPATIENT
Start: 2021-03-23

## 2021-03-23 RX ORDER — MOXIFLOXACIN 5 MG/ML
1 SOLUTION/ DROPS OPHTHALMIC
Status: CANCELLED | OUTPATIENT
Start: 2021-03-23

## 2021-03-23 RX ORDER — SODIUM CHLORIDE 0.9 % (FLUSH) 0.9 %
10 SYRINGE (ML) INJECTION
Status: DISCONTINUED | OUTPATIENT
Start: 2021-03-23 | End: 2021-06-30

## 2021-03-23 RX ORDER — PHENYLEPHRINE HYDROCHLORIDE 25 MG/ML
1 SOLUTION/ DROPS OPHTHALMIC
Status: CANCELLED | OUTPATIENT
Start: 2021-03-23

## 2021-03-23 RX ORDER — TROPICAMIDE 10 MG/ML
1 SOLUTION/ DROPS OPHTHALMIC
Status: CANCELLED | OUTPATIENT
Start: 2021-03-23

## 2021-03-26 ENCOUNTER — PATIENT MESSAGE (OUTPATIENT)
Dept: RESEARCH | Facility: HOSPITAL | Age: 71
End: 2021-03-26

## 2021-03-29 ENCOUNTER — OFFICE VISIT (OUTPATIENT)
Dept: INTERNAL MEDICINE | Facility: CLINIC | Age: 71
End: 2021-03-29
Payer: MEDICARE

## 2021-03-29 VITALS
WEIGHT: 134.13 LBS | DIASTOLIC BLOOD PRESSURE: 70 MMHG | OXYGEN SATURATION: 98 % | HEIGHT: 68 IN | HEART RATE: 79 BPM | SYSTOLIC BLOOD PRESSURE: 110 MMHG | BODY MASS INDEX: 20.33 KG/M2

## 2021-03-29 DIAGNOSIS — M17.11 OSTEOARTHRITIS OF RIGHT KNEE, UNSPECIFIED OSTEOARTHRITIS TYPE: ICD-10-CM

## 2021-03-29 DIAGNOSIS — M19.019 OSTEOARTHRITIS OF SHOULDER, UNSPECIFIED LATERALITY, UNSPECIFIED OSTEOARTHRITIS TYPE: ICD-10-CM

## 2021-03-29 DIAGNOSIS — M81.0 OSTEOPOROSIS, UNSPECIFIED OSTEOPOROSIS TYPE, UNSPECIFIED PATHOLOGICAL FRACTURE PRESENCE: Primary | ICD-10-CM

## 2021-03-29 DIAGNOSIS — Z13.220 ENCOUNTER FOR LIPID SCREENING FOR CARDIOVASCULAR DISEASE: ICD-10-CM

## 2021-03-29 DIAGNOSIS — Z13.6 ENCOUNTER FOR LIPID SCREENING FOR CARDIOVASCULAR DISEASE: ICD-10-CM

## 2021-03-29 DIAGNOSIS — N32.81 OAB (OVERACTIVE BLADDER): ICD-10-CM

## 2021-03-29 DIAGNOSIS — Z79.899 OTHER LONG TERM (CURRENT) DRUG THERAPY: ICD-10-CM

## 2021-03-29 PROCEDURE — 1126F AMNT PAIN NOTED NONE PRSNT: CPT | Mod: S$GLB,,, | Performed by: FAMILY MEDICINE

## 2021-03-29 PROCEDURE — 99999 PR PBB SHADOW E&M-EST. PATIENT-LVL III: ICD-10-PCS | Mod: PBBFAC,,, | Performed by: FAMILY MEDICINE

## 2021-03-29 PROCEDURE — 99214 PR OFFICE/OUTPT VISIT, EST, LEVL IV, 30-39 MIN: ICD-10-PCS | Mod: S$GLB,,, | Performed by: FAMILY MEDICINE

## 2021-03-29 PROCEDURE — 99214 OFFICE O/P EST MOD 30 MIN: CPT | Mod: S$GLB,,, | Performed by: FAMILY MEDICINE

## 2021-03-29 PROCEDURE — 1157F PR ADVANCE CARE PLAN OR EQUIV PRESENT IN MEDICAL RECORD: ICD-10-PCS | Mod: S$GLB,,, | Performed by: FAMILY MEDICINE

## 2021-03-29 PROCEDURE — 1159F PR MEDICATION LIST DOCUMENTED IN MEDICAL RECORD: ICD-10-PCS | Mod: S$GLB,,, | Performed by: FAMILY MEDICINE

## 2021-03-29 PROCEDURE — 1157F ADVNC CARE PLAN IN RCRD: CPT | Mod: S$GLB,,, | Performed by: FAMILY MEDICINE

## 2021-03-29 PROCEDURE — 3288F PR FALLS RISK ASSESSMENT DOCUMENTED: ICD-10-PCS | Mod: CPTII,S$GLB,, | Performed by: FAMILY MEDICINE

## 2021-03-29 PROCEDURE — 99999 PR PBB SHADOW E&M-EST. PATIENT-LVL III: CPT | Mod: PBBFAC,,, | Performed by: FAMILY MEDICINE

## 2021-03-29 PROCEDURE — 3008F BODY MASS INDEX DOCD: CPT | Mod: CPTII,S$GLB,, | Performed by: FAMILY MEDICINE

## 2021-03-29 PROCEDURE — 3288F FALL RISK ASSESSMENT DOCD: CPT | Mod: CPTII,S$GLB,, | Performed by: FAMILY MEDICINE

## 2021-03-29 PROCEDURE — 1101F PT FALLS ASSESS-DOCD LE1/YR: CPT | Mod: CPTII,S$GLB,, | Performed by: FAMILY MEDICINE

## 2021-03-29 PROCEDURE — 1101F PR PT FALLS ASSESS DOC 0-1 FALLS W/OUT INJ PAST YR: ICD-10-PCS | Mod: CPTII,S$GLB,, | Performed by: FAMILY MEDICINE

## 2021-03-29 PROCEDURE — 3008F PR BODY MASS INDEX (BMI) DOCUMENTED: ICD-10-PCS | Mod: CPTII,S$GLB,, | Performed by: FAMILY MEDICINE

## 2021-03-29 PROCEDURE — 1159F MED LIST DOCD IN RCRD: CPT | Mod: S$GLB,,, | Performed by: FAMILY MEDICINE

## 2021-03-29 PROCEDURE — 1126F PR PAIN SEVERITY QUANTIFIED, NO PAIN PRESENT: ICD-10-PCS | Mod: S$GLB,,, | Performed by: FAMILY MEDICINE

## 2021-03-29 RX ORDER — DULOXETIN HYDROCHLORIDE 20 MG/1
40 CAPSULE, DELAYED RELEASE ORAL DAILY
Qty: 180 CAPSULE | Refills: 1 | Status: SHIPPED | OUTPATIENT
Start: 2021-03-29 | End: 2021-10-25

## 2021-03-29 RX ORDER — MULTIVITAMIN
1 TABLET ORAL DAILY
COMMUNITY

## 2021-03-30 DIAGNOSIS — H25.11 NUCLEAR SCLEROTIC CATARACT OF RIGHT EYE: Primary | ICD-10-CM

## 2021-04-01 ENCOUNTER — LAB VISIT (OUTPATIENT)
Dept: LAB | Facility: OTHER | Age: 71
End: 2021-04-01
Attending: FAMILY MEDICINE
Payer: MEDICARE

## 2021-04-01 DIAGNOSIS — Z13.6 ENCOUNTER FOR LIPID SCREENING FOR CARDIOVASCULAR DISEASE: ICD-10-CM

## 2021-04-01 DIAGNOSIS — M81.0 OSTEOPOROSIS, UNSPECIFIED OSTEOPOROSIS TYPE, UNSPECIFIED PATHOLOGICAL FRACTURE PRESENCE: ICD-10-CM

## 2021-04-01 DIAGNOSIS — Z79.899 OTHER LONG TERM (CURRENT) DRUG THERAPY: ICD-10-CM

## 2021-04-01 DIAGNOSIS — Z13.220 ENCOUNTER FOR LIPID SCREENING FOR CARDIOVASCULAR DISEASE: ICD-10-CM

## 2021-04-01 LAB
25(OH)D3+25(OH)D2 SERPL-MCNC: 72 NG/ML (ref 30–96)
ALBUMIN SERPL BCP-MCNC: 3.8 G/DL (ref 3.5–5.2)
ALP SERPL-CCNC: 62 U/L (ref 55–135)
ALT SERPL W/O P-5'-P-CCNC: 19 U/L (ref 10–44)
ANION GAP SERPL CALC-SCNC: 10 MMOL/L (ref 8–16)
AST SERPL-CCNC: 25 U/L (ref 10–40)
BASOPHILS # BLD AUTO: 0.05 K/UL (ref 0–0.2)
BASOPHILS NFR BLD: 0.7 % (ref 0–1.9)
BILIRUB SERPL-MCNC: 0.5 MG/DL (ref 0.1–1)
BUN SERPL-MCNC: 17 MG/DL (ref 8–23)
CALCIUM SERPL-MCNC: 9.1 MG/DL (ref 8.7–10.5)
CHLORIDE SERPL-SCNC: 103 MMOL/L (ref 95–110)
CHOLEST SERPL-MCNC: 188 MG/DL (ref 120–199)
CHOLEST/HDLC SERPL: 3 {RATIO} (ref 2–5)
CO2 SERPL-SCNC: 26 MMOL/L (ref 23–29)
CREAT SERPL-MCNC: 0.8 MG/DL (ref 0.5–1.4)
DIFFERENTIAL METHOD: ABNORMAL
EOSINOPHIL # BLD AUTO: 0.1 K/UL (ref 0–0.5)
EOSINOPHIL NFR BLD: 1.8 % (ref 0–8)
ERYTHROCYTE [DISTWIDTH] IN BLOOD BY AUTOMATED COUNT: 12.9 % (ref 11.5–14.5)
EST. GFR  (AFRICAN AMERICAN): >60 ML/MIN/1.73 M^2
EST. GFR  (NON AFRICAN AMERICAN): >60 ML/MIN/1.73 M^2
ESTIMATED AVG GLUCOSE: 103 MG/DL (ref 68–131)
GLUCOSE SERPL-MCNC: 119 MG/DL (ref 70–110)
HBA1C MFR BLD: 5.2 % (ref 4–5.6)
HCT VFR BLD AUTO: 43 % (ref 37–48.5)
HDLC SERPL-MCNC: 63 MG/DL (ref 40–75)
HDLC SERPL: 33.5 % (ref 20–50)
HGB BLD-MCNC: 13.7 G/DL (ref 12–16)
IMM GRANULOCYTES # BLD AUTO: 0.02 K/UL (ref 0–0.04)
IMM GRANULOCYTES NFR BLD AUTO: 0.3 % (ref 0–0.5)
LDLC SERPL CALC-MCNC: 113 MG/DL (ref 63–159)
LYMPHOCYTES # BLD AUTO: 1.1 K/UL (ref 1–4.8)
LYMPHOCYTES NFR BLD: 15.1 % (ref 18–48)
MCH RBC QN AUTO: 28.8 PG (ref 27–31)
MCHC RBC AUTO-ENTMCNC: 31.9 G/DL (ref 32–36)
MCV RBC AUTO: 91 FL (ref 82–98)
MONOCYTES # BLD AUTO: 0.8 K/UL (ref 0.3–1)
MONOCYTES NFR BLD: 10.8 % (ref 4–15)
NEUTROPHILS # BLD AUTO: 5.2 K/UL (ref 1.8–7.7)
NEUTROPHILS NFR BLD: 71.3 % (ref 38–73)
NONHDLC SERPL-MCNC: 125 MG/DL
NRBC BLD-RTO: 0 /100 WBC
PLATELET # BLD AUTO: 218 K/UL (ref 150–450)
PMV BLD AUTO: 10 FL (ref 9.2–12.9)
POTASSIUM SERPL-SCNC: 4.1 MMOL/L (ref 3.5–5.1)
PROT SERPL-MCNC: 7 G/DL (ref 6–8.4)
RBC # BLD AUTO: 4.75 M/UL (ref 4–5.4)
SODIUM SERPL-SCNC: 139 MMOL/L (ref 136–145)
TRIGL SERPL-MCNC: 60 MG/DL (ref 30–150)
TSH SERPL DL<=0.005 MIU/L-ACNC: 2.57 UIU/ML (ref 0.4–4)
WBC # BLD AUTO: 7.22 K/UL (ref 3.9–12.7)

## 2021-04-01 PROCEDURE — 80053 COMPREHEN METABOLIC PANEL: CPT | Performed by: FAMILY MEDICINE

## 2021-04-01 PROCEDURE — 36415 COLL VENOUS BLD VENIPUNCTURE: CPT | Performed by: FAMILY MEDICINE

## 2021-04-01 PROCEDURE — 85025 COMPLETE CBC W/AUTO DIFF WBC: CPT | Performed by: FAMILY MEDICINE

## 2021-04-01 PROCEDURE — 80061 LIPID PANEL: CPT | Performed by: FAMILY MEDICINE

## 2021-04-01 PROCEDURE — 83036 HEMOGLOBIN GLYCOSYLATED A1C: CPT | Performed by: FAMILY MEDICINE

## 2021-04-01 PROCEDURE — 82306 VITAMIN D 25 HYDROXY: CPT | Performed by: FAMILY MEDICINE

## 2021-04-01 PROCEDURE — 84443 ASSAY THYROID STIM HORMONE: CPT | Performed by: FAMILY MEDICINE

## 2021-04-06 ENCOUNTER — TELEPHONE (OUTPATIENT)
Dept: OPHTHALMOLOGY | Facility: CLINIC | Age: 71
End: 2021-04-06

## 2021-04-08 DIAGNOSIS — H25.12 NUCLEAR SCLEROTIC CATARACT OF LEFT EYE: Primary | ICD-10-CM

## 2021-04-16 ENCOUNTER — HOSPITAL ENCOUNTER (OUTPATIENT)
Dept: PREADMISSION TESTING | Facility: OTHER | Age: 71
Discharge: HOME | End: 2021-04-16
Attending: OPHTHALMOLOGY
Payer: MEDICARE

## 2021-04-16 DIAGNOSIS — Z01.818 PRE-OP TESTING: ICD-10-CM

## 2021-04-16 PROCEDURE — U0005 INFEC AGEN DETEC AMPLI PROBE: HCPCS | Performed by: OPHTHALMOLOGY

## 2021-04-16 PROCEDURE — U0003 INFECTIOUS AGENT DETECTION BY NUCLEIC ACID (DNA OR RNA); SEVERE ACUTE RESPIRATORY SYNDROME CORONAVIRUS 2 (SARS-COV-2) (CORONAVIRUS DISEASE [COVID-19]), AMPLIFIED PROBE TECHNIQUE, MAKING USE OF HIGH THROUGHPUT TECHNOLOGIES AS DESCRIBED BY CMS-2020-01-R: HCPCS | Performed by: OPHTHALMOLOGY

## 2021-04-17 LAB — SARS-COV-2 RNA RESP QL NAA+PROBE: NOT DETECTED

## 2021-04-19 ENCOUNTER — HOSPITAL ENCOUNTER (OUTPATIENT)
Facility: OTHER | Age: 71
Discharge: HOME | End: 2021-04-19
Attending: OPHTHALMOLOGY | Admitting: OPHTHALMOLOGY
Payer: MEDICARE

## 2021-04-19 ENCOUNTER — ANESTHESIA (OUTPATIENT)
Dept: SURGERY | Facility: OTHER | Age: 71
End: 2021-04-19
Payer: MEDICARE

## 2021-04-19 ENCOUNTER — ANESTHESIA EVENT (OUTPATIENT)
Dept: SURGERY | Facility: OTHER | Age: 71
End: 2021-04-19
Payer: MEDICARE

## 2021-04-19 VITALS
TEMPERATURE: 98 F | BODY MASS INDEX: 19.55 KG/M2 | OXYGEN SATURATION: 97 % | HEART RATE: 66 BPM | SYSTOLIC BLOOD PRESSURE: 136 MMHG | RESPIRATION RATE: 16 BRPM | HEIGHT: 68 IN | DIASTOLIC BLOOD PRESSURE: 72 MMHG | WEIGHT: 129 LBS

## 2021-04-19 DIAGNOSIS — H25.13 NS (NUCLEAR SCLEROSIS), BILATERAL: ICD-10-CM

## 2021-04-19 PROCEDURE — 66999 UNLISTED PX ANT SEGMENT EYE: CPT | Mod: CSM,,, | Performed by: OPHTHALMOLOGY

## 2021-04-19 PROCEDURE — 36000707: Performed by: OPHTHALMOLOGY

## 2021-04-19 PROCEDURE — 66999 PR FEMTO MFIOL: ICD-10-PCS | Mod: CSM,,, | Performed by: OPHTHALMOLOGY

## 2021-04-19 PROCEDURE — 66984 XCAPSL CTRC RMVL W/O ECP: CPT | Mod: RT,,, | Performed by: OPHTHALMOLOGY

## 2021-04-19 PROCEDURE — 37000009 HC ANESTHESIA EA ADD 15 MINS: Performed by: OPHTHALMOLOGY

## 2021-04-19 PROCEDURE — V2788 PRESBYOPIA-CORRECT FUNCTION: HCPCS | Performed by: OPHTHALMOLOGY

## 2021-04-19 PROCEDURE — 36000706: Performed by: OPHTHALMOLOGY

## 2021-04-19 PROCEDURE — 25000003 PHARM REV CODE 250: Performed by: OPHTHALMOLOGY

## 2021-04-19 PROCEDURE — 63600175 PHARM REV CODE 636 W HCPCS: Performed by: NURSE ANESTHETIST, CERTIFIED REGISTERED

## 2021-04-19 PROCEDURE — 37000008 HC ANESTHESIA 1ST 15 MINUTES: Performed by: OPHTHALMOLOGY

## 2021-04-19 PROCEDURE — 71000015 HC POSTOP RECOV 1ST HR: Performed by: OPHTHALMOLOGY

## 2021-04-19 PROCEDURE — 66984 PR REMOVAL, CATARACT, W/INSRT INTRAOC LENS, W/O ENDO CYCLO: ICD-10-PCS | Mod: RT,,, | Performed by: OPHTHALMOLOGY

## 2021-04-19 DEVICE — IMPLANTABLE DEVICE: Type: IMPLANTABLE DEVICE | Site: EYE | Status: FUNCTIONAL

## 2021-04-19 RX ORDER — LIDOCAINE HYDROCHLORIDE 40 MG/ML
INJECTION, SOLUTION RETROBULBAR
Status: DISCONTINUED | OUTPATIENT
Start: 2021-04-19 | End: 2021-04-19 | Stop reason: HOSPADM

## 2021-04-19 RX ORDER — TETRACAINE HYDROCHLORIDE 5 MG/ML
1 SOLUTION OPHTHALMIC
Status: COMPLETED | OUTPATIENT
Start: 2021-04-19 | End: 2021-04-19

## 2021-04-19 RX ORDER — MOXIFLOXACIN 5 MG/ML
1 SOLUTION/ DROPS OPHTHALMIC
Status: COMPLETED | OUTPATIENT
Start: 2021-04-19 | End: 2021-04-19

## 2021-04-19 RX ORDER — PROPARACAINE HYDROCHLORIDE 5 MG/ML
1 SOLUTION/ DROPS OPHTHALMIC
Status: DISCONTINUED | OUTPATIENT
Start: 2021-04-19 | End: 2021-04-19 | Stop reason: HOSPADM

## 2021-04-19 RX ORDER — ACETAMINOPHEN 325 MG/1
650 TABLET ORAL EVERY 4 HOURS PRN
Status: DISCONTINUED | OUTPATIENT
Start: 2021-04-19 | End: 2021-04-19 | Stop reason: HOSPADM

## 2021-04-19 RX ORDER — PHENYLEPHRINE HYDROCHLORIDE 25 MG/ML
1 SOLUTION/ DROPS OPHTHALMIC
Status: COMPLETED | OUTPATIENT
Start: 2021-04-19 | End: 2021-04-19

## 2021-04-19 RX ORDER — TETRACAINE HYDROCHLORIDE 5 MG/ML
SOLUTION OPHTHALMIC
Status: DISCONTINUED | OUTPATIENT
Start: 2021-04-19 | End: 2021-04-19 | Stop reason: HOSPADM

## 2021-04-19 RX ORDER — TROPICAMIDE 10 MG/ML
1 SOLUTION/ DROPS OPHTHALMIC
Status: COMPLETED | OUTPATIENT
Start: 2021-04-19 | End: 2021-04-19

## 2021-04-19 RX ORDER — MOXIFLOXACIN 5 MG/ML
SOLUTION/ DROPS OPHTHALMIC
Status: DISCONTINUED | OUTPATIENT
Start: 2021-04-19 | End: 2021-04-19 | Stop reason: HOSPADM

## 2021-04-19 RX ORDER — PHENYLEPHRINE HYDROCHLORIDE 100 MG/ML
1 SOLUTION/ DROPS OPHTHALMIC
Status: COMPLETED | OUTPATIENT
Start: 2021-04-19 | End: 2021-04-19

## 2021-04-19 RX ORDER — MIDAZOLAM HYDROCHLORIDE 1 MG/ML
INJECTION INTRAMUSCULAR; INTRAVENOUS
Status: DISCONTINUED | OUTPATIENT
Start: 2021-04-19 | End: 2021-04-19

## 2021-04-19 RX ADMIN — PHENYLEPHRINE HYDROCHLORIDE 1 DROP: 25 SOLUTION/ DROPS OPHTHALMIC at 07:04

## 2021-04-19 RX ADMIN — TROPICAMIDE 1 DROP: 10 SOLUTION/ DROPS OPHTHALMIC at 07:04

## 2021-04-19 RX ADMIN — MIDAZOLAM HYDROCHLORIDE 2 MG: 1 INJECTION, SOLUTION INTRAMUSCULAR; INTRAVENOUS at 08:04

## 2021-04-19 RX ADMIN — MOXIFLOXACIN 1 DROP: 5 SOLUTION/ DROPS OPHTHALMIC at 09:04

## 2021-04-19 RX ADMIN — MOXIFLOXACIN 1 DROP: 5 SOLUTION/ DROPS OPHTHALMIC at 07:04

## 2021-04-19 RX ADMIN — TETRACAINE HYDROCHLORIDE 1 DROP: 5 SOLUTION OPHTHALMIC at 07:04

## 2021-04-19 RX ADMIN — PROPARACAINE HYDROCHLORIDE 1 DROP: 5 SOLUTION/ DROPS OPHTHALMIC at 09:04

## 2021-04-20 ENCOUNTER — OFFICE VISIT (OUTPATIENT)
Dept: OPHTHALMOLOGY | Facility: CLINIC | Age: 71
End: 2021-04-20
Attending: OPHTHALMOLOGY
Payer: MEDICARE

## 2021-04-20 DIAGNOSIS — H25.11 NUCLEAR SCLEROSIS, RIGHT: ICD-10-CM

## 2021-04-20 DIAGNOSIS — Z98.890 POST-OPERATIVE STATE: Primary | ICD-10-CM

## 2021-04-20 PROCEDURE — 1126F PR PAIN SEVERITY QUANTIFIED, NO PAIN PRESENT: ICD-10-PCS | Mod: S$GLB,,, | Performed by: OPHTHALMOLOGY

## 2021-04-20 PROCEDURE — 1157F PR ADVANCE CARE PLAN OR EQUIV PRESENT IN MEDICAL RECORD: ICD-10-PCS | Mod: S$GLB,,, | Performed by: OPHTHALMOLOGY

## 2021-04-20 PROCEDURE — 1157F ADVNC CARE PLAN IN RCRD: CPT | Mod: S$GLB,,, | Performed by: OPHTHALMOLOGY

## 2021-04-20 PROCEDURE — 99024 PR POST-OP FOLLOW-UP VISIT: ICD-10-PCS | Mod: S$GLB,,, | Performed by: OPHTHALMOLOGY

## 2021-04-20 PROCEDURE — 1126F AMNT PAIN NOTED NONE PRSNT: CPT | Mod: S$GLB,,, | Performed by: OPHTHALMOLOGY

## 2021-04-20 PROCEDURE — 99999 PR PBB SHADOW E&M-EST. PATIENT-LVL III: CPT | Mod: PBBFAC,,, | Performed by: OPHTHALMOLOGY

## 2021-04-20 PROCEDURE — 99999 PR PBB SHADOW E&M-EST. PATIENT-LVL III: ICD-10-PCS | Mod: PBBFAC,,, | Performed by: OPHTHALMOLOGY

## 2021-04-20 PROCEDURE — 99024 POSTOP FOLLOW-UP VISIT: CPT | Mod: S$GLB,,, | Performed by: OPHTHALMOLOGY

## 2021-04-27 ENCOUNTER — OFFICE VISIT (OUTPATIENT)
Dept: OPHTHALMOLOGY | Facility: CLINIC | Age: 71
End: 2021-04-27
Attending: OPHTHALMOLOGY
Payer: MEDICARE

## 2021-04-27 DIAGNOSIS — Z98.890 POST-OPERATIVE STATE: ICD-10-CM

## 2021-04-27 DIAGNOSIS — H25.13 NUCLEAR SCLEROSIS, BILATERAL: Primary | ICD-10-CM

## 2021-04-27 PROCEDURE — 1126F AMNT PAIN NOTED NONE PRSNT: CPT | Mod: S$GLB,,, | Performed by: OPHTHALMOLOGY

## 2021-04-27 PROCEDURE — 1157F ADVNC CARE PLAN IN RCRD: CPT | Mod: S$GLB,,, | Performed by: OPHTHALMOLOGY

## 2021-04-27 PROCEDURE — 3288F FALL RISK ASSESSMENT DOCD: CPT | Mod: CPTII,S$GLB,, | Performed by: OPHTHALMOLOGY

## 2021-04-27 PROCEDURE — 99999 PR PBB SHADOW E&M-EST. PATIENT-LVL III: ICD-10-PCS | Mod: PBBFAC,,, | Performed by: OPHTHALMOLOGY

## 2021-04-27 PROCEDURE — 1126F PR PAIN SEVERITY QUANTIFIED, NO PAIN PRESENT: ICD-10-PCS | Mod: S$GLB,,, | Performed by: OPHTHALMOLOGY

## 2021-04-27 PROCEDURE — 99999 PR PBB SHADOW E&M-EST. PATIENT-LVL III: CPT | Mod: PBBFAC,,, | Performed by: OPHTHALMOLOGY

## 2021-04-27 PROCEDURE — 1101F PR PT FALLS ASSESS DOC 0-1 FALLS W/OUT INJ PAST YR: ICD-10-PCS | Mod: CPTII,S$GLB,, | Performed by: OPHTHALMOLOGY

## 2021-04-27 PROCEDURE — 1157F PR ADVANCE CARE PLAN OR EQUIV PRESENT IN MEDICAL RECORD: ICD-10-PCS | Mod: S$GLB,,, | Performed by: OPHTHALMOLOGY

## 2021-04-27 PROCEDURE — 99024 POSTOP FOLLOW-UP VISIT: CPT | Mod: S$GLB,,, | Performed by: OPHTHALMOLOGY

## 2021-04-27 PROCEDURE — 1101F PT FALLS ASSESS-DOCD LE1/YR: CPT | Mod: CPTII,S$GLB,, | Performed by: OPHTHALMOLOGY

## 2021-04-27 PROCEDURE — 99024 PR POST-OP FOLLOW-UP VISIT: ICD-10-PCS | Mod: S$GLB,,, | Performed by: OPHTHALMOLOGY

## 2021-04-27 PROCEDURE — 3288F PR FALLS RISK ASSESSMENT DOCUMENTED: ICD-10-PCS | Mod: CPTII,S$GLB,, | Performed by: OPHTHALMOLOGY

## 2021-04-27 RX ORDER — TROPICAMIDE 10 MG/ML
1 SOLUTION/ DROPS OPHTHALMIC
Status: CANCELLED | OUTPATIENT
Start: 2021-04-27

## 2021-04-27 RX ORDER — TETRACAINE HYDROCHLORIDE 5 MG/ML
1 SOLUTION OPHTHALMIC
Status: CANCELLED | OUTPATIENT
Start: 2021-04-27

## 2021-04-27 RX ORDER — SODIUM CHLORIDE 0.9 % (FLUSH) 0.9 %
10 SYRINGE (ML) INJECTION
Status: DISCONTINUED | OUTPATIENT
Start: 2021-04-27 | End: 2021-06-30

## 2021-04-27 RX ORDER — MOXIFLOXACIN 5 MG/ML
1 SOLUTION/ DROPS OPHTHALMIC
Status: CANCELLED | OUTPATIENT
Start: 2021-04-27

## 2021-04-27 RX ORDER — PHENYLEPHRINE HYDROCHLORIDE 25 MG/ML
1 SOLUTION/ DROPS OPHTHALMIC
Status: CANCELLED | OUTPATIENT
Start: 2021-04-27

## 2021-05-13 ENCOUNTER — TELEPHONE (OUTPATIENT)
Dept: OPHTHALMOLOGY | Facility: CLINIC | Age: 71
End: 2021-05-13

## 2021-05-14 ENCOUNTER — TELEPHONE (OUTPATIENT)
Dept: OPHTHALMOLOGY | Facility: CLINIC | Age: 71
End: 2021-05-14

## 2021-05-14 ENCOUNTER — HOSPITAL ENCOUNTER (OUTPATIENT)
Dept: PREADMISSION TESTING | Facility: OTHER | Age: 71
Discharge: HOME | End: 2021-05-14
Attending: OPHTHALMOLOGY
Payer: MEDICARE

## 2021-05-14 DIAGNOSIS — Z01.818 PRE-OP TESTING: ICD-10-CM

## 2021-05-14 PROCEDURE — U0003 INFECTIOUS AGENT DETECTION BY NUCLEIC ACID (DNA OR RNA); SEVERE ACUTE RESPIRATORY SYNDROME CORONAVIRUS 2 (SARS-COV-2) (CORONAVIRUS DISEASE [COVID-19]), AMPLIFIED PROBE TECHNIQUE, MAKING USE OF HIGH THROUGHPUT TECHNOLOGIES AS DESCRIBED BY CMS-2020-01-R: HCPCS | Performed by: OPHTHALMOLOGY

## 2021-05-14 PROCEDURE — U0005 INFEC AGEN DETEC AMPLI PROBE: HCPCS | Performed by: OPHTHALMOLOGY

## 2021-05-15 LAB — SARS-COV-2 RNA RESP QL NAA+PROBE: NOT DETECTED

## 2021-05-17 ENCOUNTER — ANESTHESIA (OUTPATIENT)
Dept: SURGERY | Facility: OTHER | Age: 71
End: 2021-05-17
Payer: MEDICARE

## 2021-05-17 ENCOUNTER — ANESTHESIA EVENT (OUTPATIENT)
Dept: SURGERY | Facility: OTHER | Age: 71
End: 2021-05-17
Payer: MEDICARE

## 2021-05-17 ENCOUNTER — HOSPITAL ENCOUNTER (OUTPATIENT)
Facility: OTHER | Age: 71
Discharge: HOME | End: 2021-05-17
Attending: OPHTHALMOLOGY | Admitting: OPHTHALMOLOGY
Payer: MEDICARE

## 2021-05-17 VITALS
OXYGEN SATURATION: 97 % | DIASTOLIC BLOOD PRESSURE: 71 MMHG | SYSTOLIC BLOOD PRESSURE: 121 MMHG | TEMPERATURE: 98 F | RESPIRATION RATE: 16 BRPM | HEART RATE: 70 BPM | BODY MASS INDEX: 19.55 KG/M2 | HEIGHT: 68 IN | WEIGHT: 129 LBS

## 2021-05-17 DIAGNOSIS — H25.13 NUCLEAR SCLEROSIS, BILATERAL: ICD-10-CM

## 2021-05-17 PROCEDURE — 66999 UNLISTED PX ANT SEGMENT EYE: CPT | Mod: CSM,LT,, | Performed by: OPHTHALMOLOGY

## 2021-05-17 PROCEDURE — 36000707: Performed by: OPHTHALMOLOGY

## 2021-05-17 PROCEDURE — 66984 PR REMOVAL, CATARACT, W/INSRT INTRAOC LENS, W/O ENDO CYCLO: ICD-10-PCS | Mod: 79,LT,, | Performed by: OPHTHALMOLOGY

## 2021-05-17 PROCEDURE — 71000015 HC POSTOP RECOV 1ST HR: Performed by: OPHTHALMOLOGY

## 2021-05-17 PROCEDURE — V2788 PRESBYOPIA-CORRECT FUNCTION: HCPCS | Performed by: OPHTHALMOLOGY

## 2021-05-17 PROCEDURE — 66984 XCAPSL CTRC RMVL W/O ECP: CPT | Mod: 79,LT,, | Performed by: OPHTHALMOLOGY

## 2021-05-17 PROCEDURE — 37000009 HC ANESTHESIA EA ADD 15 MINS: Performed by: OPHTHALMOLOGY

## 2021-05-17 PROCEDURE — 37000008 HC ANESTHESIA 1ST 15 MINUTES: Performed by: OPHTHALMOLOGY

## 2021-05-17 PROCEDURE — 25000003 PHARM REV CODE 250: Performed by: OPHTHALMOLOGY

## 2021-05-17 PROCEDURE — 63600175 PHARM REV CODE 636 W HCPCS: Performed by: NURSE ANESTHETIST, CERTIFIED REGISTERED

## 2021-05-17 PROCEDURE — 36000706: Performed by: OPHTHALMOLOGY

## 2021-05-17 PROCEDURE — 66999 PR FEMTO MFIOL: ICD-10-PCS | Mod: CSM,LT,, | Performed by: OPHTHALMOLOGY

## 2021-05-17 DEVICE — IMPLANTABLE DEVICE: Type: IMPLANTABLE DEVICE | Site: EYE | Status: FUNCTIONAL

## 2021-05-17 RX ORDER — MOXIFLOXACIN 5 MG/ML
1 SOLUTION/ DROPS OPHTHALMIC
Status: COMPLETED | OUTPATIENT
Start: 2021-05-17 | End: 2021-05-17

## 2021-05-17 RX ORDER — MIDAZOLAM HYDROCHLORIDE 1 MG/ML
INJECTION INTRAMUSCULAR; INTRAVENOUS
Status: DISCONTINUED | OUTPATIENT
Start: 2021-05-17 | End: 2021-05-17

## 2021-05-17 RX ORDER — TROPICAMIDE 10 MG/ML
1 SOLUTION/ DROPS OPHTHALMIC
Status: COMPLETED | OUTPATIENT
Start: 2021-05-17 | End: 2021-05-17

## 2021-05-17 RX ORDER — ACETAMINOPHEN 325 MG/1
650 TABLET ORAL EVERY 4 HOURS PRN
Status: DISCONTINUED | OUTPATIENT
Start: 2021-05-17 | End: 2021-05-17 | Stop reason: HOSPADM

## 2021-05-17 RX ORDER — PROPARACAINE HYDROCHLORIDE 5 MG/ML
1 SOLUTION/ DROPS OPHTHALMIC
Status: DISCONTINUED | OUTPATIENT
Start: 2021-05-17 | End: 2021-05-17 | Stop reason: HOSPADM

## 2021-05-17 RX ORDER — TETRACAINE HYDROCHLORIDE 5 MG/ML
SOLUTION OPHTHALMIC
Status: DISCONTINUED | OUTPATIENT
Start: 2021-05-17 | End: 2021-05-17 | Stop reason: HOSPADM

## 2021-05-17 RX ORDER — LIDOCAINE HYDROCHLORIDE 40 MG/ML
INJECTION, SOLUTION RETROBULBAR
Status: DISCONTINUED | OUTPATIENT
Start: 2021-05-17 | End: 2021-05-17 | Stop reason: HOSPADM

## 2021-05-17 RX ORDER — TETRACAINE HYDROCHLORIDE 5 MG/ML
1 SOLUTION OPHTHALMIC
Status: COMPLETED | OUTPATIENT
Start: 2021-05-17 | End: 2021-05-17

## 2021-05-17 RX ORDER — MOXIFLOXACIN 5 MG/ML
SOLUTION/ DROPS OPHTHALMIC
Status: DISCONTINUED | OUTPATIENT
Start: 2021-05-17 | End: 2021-05-17 | Stop reason: HOSPADM

## 2021-05-17 RX ORDER — PHENYLEPHRINE HYDROCHLORIDE 100 MG/ML
1 SOLUTION/ DROPS OPHTHALMIC ONCE
Status: COMPLETED | OUTPATIENT
Start: 2021-05-17 | End: 2021-05-17

## 2021-05-17 RX ORDER — PHENYLEPHRINE HYDROCHLORIDE 25 MG/ML
1 SOLUTION/ DROPS OPHTHALMIC
Status: COMPLETED | OUTPATIENT
Start: 2021-05-17 | End: 2021-05-17

## 2021-05-17 RX ADMIN — TETRACAINE HYDROCHLORIDE 1 DROP: 5 SOLUTION OPHTHALMIC at 07:05

## 2021-05-17 RX ADMIN — MOXIFLOXACIN 1 DROP: 5 SOLUTION/ DROPS OPHTHALMIC at 09:05

## 2021-05-17 RX ADMIN — MOXIFLOXACIN 1 DROP: 5 SOLUTION/ DROPS OPHTHALMIC at 07:05

## 2021-05-17 RX ADMIN — PHENYLEPHRINE HYDROCHLORIDE 1 DROP: 25 SOLUTION/ DROPS OPHTHALMIC at 07:05

## 2021-05-17 RX ADMIN — MIDAZOLAM HYDROCHLORIDE 1 MG: 1 INJECTION, SOLUTION INTRAMUSCULAR; INTRAVENOUS at 09:05

## 2021-05-17 RX ADMIN — TROPICAMIDE 1 DROP: 10 SOLUTION/ DROPS OPHTHALMIC at 07:05

## 2021-05-17 RX ADMIN — MIDAZOLAM HYDROCHLORIDE 2 MG: 1 INJECTION, SOLUTION INTRAMUSCULAR; INTRAVENOUS at 08:05

## 2021-05-18 ENCOUNTER — OFFICE VISIT (OUTPATIENT)
Dept: OPHTHALMOLOGY | Facility: CLINIC | Age: 71
End: 2021-05-18
Attending: OPHTHALMOLOGY
Payer: MEDICARE

## 2021-05-18 DIAGNOSIS — Z98.890 POST-OPERATIVE STATE: Primary | ICD-10-CM

## 2021-05-18 DIAGNOSIS — H25.12 NUCLEAR SCLEROTIC CATARACT OF LEFT EYE: ICD-10-CM

## 2021-05-18 PROCEDURE — 3288F PR FALLS RISK ASSESSMENT DOCUMENTED: ICD-10-PCS | Mod: CPTII,S$GLB,, | Performed by: OPHTHALMOLOGY

## 2021-05-18 PROCEDURE — 99999 PR PBB SHADOW E&M-EST. PATIENT-LVL III: ICD-10-PCS | Mod: PBBFAC,,, | Performed by: OPHTHALMOLOGY

## 2021-05-18 PROCEDURE — 99999 PR PBB SHADOW E&M-EST. PATIENT-LVL III: CPT | Mod: PBBFAC,,, | Performed by: OPHTHALMOLOGY

## 2021-05-18 PROCEDURE — 1126F PR PAIN SEVERITY QUANTIFIED, NO PAIN PRESENT: ICD-10-PCS | Mod: S$GLB,,, | Performed by: OPHTHALMOLOGY

## 2021-05-18 PROCEDURE — 3288F FALL RISK ASSESSMENT DOCD: CPT | Mod: CPTII,S$GLB,, | Performed by: OPHTHALMOLOGY

## 2021-05-18 PROCEDURE — 1157F ADVNC CARE PLAN IN RCRD: CPT | Mod: S$GLB,,, | Performed by: OPHTHALMOLOGY

## 2021-05-18 PROCEDURE — 1101F PR PT FALLS ASSESS DOC 0-1 FALLS W/OUT INJ PAST YR: ICD-10-PCS | Mod: CPTII,S$GLB,, | Performed by: OPHTHALMOLOGY

## 2021-05-18 PROCEDURE — 99024 PR POST-OP FOLLOW-UP VISIT: ICD-10-PCS | Mod: S$GLB,,, | Performed by: OPHTHALMOLOGY

## 2021-05-18 PROCEDURE — 1126F AMNT PAIN NOTED NONE PRSNT: CPT | Mod: S$GLB,,, | Performed by: OPHTHALMOLOGY

## 2021-05-18 PROCEDURE — 1101F PT FALLS ASSESS-DOCD LE1/YR: CPT | Mod: CPTII,S$GLB,, | Performed by: OPHTHALMOLOGY

## 2021-05-18 PROCEDURE — 99024 POSTOP FOLLOW-UP VISIT: CPT | Mod: S$GLB,,, | Performed by: OPHTHALMOLOGY

## 2021-05-18 PROCEDURE — 1157F PR ADVANCE CARE PLAN OR EQUIV PRESENT IN MEDICAL RECORD: ICD-10-PCS | Mod: S$GLB,,, | Performed by: OPHTHALMOLOGY

## 2021-06-09 ENCOUNTER — TELEPHONE (OUTPATIENT)
Dept: OBSTETRICS AND GYNECOLOGY | Facility: CLINIC | Age: 71
End: 2021-06-09

## 2021-06-09 DIAGNOSIS — Z12.31 SCREENING MAMMOGRAM, ENCOUNTER FOR: Primary | ICD-10-CM

## 2021-06-29 ENCOUNTER — OFFICE VISIT (OUTPATIENT)
Dept: DERMATOLOGY | Facility: CLINIC | Age: 71
End: 2021-06-29
Payer: MEDICARE

## 2021-06-29 DIAGNOSIS — L82.1 SEBORRHEIC KERATOSES: ICD-10-CM

## 2021-06-29 DIAGNOSIS — L21.9 SEBORRHEIC DERMATITIS OF SCALP: Primary | ICD-10-CM

## 2021-06-29 DIAGNOSIS — L57.0 AK (ACTINIC KERATOSIS): ICD-10-CM

## 2021-06-29 DIAGNOSIS — D18.01 HEMANGIOMA OF SKIN: ICD-10-CM

## 2021-06-29 DIAGNOSIS — Z85.828 HISTORY OF SKIN CANCER: ICD-10-CM

## 2021-06-29 DIAGNOSIS — L82.0 INFLAMED SEBORRHEIC KERATOSIS: ICD-10-CM

## 2021-06-29 DIAGNOSIS — L81.4 SOLAR LENTIGO: ICD-10-CM

## 2021-06-29 DIAGNOSIS — D22.9 MULTIPLE BENIGN NEVI: ICD-10-CM

## 2021-06-29 PROCEDURE — 1157F PR ADVANCE CARE PLAN OR EQUIV PRESENT IN MEDICAL RECORD: ICD-10-PCS | Mod: S$GLB,,, | Performed by: PATHOLOGY

## 2021-06-29 PROCEDURE — 17110 DESTRUCTION B9 LES UP TO 14: CPT | Mod: S$GLB,,, | Performed by: PATHOLOGY

## 2021-06-29 PROCEDURE — 1126F AMNT PAIN NOTED NONE PRSNT: CPT | Mod: S$GLB,,, | Performed by: PATHOLOGY

## 2021-06-29 PROCEDURE — 1126F PR PAIN SEVERITY QUANTIFIED, NO PAIN PRESENT: ICD-10-PCS | Mod: S$GLB,,, | Performed by: PATHOLOGY

## 2021-06-29 PROCEDURE — 99999 PR PBB SHADOW E&M-EST. PATIENT-LVL III: CPT | Mod: PBBFAC,,, | Performed by: PATHOLOGY

## 2021-06-29 PROCEDURE — 3288F FALL RISK ASSESSMENT DOCD: CPT | Mod: CPTII,S$GLB,, | Performed by: PATHOLOGY

## 2021-06-29 PROCEDURE — 17000 DESTRUCT PREMALG LESION: CPT | Mod: 59,S$GLB,, | Performed by: PATHOLOGY

## 2021-06-29 PROCEDURE — 99999 PR PBB SHADOW E&M-EST. PATIENT-LVL III: ICD-10-PCS | Mod: PBBFAC,,, | Performed by: PATHOLOGY

## 2021-06-29 PROCEDURE — 1100F PTFALLS ASSESS-DOCD GE2>/YR: CPT | Mod: CPTII,S$GLB,, | Performed by: PATHOLOGY

## 2021-06-29 PROCEDURE — 99214 PR OFFICE/OUTPT VISIT, EST, LEVL IV, 30-39 MIN: ICD-10-PCS | Mod: 25,S$GLB,, | Performed by: PATHOLOGY

## 2021-06-29 PROCEDURE — 17110 PR DESTRUCTION BENIGN LESIONS UP TO 14: ICD-10-PCS | Mod: S$GLB,,, | Performed by: PATHOLOGY

## 2021-06-29 PROCEDURE — 99214 OFFICE O/P EST MOD 30 MIN: CPT | Mod: 25,S$GLB,, | Performed by: PATHOLOGY

## 2021-06-29 PROCEDURE — 17000 PR DESTRUCTION(LASER SURGERY,CRYOSURGERY,CHEMOSURGERY),PREMALIGNANT LESIONS,FIRST LESION: ICD-10-PCS | Mod: 59,S$GLB,, | Performed by: PATHOLOGY

## 2021-06-29 PROCEDURE — 1159F PR MEDICATION LIST DOCUMENTED IN MEDICAL RECORD: ICD-10-PCS | Mod: S$GLB,,, | Performed by: PATHOLOGY

## 2021-06-29 PROCEDURE — 1157F ADVNC CARE PLAN IN RCRD: CPT | Mod: S$GLB,,, | Performed by: PATHOLOGY

## 2021-06-29 PROCEDURE — 3288F PR FALLS RISK ASSESSMENT DOCUMENTED: ICD-10-PCS | Mod: CPTII,S$GLB,, | Performed by: PATHOLOGY

## 2021-06-29 PROCEDURE — 1159F MED LIST DOCD IN RCRD: CPT | Mod: S$GLB,,, | Performed by: PATHOLOGY

## 2021-06-29 PROCEDURE — 1100F PR PT FALLS ASSESS DOC 2+ FALLS/FALL W/INJURY/YR: ICD-10-PCS | Mod: CPTII,S$GLB,, | Performed by: PATHOLOGY

## 2021-06-29 RX ORDER — HYALURONATE SODIUM 30 MG/2 ML
SYRINGE (ML) INTRAARTICULAR
COMMUNITY
Start: 2021-01-06 | End: 2022-10-05 | Stop reason: SDUPTHER

## 2021-06-29 RX ORDER — ALPRAZOLAM 0.5 MG/1
TABLET ORAL
COMMUNITY
Start: 2021-06-23 | End: 2021-08-25

## 2021-06-29 RX ORDER — CLOBETASOL PROPIONATE 0.46 MG/ML
SOLUTION TOPICAL
Qty: 50 ML | Refills: 3 | Status: SHIPPED | OUTPATIENT
Start: 2021-06-29 | End: 2023-05-02 | Stop reason: SDUPTHER

## 2021-06-29 RX ORDER — AMOXICILLIN AND CLAVULANATE POTASSIUM 875; 125 MG/1; MG/1
TABLET, FILM COATED ORAL
COMMUNITY
Start: 2021-06-23 | End: 2021-08-25

## 2021-06-30 ENCOUNTER — OFFICE VISIT (OUTPATIENT)
Dept: OPTOMETRY | Facility: CLINIC | Age: 71
End: 2021-06-30
Payer: MEDICARE

## 2021-06-30 DIAGNOSIS — Z98.42 S/P BILATERAL CATARACT EXTRACTION: Primary | ICD-10-CM

## 2021-06-30 DIAGNOSIS — Z98.41 S/P BILATERAL CATARACT EXTRACTION: Primary | ICD-10-CM

## 2021-06-30 PROCEDURE — 99024 PR POST-OP FOLLOW-UP VISIT: ICD-10-PCS | Mod: S$GLB,,, | Performed by: OPTOMETRIST

## 2021-06-30 PROCEDURE — 3288F PR FALLS RISK ASSESSMENT DOCUMENTED: ICD-10-PCS | Mod: CPTII,S$GLB,, | Performed by: OPTOMETRIST

## 2021-06-30 PROCEDURE — 1157F ADVNC CARE PLAN IN RCRD: CPT | Mod: S$GLB,,, | Performed by: OPTOMETRIST

## 2021-06-30 PROCEDURE — 1126F PR PAIN SEVERITY QUANTIFIED, NO PAIN PRESENT: ICD-10-PCS | Mod: S$GLB,,, | Performed by: OPTOMETRIST

## 2021-06-30 PROCEDURE — 99999 PR PBB SHADOW E&M-EST. PATIENT-LVL III: CPT | Mod: PBBFAC,,, | Performed by: OPTOMETRIST

## 2021-06-30 PROCEDURE — 99024 POSTOP FOLLOW-UP VISIT: CPT | Mod: S$GLB,,, | Performed by: OPTOMETRIST

## 2021-06-30 PROCEDURE — 99999 PR PBB SHADOW E&M-EST. PATIENT-LVL III: ICD-10-PCS | Mod: PBBFAC,,, | Performed by: OPTOMETRIST

## 2021-06-30 PROCEDURE — 1157F PR ADVANCE CARE PLAN OR EQUIV PRESENT IN MEDICAL RECORD: ICD-10-PCS | Mod: S$GLB,,, | Performed by: OPTOMETRIST

## 2021-06-30 PROCEDURE — 1126F AMNT PAIN NOTED NONE PRSNT: CPT | Mod: S$GLB,,, | Performed by: OPTOMETRIST

## 2021-06-30 PROCEDURE — 3288F FALL RISK ASSESSMENT DOCD: CPT | Mod: CPTII,S$GLB,, | Performed by: OPTOMETRIST

## 2021-06-30 PROCEDURE — 1101F PT FALLS ASSESS-DOCD LE1/YR: CPT | Mod: CPTII,S$GLB,, | Performed by: OPTOMETRIST

## 2021-06-30 PROCEDURE — 1101F PR PT FALLS ASSESS DOC 0-1 FALLS W/OUT INJ PAST YR: ICD-10-PCS | Mod: CPTII,S$GLB,, | Performed by: OPTOMETRIST

## 2021-07-01 LAB — HEMOCCULT STL QL IA: NEGATIVE

## 2021-07-07 ENCOUNTER — HOSPITAL ENCOUNTER (OUTPATIENT)
Dept: RADIOLOGY | Facility: OTHER | Age: 71
Discharge: HOME | End: 2021-07-07
Attending: OBSTETRICS & GYNECOLOGY
Payer: MEDICARE

## 2021-07-07 DIAGNOSIS — Z12.31 SCREENING MAMMOGRAM, ENCOUNTER FOR: ICD-10-CM

## 2021-07-07 PROCEDURE — 77067 SCR MAMMO BI INCL CAD: CPT | Mod: TC

## 2021-07-07 PROCEDURE — 77067 MAMMO DIGITAL SCREENING BILAT WITH TOMO: ICD-10-PCS | Mod: 26,,, | Performed by: RADIOLOGY

## 2021-07-07 PROCEDURE — 77063 MAMMO DIGITAL SCREENING BILAT WITH TOMO: ICD-10-PCS | Mod: 26,,, | Performed by: RADIOLOGY

## 2021-07-07 PROCEDURE — 77067 SCR MAMMO BI INCL CAD: CPT | Mod: 26,,, | Performed by: RADIOLOGY

## 2021-07-07 PROCEDURE — 77063 BREAST TOMOSYNTHESIS BI: CPT | Mod: 26,,, | Performed by: RADIOLOGY

## 2021-07-16 ENCOUNTER — TELEPHONE (OUTPATIENT)
Dept: INTERNAL MEDICINE | Facility: CLINIC | Age: 71
End: 2021-07-16

## 2021-07-21 ENCOUNTER — PATIENT OUTREACH (OUTPATIENT)
Dept: ADMINISTRATIVE | Facility: HOSPITAL | Age: 71
End: 2021-07-21

## 2021-08-12 ENCOUNTER — TELEPHONE (OUTPATIENT)
Dept: URGENT CARE | Facility: CLINIC | Age: 71
End: 2021-08-12

## 2021-08-12 ENCOUNTER — OFFICE VISIT (OUTPATIENT)
Dept: URGENT CARE | Facility: CLINIC | Age: 71
End: 2021-08-12
Payer: MEDICARE

## 2021-08-12 ENCOUNTER — HOSPITAL ENCOUNTER (OUTPATIENT)
Dept: RADIOLOGY | Facility: OTHER | Age: 71
Discharge: HOME | End: 2021-08-12
Attending: FAMILY MEDICINE
Payer: MEDICARE

## 2021-08-12 VITALS
RESPIRATION RATE: 16 BRPM | HEIGHT: 68 IN | HEART RATE: 88 BPM | SYSTOLIC BLOOD PRESSURE: 114 MMHG | OXYGEN SATURATION: 97 % | BODY MASS INDEX: 19.7 KG/M2 | TEMPERATURE: 97 F | WEIGHT: 130 LBS | DIASTOLIC BLOOD PRESSURE: 71 MMHG

## 2021-08-12 DIAGNOSIS — M79.605 PAIN OF LEFT LOWER EXTREMITY: ICD-10-CM

## 2021-08-12 DIAGNOSIS — M71.22 POPLITEAL CYST, LEFT: ICD-10-CM

## 2021-08-12 DIAGNOSIS — M71.22 POPLITEAL CYST, LEFT: Primary | ICD-10-CM

## 2021-08-12 PROCEDURE — 93971 EXTREMITY STUDY: CPT | Mod: TC,LT

## 2021-08-12 PROCEDURE — 1159F PR MEDICATION LIST DOCUMENTED IN MEDICAL RECORD: ICD-10-PCS | Mod: CPTII,S$GLB,, | Performed by: FAMILY MEDICINE

## 2021-08-12 PROCEDURE — 3078F DIAST BP <80 MM HG: CPT | Mod: CPTII,S$GLB,, | Performed by: FAMILY MEDICINE

## 2021-08-12 PROCEDURE — 3078F PR MOST RECENT DIASTOLIC BLOOD PRESSURE < 80 MM HG: ICD-10-PCS | Mod: CPTII,S$GLB,, | Performed by: FAMILY MEDICINE

## 2021-08-12 PROCEDURE — 1159F MED LIST DOCD IN RCRD: CPT | Mod: CPTII,S$GLB,, | Performed by: FAMILY MEDICINE

## 2021-08-12 PROCEDURE — 3008F BODY MASS INDEX DOCD: CPT | Mod: CPTII,S$GLB,, | Performed by: FAMILY MEDICINE

## 2021-08-12 PROCEDURE — 1157F ADVNC CARE PLAN IN RCRD: CPT | Mod: CPTII,S$GLB,, | Performed by: FAMILY MEDICINE

## 2021-08-12 PROCEDURE — 99214 OFFICE O/P EST MOD 30 MIN: CPT | Mod: S$GLB,,, | Performed by: FAMILY MEDICINE

## 2021-08-12 PROCEDURE — 3008F PR BODY MASS INDEX (BMI) DOCUMENTED: ICD-10-PCS | Mod: CPTII,S$GLB,, | Performed by: FAMILY MEDICINE

## 2021-08-12 PROCEDURE — 3044F HG A1C LEVEL LT 7.0%: CPT | Mod: CPTII,S$GLB,, | Performed by: FAMILY MEDICINE

## 2021-08-12 PROCEDURE — 1160F RVW MEDS BY RX/DR IN RCRD: CPT | Mod: CPTII,S$GLB,, | Performed by: FAMILY MEDICINE

## 2021-08-12 PROCEDURE — 1157F PR ADVANCE CARE PLAN OR EQUIV PRESENT IN MEDICAL RECORD: ICD-10-PCS | Mod: CPTII,S$GLB,, | Performed by: FAMILY MEDICINE

## 2021-08-12 PROCEDURE — 3074F SYST BP LT 130 MM HG: CPT | Mod: CPTII,S$GLB,, | Performed by: FAMILY MEDICINE

## 2021-08-12 PROCEDURE — 3074F PR MOST RECENT SYSTOLIC BLOOD PRESSURE < 130 MM HG: ICD-10-PCS | Mod: CPTII,S$GLB,, | Performed by: FAMILY MEDICINE

## 2021-08-12 PROCEDURE — 99214 PR OFFICE/OUTPT VISIT, EST, LEVL IV, 30-39 MIN: ICD-10-PCS | Mod: S$GLB,,, | Performed by: FAMILY MEDICINE

## 2021-08-12 PROCEDURE — 1160F PR REVIEW ALL MEDS BY PRESCRIBER/CLIN PHARMACIST DOCUMENTED: ICD-10-PCS | Mod: CPTII,S$GLB,, | Performed by: FAMILY MEDICINE

## 2021-08-12 PROCEDURE — 3044F PR MOST RECENT HEMOGLOBIN A1C LEVEL <7.0%: ICD-10-PCS | Mod: CPTII,S$GLB,, | Performed by: FAMILY MEDICINE

## 2021-08-12 PROCEDURE — 93971 EXTREMITY STUDY: CPT | Mod: 26,LT,, | Performed by: RADIOLOGY

## 2021-08-12 PROCEDURE — 93971 US LOWER EXTREMITY VEINS LEFT: ICD-10-PCS | Mod: 26,LT,, | Performed by: RADIOLOGY

## 2021-08-18 ENCOUNTER — OFFICE VISIT (OUTPATIENT)
Dept: ORTHOPEDICS | Facility: CLINIC | Age: 71
End: 2021-08-18
Payer: MEDICARE

## 2021-08-18 ENCOUNTER — HOSPITAL ENCOUNTER (OUTPATIENT)
Dept: RADIOLOGY | Facility: HOSPITAL | Age: 71
Discharge: HOME | End: 2021-08-18
Attending: NURSE PRACTITIONER
Payer: MEDICARE

## 2021-08-18 DIAGNOSIS — M25.562 BILATERAL ANTERIOR KNEE PAIN: Primary | ICD-10-CM

## 2021-08-18 DIAGNOSIS — M25.561 BILATERAL ANTERIOR KNEE PAIN: ICD-10-CM

## 2021-08-18 DIAGNOSIS — M25.561 BILATERAL ANTERIOR KNEE PAIN: Primary | ICD-10-CM

## 2021-08-18 DIAGNOSIS — M25.562 BILATERAL ANTERIOR KNEE PAIN: ICD-10-CM

## 2021-08-18 PROCEDURE — 1159F PR MEDICATION LIST DOCUMENTED IN MEDICAL RECORD: ICD-10-PCS | Mod: CPTII,S$GLB,, | Performed by: NURSE PRACTITIONER

## 2021-08-18 PROCEDURE — 73564 X-RAY EXAM KNEE 4 OR MORE: CPT | Mod: TC,50

## 2021-08-18 PROCEDURE — 99213 PR OFFICE/OUTPT VISIT, EST, LEVL III, 20-29 MIN: ICD-10-PCS | Mod: S$GLB,,, | Performed by: NURSE PRACTITIONER

## 2021-08-18 PROCEDURE — 3044F PR MOST RECENT HEMOGLOBIN A1C LEVEL <7.0%: ICD-10-PCS | Mod: CPTII,S$GLB,, | Performed by: NURSE PRACTITIONER

## 2021-08-18 PROCEDURE — 99999 PR PBB SHADOW E&M-EST. PATIENT-LVL II: CPT | Mod: PBBFAC,,, | Performed by: NURSE PRACTITIONER

## 2021-08-18 PROCEDURE — 1157F ADVNC CARE PLAN IN RCRD: CPT | Mod: CPTII,S$GLB,, | Performed by: NURSE PRACTITIONER

## 2021-08-18 PROCEDURE — 1157F PR ADVANCE CARE PLAN OR EQUIV PRESENT IN MEDICAL RECORD: ICD-10-PCS | Mod: CPTII,S$GLB,, | Performed by: NURSE PRACTITIONER

## 2021-08-18 PROCEDURE — 99999 PR PBB SHADOW E&M-EST. PATIENT-LVL II: ICD-10-PCS | Mod: PBBFAC,,, | Performed by: NURSE PRACTITIONER

## 2021-08-18 PROCEDURE — 99213 OFFICE O/P EST LOW 20 MIN: CPT | Mod: S$GLB,,, | Performed by: NURSE PRACTITIONER

## 2021-08-18 PROCEDURE — 3044F HG A1C LEVEL LT 7.0%: CPT | Mod: CPTII,S$GLB,, | Performed by: NURSE PRACTITIONER

## 2021-08-18 PROCEDURE — 1160F PR REVIEW ALL MEDS BY PRESCRIBER/CLIN PHARMACIST DOCUMENTED: ICD-10-PCS | Mod: CPTII,S$GLB,, | Performed by: NURSE PRACTITIONER

## 2021-08-18 PROCEDURE — 1160F RVW MEDS BY RX/DR IN RCRD: CPT | Mod: CPTII,S$GLB,, | Performed by: NURSE PRACTITIONER

## 2021-08-18 PROCEDURE — 1159F MED LIST DOCD IN RCRD: CPT | Mod: CPTII,S$GLB,, | Performed by: NURSE PRACTITIONER

## 2021-08-18 PROCEDURE — 73564 X-RAY EXAM KNEE 4 OR MORE: CPT | Mod: 26,50,, | Performed by: RADIOLOGY

## 2021-08-18 PROCEDURE — 73564 XR KNEE ORTHO BILAT WITH FLEXION: ICD-10-PCS | Mod: 26,50,, | Performed by: RADIOLOGY

## 2021-08-20 ENCOUNTER — PATIENT MESSAGE (OUTPATIENT)
Dept: ORTHOPEDICS | Facility: CLINIC | Age: 71
End: 2021-08-20

## 2021-08-20 DIAGNOSIS — M17.0 PRIMARY OSTEOARTHRITIS OF BOTH KNEES: Primary | ICD-10-CM

## 2021-08-20 RX ORDER — MELOXICAM 15 MG/1
15 TABLET ORAL DAILY
Qty: 90 TABLET | Refills: 0 | Status: SHIPPED | OUTPATIENT
Start: 2021-08-20

## 2021-08-25 ENCOUNTER — OFFICE VISIT (OUTPATIENT)
Dept: OBSTETRICS AND GYNECOLOGY | Facility: CLINIC | Age: 71
End: 2021-08-25
Payer: MEDICARE

## 2021-08-25 VITALS
HEIGHT: 68 IN | SYSTOLIC BLOOD PRESSURE: 110 MMHG | BODY MASS INDEX: 20.61 KG/M2 | WEIGHT: 136 LBS | DIASTOLIC BLOOD PRESSURE: 68 MMHG

## 2021-08-25 DIAGNOSIS — Z01.419 ENCOUNTER FOR GYNECOLOGICAL EXAMINATION WITHOUT ABNORMAL FINDING: Primary | ICD-10-CM

## 2021-08-25 DIAGNOSIS — N39.41 URGE INCONTINENCE: ICD-10-CM

## 2021-08-25 DIAGNOSIS — N95.2 VAGINAL ATROPHY: ICD-10-CM

## 2021-08-25 PROCEDURE — 1160F PR REVIEW ALL MEDS BY PRESCRIBER/CLIN PHARMACIST DOCUMENTED: ICD-10-PCS | Mod: CPTII,S$GLB,, | Performed by: OBSTETRICS & GYNECOLOGY

## 2021-08-25 PROCEDURE — 99999 PR PBB SHADOW E&M-EST. PATIENT-LVL III: ICD-10-PCS | Mod: PBBFAC,,, | Performed by: OBSTETRICS & GYNECOLOGY

## 2021-08-25 PROCEDURE — 3044F PR MOST RECENT HEMOGLOBIN A1C LEVEL <7.0%: ICD-10-PCS | Mod: CPTII,S$GLB,, | Performed by: OBSTETRICS & GYNECOLOGY

## 2021-08-25 PROCEDURE — 1159F MED LIST DOCD IN RCRD: CPT | Mod: CPTII,S$GLB,, | Performed by: OBSTETRICS & GYNECOLOGY

## 2021-08-25 PROCEDURE — 1101F PT FALLS ASSESS-DOCD LE1/YR: CPT | Mod: CPTII,S$GLB,, | Performed by: OBSTETRICS & GYNECOLOGY

## 2021-08-25 PROCEDURE — 3288F PR FALLS RISK ASSESSMENT DOCUMENTED: ICD-10-PCS | Mod: CPTII,S$GLB,, | Performed by: OBSTETRICS & GYNECOLOGY

## 2021-08-25 PROCEDURE — 3074F SYST BP LT 130 MM HG: CPT | Mod: CPTII,S$GLB,, | Performed by: OBSTETRICS & GYNECOLOGY

## 2021-08-25 PROCEDURE — 1157F ADVNC CARE PLAN IN RCRD: CPT | Mod: CPTII,S$GLB,, | Performed by: OBSTETRICS & GYNECOLOGY

## 2021-08-25 PROCEDURE — 1159F PR MEDICATION LIST DOCUMENTED IN MEDICAL RECORD: ICD-10-PCS | Mod: CPTII,S$GLB,, | Performed by: OBSTETRICS & GYNECOLOGY

## 2021-08-25 PROCEDURE — 3078F PR MOST RECENT DIASTOLIC BLOOD PRESSURE < 80 MM HG: ICD-10-PCS | Mod: CPTII,S$GLB,, | Performed by: OBSTETRICS & GYNECOLOGY

## 2021-08-25 PROCEDURE — 1157F PR ADVANCE CARE PLAN OR EQUIV PRESENT IN MEDICAL RECORD: ICD-10-PCS | Mod: CPTII,S$GLB,, | Performed by: OBSTETRICS & GYNECOLOGY

## 2021-08-25 PROCEDURE — 3008F BODY MASS INDEX DOCD: CPT | Mod: CPTII,S$GLB,, | Performed by: OBSTETRICS & GYNECOLOGY

## 2021-08-25 PROCEDURE — 1101F PR PT FALLS ASSESS DOC 0-1 FALLS W/OUT INJ PAST YR: ICD-10-PCS | Mod: CPTII,S$GLB,, | Performed by: OBSTETRICS & GYNECOLOGY

## 2021-08-25 PROCEDURE — 1126F AMNT PAIN NOTED NONE PRSNT: CPT | Mod: CPTII,S$GLB,, | Performed by: OBSTETRICS & GYNECOLOGY

## 2021-08-25 PROCEDURE — G0101 CA SCREEN;PELVIC/BREAST EXAM: HCPCS | Mod: S$GLB,,, | Performed by: OBSTETRICS & GYNECOLOGY

## 2021-08-25 PROCEDURE — 3044F HG A1C LEVEL LT 7.0%: CPT | Mod: CPTII,S$GLB,, | Performed by: OBSTETRICS & GYNECOLOGY

## 2021-08-25 PROCEDURE — 3288F FALL RISK ASSESSMENT DOCD: CPT | Mod: CPTII,S$GLB,, | Performed by: OBSTETRICS & GYNECOLOGY

## 2021-08-25 PROCEDURE — G0101 PR CA SCREEN;PELVIC/BREAST EXAM: ICD-10-PCS | Mod: S$GLB,,, | Performed by: OBSTETRICS & GYNECOLOGY

## 2021-08-25 PROCEDURE — 3078F DIAST BP <80 MM HG: CPT | Mod: CPTII,S$GLB,, | Performed by: OBSTETRICS & GYNECOLOGY

## 2021-08-25 PROCEDURE — 1126F PR PAIN SEVERITY QUANTIFIED, NO PAIN PRESENT: ICD-10-PCS | Mod: CPTII,S$GLB,, | Performed by: OBSTETRICS & GYNECOLOGY

## 2021-08-25 PROCEDURE — 3074F PR MOST RECENT SYSTOLIC BLOOD PRESSURE < 130 MM HG: ICD-10-PCS | Mod: CPTII,S$GLB,, | Performed by: OBSTETRICS & GYNECOLOGY

## 2021-08-25 PROCEDURE — 3008F PR BODY MASS INDEX (BMI) DOCUMENTED: ICD-10-PCS | Mod: CPTII,S$GLB,, | Performed by: OBSTETRICS & GYNECOLOGY

## 2021-08-25 PROCEDURE — 99999 PR PBB SHADOW E&M-EST. PATIENT-LVL III: CPT | Mod: PBBFAC,,, | Performed by: OBSTETRICS & GYNECOLOGY

## 2021-08-25 PROCEDURE — 1160F RVW MEDS BY RX/DR IN RCRD: CPT | Mod: CPTII,S$GLB,, | Performed by: OBSTETRICS & GYNECOLOGY

## 2021-08-25 RX ORDER — OXYBUTYNIN CHLORIDE 10 MG/1
10 TABLET, EXTENDED RELEASE ORAL DAILY
Qty: 90 TABLET | Refills: 4 | Status: SHIPPED | OUTPATIENT
Start: 2021-08-25 | End: 2022-09-12 | Stop reason: SDUPTHER

## 2021-08-25 RX ORDER — PRASTERONE 6.5 MG/1
1 INSERT VAGINAL NIGHTLY
Qty: 28 EACH | Refills: 12 | Status: SHIPPED | OUTPATIENT
Start: 2021-08-25 | End: 2023-04-11 | Stop reason: ALTCHOICE

## 2021-09-09 ENCOUNTER — TELEPHONE (OUTPATIENT)
Dept: INTERNAL MEDICINE | Facility: CLINIC | Age: 71
End: 2021-09-09

## 2021-09-09 ENCOUNTER — OFFICE VISIT (OUTPATIENT)
Dept: INTERNAL MEDICINE | Facility: CLINIC | Age: 71
End: 2021-09-09
Attending: INTERNAL MEDICINE
Payer: MEDICARE

## 2021-09-09 ENCOUNTER — PATIENT MESSAGE (OUTPATIENT)
Dept: INTERNAL MEDICINE | Facility: CLINIC | Age: 71
End: 2021-09-09

## 2021-09-09 DIAGNOSIS — R51.9 ACUTE INTRACTABLE HEADACHE, UNSPECIFIED HEADACHE TYPE: Primary | ICD-10-CM

## 2021-09-09 DIAGNOSIS — M54.2 CHRONIC NECK PAIN: ICD-10-CM

## 2021-09-09 DIAGNOSIS — G89.29 CHRONIC NECK PAIN: ICD-10-CM

## 2021-09-09 PROCEDURE — 99214 PR OFFICE/OUTPT VISIT, EST, LEVL IV, 30-39 MIN: ICD-10-PCS | Mod: 95,,, | Performed by: INTERNAL MEDICINE

## 2021-09-09 PROCEDURE — 1157F ADVNC CARE PLAN IN RCRD: CPT | Mod: CPTII,95,, | Performed by: INTERNAL MEDICINE

## 2021-09-09 PROCEDURE — 3044F HG A1C LEVEL LT 7.0%: CPT | Mod: CPTII,95,, | Performed by: INTERNAL MEDICINE

## 2021-09-09 PROCEDURE — 3044F PR MOST RECENT HEMOGLOBIN A1C LEVEL <7.0%: ICD-10-PCS | Mod: CPTII,95,, | Performed by: INTERNAL MEDICINE

## 2021-09-09 PROCEDURE — 1157F PR ADVANCE CARE PLAN OR EQUIV PRESENT IN MEDICAL RECORD: ICD-10-PCS | Mod: CPTII,95,, | Performed by: INTERNAL MEDICINE

## 2021-09-09 PROCEDURE — 99214 OFFICE O/P EST MOD 30 MIN: CPT | Mod: 95,,, | Performed by: INTERNAL MEDICINE

## 2021-10-06 ENCOUNTER — IMMUNIZATION (OUTPATIENT)
Dept: INTERNAL MEDICINE | Facility: CLINIC | Age: 71
End: 2021-10-06
Payer: MEDICARE

## 2021-10-06 DIAGNOSIS — Z23 NEED FOR VACCINATION: Primary | ICD-10-CM

## 2021-10-06 PROCEDURE — 91300 COVID-19, MRNA, LNP-S, PF, 30 MCG/0.3 ML DOSE VACCINE: CPT | Mod: HCNC,PBBFAC | Performed by: INTERNAL MEDICINE

## 2021-10-06 PROCEDURE — 0003A COVID-19, MRNA, LNP-S, PF, 30 MCG/0.3 ML DOSE VACCINE: CPT | Mod: HCNC,PBBFAC | Performed by: INTERNAL MEDICINE

## 2021-11-29 ENCOUNTER — TELEPHONE (OUTPATIENT)
Dept: INTERNAL MEDICINE | Facility: CLINIC | Age: 71
End: 2021-11-29
Payer: MEDICARE

## 2021-12-09 ENCOUNTER — OFFICE VISIT (OUTPATIENT)
Dept: OBSTETRICS AND GYNECOLOGY | Facility: CLINIC | Age: 71
End: 2021-12-09
Payer: MEDICARE

## 2021-12-09 VITALS
DIASTOLIC BLOOD PRESSURE: 72 MMHG | BODY MASS INDEX: 19.84 KG/M2 | HEIGHT: 68 IN | SYSTOLIC BLOOD PRESSURE: 116 MMHG | WEIGHT: 130.94 LBS

## 2021-12-09 DIAGNOSIS — N64.4 MASTODYNIA: ICD-10-CM

## 2021-12-09 DIAGNOSIS — R23.4 BREAST SKIN CHANGES: ICD-10-CM

## 2021-12-09 DIAGNOSIS — R22.31 MASS OF RIGHT AXILLA: Primary | ICD-10-CM

## 2021-12-09 PROCEDURE — 1157F PR ADVANCE CARE PLAN OR EQUIV PRESENT IN MEDICAL RECORD: ICD-10-PCS | Mod: HCNC,CPTII,S$GLB, | Performed by: PHYSICIAN ASSISTANT

## 2021-12-09 PROCEDURE — 99999 PR PBB SHADOW E&M-EST. PATIENT-LVL III: ICD-10-PCS | Mod: PBBFAC,HCNC,, | Performed by: PHYSICIAN ASSISTANT

## 2021-12-09 PROCEDURE — 99214 PR OFFICE/OUTPT VISIT, EST, LEVL IV, 30-39 MIN: ICD-10-PCS | Mod: HCNC,S$GLB,, | Performed by: PHYSICIAN ASSISTANT

## 2021-12-09 PROCEDURE — 99999 PR PBB SHADOW E&M-EST. PATIENT-LVL III: CPT | Mod: PBBFAC,HCNC,, | Performed by: PHYSICIAN ASSISTANT

## 2021-12-09 PROCEDURE — 1157F ADVNC CARE PLAN IN RCRD: CPT | Mod: HCNC,CPTII,S$GLB, | Performed by: PHYSICIAN ASSISTANT

## 2021-12-09 PROCEDURE — 99214 OFFICE O/P EST MOD 30 MIN: CPT | Mod: HCNC,S$GLB,, | Performed by: PHYSICIAN ASSISTANT

## 2022-01-03 ENCOUNTER — HOSPITAL ENCOUNTER (OUTPATIENT)
Dept: RADIOLOGY | Facility: OTHER | Age: 72
Discharge: HOME | End: 2022-01-03
Attending: PHYSICIAN ASSISTANT
Payer: MEDICARE

## 2022-01-03 DIAGNOSIS — R22.31 MASS OF RIGHT AXILLA: ICD-10-CM

## 2022-01-03 DIAGNOSIS — N64.4 MASTODYNIA: ICD-10-CM

## 2022-01-03 PROCEDURE — 77065 DX MAMMO INCL CAD UNI: CPT | Mod: TC,HCNC,RT

## 2022-01-03 PROCEDURE — 77065 MAMMO DIGITAL DIAGNOSTIC RIGHT WITH TOMO: ICD-10-PCS | Mod: 26,HCNC,RT, | Performed by: RADIOLOGY

## 2022-01-03 PROCEDURE — 77061 MAMMO DIGITAL DIAGNOSTIC RIGHT WITH TOMO: ICD-10-PCS | Mod: 26,HCNC,RT, | Performed by: RADIOLOGY

## 2022-01-03 PROCEDURE — 77065 DX MAMMO INCL CAD UNI: CPT | Mod: 26,HCNC,RT, | Performed by: RADIOLOGY

## 2022-01-03 PROCEDURE — 77061 BREAST TOMOSYNTHESIS UNI: CPT | Mod: 26,HCNC,RT, | Performed by: RADIOLOGY

## 2022-01-30 ENCOUNTER — LAB VISIT (OUTPATIENT)
Dept: PRIMARY CARE CLINIC | Facility: OTHER | Age: 72
End: 2022-01-30
Attending: INTERNAL MEDICINE
Payer: MEDICARE

## 2022-01-30 DIAGNOSIS — R05.9 COUGH: ICD-10-CM

## 2022-02-22 ENCOUNTER — OFFICE VISIT (OUTPATIENT)
Dept: ENDOCRINOLOGY | Facility: CLINIC | Age: 72
End: 2022-02-22
Payer: MEDICARE

## 2022-02-22 DIAGNOSIS — M81.0 OSTEOPOROSIS, UNSPECIFIED OSTEOPOROSIS TYPE, UNSPECIFIED PATHOLOGICAL FRACTURE PRESENCE: ICD-10-CM

## 2022-02-22 PROCEDURE — 99214 OFFICE O/P EST MOD 30 MIN: CPT | Mod: HCNC,95,, | Performed by: INTERNAL MEDICINE

## 2022-02-22 PROCEDURE — 1159F PR MEDICATION LIST DOCUMENTED IN MEDICAL RECORD: ICD-10-PCS | Mod: HCNC,CPTII,95, | Performed by: INTERNAL MEDICINE

## 2022-02-22 PROCEDURE — 1157F ADVNC CARE PLAN IN RCRD: CPT | Mod: HCNC,CPTII,95, | Performed by: INTERNAL MEDICINE

## 2022-02-22 PROCEDURE — 1160F RVW MEDS BY RX/DR IN RCRD: CPT | Mod: HCNC,CPTII,95, | Performed by: INTERNAL MEDICINE

## 2022-02-22 PROCEDURE — 1157F PR ADVANCE CARE PLAN OR EQUIV PRESENT IN MEDICAL RECORD: ICD-10-PCS | Mod: HCNC,CPTII,95, | Performed by: INTERNAL MEDICINE

## 2022-02-22 PROCEDURE — 1160F PR REVIEW ALL MEDS BY PRESCRIBER/CLIN PHARMACIST DOCUMENTED: ICD-10-PCS | Mod: HCNC,CPTII,95, | Performed by: INTERNAL MEDICINE

## 2022-02-22 PROCEDURE — 99214 PR OFFICE/OUTPT VISIT, EST, LEVL IV, 30-39 MIN: ICD-10-PCS | Mod: HCNC,95,, | Performed by: INTERNAL MEDICINE

## 2022-02-22 PROCEDURE — 1159F MED LIST DOCD IN RCRD: CPT | Mod: HCNC,CPTII,95, | Performed by: INTERNAL MEDICINE

## 2022-02-22 RX ORDER — RALOXIFENE HYDROCHLORIDE 60 MG/1
60 TABLET, FILM COATED ORAL DAILY
Qty: 90 TABLET | Refills: 3 | Status: SHIPPED | OUTPATIENT
Start: 2022-02-22 | End: 2022-12-15

## 2022-02-22 NOTE — ASSESSMENT & PLAN NOTE
Risk factors:   Postmenopausal, family history of osteoporosis, two years of MHT  Current treatment: raloxifene daily   Balance is good  Continue yoga for balance, continue walking     Vitamin D 2500 IUs daily   Continue calcium daily via diet     DXA scan 3/2023   FRAX is increased and supports daily treatment   Other options include IV BP versus prolia -- for now wants to continue evista  Age may be a factor in switching medications as she gets closer to 79

## 2022-04-07 DIAGNOSIS — Z13.6 ENCOUNTER FOR LIPID SCREENING FOR CARDIOVASCULAR DISEASE: ICD-10-CM

## 2022-04-07 DIAGNOSIS — N32.81 OAB (OVERACTIVE BLADDER): ICD-10-CM

## 2022-04-07 DIAGNOSIS — Z13.220 ENCOUNTER FOR LIPID SCREENING FOR CARDIOVASCULAR DISEASE: ICD-10-CM

## 2022-04-07 DIAGNOSIS — M81.8 OTHER OSTEOPOROSIS, UNSPECIFIED PATHOLOGICAL FRACTURE PRESENCE: ICD-10-CM

## 2022-04-07 DIAGNOSIS — Z79.899 OTHER LONG TERM (CURRENT) DRUG THERAPY: Primary | ICD-10-CM

## 2022-04-13 ENCOUNTER — IMMUNIZATION (OUTPATIENT)
Dept: INTERNAL MEDICINE | Facility: CLINIC | Age: 72
End: 2022-04-13
Payer: MEDICARE

## 2022-04-13 ENCOUNTER — LAB VISIT (OUTPATIENT)
Dept: LAB | Facility: OTHER | Age: 72
End: 2022-04-13
Attending: FAMILY MEDICINE
Payer: MEDICARE

## 2022-04-13 DIAGNOSIS — Z13.220 ENCOUNTER FOR LIPID SCREENING FOR CARDIOVASCULAR DISEASE: ICD-10-CM

## 2022-04-13 DIAGNOSIS — Z13.6 ENCOUNTER FOR LIPID SCREENING FOR CARDIOVASCULAR DISEASE: ICD-10-CM

## 2022-04-13 DIAGNOSIS — M81.8 OTHER OSTEOPOROSIS, UNSPECIFIED PATHOLOGICAL FRACTURE PRESENCE: ICD-10-CM

## 2022-04-13 DIAGNOSIS — Z79.899 OTHER LONG TERM (CURRENT) DRUG THERAPY: ICD-10-CM

## 2022-04-13 DIAGNOSIS — Z23 NEED FOR VACCINATION: Primary | ICD-10-CM

## 2022-04-13 LAB
ALBUMIN SERPL BCP-MCNC: 3.8 G/DL (ref 3.5–5.2)
ALP SERPL-CCNC: 48 U/L (ref 55–135)
ALT SERPL W/O P-5'-P-CCNC: 16 U/L (ref 10–44)
ANION GAP SERPL CALC-SCNC: 11 MMOL/L (ref 8–16)
AST SERPL-CCNC: 22 U/L (ref 10–40)
BASOPHILS # BLD AUTO: 0.05 K/UL (ref 0–0.2)
BASOPHILS NFR BLD: 0.9 % (ref 0–1.9)
BILIRUB SERPL-MCNC: 0.5 MG/DL (ref 0.1–1)
BUN SERPL-MCNC: 16 MG/DL (ref 8–23)
CALCIUM SERPL-MCNC: 8.9 MG/DL (ref 8.7–10.5)
CHLORIDE SERPL-SCNC: 107 MMOL/L (ref 95–110)
CHOLEST SERPL-MCNC: 181 MG/DL (ref 120–199)
CHOLEST/HDLC SERPL: 2.7 {RATIO} (ref 2–5)
CO2 SERPL-SCNC: 22 MMOL/L (ref 23–29)
CREAT SERPL-MCNC: 0.8 MG/DL (ref 0.5–1.4)
DIFFERENTIAL METHOD: NORMAL
EOSINOPHIL # BLD AUTO: 0.1 K/UL (ref 0–0.5)
EOSINOPHIL NFR BLD: 1.2 % (ref 0–8)
ERYTHROCYTE [DISTWIDTH] IN BLOOD BY AUTOMATED COUNT: 12.8 % (ref 11.5–14.5)
EST. GFR  (AFRICAN AMERICAN): >60 ML/MIN/1.73 M^2
EST. GFR  (NON AFRICAN AMERICAN): >60 ML/MIN/1.73 M^2
ESTIMATED AVG GLUCOSE: 103 MG/DL (ref 68–131)
GLUCOSE SERPL-MCNC: 89 MG/DL (ref 70–110)
HBA1C MFR BLD: 5.2 % (ref 4–5.6)
HCT VFR BLD AUTO: 39.3 % (ref 37–48.5)
HDLC SERPL-MCNC: 68 MG/DL (ref 40–75)
HDLC SERPL: 37.6 % (ref 20–50)
HGB BLD-MCNC: 13 G/DL (ref 12–16)
IMM GRANULOCYTES # BLD AUTO: 0.01 K/UL (ref 0–0.04)
IMM GRANULOCYTES NFR BLD AUTO: 0.2 % (ref 0–0.5)
LDLC SERPL CALC-MCNC: 101.8 MG/DL (ref 63–159)
LYMPHOCYTES # BLD AUTO: 1.4 K/UL (ref 1–4.8)
LYMPHOCYTES NFR BLD: 25.3 % (ref 18–48)
MCH RBC QN AUTO: 30.2 PG (ref 27–31)
MCHC RBC AUTO-ENTMCNC: 33.1 G/DL (ref 32–36)
MCV RBC AUTO: 91 FL (ref 82–98)
MONOCYTES # BLD AUTO: 0.4 K/UL (ref 0.3–1)
MONOCYTES NFR BLD: 6.8 % (ref 4–15)
NEUTROPHILS # BLD AUTO: 3.7 K/UL (ref 1.8–7.7)
NEUTROPHILS NFR BLD: 65.6 % (ref 38–73)
NONHDLC SERPL-MCNC: 113 MG/DL
NRBC BLD-RTO: 0 /100 WBC
PLATELET # BLD AUTO: 232 K/UL (ref 150–450)
PMV BLD AUTO: 10.1 FL (ref 9.2–12.9)
POTASSIUM SERPL-SCNC: 4.2 MMOL/L (ref 3.5–5.1)
PROT SERPL-MCNC: 6.2 G/DL (ref 6–8.4)
RBC # BLD AUTO: 4.31 M/UL (ref 4–5.4)
SODIUM SERPL-SCNC: 140 MMOL/L (ref 136–145)
TRIGL SERPL-MCNC: 56 MG/DL (ref 30–150)
TSH SERPL DL<=0.005 MIU/L-ACNC: 1.57 UIU/ML (ref 0.4–4)
WBC # BLD AUTO: 5.62 K/UL (ref 3.9–12.7)

## 2022-04-13 PROCEDURE — 80053 COMPREHEN METABOLIC PANEL: CPT | Performed by: FAMILY MEDICINE

## 2022-04-13 PROCEDURE — 36415 COLL VENOUS BLD VENIPUNCTURE: CPT | Performed by: FAMILY MEDICINE

## 2022-04-13 PROCEDURE — 84443 ASSAY THYROID STIM HORMONE: CPT | Performed by: FAMILY MEDICINE

## 2022-04-13 PROCEDURE — 83036 HEMOGLOBIN GLYCOSYLATED A1C: CPT | Performed by: FAMILY MEDICINE

## 2022-04-13 PROCEDURE — 0054A COVID-19, MRNA, LNP-S, PF, 30 MCG/0.3 ML DOSE VACCINE (PFIZER): CPT | Mod: PBBFAC | Performed by: INTERNAL MEDICINE

## 2022-04-13 PROCEDURE — 80061 LIPID PANEL: CPT | Performed by: FAMILY MEDICINE

## 2022-04-13 PROCEDURE — 85025 COMPLETE CBC W/AUTO DIFF WBC: CPT | Performed by: FAMILY MEDICINE

## 2022-04-18 ENCOUNTER — PATIENT MESSAGE (OUTPATIENT)
Dept: PRIMARY CARE CLINIC | Facility: CLINIC | Age: 72
End: 2022-04-18
Payer: MEDICARE

## 2022-04-26 ENCOUNTER — OFFICE VISIT (OUTPATIENT)
Dept: PRIMARY CARE CLINIC | Facility: CLINIC | Age: 72
End: 2022-04-26
Payer: MEDICARE

## 2022-04-26 VITALS
OXYGEN SATURATION: 100 % | HEIGHT: 68 IN | HEART RATE: 71 BPM | WEIGHT: 119.94 LBS | TEMPERATURE: 98 F | DIASTOLIC BLOOD PRESSURE: 88 MMHG | SYSTOLIC BLOOD PRESSURE: 122 MMHG | RESPIRATION RATE: 18 BRPM | BODY MASS INDEX: 18.18 KG/M2

## 2022-04-26 DIAGNOSIS — M19.019 OSTEOARTHRITIS OF SHOULDER, UNSPECIFIED LATERALITY, UNSPECIFIED OSTEOARTHRITIS TYPE: ICD-10-CM

## 2022-04-26 DIAGNOSIS — M17.11 OSTEOARTHRITIS OF RIGHT KNEE, UNSPECIFIED OSTEOARTHRITIS TYPE: ICD-10-CM

## 2022-04-26 DIAGNOSIS — Z87.74 HISTORY OF REPAIR OF CONGENITAL ATRIAL SEPTAL DEFECT (ASD): ICD-10-CM

## 2022-04-26 DIAGNOSIS — R07.89 ATYPICAL CHEST PAIN: Primary | ICD-10-CM

## 2022-04-26 PROCEDURE — 99214 PR OFFICE/OUTPT VISIT, EST, LEVL IV, 30-39 MIN: ICD-10-PCS | Mod: S$GLB,,, | Performed by: FAMILY MEDICINE

## 2022-04-26 PROCEDURE — 99999 PR PBB SHADOW E&M-EST. PATIENT-LVL III: CPT | Mod: PBBFAC,,, | Performed by: FAMILY MEDICINE

## 2022-04-26 PROCEDURE — 3008F BODY MASS INDEX DOCD: CPT | Mod: CPTII,S$GLB,, | Performed by: FAMILY MEDICINE

## 2022-04-26 PROCEDURE — 93005 EKG 12-LEAD: ICD-10-PCS | Mod: S$GLB,,, | Performed by: FAMILY MEDICINE

## 2022-04-26 PROCEDURE — 3079F PR MOST RECENT DIASTOLIC BLOOD PRESSURE 80-89 MM HG: ICD-10-PCS | Mod: CPTII,S$GLB,, | Performed by: FAMILY MEDICINE

## 2022-04-26 PROCEDURE — 3008F PR BODY MASS INDEX (BMI) DOCUMENTED: ICD-10-PCS | Mod: CPTII,S$GLB,, | Performed by: FAMILY MEDICINE

## 2022-04-26 PROCEDURE — 99214 OFFICE O/P EST MOD 30 MIN: CPT | Mod: S$GLB,,, | Performed by: FAMILY MEDICINE

## 2022-04-26 PROCEDURE — 1157F PR ADVANCE CARE PLAN OR EQUIV PRESENT IN MEDICAL RECORD: ICD-10-PCS | Mod: CPTII,S$GLB,, | Performed by: FAMILY MEDICINE

## 2022-04-26 PROCEDURE — 1100F PTFALLS ASSESS-DOCD GE2>/YR: CPT | Mod: CPTII,S$GLB,, | Performed by: FAMILY MEDICINE

## 2022-04-26 PROCEDURE — 93010 EKG 12-LEAD: ICD-10-PCS | Mod: S$GLB,,, | Performed by: INTERNAL MEDICINE

## 2022-04-26 PROCEDURE — 3074F SYST BP LT 130 MM HG: CPT | Mod: CPTII,S$GLB,, | Performed by: FAMILY MEDICINE

## 2022-04-26 PROCEDURE — 1126F AMNT PAIN NOTED NONE PRSNT: CPT | Mod: CPTII,S$GLB,, | Performed by: FAMILY MEDICINE

## 2022-04-26 PROCEDURE — 1157F ADVNC CARE PLAN IN RCRD: CPT | Mod: CPTII,S$GLB,, | Performed by: FAMILY MEDICINE

## 2022-04-26 PROCEDURE — 1126F PR PAIN SEVERITY QUANTIFIED, NO PAIN PRESENT: ICD-10-PCS | Mod: CPTII,S$GLB,, | Performed by: FAMILY MEDICINE

## 2022-04-26 PROCEDURE — 3288F FALL RISK ASSESSMENT DOCD: CPT | Mod: CPTII,S$GLB,, | Performed by: FAMILY MEDICINE

## 2022-04-26 PROCEDURE — 3079F DIAST BP 80-89 MM HG: CPT | Mod: CPTII,S$GLB,, | Performed by: FAMILY MEDICINE

## 2022-04-26 PROCEDURE — 3044F PR MOST RECENT HEMOGLOBIN A1C LEVEL <7.0%: ICD-10-PCS | Mod: CPTII,S$GLB,, | Performed by: FAMILY MEDICINE

## 2022-04-26 PROCEDURE — 3074F PR MOST RECENT SYSTOLIC BLOOD PRESSURE < 130 MM HG: ICD-10-PCS | Mod: CPTII,S$GLB,, | Performed by: FAMILY MEDICINE

## 2022-04-26 PROCEDURE — 93005 ELECTROCARDIOGRAM TRACING: CPT | Mod: S$GLB,,, | Performed by: FAMILY MEDICINE

## 2022-04-26 PROCEDURE — 99999 PR PBB SHADOW E&M-EST. PATIENT-LVL III: ICD-10-PCS | Mod: PBBFAC,,, | Performed by: FAMILY MEDICINE

## 2022-04-26 PROCEDURE — 1100F PR PT FALLS ASSESS DOC 2+ FALLS/FALL W/INJURY/YR: ICD-10-PCS | Mod: CPTII,S$GLB,, | Performed by: FAMILY MEDICINE

## 2022-04-26 PROCEDURE — 3044F HG A1C LEVEL LT 7.0%: CPT | Mod: CPTII,S$GLB,, | Performed by: FAMILY MEDICINE

## 2022-04-26 PROCEDURE — 3288F PR FALLS RISK ASSESSMENT DOCUMENTED: ICD-10-PCS | Mod: CPTII,S$GLB,, | Performed by: FAMILY MEDICINE

## 2022-04-26 PROCEDURE — 93010 ELECTROCARDIOGRAM REPORT: CPT | Mod: S$GLB,,, | Performed by: INTERNAL MEDICINE

## 2022-04-26 RX ORDER — DULOXETIN HYDROCHLORIDE 20 MG/1
40 CAPSULE, DELAYED RELEASE ORAL DAILY
Qty: 180 CAPSULE | Refills: 1 | Status: SHIPPED | OUTPATIENT
Start: 2022-04-26 | End: 2022-05-04 | Stop reason: SDUPTHER
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
## 2022-05-04 ENCOUNTER — PATIENT MESSAGE (OUTPATIENT)
Dept: PRIMARY CARE CLINIC | Facility: CLINIC | Age: 72
End: 2022-05-04
Payer: MEDICARE

## 2022-05-04 DIAGNOSIS — M17.11 OSTEOARTHRITIS OF RIGHT KNEE, UNSPECIFIED OSTEOARTHRITIS TYPE: ICD-10-CM

## 2022-05-04 DIAGNOSIS — M19.019 OSTEOARTHRITIS OF SHOULDER, UNSPECIFIED LATERALITY, UNSPECIFIED OSTEOARTHRITIS TYPE: ICD-10-CM

## 2022-05-04 RX ORDER — DULOXETIN HYDROCHLORIDE 20 MG/1
40 CAPSULE, DELAYED RELEASE ORAL DAILY
Qty: 180 CAPSULE | Refills: 1 | Status: SHIPPED | OUTPATIENT
Start: 2022-05-04 | End: 2023-01-02

## 2022-05-09 ENCOUNTER — HOSPITAL ENCOUNTER (OUTPATIENT)
Dept: CARDIOLOGY | Facility: HOSPITAL | Age: 72
Discharge: HOME | End: 2022-05-09
Attending: FAMILY MEDICINE
Payer: MEDICARE

## 2022-05-09 VITALS
HEART RATE: 78 BPM | BODY MASS INDEX: 18.04 KG/M2 | SYSTOLIC BLOOD PRESSURE: 90 MMHG | HEIGHT: 68 IN | WEIGHT: 119 LBS | DIASTOLIC BLOOD PRESSURE: 66 MMHG

## 2022-05-09 DIAGNOSIS — R07.89 ATYPICAL CHEST PAIN: ICD-10-CM

## 2022-05-09 DIAGNOSIS — Z87.74 HISTORY OF REPAIR OF CONGENITAL ATRIAL SEPTAL DEFECT (ASD): ICD-10-CM

## 2022-05-09 PROCEDURE — 93306 ECHO (CUPID ONLY): ICD-10-PCS | Mod: 26,,, | Performed by: INTERNAL MEDICINE

## 2022-05-09 PROCEDURE — 93306 TTE W/DOPPLER COMPLETE: CPT | Mod: 26,,, | Performed by: INTERNAL MEDICINE

## 2022-05-09 PROCEDURE — 93306 TTE W/DOPPLER COMPLETE: CPT

## 2022-05-10 ENCOUNTER — PATIENT MESSAGE (OUTPATIENT)
Dept: PRIMARY CARE CLINIC | Facility: CLINIC | Age: 72
End: 2022-05-10
Payer: MEDICARE

## 2022-05-10 LAB
ASCENDING AORTA: 2.47 CM
AV INDEX (PROSTH): 0.79
AV MEAN GRADIENT: 6 MMHG
AV PEAK GRADIENT: 10 MMHG
AV VALVE AREA: 2.41 CM2
AV VELOCITY RATIO: 0.79
BSA FOR ECHO PROCEDURE: 1.61 M2
CV ECHO LV RWT: 0.37 CM
DOP CALC AO PEAK VEL: 1.56 M/S
DOP CALC AO VTI: 33.31 CM
DOP CALC LVOT AREA: 3 CM2
DOP CALC LVOT DIAMETER: 1.97 CM
DOP CALC LVOT PEAK VEL: 1.24 M/S
DOP CALC LVOT STROKE VOLUME: 80.28 CM3
DOP CALCLVOT PEAK VEL VTI: 26.35 CM
E WAVE DECELERATION TIME: 230.93 MSEC
E/A RATIO: 0.94
E/E' RATIO: 8.11 M/S
ECHO LV POSTERIOR WALL: 0.72 CM (ref 0.6–1.1)
EJECTION FRACTION: 65 %
FRACTIONAL SHORTENING: 33 % (ref 28–44)
INTERVENTRICULAR SEPTUM: 0.92 CM (ref 0.6–1.1)
LA MAJOR: 5.03 CM
LA MINOR: 4.75 CM
LA WIDTH: 3.37 CM
LEFT ATRIUM SIZE: 2.91 CM
LEFT ATRIUM VOLUME INDEX MOD: 32.5 ML/M2
LEFT ATRIUM VOLUME INDEX: 24.8 ML/M2
LEFT ATRIUM VOLUME MOD: 53.28 CM3
LEFT ATRIUM VOLUME: 40.73 CM3
LEFT INTERNAL DIMENSION IN SYSTOLE: 2.58 CM (ref 2.1–4)
LEFT VENTRICLE DIASTOLIC VOLUME INDEX: 39.29 ML/M2
LEFT VENTRICLE DIASTOLIC VOLUME: 64.43 ML
LEFT VENTRICLE MASS INDEX: 56 G/M2
LEFT VENTRICLE SYSTOLIC VOLUME INDEX: 14.7 ML/M2
LEFT VENTRICLE SYSTOLIC VOLUME: 24.13 ML
LEFT VENTRICULAR INTERNAL DIMENSION IN DIASTOLE: 3.86 CM (ref 3.5–6)
LEFT VENTRICULAR MASS: 91.17 G
LV LATERAL E/E' RATIO: 7.3 M/S
LV SEPTAL E/E' RATIO: 9.13 M/S
MV A" WAVE DURATION": 8.85 MSEC
MV PEAK A VEL: 0.78 M/S
MV PEAK E VEL: 0.73 M/S
MV STENOSIS PRESSURE HALF TIME: 66.97 MS
MV VALVE AREA P 1/2 METHOD: 3.29 CM2
PISA TR MAX VEL: 2.31 M/S
PULM VEIN S/D RATIO: 0.98
PV PEAK D VEL: 0.48 M/S
PV PEAK S VEL: 0.47 M/S
RA MAJOR: 4.56 CM
RA PRESSURE: 3 MMHG
RA WIDTH: 3.12 CM
RIGHT VENTRICULAR END-DIASTOLIC DIMENSION: 3.77 CM
RV TISSUE DOPPLER FREE WALL SYSTOLIC VELOCITY 1 (APICAL 4 CHAMBER VIEW): 11.05 CM/S
SINUS: 2.65 CM
STJ: 2.42 CM
TDI LATERAL: 0.1 M/S
TDI SEPTAL: 0.08 M/S
TDI: 0.09 M/S
TR MAX PG: 21 MMHG
TRICUSPID ANNULAR PLANE SYSTOLIC EXCURSION: 1.89 CM
TV REST PULMONARY ARTERY PRESSURE: 24 MMHG

## 2022-05-13 ENCOUNTER — OFFICE VISIT (OUTPATIENT)
Dept: CARDIOLOGY | Facility: CLINIC | Age: 72
End: 2022-05-13
Payer: MEDICARE

## 2022-05-13 VITALS — HEIGHT: 68 IN | WEIGHT: 130.81 LBS | BODY MASS INDEX: 19.82 KG/M2

## 2022-05-13 DIAGNOSIS — Z87.74 H/O ATRIAL SEPTAL DEFECT REPAIR: Primary | ICD-10-CM

## 2022-05-13 DIAGNOSIS — R07.89 ATYPICAL CHEST PAIN: ICD-10-CM

## 2022-05-13 DIAGNOSIS — R07.89 OTHER CHEST PAIN: ICD-10-CM

## 2022-05-13 PROCEDURE — 3044F PR MOST RECENT HEMOGLOBIN A1C LEVEL <7.0%: ICD-10-PCS | Mod: CPTII,S$GLB,, | Performed by: INTERNAL MEDICINE

## 2022-05-13 PROCEDURE — 3044F HG A1C LEVEL LT 7.0%: CPT | Mod: CPTII,S$GLB,, | Performed by: INTERNAL MEDICINE

## 2022-05-13 PROCEDURE — 1157F ADVNC CARE PLAN IN RCRD: CPT | Mod: CPTII,S$GLB,, | Performed by: INTERNAL MEDICINE

## 2022-05-13 PROCEDURE — 3288F PR FALLS RISK ASSESSMENT DOCUMENTED: ICD-10-PCS | Mod: CPTII,S$GLB,, | Performed by: INTERNAL MEDICINE

## 2022-05-13 PROCEDURE — 3008F PR BODY MASS INDEX (BMI) DOCUMENTED: ICD-10-PCS | Mod: CPTII,S$GLB,, | Performed by: INTERNAL MEDICINE

## 2022-05-13 PROCEDURE — 1126F PR PAIN SEVERITY QUANTIFIED, NO PAIN PRESENT: ICD-10-PCS | Mod: CPTII,S$GLB,, | Performed by: INTERNAL MEDICINE

## 2022-05-13 PROCEDURE — 1126F AMNT PAIN NOTED NONE PRSNT: CPT | Mod: CPTII,S$GLB,, | Performed by: INTERNAL MEDICINE

## 2022-05-13 PROCEDURE — 99204 OFFICE O/P NEW MOD 45 MIN: CPT | Mod: S$GLB,,, | Performed by: INTERNAL MEDICINE

## 2022-05-13 PROCEDURE — 1157F PR ADVANCE CARE PLAN OR EQUIV PRESENT IN MEDICAL RECORD: ICD-10-PCS | Mod: CPTII,S$GLB,, | Performed by: INTERNAL MEDICINE

## 2022-05-13 PROCEDURE — 3008F BODY MASS INDEX DOCD: CPT | Mod: CPTII,S$GLB,, | Performed by: INTERNAL MEDICINE

## 2022-05-13 PROCEDURE — 1101F PR PT FALLS ASSESS DOC 0-1 FALLS W/OUT INJ PAST YR: ICD-10-PCS | Mod: CPTII,S$GLB,, | Performed by: INTERNAL MEDICINE

## 2022-05-13 PROCEDURE — 99204 PR OFFICE/OUTPT VISIT, NEW, LEVL IV, 45-59 MIN: ICD-10-PCS | Mod: S$GLB,,, | Performed by: INTERNAL MEDICINE

## 2022-05-13 PROCEDURE — 99999 PR PBB SHADOW E&M-EST. PATIENT-LVL IV: CPT | Mod: PBBFAC,,, | Performed by: INTERNAL MEDICINE

## 2022-05-13 PROCEDURE — 1101F PT FALLS ASSESS-DOCD LE1/YR: CPT | Mod: CPTII,S$GLB,, | Performed by: INTERNAL MEDICINE

## 2022-05-13 PROCEDURE — 1159F MED LIST DOCD IN RCRD: CPT | Mod: CPTII,S$GLB,, | Performed by: INTERNAL MEDICINE

## 2022-05-13 PROCEDURE — 3288F FALL RISK ASSESSMENT DOCD: CPT | Mod: CPTII,S$GLB,, | Performed by: INTERNAL MEDICINE

## 2022-05-13 PROCEDURE — 99999 PR PBB SHADOW E&M-EST. PATIENT-LVL IV: ICD-10-PCS | Mod: PBBFAC,,, | Performed by: INTERNAL MEDICINE

## 2022-05-13 PROCEDURE — 1159F PR MEDICATION LIST DOCUMENTED IN MEDICAL RECORD: ICD-10-PCS | Mod: CPTII,S$GLB,, | Performed by: INTERNAL MEDICINE

## 2022-05-13 NOTE — PROGRESS NOTES
Cardiology    5/13/2022  2:56 PM    Problem list  Patient Active Problem List   Diagnosis    History of squamous cell carcinoma    H/O atrial septal defect repair    Osteoporosis    OAB (overactive bladder)    Closed Ortiz's fracture of right radius    Range of motion deficit    Wrist pain, right    Decreased  strength of right hand    Decreased ROM of right shoulder    Decreased strength of upper extremity    OA (osteoarthritis) of shoulder    Osteoarthritis of right knee    NS (nuclear sclerosis), bilateral    Nuclear sclerosis, bilateral    Other chest pain       CC:  CP    HPI:  For PCP for evaluation of chest pain.  She had her annual visit her PCP and she had episode of left-sided chest pain which was localized to the left of her sternum.  The pain was dull.  The pain was not associated with any nausea vomiting diaphoresis.  The pain was nonexertional.  She is very active.  She exercises by doing yoga and walking and riding her bike between 4-5 miles daily.  She does not have any history of hypertension or diabetes.  Family history includes coronary disease in her siblings in their 50s.  Patient has history of ASD repair in 1978. She had recent echocardiogram and EKG which results were discussed with her.    Medications  Current Outpatient Medications   Medication Sig Dispense Refill    biotin 1 mg tablet Take 5,000 mcg by mouth once daily.      calcium carbonate (CALCIUM 600 ORAL) Take 1,200 mg by mouth once daily.       cholecalciferol, vitamin D3, (VITAMIN D3) 50 mcg (2,000 unit) Cap Take 1,000 Units by mouth every other day.       DULoxetine (CYMBALTA) 20 MG capsule Take 2 capsules (40 mg total) by mouth once daily. 180 capsule 1    meloxicam (MOBIC) 15 MG tablet Take 1 tablet (15 mg total) by mouth once daily. 90 tablet 0    multivitamin (THERAGRAN) per tablet Take 1 tablet by mouth once daily.      ORTHOVISC 30 mg/2 mL Syrg       oxybutynin (DITROPAN-XL) 10 MG 24 hr  tablet Take 1 tablet (10 mg total) by mouth once daily. 90 tablet 4    raloxifene (EVISTA) 60 mg tablet Take 1 tablet (60 mg total) by mouth once daily. 90 tablet 3    clobetasoL (TEMOVATE) 0.05 % external solution Use on scalp one - two times daily as needed for scaling or itching (Patient not taking: Reported on 4/26/2022) 50 mL 3    prasterone, dhea, (INTRAROSA) 6.5 mg Inst Place 1 applicator vaginally every evening. (Patient not taking: No sig reported) 28 each 12     No current facility-administered medications for this visit.      Prior to Admission medications    Medication Sig Start Date End Date Taking? Authorizing Provider   biotin 1 mg tablet Take 5,000 mcg by mouth once daily.   Yes Historical Provider   calcium carbonate (CALCIUM 600 ORAL) Take 1,200 mg by mouth once daily.    Yes Historical Provider   cholecalciferol, vitamin D3, (VITAMIN D3) 50 mcg (2,000 unit) Cap Take 1,000 Units by mouth every other day.    Yes Historical Provider   DULoxetine (CYMBALTA) 20 MG capsule Take 2 capsules (40 mg total) by mouth once daily. 5/4/22  Yes Shelley Leija MD   meloxicam (MOBIC) 15 MG tablet Take 1 tablet (15 mg total) by mouth once daily. 8/20/21  Yes Jaelyn Gallardo NP   multivitamin (THERAGRAN) per tablet Take 1 tablet by mouth once daily.   Yes Historical Provider   ORTHOVISC 30 mg/2 mL Syrg  1/6/21  Yes Historical Provider   oxybutynin (DITROPAN-XL) 10 MG 24 hr tablet Take 1 tablet (10 mg total) by mouth once daily. 8/25/21  Yes Taisha Savage MD   raloxifene (EVISTA) 60 mg tablet Take 1 tablet (60 mg total) by mouth once daily. 2/22/22  Yes Noemi Florian MD   clobetasoL (TEMOVATE) 0.05 % external solution Use on scalp one - two times daily as needed for scaling or itching  Patient not taking: Reported on 4/26/2022 6/29/21   Ash Mcduffie MD   prasterone, dhea, (INTRAROSA) 6.5 mg Inst Place 1 applicator vaginally every evening.  Patient not taking: No sig reported 8/25/21   Taisha FERGUSON  MD Hussein         History  Past Medical History:   Diagnosis Date    Breast cyst     Cancer     SCC x 2 on arms    Cataract     OA (osteoarthritis) of shoulder 3/13/2019    OAB (overactive bladder) 3/8/2017    Osteoporosis 3/8/2017     Past Surgical History:   Procedure Laterality Date    ASD REPAIR      open heart surgery    BREAST BIOPSY      CATARACT EXTRACTION W/  INTRAOCULAR LENS IMPLANT Right 2021    Procedure: EXTRACTION, CATARACT, WITH IOL INSERTION;  Surgeon: Bruna Silva MD;  Location: [facility];  Service: Ophthalmology;  Laterality: Right;    CATARACT EXTRACTION W/  INTRAOCULAR LENS IMPLANT Left 2021    Procedure: EXTRACTION, CATARACT, WITH IOL INSERTION;  Surgeon: Bruna Silva MD;  Location: [facility];  Service: Ophthalmology;  Laterality: Left;     SECTION      x2    EYE SURGERY      KNEE SURGERY      meniscal repair    SKIN CANCER EXCISION       Social History     Socioeconomic History    Marital status:     Number of children: 2   Occupational History    Occupation: retired   Tobacco Use    Smoking status: Never Smoker    Smokeless tobacco: Never Used   Substance and Sexual Activity    Alcohol use: Yes     Comment: social    Drug use: Never    Sexual activity: Yes     Partners: Male     Birth control/protection: Post-menopausal     Social Determinants of Health     Financial Resource Strain: Low Risk     Difficulty of Paying Living Expenses: Not hard at all   Food Insecurity: No Food Insecurity    Worried About Running Out of Food in the Last Year: Never true    Ran Out of Food in the Last Year: Never true   Transportation Needs: No Transportation Needs    Lack of Transportation (Medical): No    Lack of Transportation (Non-Medical): No   Physical Activity: Sufficiently Active    Days of Exercise per Week: 7 days    Minutes of Exercise per Session: 60 min   Stress: No Stress Concern Present    Feeling of Stress : Not at all   Social Connections:  Unknown    Frequency of Communication with Friends and Family: More than three times a week    Frequency of Social Gatherings with Friends and Family: Twice a week    Active Member of Clubs or Organizations: No    Marital Status:    Housing Stability: Low Risk     Unable to Pay for Housing in the Last Year: No    Number of Places Lived in the Last Year: 1    Unstable Housing in the Last Year: No         Allergies  Review of patient's allergies indicates:   Allergen Reactions    Codeine Nausea Only         Review of Systems   Review of Systems   Constitutional: Negative for decreased appetite, fever and weight loss.   HENT: Negative for congestion and nosebleeds.    Eyes: Negative for double vision, vision loss in left eye, vision loss in right eye and visual disturbance.   Cardiovascular: Positive for chest pain. Negative for claudication, cyanosis, dyspnea on exertion, irregular heartbeat, leg swelling, near-syncope, orthopnea, palpitations, paroxysmal nocturnal dyspnea and syncope.   Respiratory: Negative for cough, hemoptysis, shortness of breath, sleep disturbances due to breathing, snoring, sputum production and wheezing.    Endocrine: Negative for cold intolerance and heat intolerance.   Skin: Negative for nail changes and rash.   Musculoskeletal: Negative for joint pain, muscle cramps, muscle weakness and myalgias.   Gastrointestinal: Negative for change in bowel habit, heartburn, hematemesis, hematochezia, hemorrhoids and melena.   Neurological: Negative for dizziness, focal weakness and headaches.         Physical Exam  Wt Readings from Last 1 Encounters:   05/13/22 59.3 kg (130 lb 12.8 oz)     BP Readings from Last 3 Encounters:   05/13/22 (P) 102/62   05/09/22 90/66   04/26/22 122/88     Pulse Readings from Last 1 Encounters:   05/13/22 (P) 78     Body mass index is 19.89 kg/m².    Physical Exam  Constitutional:       Appearance: She is well-developed.   HENT:      Head: Atraumatic.    Eyes:      General: No scleral icterus.  Neck:      Vascular: Normal carotid pulses. No carotid bruit, hepatojugular reflux or JVD.   Cardiovascular:      Rate and Rhythm: Normal rate and regular rhythm.      Chest Wall: PMI is not displaced.      Pulses: Intact distal pulses.           Carotid pulses are 2+ on the right side and 2+ on the left side.       Radial pulses are 2+ on the right side and 2+ on the left side.        Dorsalis pedis pulses are 2+ on the right side and 2+ on the left side.      Heart sounds: Normal heart sounds, S1 normal and S2 normal. No murmur heard.    No friction rub.      Comments: Sternal scar from ASD repair.  Pulmonary:      Effort: Pulmonary effort is normal. No respiratory distress.      Breath sounds: Normal breath sounds. No stridor. No wheezing or rales.   Chest:      Chest wall: No tenderness.   Abdominal:      General: Bowel sounds are normal.      Palpations: Abdomen is soft.   Musculoskeletal:      Cervical back: Neck supple. No edema.   Skin:     General: Skin is warm and dry.      Nails: There is no clubbing.   Neurological:      Mental Status: She is alert and oriented to person, place, and time.   Psychiatric:         Behavior: Behavior normal.         Thought Content: Thought content normal.             Assessment  1. Atypical chest pain  Being evaluated  - Ambulatory referral/consult to Cardiology  - Stress Echo Which stress agent will be used? Treadmill Exercise; Color Flow Doppler? No; Future    2. H/O atrial septal defect repair  stable    3. Other chest pain  Being evaluated        Plan and Discussion  Discussed with her that her symptoms are atypical.  Given her FH of CAD, will proceed with stress echo.    Follow Up  1 month      Nura Spangler MD, F.A.C.C, F.S.C.A.I.

## 2022-05-17 ENCOUNTER — TELEPHONE (OUTPATIENT)
Dept: OPTOMETRY | Facility: CLINIC | Age: 72
End: 2022-05-17
Payer: MEDICARE

## 2022-05-17 NOTE — TELEPHONE ENCOUNTER
----- Message from Lam Browne sent at 5/17/2022  2:17 PM CDT -----  Regarding: Speak with office  Contact: Dia Goff is calling to schedule annual appt.    [phone]

## 2022-05-25 ENCOUNTER — HOSPITAL ENCOUNTER (OUTPATIENT)
Dept: CARDIOLOGY | Facility: OTHER | Age: 72
Discharge: HOME | End: 2022-05-25
Attending: INTERNAL MEDICINE
Payer: MEDICARE

## 2022-05-25 VITALS
DIASTOLIC BLOOD PRESSURE: 70 MMHG | HEIGHT: 68 IN | WEIGHT: 130 LBS | BODY MASS INDEX: 19.7 KG/M2 | SYSTOLIC BLOOD PRESSURE: 122 MMHG | HEART RATE: 67 BPM

## 2022-05-25 DIAGNOSIS — R07.89 ATYPICAL CHEST PAIN: ICD-10-CM

## 2022-05-25 LAB
BSA FOR ECHO PROCEDURE: 1.68 M2
CV ECHO LV RWT: 0.39 CM
CV STRESS BASE HR: 67 BPM
DIASTOLIC BLOOD PRESSURE: 70 MMHG
ECHO LV POSTERIOR WALL: 0.8 CM (ref 0.6–1.1)
EJECTION FRACTION: 71 %
FRACTIONAL SHORTENING: 39 % (ref 28–44)
INTERVENTRICULAR SEPTUM: 0.9 CM (ref 0.6–1.1)
LEFT INTERNAL DIMENSION IN SYSTOLE: 2.5 CM (ref 2.1–4)
LEFT VENTRICLE MASS INDEX: 62 G/M2
LEFT VENTRICULAR INTERNAL DIMENSION IN DIASTOLE: 4.1 CM (ref 3.5–6)
LEFT VENTRICULAR MASS: 105.59 G
OHS CV CPX 1 MINUTE RECOVERY HEART RATE: 110 BPM
OHS CV CPX 85 PERCENT MAX PREDICTED HEART RATE MALE: 121
OHS CV CPX ESTIMATED METS: 13
OHS CV CPX MAX PREDICTED HEART RATE: 143
OHS CV CPX PATIENT IS FEMALE: 1
OHS CV CPX PATIENT IS MALE: 0
OHS CV CPX PEAK DIASTOLIC BLOOD PRESSURE: 62 MMHG
OHS CV CPX PEAK HEAR RATE: 144 BPM
OHS CV CPX PEAK RATE PRESSURE PRODUCT: NORMAL
OHS CV CPX PEAK SYSTOLIC BLOOD PRESSURE: 184 MMHG
OHS CV CPX PERCENT MAX PREDICTED HEART RATE ACHIEVED: 101
OHS CV CPX RATE PRESSURE PRODUCT PRESENTING: 8174
STRESS ANGINA INDEX: 0
STRESS DUKE TREADMILL SCORE: 11
STRESS ECHO POST EXERCISE DUR MIN: 10 MINUTES
STRESS ECHO POST EXERCISE DUR SEC: 31 SECONDS
STRESS ST DEPRESSION: 0 MM
STRESS ST ELEVATION: 0 MM
SYSTOLIC BLOOD PRESSURE: 122 MMHG

## 2022-05-25 PROCEDURE — 93351 STRESS TTE COMPLETE: CPT

## 2022-05-25 PROCEDURE — 93351 STRESS TTE COMPLETE: CPT | Mod: 26,,, | Performed by: INTERNAL MEDICINE

## 2022-05-25 PROCEDURE — 93351 STRESS ECHO (CUPID ONLY): ICD-10-PCS | Mod: 26,,, | Performed by: INTERNAL MEDICINE

## 2022-05-27 ENCOUNTER — OFFICE VISIT (OUTPATIENT)
Dept: CARDIOLOGY | Facility: CLINIC | Age: 72
End: 2022-05-27
Payer: MEDICARE

## 2022-05-27 VITALS
DIASTOLIC BLOOD PRESSURE: 68 MMHG | BODY MASS INDEX: 19.58 KG/M2 | SYSTOLIC BLOOD PRESSURE: 106 MMHG | WEIGHT: 129.19 LBS | HEART RATE: 87 BPM | OXYGEN SATURATION: 97 % | HEIGHT: 68 IN

## 2022-05-27 DIAGNOSIS — Z87.74 H/O ATRIAL SEPTAL DEFECT REPAIR: ICD-10-CM

## 2022-05-27 DIAGNOSIS — R07.89 OTHER CHEST PAIN: Primary | ICD-10-CM

## 2022-05-27 PROCEDURE — 1157F ADVNC CARE PLAN IN RCRD: CPT | Mod: CPTII,S$GLB,, | Performed by: INTERNAL MEDICINE

## 2022-05-27 PROCEDURE — 1126F AMNT PAIN NOTED NONE PRSNT: CPT | Mod: CPTII,S$GLB,, | Performed by: INTERNAL MEDICINE

## 2022-05-27 PROCEDURE — 1101F PR PT FALLS ASSESS DOC 0-1 FALLS W/OUT INJ PAST YR: ICD-10-PCS | Mod: CPTII,S$GLB,, | Performed by: INTERNAL MEDICINE

## 2022-05-27 PROCEDURE — 1159F PR MEDICATION LIST DOCUMENTED IN MEDICAL RECORD: ICD-10-PCS | Mod: CPTII,S$GLB,, | Performed by: INTERNAL MEDICINE

## 2022-05-27 PROCEDURE — 99999 PR PBB SHADOW E&M-EST. PATIENT-LVL III: CPT | Mod: PBBFAC,,, | Performed by: INTERNAL MEDICINE

## 2022-05-27 PROCEDURE — 99213 PR OFFICE/OUTPT VISIT, EST, LEVL III, 20-29 MIN: ICD-10-PCS | Mod: S$GLB,,, | Performed by: INTERNAL MEDICINE

## 2022-05-27 PROCEDURE — 3044F HG A1C LEVEL LT 7.0%: CPT | Mod: CPTII,S$GLB,, | Performed by: INTERNAL MEDICINE

## 2022-05-27 PROCEDURE — 1126F PR PAIN SEVERITY QUANTIFIED, NO PAIN PRESENT: ICD-10-PCS | Mod: CPTII,S$GLB,, | Performed by: INTERNAL MEDICINE

## 2022-05-27 PROCEDURE — 1157F PR ADVANCE CARE PLAN OR EQUIV PRESENT IN MEDICAL RECORD: ICD-10-PCS | Mod: CPTII,S$GLB,, | Performed by: INTERNAL MEDICINE

## 2022-05-27 PROCEDURE — 1101F PT FALLS ASSESS-DOCD LE1/YR: CPT | Mod: CPTII,S$GLB,, | Performed by: INTERNAL MEDICINE

## 2022-05-27 PROCEDURE — 3074F SYST BP LT 130 MM HG: CPT | Mod: CPTII,S$GLB,, | Performed by: INTERNAL MEDICINE

## 2022-05-27 PROCEDURE — 1159F MED LIST DOCD IN RCRD: CPT | Mod: CPTII,S$GLB,, | Performed by: INTERNAL MEDICINE

## 2022-05-27 PROCEDURE — 3008F BODY MASS INDEX DOCD: CPT | Mod: CPTII,S$GLB,, | Performed by: INTERNAL MEDICINE

## 2022-05-27 PROCEDURE — 3288F PR FALLS RISK ASSESSMENT DOCUMENTED: ICD-10-PCS | Mod: CPTII,S$GLB,, | Performed by: INTERNAL MEDICINE

## 2022-05-27 PROCEDURE — 3288F FALL RISK ASSESSMENT DOCD: CPT | Mod: CPTII,S$GLB,, | Performed by: INTERNAL MEDICINE

## 2022-05-27 PROCEDURE — 3074F PR MOST RECENT SYSTOLIC BLOOD PRESSURE < 130 MM HG: ICD-10-PCS | Mod: CPTII,S$GLB,, | Performed by: INTERNAL MEDICINE

## 2022-05-27 PROCEDURE — 3044F PR MOST RECENT HEMOGLOBIN A1C LEVEL <7.0%: ICD-10-PCS | Mod: CPTII,S$GLB,, | Performed by: INTERNAL MEDICINE

## 2022-05-27 PROCEDURE — 99999 PR PBB SHADOW E&M-EST. PATIENT-LVL III: ICD-10-PCS | Mod: PBBFAC,,, | Performed by: INTERNAL MEDICINE

## 2022-05-27 PROCEDURE — 3078F DIAST BP <80 MM HG: CPT | Mod: CPTII,S$GLB,, | Performed by: INTERNAL MEDICINE

## 2022-05-27 PROCEDURE — 99213 OFFICE O/P EST LOW 20 MIN: CPT | Mod: S$GLB,,, | Performed by: INTERNAL MEDICINE

## 2022-05-27 PROCEDURE — 3078F PR MOST RECENT DIASTOLIC BLOOD PRESSURE < 80 MM HG: ICD-10-PCS | Mod: CPTII,S$GLB,, | Performed by: INTERNAL MEDICINE

## 2022-05-27 PROCEDURE — 3008F PR BODY MASS INDEX (BMI) DOCUMENTED: ICD-10-PCS | Mod: CPTII,S$GLB,, | Performed by: INTERNAL MEDICINE

## 2022-05-27 NOTE — PROGRESS NOTES
Cardiology    5/27/2022  1:41 PM    Problem list  Patient Active Problem List   Diagnosis    History of squamous cell carcinoma    H/O atrial septal defect repair    Osteoporosis    OAB (overactive bladder)    Closed Ortiz's fracture of right radius    Range of motion deficit    Wrist pain, right    Decreased  strength of right hand    Decreased ROM of right shoulder    Decreased strength of upper extremity    OA (osteoarthritis) of shoulder    Osteoarthritis of right knee    NS (nuclear sclerosis), bilateral    Nuclear sclerosis, bilateral    Other chest pain       CC:  f/u    HPI:  Doing well.  Here to discuss stress echo which was negative for ischemia, no arrhythmias.      Medications  Current Outpatient Medications   Medication Sig Dispense Refill    biotin 1 mg tablet Take 5,000 mcg by mouth once daily.      calcium carbonate (CALCIUM 600 ORAL) Take 1,200 mg by mouth once daily.       cholecalciferol, vitamin D3, (VITAMIN D3) 50 mcg (2,000 unit) Cap Take 1,000 Units by mouth every other day.       clobetasoL (TEMOVATE) 0.05 % external solution Use on scalp one - two times daily as needed for scaling or itching 50 mL 3    DULoxetine (CYMBALTA) 20 MG capsule Take 2 capsules (40 mg total) by mouth once daily. 180 capsule 1    meloxicam (MOBIC) 15 MG tablet Take 1 tablet (15 mg total) by mouth once daily. 90 tablet 0    multivitamin (THERAGRAN) per tablet Take 1 tablet by mouth once daily.      ORTHOVISC 30 mg/2 mL Syrg       oxybutynin (DITROPAN-XL) 10 MG 24 hr tablet Take 1 tablet (10 mg total) by mouth once daily. 90 tablet 4    prasterone, dhea, (INTRAROSA) 6.5 mg Inst Place 1 applicator vaginally every evening. 28 each 12    raloxifene (EVISTA) 60 mg tablet Take 1 tablet (60 mg total) by mouth once daily. 90 tablet 3     No current facility-administered medications for this visit.      Prior to Admission medications    Medication Sig Start Date End Date Taking?  Authorizing Provider   biotin 1 mg tablet Take 5,000 mcg by mouth once daily.   Yes Historical Provider   calcium carbonate (CALCIUM 600 ORAL) Take 1,200 mg by mouth once daily.    Yes Historical Provider   cholecalciferol, vitamin D3, (VITAMIN D3) 50 mcg (2,000 unit) Cap Take 1,000 Units by mouth every other day.    Yes Historical Provider   clobetasoL (TEMOVATE) 0.05 % external solution Use on scalp one - two times daily as needed for scaling or itching 6/29/21  Yes Ash Mcduffie MD   DULoxetine (CYMBALTA) 20 MG capsule Take 2 capsules (40 mg total) by mouth once daily. 5/4/22  Yes Shelley Leija MD   meloxicam (MOBIC) 15 MG tablet Take 1 tablet (15 mg total) by mouth once daily. 8/20/21  Yes Jaelyn Gallardo NP   multivitamin (THERAGRAN) per tablet Take 1 tablet by mouth once daily.   Yes Historical Provider   ORTHOVISC 30 mg/2 mL Syrg  1/6/21  Yes Historical Provider   oxybutynin (DITROPAN-XL) 10 MG 24 hr tablet Take 1 tablet (10 mg total) by mouth once daily. 8/25/21  Yes Taisha Savage MD   prasterone, dhea, (INTRAROSA) 6.5 mg Inst Place 1 applicator vaginally every evening. 8/25/21  Yes Taisha Savage MD   raloxifene (EVISTA) 60 mg tablet Take 1 tablet (60 mg total) by mouth once daily. 2/22/22  Yes Noemi Florian MD         History  Past Medical History:   Diagnosis Date    Breast cyst     Cancer     SCC x 2 on arms    Cataract     OA (osteoarthritis) of shoulder 3/13/2019    OAB (overactive bladder) 3/8/2017    Osteoporosis 3/8/2017     Past Surgical History:   Procedure Laterality Date    ASD REPAIR      open heart surgery    BREAST BIOPSY      CATARACT EXTRACTION W/  INTRAOCULAR LENS IMPLANT Right 4/19/2021    Procedure: EXTRACTION, CATARACT, WITH IOL INSERTION;  Surgeon: Bruna Silva MD;  Location: [facility];  Service: Ophthalmology;  Laterality: Right;    CATARACT EXTRACTION W/  INTRAOCULAR LENS IMPLANT Left 5/17/2021    Procedure: EXTRACTION, CATARACT, WITH IOL INSERTION;   Surgeon: Bruna Silva MD;  Location: [facility];  Service: Ophthalmology;  Laterality: Left;     SECTION      x2    EYE SURGERY      KNEE SURGERY      meniscal repair    SKIN CANCER EXCISION       Social History     Socioeconomic History    Marital status:     Number of children: 2   Occupational History    Occupation: retired   Tobacco Use    Smoking status: Never Smoker    Smokeless tobacco: Never Used   Substance and Sexual Activity    Alcohol use: Yes     Comment: social    Drug use: Never    Sexual activity: Yes     Partners: Male     Birth control/protection: Post-menopausal     Social Determinants of Health     Financial Resource Strain: Low Risk     Difficulty of Paying Living Expenses: Not hard at all   Food Insecurity: No Food Insecurity    Worried About Running Out of Food in the Last Year: Never true    Ran Out of Food in the Last Year: Never true   Transportation Needs: No Transportation Needs    Lack of Transportation (Medical): No    Lack of Transportation (Non-Medical): No   Physical Activity: Sufficiently Active    Days of Exercise per Week: 7 days    Minutes of Exercise per Session: 60 min   Stress: No Stress Concern Present    Feeling of Stress : Not at all   Social Connections: Unknown    Frequency of Communication with Friends and Family: More than three times a week    Frequency of Social Gatherings with Friends and Family: Twice a week    Active Member of Clubs or Organizations: No    Marital Status:    Housing Stability: Low Risk     Unable to Pay for Housing in the Last Year: No    Number of Places Lived in the Last Year: 1    Unstable Housing in the Last Year: No         Allergies  Review of patient's allergies indicates:   Allergen Reactions    Codeine Nausea Only         Review of Systems   Review of Systems   Constitutional: Negative for decreased appetite, fever and weight loss.   HENT: Negative for congestion and nosebleeds.    Eyes:  Negative for double vision, vision loss in left eye, vision loss in right eye and visual disturbance.   Cardiovascular: Negative for chest pain, claudication, cyanosis, dyspnea on exertion, irregular heartbeat, leg swelling, near-syncope, orthopnea, palpitations, paroxysmal nocturnal dyspnea and syncope.   Respiratory: Negative for cough, hemoptysis, shortness of breath, sleep disturbances due to breathing, snoring, sputum production and wheezing.    Endocrine: Negative for cold intolerance and heat intolerance.   Skin: Negative for nail changes and rash.   Musculoskeletal: Negative for joint pain, muscle cramps, muscle weakness and myalgias.   Gastrointestinal: Negative for change in bowel habit, heartburn, hematemesis, hematochezia, hemorrhoids and melena.   Neurological: Negative for dizziness, focal weakness and headaches.         Physical Exam  Wt Readings from Last 1 Encounters:   05/27/22 58.6 kg (129 lb 3.2 oz)     BP Readings from Last 3 Encounters:   05/27/22 106/68   05/25/22 122/70   05/13/22 (P) 102/62     Pulse Readings from Last 1 Encounters:   05/27/22 87     Body mass index is 19.64 kg/m².    Physical Exam  Constitutional:       Appearance: She is well-developed.   HENT:      Head: Atraumatic.   Eyes:      General: No scleral icterus.  Neck:      Vascular: Normal carotid pulses. No carotid bruit, hepatojugular reflux or JVD.   Cardiovascular:      Rate and Rhythm: Normal rate and regular rhythm.      Chest Wall: PMI is not displaced.      Pulses: Intact distal pulses.           Carotid pulses are 2+ on the right side and 2+ on the left side.       Radial pulses are 2+ on the right side and 2+ on the left side.        Dorsalis pedis pulses are 2+ on the right side and 2+ on the left side.      Heart sounds: Normal heart sounds, S1 normal and S2 normal. No murmur heard.    No friction rub.      Comments: Sternal scar from ASD repair.  Pulmonary:      Effort: Pulmonary effort is normal. No respiratory  distress.      Breath sounds: Normal breath sounds. No stridor. No wheezing or rales.   Chest:      Chest wall: No tenderness.   Abdominal:      General: Bowel sounds are normal.      Palpations: Abdomen is soft.   Musculoskeletal:      Cervical back: Neck supple. No edema.   Skin:     General: Skin is warm and dry.      Nails: There is no clubbing.   Neurological:      Mental Status: She is alert and oriented to person, place, and time.   Psychiatric:         Behavior: Behavior normal.         Thought Content: Thought content normal.             Assessment  1. Other chest pain  Non-cardiac    2. H/O atrial septal defect repair  stable        Plan and Discussion  Continue diet and exercise regimen.  No further cardiac testing at this time.    Follow Up  CRISTINEN      Nura Spangler MD, F.A.C.C, F.S.C.A.I.

## 2022-06-23 ENCOUNTER — OFFICE VISIT (OUTPATIENT)
Dept: OPTOMETRY | Facility: CLINIC | Age: 72
End: 2022-06-23
Payer: MEDICARE

## 2022-06-23 ENCOUNTER — TELEPHONE (OUTPATIENT)
Dept: OPHTHALMOLOGY | Facility: CLINIC | Age: 72
End: 2022-06-23
Payer: MEDICARE

## 2022-06-23 DIAGNOSIS — H26.493 BILATERAL POSTERIOR CAPSULAR OPACIFICATION: Primary | ICD-10-CM

## 2022-06-23 DIAGNOSIS — H52.4 BILATERAL PRESBYOPIA: ICD-10-CM

## 2022-06-23 DIAGNOSIS — Z96.1 PSEUDOPHAKIA: ICD-10-CM

## 2022-06-23 DIAGNOSIS — H02.9 LESION OF EYELID: ICD-10-CM

## 2022-06-23 DIAGNOSIS — Z46.0 FITTING AND ADJUSTMENT OF SPECTACLES AND CONTACT LENSES: Primary | ICD-10-CM

## 2022-06-23 PROCEDURE — 1157F ADVNC CARE PLAN IN RCRD: CPT | Mod: CPTII,S$GLB,, | Performed by: OPTOMETRIST

## 2022-06-23 PROCEDURE — 99999 PR PBB SHADOW E&M-EST. PATIENT-LVL I: ICD-10-PCS | Mod: PBBFAC,,, | Performed by: OPTOMETRIST

## 2022-06-23 PROCEDURE — 99999 PR PBB SHADOW E&M-EST. PATIENT-LVL III: ICD-10-PCS | Mod: PBBFAC,,, | Performed by: OPTOMETRIST

## 2022-06-23 PROCEDURE — 99999 PR PBB SHADOW E&M-EST. PATIENT-LVL I: CPT | Mod: PBBFAC,,, | Performed by: OPTOMETRIST

## 2022-06-23 PROCEDURE — 3288F PR FALLS RISK ASSESSMENT DOCUMENTED: ICD-10-PCS | Mod: CPTII,S$GLB,, | Performed by: OPTOMETRIST

## 2022-06-23 PROCEDURE — 92014 COMPRE OPH EXAM EST PT 1/>: CPT | Mod: S$GLB,,, | Performed by: OPTOMETRIST

## 2022-06-23 PROCEDURE — 3044F HG A1C LEVEL LT 7.0%: CPT | Mod: CPTII,S$GLB,, | Performed by: OPTOMETRIST

## 2022-06-23 PROCEDURE — 1159F PR MEDICATION LIST DOCUMENTED IN MEDICAL RECORD: ICD-10-PCS | Mod: CPTII,S$GLB,, | Performed by: OPTOMETRIST

## 2022-06-23 PROCEDURE — 99999 PR PBB SHADOW E&M-EST. PATIENT-LVL III: CPT | Mod: PBBFAC,,, | Performed by: OPTOMETRIST

## 2022-06-23 PROCEDURE — 1160F PR REVIEW ALL MEDS BY PRESCRIBER/CLIN PHARMACIST DOCUMENTED: ICD-10-PCS | Mod: CPTII,S$GLB,, | Performed by: OPTOMETRIST

## 2022-06-23 PROCEDURE — 1160F RVW MEDS BY RX/DR IN RCRD: CPT | Mod: CPTII,S$GLB,, | Performed by: OPTOMETRIST

## 2022-06-23 PROCEDURE — 1101F PT FALLS ASSESS-DOCD LE1/YR: CPT | Mod: CPTII,S$GLB,, | Performed by: OPTOMETRIST

## 2022-06-23 PROCEDURE — 1101F PR PT FALLS ASSESS DOC 0-1 FALLS W/OUT INJ PAST YR: ICD-10-PCS | Mod: CPTII,S$GLB,, | Performed by: OPTOMETRIST

## 2022-06-23 PROCEDURE — 92015 DETERMINE REFRACTIVE STATE: CPT | Mod: S$GLB,,, | Performed by: OPTOMETRIST

## 2022-06-23 PROCEDURE — 92310 PR CONTACT LENS FITTING (NO CHANGE): ICD-10-PCS | Mod: CSM,,, | Performed by: OPTOMETRIST

## 2022-06-23 PROCEDURE — 1157F PR ADVANCE CARE PLAN OR EQUIV PRESENT IN MEDICAL RECORD: ICD-10-PCS | Mod: CPTII,S$GLB,, | Performed by: OPTOMETRIST

## 2022-06-23 PROCEDURE — 92015 PR REFRACTION: ICD-10-PCS | Mod: S$GLB,,, | Performed by: OPTOMETRIST

## 2022-06-23 PROCEDURE — 92014 PR EYE EXAM, EST PATIENT,COMPREHESV: ICD-10-PCS | Mod: S$GLB,,, | Performed by: OPTOMETRIST

## 2022-06-23 PROCEDURE — 92310 CONTACT LENS FITTING OU: CPT | Mod: CSM,,, | Performed by: OPTOMETRIST

## 2022-06-23 PROCEDURE — 1159F MED LIST DOCD IN RCRD: CPT | Mod: CPTII,S$GLB,, | Performed by: OPTOMETRIST

## 2022-06-23 PROCEDURE — 3288F FALL RISK ASSESSMENT DOCD: CPT | Mod: CPTII,S$GLB,, | Performed by: OPTOMETRIST

## 2022-06-23 PROCEDURE — 3044F PR MOST RECENT HEMOGLOBIN A1C LEVEL <7.0%: ICD-10-PCS | Mod: CPTII,S$GLB,, | Performed by: OPTOMETRIST

## 2022-06-23 PROCEDURE — 1126F PR PAIN SEVERITY QUANTIFIED, NO PAIN PRESENT: ICD-10-PCS | Mod: CPTII,S$GLB,, | Performed by: OPTOMETRIST

## 2022-06-23 PROCEDURE — 1126F AMNT PAIN NOTED NONE PRSNT: CPT | Mod: CPTII,S$GLB,, | Performed by: OPTOMETRIST

## 2022-06-23 NOTE — TELEPHONE ENCOUNTER
----- Message from Quita Forrester sent at 6/23/2022  3:51 PM CDT -----  Contact: [phone]  Patient is returning a phone call to schedule from referral.   Please contact patient to discuss at [phone].

## 2022-06-23 NOTE — PROGRESS NOTES
HPI     NOREEN: 06/21 with Dr. Edmondson  Chief complaint (CC): patient is here for annual eye exam today.  Patient   had cataract surgery and distance vision is fine without glasses. Wears   OTC +1.25 for near, seems to work fine but patient may want contact in one   eye for reading.  Glasses? +1.25 OTC  Contacts? -  H/o eye surgery, injections or laser: PC IOL OU  H/o eye injury: -  Known eye conditions? See above  Family h/o eye conditions? -  Eye gtts? AT's for dryness in the morning      (-) Flashes (-)  Floaters (-) Mucous   (-)  Tearing (-) Itching (-) Burning   (-) Headaches (-) Eye Pain/discomfort (-) Irritation   (-)  Redness (-) Double vision (-) Blurry vision    Diabetic? -  A1c? -        Last edited by Delia Proctor on 6/23/2022  1:13 PM. (History)            Assessment /Plan     For exam results, see Encounter Report.    Bilateral posterior capsular opacification  -     Ambulatory referral/consult to Ophthalmology; Future; Expected date: 06/30/2022    Pseudophakia  -     Ambulatory referral/consult to Ophthalmology; Future; Expected date: 06/30/2022    Bilateral presbyopia    Lesion of eyelid      1-2. Pt has noted a shift in reading vision over the past few months. According to Dr Edmondson, Pt's PCIOLs are intermediate lenses so she can expect to not see as well at near. Question of role that PCO is playing. Refer to Dr Silva.   3. CLRx and SRx released to patient. Patient educated on lens options. Normal ocular health. RTC 1 year for routine exam.   4. Longstanding. Monitor.

## 2022-07-07 ENCOUNTER — TELEPHONE (OUTPATIENT)
Dept: PRIMARY CARE CLINIC | Facility: CLINIC | Age: 72
End: 2022-07-07
Payer: MEDICARE

## 2022-08-01 ENCOUNTER — PATIENT MESSAGE (OUTPATIENT)
Dept: OPTOMETRY | Facility: CLINIC | Age: 72
End: 2022-08-01
Payer: MEDICARE

## 2022-08-12 ENCOUNTER — OFFICE VISIT (OUTPATIENT)
Dept: OPHTHALMOLOGY | Facility: CLINIC | Age: 72
End: 2022-08-12
Attending: OPHTHALMOLOGY
Payer: MEDICARE

## 2022-08-12 DIAGNOSIS — Z96.1 PSEUDOPHAKIA: ICD-10-CM

## 2022-08-12 DIAGNOSIS — H26.493 BILATERAL POSTERIOR CAPSULAR OPACIFICATION: ICD-10-CM

## 2022-08-12 PROCEDURE — 1101F PT FALLS ASSESS-DOCD LE1/YR: CPT | Mod: CPTII,S$GLB,, | Performed by: OPHTHALMOLOGY

## 2022-08-12 PROCEDURE — 3044F HG A1C LEVEL LT 7.0%: CPT | Mod: CPTII,S$GLB,, | Performed by: OPHTHALMOLOGY

## 2022-08-12 PROCEDURE — 3044F PR MOST RECENT HEMOGLOBIN A1C LEVEL <7.0%: ICD-10-PCS | Mod: CPTII,S$GLB,, | Performed by: OPHTHALMOLOGY

## 2022-08-12 PROCEDURE — 3288F PR FALLS RISK ASSESSMENT DOCUMENTED: ICD-10-PCS | Mod: CPTII,S$GLB,, | Performed by: OPHTHALMOLOGY

## 2022-08-12 PROCEDURE — 1101F PR PT FALLS ASSESS DOC 0-1 FALLS W/OUT INJ PAST YR: ICD-10-PCS | Mod: CPTII,S$GLB,, | Performed by: OPHTHALMOLOGY

## 2022-08-12 PROCEDURE — 1126F PR PAIN SEVERITY QUANTIFIED, NO PAIN PRESENT: ICD-10-PCS | Mod: CPTII,S$GLB,, | Performed by: OPHTHALMOLOGY

## 2022-08-12 PROCEDURE — 1160F PR REVIEW ALL MEDS BY PRESCRIBER/CLIN PHARMACIST DOCUMENTED: ICD-10-PCS | Mod: CPTII,S$GLB,, | Performed by: OPHTHALMOLOGY

## 2022-08-12 PROCEDURE — 1159F PR MEDICATION LIST DOCUMENTED IN MEDICAL RECORD: ICD-10-PCS | Mod: CPTII,S$GLB,, | Performed by: OPHTHALMOLOGY

## 2022-08-12 PROCEDURE — 99999 PR PBB SHADOW E&M-EST. PATIENT-LVL III: CPT | Mod: PBBFAC,,, | Performed by: OPHTHALMOLOGY

## 2022-08-12 PROCEDURE — 92014 COMPRE OPH EXAM EST PT 1/>: CPT | Mod: 57,S$GLB,, | Performed by: OPHTHALMOLOGY

## 2022-08-12 PROCEDURE — 92014 PR EYE EXAM, EST PATIENT,COMPREHESV: ICD-10-PCS | Mod: 57,S$GLB,, | Performed by: OPHTHALMOLOGY

## 2022-08-12 PROCEDURE — 66821 PR DISCISSION,2ND CATARACT,LASER: ICD-10-PCS | Mod: 50,S$GLB,, | Performed by: OPHTHALMOLOGY

## 2022-08-12 PROCEDURE — 1160F RVW MEDS BY RX/DR IN RCRD: CPT | Mod: CPTII,S$GLB,, | Performed by: OPHTHALMOLOGY

## 2022-08-12 PROCEDURE — 1159F MED LIST DOCD IN RCRD: CPT | Mod: CPTII,S$GLB,, | Performed by: OPHTHALMOLOGY

## 2022-08-12 PROCEDURE — 3288F FALL RISK ASSESSMENT DOCD: CPT | Mod: CPTII,S$GLB,, | Performed by: OPHTHALMOLOGY

## 2022-08-12 PROCEDURE — 1126F AMNT PAIN NOTED NONE PRSNT: CPT | Mod: CPTII,S$GLB,, | Performed by: OPHTHALMOLOGY

## 2022-08-12 PROCEDURE — 66821 AFTER CATARACT LASER SURGERY: CPT | Mod: 50,S$GLB,, | Performed by: OPHTHALMOLOGY

## 2022-08-12 PROCEDURE — 1157F ADVNC CARE PLAN IN RCRD: CPT | Mod: CPTII,S$GLB,, | Performed by: OPHTHALMOLOGY

## 2022-08-12 PROCEDURE — 99999 PR PBB SHADOW E&M-EST. PATIENT-LVL III: ICD-10-PCS | Mod: PBBFAC,,, | Performed by: OPHTHALMOLOGY

## 2022-08-12 PROCEDURE — 1157F PR ADVANCE CARE PLAN OR EQUIV PRESENT IN MEDICAL RECORD: ICD-10-PCS | Mod: CPTII,S$GLB,, | Performed by: OPHTHALMOLOGY

## 2022-08-12 NOTE — PROGRESS NOTES
HPI     Patient presents today for a YAG Evaluation OU. Patient notes vision as   blurry. Patient notes difficulty with glare. Patient notes no eye pain.     Last edited by Jeremy Lara on 8/12/2022  3:19 PM. (History)            Assessment /Plan     For exam results, see Encounter Report.    Pseudophakia  -     Ambulatory referral/consult to Ophthalmology    Bilateral posterior capsular opacification  -     Ambulatory referral/consult to Ophthalmology      Visually significant posterior capsular opacity present.  Discussed risks, benefits, and alternatives to laser surgery.  YAG laser capsulotomy Procedure Note:   Informed consent obtained and correct eye(s) verified with patient.  1 drop of topical Proparacaine and Iopidine instilled, and eye(s) dilated with 1% Tropicamide 2.5% Phenylephrine.  YAG laser applied to posterior capsule in cruciate pattern OU  Patient tolerated procedure well. No complications. Follow up in 1 month/PRN.  OU

## 2022-09-12 DIAGNOSIS — M17.11 PRIMARY OSTEOARTHRITIS OF RIGHT KNEE: Primary | ICD-10-CM

## 2022-09-12 NOTE — PROGRESS NOTES
Dia Ovalles has primary osteoarthritis of right knee. 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 Patient has had orthovisc injections in the past with excellent relief and would like to repeat. Will order and schedule 30 mg weekly for three weeks.

## 2022-09-22 ENCOUNTER — IMMUNIZATION (OUTPATIENT)
Dept: INTERNAL MEDICINE | Facility: CLINIC | Age: 72
End: 2022-09-22
Payer: MEDICARE

## 2022-09-22 DIAGNOSIS — Z23 NEED FOR VACCINATION: Primary | ICD-10-CM

## 2022-09-22 PROCEDURE — 91312 COVID-19, MRNA, LNP-S, BIVALENT BOOSTER, PF, 30 MCG/0.3 ML DOSE: ICD-10-PCS | Mod: S$GLB,,, | Performed by: INTERNAL MEDICINE

## 2022-09-22 PROCEDURE — 0124A COVID-19, MRNA, LNP-S, BIVALENT BOOSTER, PF, 30 MCG/0.3 ML DOSE: CPT | Mod: PBBFAC | Performed by: INTERNAL MEDICINE

## 2022-09-22 PROCEDURE — 91312 COVID-19, MRNA, LNP-S, BIVALENT BOOSTER, PF, 30 MCG/0.3 ML DOSE: CPT | Mod: S$GLB,,, | Performed by: INTERNAL MEDICINE

## 2022-09-28 ENCOUNTER — OFFICE VISIT (OUTPATIENT)
Dept: ORTHOPEDICS | Facility: CLINIC | Age: 72
End: 2022-09-28
Payer: MEDICARE

## 2022-09-28 DIAGNOSIS — M17.11 PRIMARY OSTEOARTHRITIS OF RIGHT KNEE: Primary | ICD-10-CM

## 2022-09-28 PROCEDURE — 99999 PR PBB SHADOW E&M-EST. PATIENT-LVL III: ICD-10-PCS | Mod: PBBFAC,,, | Performed by: NURSE PRACTITIONER

## 2022-09-28 PROCEDURE — 3044F PR MOST RECENT HEMOGLOBIN A1C LEVEL <7.0%: ICD-10-PCS | Mod: CPTII,S$GLB,, | Performed by: NURSE PRACTITIONER

## 2022-09-28 PROCEDURE — 20610 DRAIN/INJ JOINT/BURSA W/O US: CPT | Mod: RT,S$GLB,, | Performed by: NURSE PRACTITIONER

## 2022-09-28 PROCEDURE — 1160F RVW MEDS BY RX/DR IN RCRD: CPT | Mod: CPTII,S$GLB,, | Performed by: NURSE PRACTITIONER

## 2022-09-28 PROCEDURE — 20610 PR DRAIN/INJECT LARGE JOINT/BURSA: ICD-10-PCS | Mod: RT,S$GLB,, | Performed by: NURSE PRACTITIONER

## 2022-09-28 PROCEDURE — 1157F PR ADVANCE CARE PLAN OR EQUIV PRESENT IN MEDICAL RECORD: ICD-10-PCS | Mod: CPTII,S$GLB,, | Performed by: NURSE PRACTITIONER

## 2022-09-28 PROCEDURE — 1157F ADVNC CARE PLAN IN RCRD: CPT | Mod: CPTII,S$GLB,, | Performed by: NURSE PRACTITIONER

## 2022-09-28 PROCEDURE — 3044F HG A1C LEVEL LT 7.0%: CPT | Mod: CPTII,S$GLB,, | Performed by: NURSE PRACTITIONER

## 2022-09-28 PROCEDURE — 99999 PR PBB SHADOW E&M-EST. PATIENT-LVL III: CPT | Mod: PBBFAC,,, | Performed by: NURSE PRACTITIONER

## 2022-09-28 PROCEDURE — 1159F PR MEDICATION LIST DOCUMENTED IN MEDICAL RECORD: ICD-10-PCS | Mod: CPTII,S$GLB,, | Performed by: NURSE PRACTITIONER

## 2022-09-28 PROCEDURE — 99499 NO LOS: ICD-10-PCS | Mod: S$GLB,,, | Performed by: NURSE PRACTITIONER

## 2022-09-28 PROCEDURE — 99499 UNLISTED E&M SERVICE: CPT | Mod: S$GLB,,, | Performed by: NURSE PRACTITIONER

## 2022-09-28 PROCEDURE — 1159F MED LIST DOCD IN RCRD: CPT | Mod: CPTII,S$GLB,, | Performed by: NURSE PRACTITIONER

## 2022-09-28 PROCEDURE — 1160F PR REVIEW ALL MEDS BY PRESCRIBER/CLIN PHARMACIST DOCUMENTED: ICD-10-PCS | Mod: CPTII,S$GLB,, | Performed by: NURSE PRACTITIONER

## 2022-09-28 NOTE — PROGRESS NOTES
Dia Ovalles presents to clinic today for the first right knee Orthovisc injection.     Exam demonstrates the no effusion in the  right knee, and the skin is intact.    Radiographs reveal degenerative changes of knee    Diagnosis: primary osteoarthritis right knee    After time out was performed and patient ID, side, and site were verified, the  right  knee was sterilly prepped in the standard fashion.  A 22-gauge needle was introduced into right knee joint from an danielle-lateral site without complication and knee was then injected with 2 ml of Orthovisc  Sterile dressing was applied.  The patient was instructed to resume activities as tolerated and to call with any problems.     Patient will return next week for the second injection.

## 2022-10-03 ENCOUNTER — OFFICE VISIT (OUTPATIENT)
Dept: OBSTETRICS AND GYNECOLOGY | Facility: CLINIC | Age: 72
End: 2022-10-03
Payer: MEDICARE

## 2022-10-03 VITALS
DIASTOLIC BLOOD PRESSURE: 78 MMHG | HEIGHT: 68 IN | WEIGHT: 133.38 LBS | BODY MASS INDEX: 20.21 KG/M2 | SYSTOLIC BLOOD PRESSURE: 106 MMHG

## 2022-10-03 DIAGNOSIS — Z01.419 ENCOUNTER FOR GYNECOLOGICAL EXAMINATION WITHOUT ABNORMAL FINDING: Primary | ICD-10-CM

## 2022-10-03 DIAGNOSIS — Z78.0 POSTMENOPAUSAL: ICD-10-CM

## 2022-10-03 DIAGNOSIS — N95.2 VAGINAL ATROPHY: ICD-10-CM

## 2022-10-03 PROCEDURE — 3074F SYST BP LT 130 MM HG: CPT | Mod: CPTII,S$GLB,, | Performed by: OBSTETRICS & GYNECOLOGY

## 2022-10-03 PROCEDURE — 1101F PR PT FALLS ASSESS DOC 0-1 FALLS W/OUT INJ PAST YR: ICD-10-PCS | Mod: CPTII,S$GLB,, | Performed by: OBSTETRICS & GYNECOLOGY

## 2022-10-03 PROCEDURE — 1126F AMNT PAIN NOTED NONE PRSNT: CPT | Mod: CPTII,S$GLB,, | Performed by: OBSTETRICS & GYNECOLOGY

## 2022-10-03 PROCEDURE — 3288F FALL RISK ASSESSMENT DOCD: CPT | Mod: CPTII,S$GLB,, | Performed by: OBSTETRICS & GYNECOLOGY

## 2022-10-03 PROCEDURE — 1159F MED LIST DOCD IN RCRD: CPT | Mod: CPTII,S$GLB,, | Performed by: OBSTETRICS & GYNECOLOGY

## 2022-10-03 PROCEDURE — 3044F PR MOST RECENT HEMOGLOBIN A1C LEVEL <7.0%: ICD-10-PCS | Mod: CPTII,S$GLB,, | Performed by: OBSTETRICS & GYNECOLOGY

## 2022-10-03 PROCEDURE — 3044F HG A1C LEVEL LT 7.0%: CPT | Mod: CPTII,S$GLB,, | Performed by: OBSTETRICS & GYNECOLOGY

## 2022-10-03 PROCEDURE — G0101 CA SCREEN;PELVIC/BREAST EXAM: HCPCS | Mod: GZ,S$GLB,, | Performed by: OBSTETRICS & GYNECOLOGY

## 2022-10-03 PROCEDURE — 1101F PT FALLS ASSESS-DOCD LE1/YR: CPT | Mod: CPTII,S$GLB,, | Performed by: OBSTETRICS & GYNECOLOGY

## 2022-10-03 PROCEDURE — 3078F DIAST BP <80 MM HG: CPT | Mod: CPTII,S$GLB,, | Performed by: OBSTETRICS & GYNECOLOGY

## 2022-10-03 PROCEDURE — 99999 PR PBB SHADOW E&M-EST. PATIENT-LVL III: CPT | Mod: PBBFAC,,, | Performed by: OBSTETRICS & GYNECOLOGY

## 2022-10-03 PROCEDURE — 99999 PR PBB SHADOW E&M-EST. PATIENT-LVL III: ICD-10-PCS | Mod: PBBFAC,,, | Performed by: OBSTETRICS & GYNECOLOGY

## 2022-10-03 PROCEDURE — 1157F ADVNC CARE PLAN IN RCRD: CPT | Mod: CPTII,S$GLB,, | Performed by: OBSTETRICS & GYNECOLOGY

## 2022-10-03 PROCEDURE — 3078F PR MOST RECENT DIASTOLIC BLOOD PRESSURE < 80 MM HG: ICD-10-PCS | Mod: CPTII,S$GLB,, | Performed by: OBSTETRICS & GYNECOLOGY

## 2022-10-03 PROCEDURE — G0101 PR CA SCREEN;PELVIC/BREAST EXAM: ICD-10-PCS | Mod: GZ,S$GLB,, | Performed by: OBSTETRICS & GYNECOLOGY

## 2022-10-03 PROCEDURE — 3288F PR FALLS RISK ASSESSMENT DOCUMENTED: ICD-10-PCS | Mod: CPTII,S$GLB,, | Performed by: OBSTETRICS & GYNECOLOGY

## 2022-10-03 PROCEDURE — 1159F PR MEDICATION LIST DOCUMENTED IN MEDICAL RECORD: ICD-10-PCS | Mod: CPTII,S$GLB,, | Performed by: OBSTETRICS & GYNECOLOGY

## 2022-10-03 PROCEDURE — 3008F PR BODY MASS INDEX (BMI) DOCUMENTED: ICD-10-PCS | Mod: CPTII,S$GLB,, | Performed by: OBSTETRICS & GYNECOLOGY

## 2022-10-03 PROCEDURE — 3074F PR MOST RECENT SYSTOLIC BLOOD PRESSURE < 130 MM HG: ICD-10-PCS | Mod: CPTII,S$GLB,, | Performed by: OBSTETRICS & GYNECOLOGY

## 2022-10-03 PROCEDURE — 1126F PR PAIN SEVERITY QUANTIFIED, NO PAIN PRESENT: ICD-10-PCS | Mod: CPTII,S$GLB,, | Performed by: OBSTETRICS & GYNECOLOGY

## 2022-10-03 PROCEDURE — 1160F PR REVIEW ALL MEDS BY PRESCRIBER/CLIN PHARMACIST DOCUMENTED: ICD-10-PCS | Mod: CPTII,S$GLB,, | Performed by: OBSTETRICS & GYNECOLOGY

## 2022-10-03 PROCEDURE — 1157F PR ADVANCE CARE PLAN OR EQUIV PRESENT IN MEDICAL RECORD: ICD-10-PCS | Mod: CPTII,S$GLB,, | Performed by: OBSTETRICS & GYNECOLOGY

## 2022-10-03 PROCEDURE — 1160F RVW MEDS BY RX/DR IN RCRD: CPT | Mod: CPTII,S$GLB,, | Performed by: OBSTETRICS & GYNECOLOGY

## 2022-10-03 PROCEDURE — 3008F BODY MASS INDEX DOCD: CPT | Mod: CPTII,S$GLB,, | Performed by: OBSTETRICS & GYNECOLOGY

## 2022-10-03 NOTE — PROGRESS NOTES
"CC: Well woman exam    Dia Ovalles is a 72 y.o. female  presents for a well woman exam.  She has no issues, problems, or complaints.      Past Medical History:   Diagnosis Date    Breast cyst     Cancer     SCC x 2 on arms    Cataract     OA (osteoarthritis) of shoulder 3/13/2019    OAB (overactive bladder) 3/8/2017    Osteoporosis 3/8/2017       Past Surgical History:   Procedure Laterality Date    ASD REPAIR      open heart surgery    BREAST BIOPSY      CATARACT EXTRACTION W/  INTRAOCULAR LENS IMPLANT Right 2021    Procedure: EXTRACTION, CATARACT, WITH IOL INSERTION;  Surgeon: Bruna Silva MD;  Location: [facility];  Service: Ophthalmology;  Laterality: Right;    CATARACT EXTRACTION W/  INTRAOCULAR LENS IMPLANT Left 2021    Procedure: EXTRACTION, CATARACT, WITH IOL INSERTION;  Surgeon: Bruna Silva MD;  Location: [facility];  Service: Ophthalmology;  Laterality: Left;     SECTION      x2    EYE SURGERY      KNEE SURGERY      meniscal repair    SKIN CANCER EXCISION         OB History    Para Term  AB Living   2 2 2     2   SAB IAB Ectopic Multiple Live Births                  # Outcome Date GA Lbr Jimmie/2nd Weight Sex Delivery Anes PTL Lv   2 Term            1 Term                Family History   Problem Relation Age of Onset    Stroke Mother     Hypertension Mother     Asthma Mother     Osteoporosis Mother     Cancer Father         lung cancer    Heart disease Maternal Grandfather     Stroke Maternal Grandfather     Breast cancer Neg Hx     Colon cancer Neg Hx     Ovarian cancer Neg Hx        Social History     Tobacco Use    Smoking status: Never    Smokeless tobacco: Never   Substance Use Topics    Alcohol use: Yes     Comment: social    Drug use: Never       /78   Ht 5' 8" (1.727 m)   Wt 60.5 kg (133 lb 6.1 oz)   LMP  (LMP Unknown)   BMI 20.28 kg/m²     ROS:  GENERAL: Denies weight gain or weight loss. Feeling well overall.   SKIN: Denies rash or lesions.   HEAD: " Denies head injury or headache.   NODES: Denies enlarged lymph nodes.   CHEST: Denies chest pain or shortness of breath.   CARDIOVASCULAR: Denies palpitations or left sided chest pain.   ABDOMEN: No abdominal pain, constipation, diarrhea, nausea, vomiting or rectal bleeding.   URINARY: No frequency, dysuria, hematuria, or burning on urination.  REPRODUCTIVE: See HPI.   BREASTS: The patient performs breast self-examination and denies pain, lumps, or nipple discharge.   HEMATOLOGIC: No easy bruisability or excessive bleeding.  MUSCULOSKELETAL: Denies joint pain or swelling.   NEUROLOGIC: Denies syncope or weakness.   PSYCHIATRIC: Denies depression, anxiety or mood swings.    Physical Exam:    APPEARANCE: Well nourished, well developed, in no acute distress.  AFFECT: WNL, alert and oriented x 3  SKIN: No acne or hirsutism  NECK: Neck symmetric without masses or thyromegaly  NODES: No inguinal, cervical, axillary, or femoral lymph node enlargement  CHEST: Good respiratory effect  ABDOMEN: Soft.  No tenderness or masses.  No hepatosplenomegaly.  No hernias.  BREASTS: Symmetrical, no skin changes or visible lesions.  No palpable masses, nipple discharge bilaterally.  PELVIC: Normal external genitalia without lesions.  Normal hair distribution.  Adequate perineal body, normal urethral meatus.  Vagina moist and well rugated without lesions or discharge.  Cervix pink, without lesions, discharge or tenderness.  No significant cystocele or rectocele.  Bimanual exam shows uterus to be normal size, regular, mobile and nontender.  Adnexa without masses or tenderness.    EXTREMITIES: No edema.      ASSESSMENT AND PLAN  1. Encounter for gynecological examination without abnormal finding        2. Postmenopausal        3. Vaginal atrophy          Replens OTC  Olive oil, coconut oil  Declines Rx    Patient was counseled today on A.C.S. Pap guidelines and recommendations for yearly pelvic exams, mammograms and monthly self breast  exams; to see her PCP for other health maintenance.     Follow up if symptoms worsen or fail to improve.

## 2022-10-05 ENCOUNTER — IMMUNIZATION (OUTPATIENT)
Dept: PHARMACY | Facility: CLINIC | Age: 72
End: 2022-10-05
Payer: MEDICARE

## 2022-10-05 ENCOUNTER — OFFICE VISIT (OUTPATIENT)
Dept: ORTHOPEDICS | Facility: CLINIC | Age: 72
End: 2022-10-05
Payer: MEDICARE

## 2022-10-05 VITALS — WEIGHT: 130 LBS | HEIGHT: 68 IN | BODY MASS INDEX: 19.7 KG/M2

## 2022-10-05 DIAGNOSIS — M17.11 PRIMARY OSTEOARTHRITIS OF RIGHT KNEE: Primary | ICD-10-CM

## 2022-10-05 PROCEDURE — 3044F HG A1C LEVEL LT 7.0%: CPT | Mod: CPTII,S$GLB,, | Performed by: NURSE PRACTITIONER

## 2022-10-05 PROCEDURE — 1126F PR PAIN SEVERITY QUANTIFIED, NO PAIN PRESENT: ICD-10-PCS | Mod: CPTII,S$GLB,, | Performed by: NURSE PRACTITIONER

## 2022-10-05 PROCEDURE — 3008F BODY MASS INDEX DOCD: CPT | Mod: CPTII,S$GLB,, | Performed by: NURSE PRACTITIONER

## 2022-10-05 PROCEDURE — 20610 PR DRAIN/INJECT LARGE JOINT/BURSA: ICD-10-PCS | Mod: RT,S$GLB,, | Performed by: NURSE PRACTITIONER

## 2022-10-05 PROCEDURE — 99999 PR PBB SHADOW E&M-EST. PATIENT-LVL III: CPT | Mod: PBBFAC,,, | Performed by: NURSE PRACTITIONER

## 2022-10-05 PROCEDURE — 3008F PR BODY MASS INDEX (BMI) DOCUMENTED: ICD-10-PCS | Mod: CPTII,S$GLB,, | Performed by: NURSE PRACTITIONER

## 2022-10-05 PROCEDURE — 1157F ADVNC CARE PLAN IN RCRD: CPT | Mod: CPTII,S$GLB,, | Performed by: NURSE PRACTITIONER

## 2022-10-05 PROCEDURE — 1157F PR ADVANCE CARE PLAN OR EQUIV PRESENT IN MEDICAL RECORD: ICD-10-PCS | Mod: CPTII,S$GLB,, | Performed by: NURSE PRACTITIONER

## 2022-10-05 PROCEDURE — 1101F PT FALLS ASSESS-DOCD LE1/YR: CPT | Mod: CPTII,S$GLB,, | Performed by: NURSE PRACTITIONER

## 2022-10-05 PROCEDURE — 20610 DRAIN/INJ JOINT/BURSA W/O US: CPT | Mod: RT,S$GLB,, | Performed by: NURSE PRACTITIONER

## 2022-10-05 PROCEDURE — 1160F RVW MEDS BY RX/DR IN RCRD: CPT | Mod: CPTII,S$GLB,, | Performed by: NURSE PRACTITIONER

## 2022-10-05 PROCEDURE — 1160F PR REVIEW ALL MEDS BY PRESCRIBER/CLIN PHARMACIST DOCUMENTED: ICD-10-PCS | Mod: CPTII,S$GLB,, | Performed by: NURSE PRACTITIONER

## 2022-10-05 PROCEDURE — 3288F PR FALLS RISK ASSESSMENT DOCUMENTED: ICD-10-PCS | Mod: CPTII,S$GLB,, | Performed by: NURSE PRACTITIONER

## 2022-10-05 PROCEDURE — 1159F PR MEDICATION LIST DOCUMENTED IN MEDICAL RECORD: ICD-10-PCS | Mod: CPTII,S$GLB,, | Performed by: NURSE PRACTITIONER

## 2022-10-05 PROCEDURE — 3044F PR MOST RECENT HEMOGLOBIN A1C LEVEL <7.0%: ICD-10-PCS | Mod: CPTII,S$GLB,, | Performed by: NURSE PRACTITIONER

## 2022-10-05 PROCEDURE — 1101F PR PT FALLS ASSESS DOC 0-1 FALLS W/OUT INJ PAST YR: ICD-10-PCS | Mod: CPTII,S$GLB,, | Performed by: NURSE PRACTITIONER

## 2022-10-05 PROCEDURE — 99499 NO LOS: ICD-10-PCS | Mod: S$GLB,,, | Performed by: NURSE PRACTITIONER

## 2022-10-05 PROCEDURE — 1159F MED LIST DOCD IN RCRD: CPT | Mod: CPTII,S$GLB,, | Performed by: NURSE PRACTITIONER

## 2022-10-05 PROCEDURE — 1126F AMNT PAIN NOTED NONE PRSNT: CPT | Mod: CPTII,S$GLB,, | Performed by: NURSE PRACTITIONER

## 2022-10-05 PROCEDURE — 3288F FALL RISK ASSESSMENT DOCD: CPT | Mod: CPTII,S$GLB,, | Performed by: NURSE PRACTITIONER

## 2022-10-05 PROCEDURE — 99499 UNLISTED E&M SERVICE: CPT | Mod: S$GLB,,, | Performed by: NURSE PRACTITIONER

## 2022-10-05 PROCEDURE — 99999 PR PBB SHADOW E&M-EST. PATIENT-LVL III: ICD-10-PCS | Mod: PBBFAC,,, | Performed by: NURSE PRACTITIONER

## 2022-10-05 RX ORDER — HYALURONATE SODIUM 30 MG/2 ML
30 SYRINGE (ML) INTRAARTICULAR WEEKLY
Qty: 2 ML | Refills: 0 | Status: SHIPPED | OUTPATIENT
Start: 2022-10-05 | End: 2022-10-05 | Stop reason: CLARIF

## 2022-10-05 NOTE — PROGRESS NOTES
Dia Ovalles presents to clinic today for the second right knee Orthovisc injection.     Exam demonstrates the no effusion in the  right knee, and the skin is intact.    Radiographs reveal degenerative changes of knee    Diagnosis: primary osteoarthritis right knee    After time out was performed and patient ID, side, and site were verified, the  right  knee was sterilly prepped in the standard fashion.  A 22-gauge needle was introduced into right knee joint from an danielle-lateral site without complication and knee was then injected with 2 ml of Orthovisc  Sterile dressing was applied.  The patient was instructed to resume activities as tolerated and to call with any problems.     Patient will return next week for the third injection.

## 2022-10-12 ENCOUNTER — PATIENT MESSAGE (OUTPATIENT)
Dept: OBSTETRICS AND GYNECOLOGY | Facility: CLINIC | Age: 72
End: 2022-10-12
Payer: MEDICARE

## 2022-10-12 ENCOUNTER — OFFICE VISIT (OUTPATIENT)
Dept: ORTHOPEDICS | Facility: CLINIC | Age: 72
End: 2022-10-12
Payer: MEDICARE

## 2022-10-12 VITALS — BODY MASS INDEX: 20.9 KG/M2 | HEIGHT: 68 IN | WEIGHT: 137.88 LBS

## 2022-10-12 DIAGNOSIS — Z12.31 SCREENING MAMMOGRAM, ENCOUNTER FOR: Primary | ICD-10-CM

## 2022-10-12 DIAGNOSIS — M17.11 PRIMARY OSTEOARTHRITIS OF RIGHT KNEE: Primary | ICD-10-CM

## 2022-10-12 PROCEDURE — 3044F HG A1C LEVEL LT 7.0%: CPT | Mod: CPTII,S$GLB,, | Performed by: NURSE PRACTITIONER

## 2022-10-12 PROCEDURE — 1126F AMNT PAIN NOTED NONE PRSNT: CPT | Mod: CPTII,S$GLB,, | Performed by: NURSE PRACTITIONER

## 2022-10-12 PROCEDURE — 1159F MED LIST DOCD IN RCRD: CPT | Mod: CPTII,S$GLB,, | Performed by: NURSE PRACTITIONER

## 2022-10-12 PROCEDURE — 99999 PR PBB SHADOW E&M-EST. PATIENT-LVL III: ICD-10-PCS | Mod: PBBFAC,,, | Performed by: NURSE PRACTITIONER

## 2022-10-12 PROCEDURE — 1126F PR PAIN SEVERITY QUANTIFIED, NO PAIN PRESENT: ICD-10-PCS | Mod: CPTII,S$GLB,, | Performed by: NURSE PRACTITIONER

## 2022-10-12 PROCEDURE — 1160F RVW MEDS BY RX/DR IN RCRD: CPT | Mod: CPTII,S$GLB,, | Performed by: NURSE PRACTITIONER

## 2022-10-12 PROCEDURE — 20610 PR DRAIN/INJECT LARGE JOINT/BURSA: ICD-10-PCS | Mod: RT,S$GLB,, | Performed by: NURSE PRACTITIONER

## 2022-10-12 PROCEDURE — 1157F ADVNC CARE PLAN IN RCRD: CPT | Mod: CPTII,S$GLB,, | Performed by: NURSE PRACTITIONER

## 2022-10-12 PROCEDURE — 1157F PR ADVANCE CARE PLAN OR EQUIV PRESENT IN MEDICAL RECORD: ICD-10-PCS | Mod: CPTII,S$GLB,, | Performed by: NURSE PRACTITIONER

## 2022-10-12 PROCEDURE — 99999 PR PBB SHADOW E&M-EST. PATIENT-LVL III: CPT | Mod: PBBFAC,,, | Performed by: NURSE PRACTITIONER

## 2022-10-12 PROCEDURE — 1159F PR MEDICATION LIST DOCUMENTED IN MEDICAL RECORD: ICD-10-PCS | Mod: CPTII,S$GLB,, | Performed by: NURSE PRACTITIONER

## 2022-10-12 PROCEDURE — 20610 DRAIN/INJ JOINT/BURSA W/O US: CPT | Mod: RT,S$GLB,, | Performed by: NURSE PRACTITIONER

## 2022-10-12 PROCEDURE — 99499 UNLISTED E&M SERVICE: CPT | Mod: S$GLB,,, | Performed by: NURSE PRACTITIONER

## 2022-10-12 PROCEDURE — 1160F PR REVIEW ALL MEDS BY PRESCRIBER/CLIN PHARMACIST DOCUMENTED: ICD-10-PCS | Mod: CPTII,S$GLB,, | Performed by: NURSE PRACTITIONER

## 2022-10-12 PROCEDURE — 3044F PR MOST RECENT HEMOGLOBIN A1C LEVEL <7.0%: ICD-10-PCS | Mod: CPTII,S$GLB,, | Performed by: NURSE PRACTITIONER

## 2022-10-12 PROCEDURE — 99499 NO LOS: ICD-10-PCS | Mod: S$GLB,,, | Performed by: NURSE PRACTITIONER

## 2022-10-12 NOTE — PROGRESS NOTES
Dia Ovalles presents to clinic today for the third right knee Orthovisc injection.     Exam demonstrates the no effusion in the  right knee, and the skin is intact.    Radiographs reveal degenerative changes of knee    Diagnosis: primary osteoarthritis right knee    After time out was performed and patient ID, side, and site were verified, the  right  knee was sterilly prepped in the standard fashion.  A 22-gauge needle was introduced into right knee joint from an danielle-lateral site without complication and knee was then injected with 2 ml of Orthovisc  Sterile dressing was applied.  The patient was instructed to resume activities as tolerated and to call with any problems.     Patient will return as needed.

## 2022-10-13 ENCOUNTER — OFFICE VISIT (OUTPATIENT)
Dept: DERMATOLOGY | Facility: CLINIC | Age: 72
End: 2022-10-13
Payer: MEDICARE

## 2022-10-13 DIAGNOSIS — Z85.828 HISTORY OF SKIN CANCER: Primary | ICD-10-CM

## 2022-10-13 DIAGNOSIS — Z12.83 SCREENING FOR SKIN CANCER: ICD-10-CM

## 2022-10-13 DIAGNOSIS — D18.01 HEMANGIOMA OF SKIN: ICD-10-CM

## 2022-10-13 DIAGNOSIS — L82.1 SEBORRHEIC KERATOSES: ICD-10-CM

## 2022-10-13 DIAGNOSIS — D23.9 INTRADERMAL NEVUS: ICD-10-CM

## 2022-10-13 DIAGNOSIS — L81.4 SOLAR LENTIGO: ICD-10-CM

## 2022-10-13 PROCEDURE — 3044F HG A1C LEVEL LT 7.0%: CPT | Mod: HCNC,CPTII,S$GLB, | Performed by: PATHOLOGY

## 2022-10-13 PROCEDURE — 3288F PR FALLS RISK ASSESSMENT DOCUMENTED: ICD-10-PCS | Mod: HCNC,CPTII,S$GLB, | Performed by: PATHOLOGY

## 2022-10-13 PROCEDURE — 1157F PR ADVANCE CARE PLAN OR EQUIV PRESENT IN MEDICAL RECORD: ICD-10-PCS | Mod: HCNC,CPTII,S$GLB, | Performed by: PATHOLOGY

## 2022-10-13 PROCEDURE — 1101F PT FALLS ASSESS-DOCD LE1/YR: CPT | Mod: HCNC,CPTII,S$GLB, | Performed by: PATHOLOGY

## 2022-10-13 PROCEDURE — 1157F ADVNC CARE PLAN IN RCRD: CPT | Mod: HCNC,CPTII,S$GLB, | Performed by: PATHOLOGY

## 2022-10-13 PROCEDURE — 3044F PR MOST RECENT HEMOGLOBIN A1C LEVEL <7.0%: ICD-10-PCS | Mod: HCNC,CPTII,S$GLB, | Performed by: PATHOLOGY

## 2022-10-13 PROCEDURE — 1101F PR PT FALLS ASSESS DOC 0-1 FALLS W/OUT INJ PAST YR: ICD-10-PCS | Mod: HCNC,CPTII,S$GLB, | Performed by: PATHOLOGY

## 2022-10-13 PROCEDURE — 1126F PR PAIN SEVERITY QUANTIFIED, NO PAIN PRESENT: ICD-10-PCS | Mod: HCNC,CPTII,S$GLB, | Performed by: PATHOLOGY

## 2022-10-13 PROCEDURE — 3288F FALL RISK ASSESSMENT DOCD: CPT | Mod: HCNC,CPTII,S$GLB, | Performed by: PATHOLOGY

## 2022-10-13 PROCEDURE — 99213 OFFICE O/P EST LOW 20 MIN: CPT | Mod: HCNC,S$GLB,, | Performed by: PATHOLOGY

## 2022-10-13 PROCEDURE — 1126F AMNT PAIN NOTED NONE PRSNT: CPT | Mod: HCNC,CPTII,S$GLB, | Performed by: PATHOLOGY

## 2022-10-13 PROCEDURE — 99999 PR PBB SHADOW E&M-EST. PATIENT-LVL III: ICD-10-PCS | Mod: PBBFAC,,, | Performed by: PATHOLOGY

## 2022-10-13 PROCEDURE — 99999 PR PBB SHADOW E&M-EST. PATIENT-LVL III: CPT | Mod: PBBFAC,,, | Performed by: PATHOLOGY

## 2022-10-13 PROCEDURE — 99213 PR OFFICE/OUTPT VISIT, EST, LEVL III, 20-29 MIN: ICD-10-PCS | Mod: HCNC,S$GLB,, | Performed by: PATHOLOGY

## 2022-10-13 NOTE — PROGRESS NOTES
Subjective:       Patient ID:  Dia Ovalles is a 72 y.o. female who presents for   Chief Complaint   Patient presents with    Skin Check     tbse    Spot     Behind L ear     HPI   Pt present today for TBSE.   72 year old female established patient with history of NMSC to right arm. Diagnosed and treated in approximately 2012 with no recurrence. Last seen June 2021 - 1 Ak treated with cryo right nose. Today, pt points out a bump behind her left ear which she noticed a few days ago, otherwise no areas of concern.     Review of Systems   Constitutional:  Negative for fever, chills, fatigue and malaise.      Objective:    Physical Exam   Constitutional: She appears well-developed and well-nourished. No distress.   Neurological: She is alert and oriented to person, place, and time. She is not disoriented.   Psychiatric: She has a normal mood and affect.   Skin:   Areas Examined (abnormalities noted in diagram):   Scalp / Hair Palpated and Inspected  Head / Face Inspection Performed  Neck Inspection Performed  Chest / Axilla Inspection Performed  Abdomen Inspection Performed  Genitals / Buttocks / Groin Inspection Performed  Back Inspection Performed  RUE Inspected  LUE Inspection Performed  RLE Inspected  LLE Inspection Performed  Nails and Digits Inspection Performed                 Diagram Legend     Erythematous scaling macule/papule c/w actinic keratosis       Vascular papule c/w angioma      Pigmented verrucoid papule/plaque c/w seborrheic keratosis      Yellow umbilicated papule c/w sebaceous hyperplasia      Irregularly shaped tan macule c/w lentigo     1-2 mm smooth white papules consistent with Milia      Movable subcutaneous cyst with punctum c/w epidermal inclusion cyst      Subcutaneous movable cyst c/w pilar cyst      Firm pink to brown papule c/w dermatofibroma      Pedunculated fleshy papule(s) c/w skin tag(s)      Evenly pigmented macule c/w junctional nevus     Mildly variegated pigmented, slightly  irregular-bordered macule c/w mildly atypical nevus      Flesh colored to evenly pigmented papule c/w intradermal nevus       Pink pearly papule/plaque c/w basal cell carcinoma      Erythematous hyperkeratotic cursted plaque c/w SCC      Surgical scar with no sign of skin cancer recurrence      Open and closed comedones      Inflammatory papules and pustules      Verrucoid papule consistent consistent with wart     Erythematous eczematous patches and plaques     Dystrophic onycholytic nail with subungual debris c/w onychomycosis     Umbilicated papule    Erythematous-base heme-crusted tan verrucoid plaque consistent with inflamed seborrheic keratosis     Erythematous Silvery Scaling Plaque c/w Psoriasis     See annotation      Assessment / Plan:        History of skin cancer:   In 2012 - no evidence of recurrence today.     Solar lentigo  This is a benign hyperpigmented sun induced lesion. Recommend daily sun protection/avoidance and use of at least SPF 30, broad spectrum sunscreen (OTC drug) will reduce the number of new lesions. Treatment of these benign lesions are considered cosmetic.    Seborrheic keratoses  These are benign inherited growths without a malignant potential. Reassurance given to patient. No treatment is necessary.     Hemangioma of skin  Intradermal nevus  Reassurance provided regarding benign nature     Screening for skin cancer  Discussed ABCDE's of nevi.  Monitor for new mole or moles that are becoming bigger, darker, irritated, or developing irregular borders.   Patient instructed in importance in daily sun protection of at least spf 30. Sun avoidance, topical protection, sun protective clothing discussed.       Follow up in about 1 year (around 10/13/2023).

## 2022-12-04 ENCOUNTER — OFFICE VISIT (OUTPATIENT)
Dept: URGENT CARE | Facility: CLINIC | Age: 72
End: 2022-12-04
Payer: MEDICARE

## 2022-12-04 VITALS
OXYGEN SATURATION: 96 % | DIASTOLIC BLOOD PRESSURE: 70 MMHG | BODY MASS INDEX: 20.31 KG/M2 | WEIGHT: 134 LBS | RESPIRATION RATE: 16 BRPM | HEIGHT: 68 IN | SYSTOLIC BLOOD PRESSURE: 112 MMHG | HEART RATE: 80 BPM | TEMPERATURE: 99 F

## 2022-12-04 DIAGNOSIS — R09.81 SINUS CONGESTION: Primary | ICD-10-CM

## 2022-12-04 DIAGNOSIS — H10.9 BACTERIAL CONJUNCTIVITIS OF BOTH EYES: ICD-10-CM

## 2022-12-04 DIAGNOSIS — B96.89 BACTERIAL SINUSITIS: ICD-10-CM

## 2022-12-04 DIAGNOSIS — B96.89 BACTERIAL CONJUNCTIVITIS OF BOTH EYES: ICD-10-CM

## 2022-12-04 DIAGNOSIS — J32.9 BACTERIAL SINUSITIS: ICD-10-CM

## 2022-12-04 LAB
CTP QC/QA: YES
CTP QC/QA: YES
POC MOLECULAR INFLUENZA A AGN: NEGATIVE
POC MOLECULAR INFLUENZA B AGN: NEGATIVE
SARS-COV-2 RDRP RESP QL NAA+PROBE: NEGATIVE

## 2022-12-04 PROCEDURE — 3074F PR MOST RECENT SYSTOLIC BLOOD PRESSURE < 130 MM HG: ICD-10-PCS | Mod: CPTII,S$GLB,, | Performed by: NURSE PRACTITIONER

## 2022-12-04 PROCEDURE — 87635: ICD-10-PCS | Mod: QW,S$GLB,, | Performed by: NURSE PRACTITIONER

## 2022-12-04 PROCEDURE — 3008F BODY MASS INDEX DOCD: CPT | Mod: CPTII,S$GLB,, | Performed by: NURSE PRACTITIONER

## 2022-12-04 PROCEDURE — 3074F SYST BP LT 130 MM HG: CPT | Mod: CPTII,S$GLB,, | Performed by: NURSE PRACTITIONER

## 2022-12-04 PROCEDURE — 1159F PR MEDICATION LIST DOCUMENTED IN MEDICAL RECORD: ICD-10-PCS | Mod: CPTII,S$GLB,, | Performed by: NURSE PRACTITIONER

## 2022-12-04 PROCEDURE — 1126F AMNT PAIN NOTED NONE PRSNT: CPT | Mod: CPTII,S$GLB,, | Performed by: NURSE PRACTITIONER

## 2022-12-04 PROCEDURE — 87635 SARS-COV-2 COVID-19 AMP PRB: CPT | Mod: QW,S$GLB,, | Performed by: NURSE PRACTITIONER

## 2022-12-04 PROCEDURE — 1157F ADVNC CARE PLAN IN RCRD: CPT | Mod: CPTII,S$GLB,, | Performed by: NURSE PRACTITIONER

## 2022-12-04 PROCEDURE — 87502 POCT INFLUENZA A/B MOLECULAR: ICD-10-PCS | Mod: QW,S$GLB,, | Performed by: NURSE PRACTITIONER

## 2022-12-04 PROCEDURE — 1159F MED LIST DOCD IN RCRD: CPT | Mod: CPTII,S$GLB,, | Performed by: NURSE PRACTITIONER

## 2022-12-04 PROCEDURE — 1160F PR REVIEW ALL MEDS BY PRESCRIBER/CLIN PHARMACIST DOCUMENTED: ICD-10-PCS | Mod: CPTII,S$GLB,, | Performed by: NURSE PRACTITIONER

## 2022-12-04 PROCEDURE — 3044F HG A1C LEVEL LT 7.0%: CPT | Mod: CPTII,S$GLB,, | Performed by: NURSE PRACTITIONER

## 2022-12-04 PROCEDURE — 3044F PR MOST RECENT HEMOGLOBIN A1C LEVEL <7.0%: ICD-10-PCS | Mod: CPTII,S$GLB,, | Performed by: NURSE PRACTITIONER

## 2022-12-04 PROCEDURE — 3078F DIAST BP <80 MM HG: CPT | Mod: CPTII,S$GLB,, | Performed by: NURSE PRACTITIONER

## 2022-12-04 PROCEDURE — 3078F PR MOST RECENT DIASTOLIC BLOOD PRESSURE < 80 MM HG: ICD-10-PCS | Mod: CPTII,S$GLB,, | Performed by: NURSE PRACTITIONER

## 2022-12-04 PROCEDURE — 3008F PR BODY MASS INDEX (BMI) DOCUMENTED: ICD-10-PCS | Mod: CPTII,S$GLB,, | Performed by: NURSE PRACTITIONER

## 2022-12-04 PROCEDURE — 99213 PR OFFICE/OUTPT VISIT, EST, LEVL III, 20-29 MIN: ICD-10-PCS | Mod: S$GLB,CS,, | Performed by: NURSE PRACTITIONER

## 2022-12-04 PROCEDURE — 1157F PR ADVANCE CARE PLAN OR EQUIV PRESENT IN MEDICAL RECORD: ICD-10-PCS | Mod: CPTII,S$GLB,, | Performed by: NURSE PRACTITIONER

## 2022-12-04 PROCEDURE — 99213 OFFICE O/P EST LOW 20 MIN: CPT | Mod: S$GLB,CS,, | Performed by: NURSE PRACTITIONER

## 2022-12-04 PROCEDURE — 1160F RVW MEDS BY RX/DR IN RCRD: CPT | Mod: CPTII,S$GLB,, | Performed by: NURSE PRACTITIONER

## 2022-12-04 PROCEDURE — 87502 INFLUENZA DNA AMP PROBE: CPT | Mod: QW,S$GLB,, | Performed by: NURSE PRACTITIONER

## 2022-12-04 PROCEDURE — 1126F PR PAIN SEVERITY QUANTIFIED, NO PAIN PRESENT: ICD-10-PCS | Mod: CPTII,S$GLB,, | Performed by: NURSE PRACTITIONER

## 2022-12-04 RX ORDER — FLUTICASONE PROPIONATE 50 MCG
1 SPRAY, SUSPENSION (ML) NASAL DAILY
Qty: 18.2 ML | Refills: 0 | Status: SHIPPED | OUTPATIENT
Start: 2022-12-04 | End: 2022-12-11

## 2022-12-04 RX ORDER — AMOXICILLIN AND CLAVULANATE POTASSIUM 875; 125 MG/1; MG/1
1 TABLET, FILM COATED ORAL 2 TIMES DAILY
Qty: 14 TABLET | Refills: 0 | Status: SHIPPED | OUTPATIENT
Start: 2022-12-04 | End: 2022-12-11

## 2022-12-04 RX ORDER — TOBRAMYCIN 3 MG/ML
1 SOLUTION/ DROPS OPHTHALMIC EVERY 6 HOURS
Qty: 5 ML | Refills: 1 | Status: SHIPPED | OUTPATIENT
Start: 2022-12-04 | End: 2022-12-11

## 2022-12-04 NOTE — PROGRESS NOTES
"Subjective:       Patient ID: Dia Ovalles is a 72 y.o. female.    Vitals:  height is 5' 8" (1.727 m) and weight is 60.8 kg (134 lb). Her temperature is 98.7 °F (37.1 °C). Her blood pressure is 112/70 and her pulse is 80. Her respiration is 16 and oxygen saturation is 96%.     Chief Complaint: Sinus Problem and Eye Problem    Pt is coming in today with complaints of possible sinus infection and pink eye. Sinus symptoms started over 2 weeks ago. Pt states symptoms started getting better and got worse over the last few days. Pt noticed her eye redness yesterday. Pt states worse. Pt states both eye but mainly the left. Pt states no pain. Pt states blurry vision yesterday none today. '    Sinus Problem  This is a new problem. The current episode started 1 to 4 weeks ago. The problem has been gradually worsening since onset. There has been no fever. Her pain is at a severity of 0/10. She is experiencing no pain. Associated symptoms include congestion, coughing, headaches and a sore throat. Pertinent negatives include no chills, diaphoresis, ear pain, hoarse voice, neck pain, shortness of breath, sinus pressure, sneezing or swollen glands. Past treatments include nothing.   Eye Problem   The left eye is affected. This is a new problem. The current episode started yesterday. The problem occurs constantly. The problem has been unchanged. There was no injury mechanism. The pain is at a severity of 0/10. The patient is experiencing no pain. There is No known exposure to pink eye. She Does not wear contacts. Associated symptoms include blurred vision, eye redness, a foreign body sensation and itching. Pertinent negatives include no eye discharge or double vision. She has tried nothing for the symptoms.     Constitution: Negative for chills and sweating.   HENT:  Positive for congestion and sore throat. Negative for ear pain and sinus pressure.    Neck: Negative for neck pain.   Eyes:  Positive for eye itching, eye redness and " blurred vision. Negative for eye discharge and double vision.   Respiratory:  Positive for cough. Negative for shortness of breath.    Allergic/Immunologic: Negative for sneezing.   Neurological:  Positive for headaches.     Objective:      Physical Exam   Constitutional: She is oriented to person, place, and time.   HENT:   Head: Normocephalic and atraumatic.   Ears:   Right Ear: Hearing, tympanic membrane, external ear and ear canal normal.   Left Ear: Hearing, tympanic membrane, external ear and ear canal normal.   Nose: Mucosal edema, rhinorrhea and purulent discharge present. Right sinus exhibits maxillary sinus tenderness and frontal sinus tenderness.   Mouth/Throat: Uvula is midline and oropharynx is clear and moist. No posterior oropharyngeal erythema.   Eyes: Right eye exhibits discharge. Left eye exhibits discharge. Right conjunctiva is injected. Left conjunctiva is injected.          Comments: Cyst on left upper lid, chronic   Cardiovascular: Normal rate.   Pulmonary/Chest: Effort normal. No respiratory distress.   Abdominal: Normal appearance.   Neurological: She is alert and oriented to person, place, and time.   Skin: Skin is warm and dry.   Psychiatric: Her behavior is normal. Mood normal.         Results for orders placed or performed in visit on 12/04/22   POCT COVID-19 Rapid Screening   Result Value Ref Range    POC Rapid COVID Negative Negative     Acceptable Yes    POCT Influenza A/B MOLECULAR   Result Value Ref Range    POC Molecular Influenza A Ag Negative Negative, Not Reported    POC Molecular Influenza B Ag Negative Negative, Not Reported     Acceptable Yes        Vision Screening    Right eye Left eye Both eyes   Without correction 20/30 20/40 20/20   With correction         Assessment:       1. Sinus congestion    2. Bacterial sinusitis    3. Bacterial conjunctivitis of both eyes          Plan:       Eyedrops.  Good handwashing.  Clean high contact  surfaces  Antibiotics   Nasal spray   Mucinex  Take probiotic      Sinus congestion  -     POCT COVID-19 Rapid Screening  -     POCT Influenza A/B MOLECULAR    Bacterial sinusitis  -     amoxicillin-clavulanate 875-125mg (AUGMENTIN) 875-125 mg per tablet; Take 1 tablet by mouth 2 (two) times daily. for 7 days  Dispense: 14 tablet; Refill: 0  -     fluticasone propionate (FLONASE) 50 mcg/actuation nasal spray; 1 spray (50 mcg total) by Each Nostril route once daily. for 7 days  Dispense: 18.2 mL; Refill: 0    Bacterial conjunctivitis of both eyes  -     tobramycin sulfate 0.3% (TOBREX) 0.3 % ophthalmic solution; Place 1 drop into both eyes every 6 (six) hours. for 7 days  Dispense: 5 mL; Refill: 1

## 2023-01-05 ENCOUNTER — HOSPITAL ENCOUNTER (OUTPATIENT)
Dept: RADIOLOGY | Facility: OTHER | Age: 73
Discharge: HOME | End: 2023-01-05
Attending: OBSTETRICS & GYNECOLOGY
Payer: MEDICARE

## 2023-01-05 DIAGNOSIS — Z12.31 SCREENING MAMMOGRAM, ENCOUNTER FOR: ICD-10-CM

## 2023-01-05 PROCEDURE — 77063 BREAST TOMOSYNTHESIS BI: CPT | Mod: 26,HCNC,, | Performed by: RADIOLOGY

## 2023-01-05 PROCEDURE — 77063 MAMMO DIGITAL SCREENING BILAT WITH TOMO: ICD-10-PCS | Mod: 26,HCNC,, | Performed by: RADIOLOGY

## 2023-01-05 PROCEDURE — 77067 MAMMO DIGITAL SCREENING BILAT WITH TOMO: ICD-10-PCS | Mod: 26,HCNC,, | Performed by: RADIOLOGY

## 2023-01-05 PROCEDURE — 77067 SCR MAMMO BI INCL CAD: CPT | Mod: TC,HCNC

## 2023-01-05 PROCEDURE — 77067 SCR MAMMO BI INCL CAD: CPT | Mod: 26,HCNC,, | Performed by: RADIOLOGY

## 2023-01-11 NOTE — PROGRESS NOTES
Subjective:      Patient ID: Dia Ovalles is a 71 y.o. female.    Chief Complaint:  No chief complaint on file.      History of Present Illness  Ms. Ovalles is a 71 y.o. female who is here for a follow-up visit for evaluation and management of osteoporosis, diagnosed with DXA scan years ago.    Denies fractures   Six months ago sustaines a fall, tripped over a side walk.  Balance is good.   Yoga regularly.     Currently on evista 60 mg daily, started treatment in 2014. Tolerating raloxifene well. Denies DVT, hot flashing.      Denies omeprazole, has instituted dietary changes with good results.   One fall in the summer while walking on the side walk. No fractures.   Denies height loss or back pain.      Previously used:   alendronate 70 mg once weekly for ten or more years     Supplements:   Vitamin D and calcium chewable daily 650 mg ad 500 IUs (12.5 mcg)  MVI chewable (2000 IUs daily)   Vitamin D3 1000 IUs daily     Exercise:  Yoga  Walking regularly     DXA:   3/2021: osteoporosis of the lumbar spine, no changes when compared to previous, FRAX is 18% and 6.9%.   3/2019: Osteoporosis of the lumbar spine. ([facility])   3/2017: osteoporosis of the lumbar spine ([facility])       Review of Systems  No recent illness     Objective:   Physical Exam  There were no vitals filed for this visit.    BP Readings from Last 3 Encounters:   12/09/21 116/72   08/25/21 110/68   08/12/21 114/71     Wt Readings from Last 1 Encounters:   12/09/21 1311 59.4 kg (130 lb 15.3 oz)         There is no height or weight on file to calculate BMI.    Lab Review:   Lab Results   Component Value Date    HGBA1C 5.2 04/01/2021     Lab Results   Component Value Date    CHOL 188 04/01/2021    HDL 63 04/01/2021    LDLCALC 113.0 04/01/2021    TRIG 60 04/01/2021    CHOLHDL 33.5 04/01/2021     Lab Results   Component Value Date     04/01/2021    K 4.1 04/01/2021     04/01/2021    CO2 26 04/01/2021     (H) 04/01/2021    BUN 17 04/01/2021     The cardioversion/defibrillation pads were placed in the anterior/posterior position.  The presenting arrhythmia was atrial fibrillation. A direct-current 200 joule discharge was delivered synchronized to the ECG R-wave. The post cardioversion rhythm was sinus. HR 79 CREATININE 0.8 04/01/2021    CALCIUM 9.1 04/01/2021    PROT 7.0 04/01/2021    ALBUMIN 3.8 04/01/2021    BILITOT 0.5 04/01/2021    ALKPHOS 62 04/01/2021    AST 25 04/01/2021    ALT 19 04/01/2021    ANIONGAP 10 04/01/2021    ESTGFRAFRICA >60 04/01/2021    EGFRNONAA >60 04/01/2021    TSH 2.573 04/01/2021         Assessment and Plan     Osteoporosis  Risk factors:   Postmenopausal, family history of osteoporosis, two years of MHT  Current treatment: raloxifene daily   Balance is good  Continue yoga for balance, continue walking     Vitamin D 2500 IUs daily   Continue calcium daily via diet     DXA scan 3/2023   FRAX is increased and supports daily treatment   Other options include IV BP versus prolia -- for now wants to continue evista  Age may be a factor in switching medications as she gets closer to 79      The patient location is: home/[place]  The chief complaint leading to consultation is: osteoporosis     Visit type: audiovisual    Face to Face time with patient: 21 minutes of total time spent on the encounter, which includes face to face time and non-face to face time preparing to see the patient (eg, review of tests), Obtaining and/or reviewing separately obtained history, Documenting clinical information in the electronic or other health record, Independently interpreting results (not separately reported) and communicating results to the patient/family/caregiver, or Care coordination (not separately reported).         Each patient to whom he or she provides medical services by telemedicine is:  (1) informed of the relationship between the physician and patient and the respective role of any other health care provider with respect to management of the patient; and (2) notified that he or she may decline to receive medical services by telemedicine and may withdraw from such care at any time.    Notes:

## 2023-02-07 DIAGNOSIS — Z00.00 ENCOUNTER FOR MEDICARE ANNUAL WELLNESS EXAM: ICD-10-CM

## 2023-02-08 ENCOUNTER — TELEPHONE (OUTPATIENT)
Dept: PRIMARY CARE CLINIC | Facility: CLINIC | Age: 73
End: 2023-02-08
Payer: MEDICARE

## 2023-02-08 DIAGNOSIS — Z00.00 ANNUAL PHYSICAL EXAM: Primary | ICD-10-CM

## 2023-02-08 DIAGNOSIS — M81.8 OTHER OSTEOPOROSIS, UNSPECIFIED PATHOLOGICAL FRACTURE PRESENCE: ICD-10-CM

## 2023-02-08 DIAGNOSIS — Z87.74 HISTORY OF REPAIR OF CONGENITAL ATRIAL SEPTAL DEFECT (ASD): ICD-10-CM

## 2023-02-08 DIAGNOSIS — Z13.6 ENCOUNTER FOR LIPID SCREENING FOR CARDIOVASCULAR DISEASE: ICD-10-CM

## 2023-02-08 DIAGNOSIS — Z13.220 ENCOUNTER FOR LIPID SCREENING FOR CARDIOVASCULAR DISEASE: ICD-10-CM

## 2023-02-08 DIAGNOSIS — Z79.899 OTHER LONG TERM (CURRENT) DRUG THERAPY: ICD-10-CM

## 2023-02-09 DIAGNOSIS — Z00.00 ENCOUNTER FOR MEDICARE ANNUAL WELLNESS EXAM: ICD-10-CM

## 2023-04-11 ENCOUNTER — OFFICE VISIT (OUTPATIENT)
Dept: INTERNAL MEDICINE | Facility: CLINIC | Age: 73
End: 2023-04-11
Payer: MEDICARE

## 2023-04-11 VITALS
OXYGEN SATURATION: 97 % | HEART RATE: 67 BPM | DIASTOLIC BLOOD PRESSURE: 62 MMHG | SYSTOLIC BLOOD PRESSURE: 112 MMHG | WEIGHT: 136 LBS | BODY MASS INDEX: 20.61 KG/M2 | HEIGHT: 68 IN

## 2023-04-11 DIAGNOSIS — Z00.00 ENCOUNTER FOR MEDICARE ANNUAL WELLNESS EXAM: ICD-10-CM

## 2023-04-11 DIAGNOSIS — Z00.00 ENCOUNTER FOR PREVENTIVE HEALTH EXAMINATION: Primary | ICD-10-CM

## 2023-04-11 DIAGNOSIS — Z85.89 HISTORY OF SQUAMOUS CELL CARCINOMA: ICD-10-CM

## 2023-04-11 DIAGNOSIS — Z87.74 H/O ATRIAL SEPTAL DEFECT REPAIR: ICD-10-CM

## 2023-04-11 DIAGNOSIS — M17.11 OSTEOARTHRITIS OF RIGHT KNEE, UNSPECIFIED OSTEOARTHRITIS TYPE: ICD-10-CM

## 2023-04-11 DIAGNOSIS — D69.2 SENILE PURPURA: ICD-10-CM

## 2023-04-11 DIAGNOSIS — M81.0 OSTEOPOROSIS WITHOUT CURRENT PATHOLOGICAL FRACTURE, UNSPECIFIED OSTEOPOROSIS TYPE: ICD-10-CM

## 2023-04-11 DIAGNOSIS — N32.81 OAB (OVERACTIVE BLADDER): ICD-10-CM

## 2023-04-11 PROCEDURE — 3288F FALL RISK ASSESSMENT DOCD: CPT | Mod: HCNC,CPTII,S$GLB, | Performed by: NURSE PRACTITIONER

## 2023-04-11 PROCEDURE — 1125F AMNT PAIN NOTED PAIN PRSNT: CPT | Mod: HCNC,CPTII,S$GLB, | Performed by: NURSE PRACTITIONER

## 2023-04-11 PROCEDURE — 1101F PR PT FALLS ASSESS DOC 0-1 FALLS W/OUT INJ PAST YR: ICD-10-PCS | Mod: HCNC,CPTII,S$GLB, | Performed by: NURSE PRACTITIONER

## 2023-04-11 PROCEDURE — 99999 PR PBB SHADOW E&M-EST. PATIENT-LVL IV: ICD-10-PCS | Mod: PBBFAC,HCNC,, | Performed by: NURSE PRACTITIONER

## 2023-04-11 PROCEDURE — 3008F PR BODY MASS INDEX (BMI) DOCUMENTED: ICD-10-PCS | Mod: HCNC,CPTII,S$GLB, | Performed by: NURSE PRACTITIONER

## 2023-04-11 PROCEDURE — 1157F ADVNC CARE PLAN IN RCRD: CPT | Mod: HCNC,CPTII,S$GLB, | Performed by: NURSE PRACTITIONER

## 2023-04-11 PROCEDURE — 3074F SYST BP LT 130 MM HG: CPT | Mod: HCNC,CPTII,S$GLB, | Performed by: NURSE PRACTITIONER

## 2023-04-11 PROCEDURE — 1170F FXNL STATUS ASSESSED: CPT | Mod: HCNC,CPTII,S$GLB, | Performed by: NURSE PRACTITIONER

## 2023-04-11 PROCEDURE — 1101F PT FALLS ASSESS-DOCD LE1/YR: CPT | Mod: HCNC,CPTII,S$GLB, | Performed by: NURSE PRACTITIONER

## 2023-04-11 PROCEDURE — 1157F PR ADVANCE CARE PLAN OR EQUIV PRESENT IN MEDICAL RECORD: ICD-10-PCS | Mod: HCNC,CPTII,S$GLB, | Performed by: NURSE PRACTITIONER

## 2023-04-11 PROCEDURE — 1159F PR MEDICATION LIST DOCUMENTED IN MEDICAL RECORD: ICD-10-PCS | Mod: HCNC,CPTII,S$GLB, | Performed by: NURSE PRACTITIONER

## 2023-04-11 PROCEDURE — 3078F PR MOST RECENT DIASTOLIC BLOOD PRESSURE < 80 MM HG: ICD-10-PCS | Mod: HCNC,CPTII,S$GLB, | Performed by: NURSE PRACTITIONER

## 2023-04-11 PROCEDURE — 1170F PR FUNCTIONAL STATUS ASSESSED: ICD-10-PCS | Mod: HCNC,CPTII,S$GLB, | Performed by: NURSE PRACTITIONER

## 2023-04-11 PROCEDURE — 3078F DIAST BP <80 MM HG: CPT | Mod: HCNC,CPTII,S$GLB, | Performed by: NURSE PRACTITIONER

## 2023-04-11 PROCEDURE — G0439 PPPS, SUBSEQ VISIT: HCPCS | Mod: HCNC,S$GLB,, | Performed by: NURSE PRACTITIONER

## 2023-04-11 PROCEDURE — G0439 PR MEDICARE ANNUAL WELLNESS SUBSEQUENT VISIT: ICD-10-PCS | Mod: HCNC,S$GLB,, | Performed by: NURSE PRACTITIONER

## 2023-04-11 PROCEDURE — 1160F RVW MEDS BY RX/DR IN RCRD: CPT | Mod: HCNC,CPTII,S$GLB, | Performed by: NURSE PRACTITIONER

## 2023-04-11 PROCEDURE — 3288F PR FALLS RISK ASSESSMENT DOCUMENTED: ICD-10-PCS | Mod: HCNC,CPTII,S$GLB, | Performed by: NURSE PRACTITIONER

## 2023-04-11 PROCEDURE — 1125F PR PAIN SEVERITY QUANTIFIED, PAIN PRESENT: ICD-10-PCS | Mod: HCNC,CPTII,S$GLB, | Performed by: NURSE PRACTITIONER

## 2023-04-11 PROCEDURE — 3074F PR MOST RECENT SYSTOLIC BLOOD PRESSURE < 130 MM HG: ICD-10-PCS | Mod: HCNC,CPTII,S$GLB, | Performed by: NURSE PRACTITIONER

## 2023-04-11 PROCEDURE — 1160F PR REVIEW ALL MEDS BY PRESCRIBER/CLIN PHARMACIST DOCUMENTED: ICD-10-PCS | Mod: HCNC,CPTII,S$GLB, | Performed by: NURSE PRACTITIONER

## 2023-04-11 PROCEDURE — 1159F MED LIST DOCD IN RCRD: CPT | Mod: HCNC,CPTII,S$GLB, | Performed by: NURSE PRACTITIONER

## 2023-04-11 PROCEDURE — 99999 PR PBB SHADOW E&M-EST. PATIENT-LVL IV: CPT | Mod: PBBFAC,HCNC,, | Performed by: NURSE PRACTITIONER

## 2023-04-11 PROCEDURE — 3008F BODY MASS INDEX DOCD: CPT | Mod: HCNC,CPTII,S$GLB, | Performed by: NURSE PRACTITIONER

## 2023-04-11 NOTE — PATIENT INSTRUCTIONS
1. Follow up with Dr. Shelley Leija MD as scheduled.    2. Schedule follow up with endocrinology, Dr. Florian.    Counseling and Referral of Other Preventative  (Italic type indicates deductible and co-insurance are waived)    Patient Name: Dia Ovalles  Today's Date: 4/11/2023    Health Maintenance         Date Due Completion Date    DEXA Scan 03/15/2023 3/15/2021    Mammogram 01/05/2024 1/5/2023    Colorectal Cancer Screening 10/30/2025 7/1/2021    Override on 10/30/2015: Done    TETANUS VACCINE 03/09/2027 3/9/2017    Lipid Panel 04/13/2027 4/13/2022          No orders of the defined types were placed in this encounter.    The following information is provided to all patients.  This information is to help you find resources for any of the problems found today that may be affecting your health:                Living healthy guide: www.[facility].[place].[facility]      Understanding Diabetes: www.diabetes.org      Eating healthy: www.cdc.gov/healthyweight      [facility] home safety checklist: www.cdc.gov/steadi/patient.html      Agency on Aging: www.goea.[place].[facility]      Alcoholics anonymous (AA): www.aa.org      Physical Activity: www.rob.nih.gov/dv6cfba      Tobacco use: www.quitwithusla.org

## 2023-04-11 NOTE — PROGRESS NOTES
"iDa Ovalles presented for a  Medicare AWV and comprehensive Health Risk Assessment today. The following components were reviewed and updated:    Medical history  Family History  Social history  Allergies and Current Medications  Health Risk Assessment  Health Maintenance  Care Team         ** See Completed Assessments for Annual Wellness Visit within the encounter summary.**         The following assessments were completed:  Living Situation  CAGE  Depression Screening  Timed Get Up and Go  Whisper Test - N/A hearing impairment, wears hearing aids  Cognitive Function Screening    Nutrition Screening  ADL Screening  PAQ Screening  Review for Opioid Screening: Pt does not have Rx for Opioids.  Review for Substance Use Disorders: Patient does not use substances.        Vitals:    04/11/23 1448   BP: 112/62   BP Location: Left arm   Pulse: 67   SpO2: 97%   Weight: 61.7 kg (136 lb 0.4 oz)   Height: 5' 8" (1.727 m)     Body mass index is 20.68 kg/m².    Physical Exam  Vitals reviewed.   Constitutional:       Appearance: Normal appearance.   HENT:      Head: Normocephalic.   Cardiovascular:      Rate and Rhythm: Normal rate.   Pulmonary:      Effort: Pulmonary effort is normal.   Abdominal:      General: Bowel sounds are normal.   Musculoskeletal:         General: Normal range of motion.      Right lower leg: No edema.      Left lower leg: No edema.   Skin:     General: Skin is warm and dry.      Capillary Refill: Capillary refill takes less than 2 seconds.      Comments: Senile purpura.   Neurological:      Mental Status: She is alert and oriented to person, place, and time.   Psychiatric:         Behavior: Behavior normal.         Thought Content: Thought content normal.         Judgment: Judgment normal.             Diagnoses and health risks identified today and associated recommendations/orders:    1. Encounter for preventive health examination  Assessments completed.  HM recommendations reviewed. Needs f/u with " endo, dxa.  F/u with PCP as scheduled.    2. Senile purpura  Chronic, stable on current regimen. Followed by PCP.    3. H/O atrial septal defect repair  Chronic, stable on current regimen. Followed by PCP.    4. OAB (overactive bladder)  Chronic, stable on current regimen. Followed by OB/GYN.    5. History of squamous cell carcinoma  Chronic, stable on current regimen. Followed by derm.    6. Osteoporosis without current pathological fracture, unspecified osteoporosis type  Chronic, stable on current regimen. Followed by endo. Due for follow up.    7. Osteoarthritis of right knee, unspecified osteoarthritis type  Chronic, stable on current regimen. Followed by ortho.      Provided Dia with a 5-10 year written screening schedule and personal prevention plan. Recommendations were developed using the USPSTF age appropriate recommendations. Education, counseling, and referrals were provided as needed. After Visit Summary printed and given to patient which includes a list of additional screenings\tests needed.    Follow up in about 1 year (around 4/11/2024) for Medicare AWV and with PCP as scheduled.       Rosetta Howard NP    I offered to discuss advanced care planning, including how to pick a person who would make decisions for you if you were unable to make them for yourself, called a health care power of , and what kind of decisions you might make such as use of life sustaining treatments such as ventilators and tube feeding when faced with a life limiting illness recorded on a living will that they will need to know. (How you want to be cared for as you near the end of your natural life)     X  Patient has advanced directives on file, which we reviewed, and they do not wish to make changes.

## 2023-05-02 ENCOUNTER — OFFICE VISIT (OUTPATIENT)
Dept: PRIMARY CARE CLINIC | Facility: CLINIC | Age: 73
End: 2023-05-02
Payer: MEDICARE

## 2023-05-02 ENCOUNTER — TELEPHONE (OUTPATIENT)
Dept: OPHTHALMOLOGY | Facility: CLINIC | Age: 73
End: 2023-05-02
Payer: MEDICARE

## 2023-05-02 ENCOUNTER — PATIENT MESSAGE (OUTPATIENT)
Dept: OPHTHALMOLOGY | Facility: CLINIC | Age: 73
End: 2023-05-02
Payer: MEDICARE

## 2023-05-02 VITALS
DIASTOLIC BLOOD PRESSURE: 67 MMHG | WEIGHT: 134.5 LBS | OXYGEN SATURATION: 95 % | HEART RATE: 89 BPM | HEIGHT: 68 IN | TEMPERATURE: 98 F | BODY MASS INDEX: 20.38 KG/M2 | RESPIRATION RATE: 18 BRPM | SYSTOLIC BLOOD PRESSURE: 110 MMHG

## 2023-05-02 DIAGNOSIS — M81.0 OSTEOPOROSIS WITHOUT CURRENT PATHOLOGICAL FRACTURE, UNSPECIFIED OSTEOPOROSIS TYPE: ICD-10-CM

## 2023-05-02 DIAGNOSIS — Z85.89 HISTORY OF SQUAMOUS CELL CARCINOMA: ICD-10-CM

## 2023-05-02 DIAGNOSIS — M17.11 OSTEOARTHRITIS OF RIGHT KNEE, UNSPECIFIED OSTEOARTHRITIS TYPE: ICD-10-CM

## 2023-05-02 DIAGNOSIS — L21.9 SEBORRHEIC DERMATITIS OF SCALP: ICD-10-CM

## 2023-05-02 DIAGNOSIS — S99.921S TOE INJURY, RIGHT, SEQUELA: ICD-10-CM

## 2023-05-02 DIAGNOSIS — N32.81 OAB (OVERACTIVE BLADDER): ICD-10-CM

## 2023-05-02 DIAGNOSIS — M19.019 OSTEOARTHRITIS OF SHOULDER, UNSPECIFIED LATERALITY, UNSPECIFIED OSTEOARTHRITIS TYPE: ICD-10-CM

## 2023-05-02 DIAGNOSIS — K21.9 LPRD (LARYNGOPHARYNGEAL REFLUX DISEASE): Primary | ICD-10-CM

## 2023-05-02 DIAGNOSIS — H02.9 LESION OF LEFT UPPER EYELID: ICD-10-CM

## 2023-05-02 DIAGNOSIS — K59.00 CONSTIPATION, UNSPECIFIED CONSTIPATION TYPE: ICD-10-CM

## 2023-05-02 DIAGNOSIS — Z87.74 H/O ATRIAL SEPTAL DEFECT REPAIR: ICD-10-CM

## 2023-05-02 DIAGNOSIS — Z78.0 POST-MENOPAUSAL: ICD-10-CM

## 2023-05-02 PROCEDURE — 1101F PR PT FALLS ASSESS DOC 0-1 FALLS W/OUT INJ PAST YR: ICD-10-PCS | Mod: HCNC,CPTII,S$GLB, | Performed by: FAMILY MEDICINE

## 2023-05-02 PROCEDURE — 1126F AMNT PAIN NOTED NONE PRSNT: CPT | Mod: HCNC,CPTII,S$GLB, | Performed by: FAMILY MEDICINE

## 2023-05-02 PROCEDURE — 3074F PR MOST RECENT SYSTOLIC BLOOD PRESSURE < 130 MM HG: ICD-10-PCS | Mod: HCNC,CPTII,S$GLB, | Performed by: FAMILY MEDICINE

## 2023-05-02 PROCEDURE — 1160F RVW MEDS BY RX/DR IN RCRD: CPT | Mod: HCNC,CPTII,S$GLB, | Performed by: FAMILY MEDICINE

## 2023-05-02 PROCEDURE — 3074F SYST BP LT 130 MM HG: CPT | Mod: HCNC,CPTII,S$GLB, | Performed by: FAMILY MEDICINE

## 2023-05-02 PROCEDURE — 99214 PR OFFICE/OUTPT VISIT, EST, LEVL IV, 30-39 MIN: ICD-10-PCS | Mod: HCNC,S$GLB,, | Performed by: FAMILY MEDICINE

## 2023-05-02 PROCEDURE — 3288F PR FALLS RISK ASSESSMENT DOCUMENTED: ICD-10-PCS | Mod: HCNC,CPTII,S$GLB, | Performed by: FAMILY MEDICINE

## 2023-05-02 PROCEDURE — 1160F PR REVIEW ALL MEDS BY PRESCRIBER/CLIN PHARMACIST DOCUMENTED: ICD-10-PCS | Mod: HCNC,CPTII,S$GLB, | Performed by: FAMILY MEDICINE

## 2023-05-02 PROCEDURE — 99999 PR PBB SHADOW E&M-EST. PATIENT-LVL V: CPT | Mod: PBBFAC,HCNC,, | Performed by: FAMILY MEDICINE

## 2023-05-02 PROCEDURE — 1101F PT FALLS ASSESS-DOCD LE1/YR: CPT | Mod: HCNC,CPTII,S$GLB, | Performed by: FAMILY MEDICINE

## 2023-05-02 PROCEDURE — 3044F PR MOST RECENT HEMOGLOBIN A1C LEVEL <7.0%: ICD-10-PCS | Mod: HCNC,CPTII,S$GLB, | Performed by: FAMILY MEDICINE

## 2023-05-02 PROCEDURE — 3078F PR MOST RECENT DIASTOLIC BLOOD PRESSURE < 80 MM HG: ICD-10-PCS | Mod: HCNC,CPTII,S$GLB, | Performed by: FAMILY MEDICINE

## 2023-05-02 PROCEDURE — 1157F ADVNC CARE PLAN IN RCRD: CPT | Mod: HCNC,CPTII,S$GLB, | Performed by: FAMILY MEDICINE

## 2023-05-02 PROCEDURE — 1126F PR PAIN SEVERITY QUANTIFIED, NO PAIN PRESENT: ICD-10-PCS | Mod: HCNC,CPTII,S$GLB, | Performed by: FAMILY MEDICINE

## 2023-05-02 PROCEDURE — 3008F PR BODY MASS INDEX (BMI) DOCUMENTED: ICD-10-PCS | Mod: HCNC,CPTII,S$GLB, | Performed by: FAMILY MEDICINE

## 2023-05-02 PROCEDURE — 99999 PR PBB SHADOW E&M-EST. PATIENT-LVL V: ICD-10-PCS | Mod: PBBFAC,HCNC,, | Performed by: FAMILY MEDICINE

## 2023-05-02 PROCEDURE — 99214 OFFICE O/P EST MOD 30 MIN: CPT | Mod: HCNC,S$GLB,, | Performed by: FAMILY MEDICINE

## 2023-05-02 PROCEDURE — 3044F HG A1C LEVEL LT 7.0%: CPT | Mod: HCNC,CPTII,S$GLB, | Performed by: FAMILY MEDICINE

## 2023-05-02 PROCEDURE — 3288F FALL RISK ASSESSMENT DOCD: CPT | Mod: HCNC,CPTII,S$GLB, | Performed by: FAMILY MEDICINE

## 2023-05-02 PROCEDURE — 1157F PR ADVANCE CARE PLAN OR EQUIV PRESENT IN MEDICAL RECORD: ICD-10-PCS | Mod: HCNC,CPTII,S$GLB, | Performed by: FAMILY MEDICINE

## 2023-05-02 PROCEDURE — 3008F BODY MASS INDEX DOCD: CPT | Mod: HCNC,CPTII,S$GLB, | Performed by: FAMILY MEDICINE

## 2023-05-02 PROCEDURE — 1159F PR MEDICATION LIST DOCUMENTED IN MEDICAL RECORD: ICD-10-PCS | Mod: HCNC,CPTII,S$GLB, | Performed by: FAMILY MEDICINE

## 2023-05-02 PROCEDURE — 1159F MED LIST DOCD IN RCRD: CPT | Mod: HCNC,CPTII,S$GLB, | Performed by: FAMILY MEDICINE

## 2023-05-02 PROCEDURE — 3078F DIAST BP <80 MM HG: CPT | Mod: HCNC,CPTII,S$GLB, | Performed by: FAMILY MEDICINE

## 2023-05-02 RX ORDER — CLOBETASOL PROPIONATE 0.46 MG/ML
SOLUTION TOPICAL
Qty: 50 ML | Refills: 3 | Status: SHIPPED | OUTPATIENT
Start: 2023-05-02

## 2023-05-02 RX ORDER — POLYETHYLENE GLYCOL 3350 17 G/17G
17 POWDER, FOR SOLUTION ORAL DAILY
Qty: 850 G | Refills: 3 | Status: SHIPPED | OUTPATIENT
Start: 2023-05-02

## 2023-05-02 RX ORDER — PHENOL/SODIUM PHENOLATE
20 AEROSOL, SPRAY (ML) MUCOUS MEMBRANE DAILY
Qty: 30 EACH | Refills: 3 | Status: SHIPPED | OUTPATIENT
Start: 2023-05-02 | End: 2024-05-01

## 2023-05-02 RX ORDER — DULOXETIN HYDROCHLORIDE 20 MG/1
40 CAPSULE, DELAYED RELEASE ORAL DAILY
Qty: 180 CAPSULE | Refills: 1 | Status: SHIPPED | OUTPATIENT
Start: 2023-05-02 | End: 2023-12-06

## 2023-05-02 NOTE — TELEPHONE ENCOUNTER
----- Message from Betsy Varner sent at 5/2/2023  2:41 PM CDT -----  Regarding: Follow Up-Eyelid Leision  Good afternoon, Dr. Shelley Leija is requesting that patient schedule a follow up with her established Ophthalmology provider, Dr. Bruna Silva on the patient's eyelid leision. Please contact patient and assist with scheduling, thanks!

## 2023-05-02 NOTE — TELEPHONE ENCOUNTER
Spoke to pt and explained that Dr Silva is cataract and cornea surgeon.  Dr Mary is not taking on any new patients at this time.  I will send her the info for Dr Villatoro to see her for the eyelid lesion.
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
## 2023-05-04 ENCOUNTER — HOSPITAL ENCOUNTER (OUTPATIENT)
Dept: RADIOLOGY | Facility: OTHER | Age: 73
Discharge: HOME | End: 2023-05-04
Attending: FAMILY MEDICINE
Payer: MEDICARE

## 2023-05-04 DIAGNOSIS — S99.921S TOE INJURY, RIGHT, SEQUELA: ICD-10-CM

## 2023-05-04 PROCEDURE — 73660 X-RAY EXAM OF TOE(S): CPT | Mod: TC,HCNC,FY,RT

## 2023-05-04 PROCEDURE — 73660 XR TOE 2 OR MORE VIEWS RIGHT: ICD-10-PCS | Mod: 26,HCNC,RT, | Performed by: RADIOLOGY

## 2023-05-04 PROCEDURE — 73660 X-RAY EXAM OF TOE(S): CPT | Mod: 26,HCNC,RT, | Performed by: RADIOLOGY

## 2023-05-10 ENCOUNTER — OFFICE VISIT (OUTPATIENT)
Dept: PODIATRY | Facility: CLINIC | Age: 73
End: 2023-05-10
Payer: MEDICARE

## 2023-05-10 DIAGNOSIS — S99.921S TOE INJURY, RIGHT, SEQUELA: ICD-10-CM

## 2023-05-10 PROCEDURE — 1157F PR ADVANCE CARE PLAN OR EQUIV PRESENT IN MEDICAL RECORD: ICD-10-PCS | Mod: HCNC,CPTII,S$GLB, | Performed by: PODIATRIST

## 2023-05-10 PROCEDURE — 99203 OFFICE O/P NEW LOW 30 MIN: CPT | Mod: HCNC,S$GLB,, | Performed by: PODIATRIST

## 2023-05-10 PROCEDURE — 99999 PR PBB SHADOW E&M-EST. PATIENT-LVL II: CPT | Mod: PBBFAC,HCNC,, | Performed by: PODIATRIST

## 2023-05-10 PROCEDURE — 99999 PR PBB SHADOW E&M-EST. PATIENT-LVL II: ICD-10-PCS | Mod: PBBFAC,HCNC,, | Performed by: PODIATRIST

## 2023-05-10 PROCEDURE — 3044F PR MOST RECENT HEMOGLOBIN A1C LEVEL <7.0%: ICD-10-PCS | Mod: HCNC,CPTII,S$GLB, | Performed by: PODIATRIST

## 2023-05-10 PROCEDURE — 99203 PR OFFICE/OUTPT VISIT, NEW, LEVL III, 30-44 MIN: ICD-10-PCS | Mod: HCNC,S$GLB,, | Performed by: PODIATRIST

## 2023-05-10 PROCEDURE — 1157F ADVNC CARE PLAN IN RCRD: CPT | Mod: HCNC,CPTII,S$GLB, | Performed by: PODIATRIST

## 2023-05-10 PROCEDURE — 3044F HG A1C LEVEL LT 7.0%: CPT | Mod: HCNC,CPTII,S$GLB, | Performed by: PODIATRIST

## 2023-05-10 RX ORDER — OMEPRAZOLE 20 MG/1
CAPSULE, DELAYED RELEASE ORAL
COMMUNITY
Start: 2023-05-03

## 2023-05-10 NOTE — PROGRESS NOTES
Subjective:      Patient ID: Dia Ovalles is a 72 y.o. female.    Chief Complaint: Toe Injury (R foot and toe injury )    Dia is a 72 y.o. female who presents to the podiatry clinic  with complaint of  right 2nd digit pain. Onset of the symptoms was several months ago. Precipitating event: injured toe in January . Current symptoms include: stiffness and swelling. Aggravating factors: any weight bearing. Symptoms have gradually improved. Patient has had prior foot problems. Evaluation to date: plain films: normal. Treatment to date: none. Patients rates pain 3/10 on pain scale.    Review of Systems   Constitutional: Negative for chills, fever and malaise/fatigue.   HENT:  Negative for hearing loss.    Cardiovascular:  Negative for claudication.   Respiratory:  Negative for shortness of breath.    Skin:  Negative for flushing and rash.   Musculoskeletal:  Negative for joint pain and myalgias.   Neurological:  Negative for loss of balance, numbness, paresthesias and sensory change.   Psychiatric/Behavioral:  Negative for altered mental status.          Objective:      Physical Exam  Vitals reviewed.   Cardiovascular:      Pulses:           Dorsalis pedis pulses are 2+ on the right side and 2+ on the left side.        Posterior tibial pulses are 2+ on the right side and 2+ on the left side.      Comments: No edema noted b/L  Musculoskeletal:      Comments:   POP to right 2nd digit     Feet:      Right foot:      Protective Sensation: 5 sites tested.  5 sites sensed.      Left foot:      Protective Sensation: 5 sites tested.  5 sites sensed.   Skin:     General: Skin is warm.      Comments: Normal skin tugor noted.   No open lesion noted b/L  Skin temp is warm to warm from proximal to distal b/L.  Webspaces clean, dry, and intact     Neurological:      Comments: Gross sensation intact b/L             Assessment:       Encounter Diagnosis   Name Primary?    Toe injury, right, sequela          Plan:       Dia was  seen today for toe injury.    Diagnoses and all orders for this visit:    Toe injury, right, sequela  -     Ambulatory referral/consult to Podiatry      I counseled the patient on her conditions, their implications and medical management.    Radiographics independently reviewed in detail with patient. Findings discussed. All questions in regards to radiographs were answered    Pt advised that toes take longer to heal from digits and can swell off and on for weeks after an injury  Pt advised to splint digits together for the next two weeks  Pt advised to wear soft, mesh like shoes  Call or return to clinic prn if these symptoms worsen or fail to improve as anticipated.      .

## 2023-05-22 ENCOUNTER — PATIENT MESSAGE (OUTPATIENT)
Dept: PRIMARY CARE CLINIC | Facility: CLINIC | Age: 73
End: 2023-05-22
Payer: MEDICARE

## 2023-05-22 DIAGNOSIS — H02.9 LESION OF LEFT UPPER EYELID: Primary | ICD-10-CM

## 2023-05-22 NOTE — TELEPHONE ENCOUNTER
Please review my chart message   Regarding referral   States that she need an external referral for eye lesion

## 2023-05-23 ENCOUNTER — TELEPHONE (OUTPATIENT)
Dept: DERMATOLOGY | Facility: CLINIC | Age: 73
End: 2023-05-23
Payer: MEDICARE

## 2023-05-23 NOTE — TELEPHONE ENCOUNTER
Please print out what is needed and pre fill what we can as far as the form goes in case we cannot get her an appointment.      Please also see if we can get her an appointment with someone at [facility] for her eyelid lesion, try calling the department or sending a message, she has been seen by Ophthalmology less than a year ago, Dr. Silva, I placed a new referral just in case it is needed.

## 2023-06-01 ENCOUNTER — OFFICE VISIT (OUTPATIENT)
Dept: OPTOMETRY | Facility: CLINIC | Age: 73
End: 2023-06-01
Payer: MEDICARE

## 2023-06-01 DIAGNOSIS — H02.9 LESION OF LEFT UPPER EYELID: Primary | ICD-10-CM

## 2023-06-01 DIAGNOSIS — Z96.1 PSEUDOPHAKIA OF BOTH EYES: ICD-10-CM

## 2023-06-01 DIAGNOSIS — Z13.5 GLAUCOMA SCREENING: ICD-10-CM

## 2023-06-01 PROCEDURE — 1159F MED LIST DOCD IN RCRD: CPT | Mod: HCNC,CPTII,S$GLB, | Performed by: OPTOMETRIST

## 2023-06-01 PROCEDURE — 1126F AMNT PAIN NOTED NONE PRSNT: CPT | Mod: HCNC,CPTII,S$GLB, | Performed by: OPTOMETRIST

## 2023-06-01 PROCEDURE — 99999 PR PBB SHADOW E&M-EST. PATIENT-LVL III: ICD-10-PCS | Mod: PBBFAC,HCNC,, | Performed by: OPTOMETRIST

## 2023-06-01 PROCEDURE — 99214 OFFICE O/P EST MOD 30 MIN: CPT | Mod: HCNC,S$GLB,, | Performed by: OPTOMETRIST

## 2023-06-01 PROCEDURE — 3288F FALL RISK ASSESSMENT DOCD: CPT | Mod: HCNC,CPTII,S$GLB, | Performed by: OPTOMETRIST

## 2023-06-01 PROCEDURE — 99999 PR PBB SHADOW E&M-EST. PATIENT-LVL III: CPT | Mod: PBBFAC,HCNC,, | Performed by: OPTOMETRIST

## 2023-06-01 PROCEDURE — 1101F PT FALLS ASSESS-DOCD LE1/YR: CPT | Mod: HCNC,CPTII,S$GLB, | Performed by: OPTOMETRIST

## 2023-06-01 PROCEDURE — 1157F ADVNC CARE PLAN IN RCRD: CPT | Mod: HCNC,CPTII,S$GLB, | Performed by: OPTOMETRIST

## 2023-06-01 PROCEDURE — 99214 PR OFFICE/OUTPT VISIT, EST, LEVL IV, 30-39 MIN: ICD-10-PCS | Mod: HCNC,S$GLB,, | Performed by: OPTOMETRIST

## 2023-06-01 PROCEDURE — 3044F PR MOST RECENT HEMOGLOBIN A1C LEVEL <7.0%: ICD-10-PCS | Mod: HCNC,CPTII,S$GLB, | Performed by: OPTOMETRIST

## 2023-06-01 PROCEDURE — 1157F PR ADVANCE CARE PLAN OR EQUIV PRESENT IN MEDICAL RECORD: ICD-10-PCS | Mod: HCNC,CPTII,S$GLB, | Performed by: OPTOMETRIST

## 2023-06-01 PROCEDURE — 1101F PR PT FALLS ASSESS DOC 0-1 FALLS W/OUT INJ PAST YR: ICD-10-PCS | Mod: HCNC,CPTII,S$GLB, | Performed by: OPTOMETRIST

## 2023-06-01 PROCEDURE — 1159F PR MEDICATION LIST DOCUMENTED IN MEDICAL RECORD: ICD-10-PCS | Mod: HCNC,CPTII,S$GLB, | Performed by: OPTOMETRIST

## 2023-06-01 PROCEDURE — 1126F PR PAIN SEVERITY QUANTIFIED, NO PAIN PRESENT: ICD-10-PCS | Mod: HCNC,CPTII,S$GLB, | Performed by: OPTOMETRIST

## 2023-06-01 PROCEDURE — 3044F HG A1C LEVEL LT 7.0%: CPT | Mod: HCNC,CPTII,S$GLB, | Performed by: OPTOMETRIST

## 2023-06-01 PROCEDURE — 3288F PR FALLS RISK ASSESSMENT DOCUMENTED: ICD-10-PCS | Mod: HCNC,CPTII,S$GLB, | Performed by: OPTOMETRIST

## 2023-06-01 NOTE — PROGRESS NOTES
HPI    Pt here for dilated eye examination.     Pt states vision is stable. Pt denies having any redness, soreness, or   pain. Pt states she has 2 bumps on her left upper eyelid x 1 for 4 weeks   and the other x 70+ years. Pt states bumps do not hurt or have any   discharge.   Dr Modi Monvision CLs near only OS lens never adapted to them     EYE MEDS:   Art Tears PRN OU   Last edited by Fred Fuentes, OD on 6/1/2023  1:32 PM.            Assessment /Plan     For exam results, see Encounter Report.    Lesion of left upper eyelid  -     Ambulatory referral/consult to Ophthalmology  -Referral to Oculoplastic for excision and Biopsy    Pseudophakia of both eyes  -clear, center    Glaucoma screening  -Monitor with annual eye exam and IOP check      RTC 1 yr annual

## 2023-06-01 NOTE — LETTER
"     [facility]  Optometry  [address]  [facility] [address]  Phone: [phone]  Fax: [phone]   June 1, 2023    Dia Ovalles    Patient: Dia Ovalles   MR Number: [id number]   YOB: 1950   Date of Visit: 6/1/2023       To Whom it may concern:    Please make available access to Oculoplastic for a Eye lid excision and Biopsy for Mrs Ovalles left upper lid.  There are no local providers available in network and her lesion requires attention.        Sincerely,        Fred Fuentes, OD       CC    No Recipients             Main Ophthalmology Exam       External Exam         Right Left    External Normal neoplasm UL-size unchanged              Slit Lamp Exam         Right Left    Lids/Lashes Meibomian gland dysfunction eyelid lesion upper lid    Conjunctiva/Sclera White and quiet White and quiet    Cornea Clear Clear    Anterior Chamber Deep and quiet Deep and quiet    Iris Round and reactive Round and reactive    Lens Posterior chamber intraocular lens, Posterior capsular opacification Posterior chamber intraocular lens, Posterior capsular opacification              Fundus Exam         Right Left    Vitreous Posterior vitreous detachment, Central vitreous floaters Posterior vitreous detachment, Central vitreous floaters    Disc Normal Normal    C/D Ratio Vertical 0.25 0.25    C/D Ratio Horizontal 0.25 0.25    Macula Normal Normal    Vessels Normal Normal                   <div id="MAIN_EXAM_REVIEWED"></div>     "

## 2023-06-05 ENCOUNTER — HOSPITAL ENCOUNTER (OUTPATIENT)
Dept: RADIOLOGY | Facility: CLINIC | Age: 73
Discharge: HOME | End: 2023-06-05
Attending: FAMILY MEDICINE
Payer: MEDICARE

## 2023-06-05 DIAGNOSIS — Z78.0 POST-MENOPAUSAL: ICD-10-CM

## 2023-06-05 DIAGNOSIS — M81.0 OSTEOPOROSIS WITHOUT CURRENT PATHOLOGICAL FRACTURE, UNSPECIFIED OSTEOPOROSIS TYPE: ICD-10-CM

## 2023-06-05 PROCEDURE — 77080 DXA BONE DENSITY AXIAL: CPT | Mod: TC,HCNC

## 2023-06-05 PROCEDURE — 77080 DXA BONE DENSITY AXIAL: CPT | Mod: 26,HCNC,, | Performed by: INTERNAL MEDICINE

## 2023-06-05 PROCEDURE — 77080 DXA BONE DENSITY AXIAL SKELETON 1 OR MORE SITES: ICD-10-PCS | Mod: 26,HCNC,, | Performed by: INTERNAL MEDICINE

## 2023-06-23 ENCOUNTER — OFFICE VISIT (OUTPATIENT)
Dept: URGENT CARE | Facility: CLINIC | Age: 73
End: 2023-06-23
Payer: MEDICARE

## 2023-06-23 ENCOUNTER — TELEPHONE (OUTPATIENT)
Dept: URGENT CARE | Facility: CLINIC | Age: 73
End: 2023-06-23

## 2023-06-23 VITALS
BODY MASS INDEX: 20.31 KG/M2 | HEART RATE: 83 BPM | OXYGEN SATURATION: 96 % | RESPIRATION RATE: 16 BRPM | HEIGHT: 68 IN | DIASTOLIC BLOOD PRESSURE: 57 MMHG | TEMPERATURE: 98 F | WEIGHT: 134 LBS | SYSTOLIC BLOOD PRESSURE: 112 MMHG

## 2023-06-23 DIAGNOSIS — R05.9 COUGH, UNSPECIFIED TYPE: ICD-10-CM

## 2023-06-23 DIAGNOSIS — M54.6 ACUTE RIGHT-SIDED THORACIC BACK PAIN: Primary | ICD-10-CM

## 2023-06-23 PROCEDURE — 99214 PR OFFICE/OUTPT VISIT, EST, LEVL IV, 30-39 MIN: ICD-10-PCS | Mod: S$GLB,,, | Performed by: SURGERY

## 2023-06-23 PROCEDURE — 71100 X-RAY EXAM RIBS UNI 2 VIEWS: CPT | Mod: RT,S$GLB,, | Performed by: RADIOLOGY

## 2023-06-23 PROCEDURE — 99214 OFFICE O/P EST MOD 30 MIN: CPT | Mod: S$GLB,,, | Performed by: SURGERY

## 2023-06-23 PROCEDURE — 71100 XR RIBS 2 VIEW RIGHT: ICD-10-PCS | Mod: RT,S$GLB,, | Performed by: RADIOLOGY

## 2023-06-23 RX ORDER — PROMETHAZINE HYDROCHLORIDE AND DEXTROMETHORPHAN HYDROBROMIDE 6.25; 15 MG/5ML; MG/5ML
5 SYRUP ORAL EVERY 4 HOURS PRN
Qty: 180 ML | Refills: 0 | Status: SHIPPED | OUTPATIENT
Start: 2023-06-23 | End: 2023-06-30

## 2023-06-23 NOTE — TELEPHONE ENCOUNTER
Pt notified of xray results suggesting a small rib fracture. I explained this is not displaced and so thi should heal without too much trouble. I recommend rest, ice and advance activities as tolerated.

## 2023-06-23 NOTE — PROGRESS NOTES
"Subjective:      Patient ID: Dia Ovalles is a 72 y.o. female.    Vitals:  height is 5' 8" (1.727 m) and weight is 60.8 kg (134 lb). Her temperature is 97.8 °F (36.6 °C). Her blood pressure is 112/57 (abnormal) and her pulse is 83. Her respiration is 16 and oxygen saturation is 96%.     Chief Complaint: Back Pain    Pt presents complaints of back pain due to a fall.  Fall occurred 7 days ago. Worse today. Right middle. Pain level 8. OTC lidocaine patches, naproxen with mild relief.     Back Pain  This is a new problem.     Musculoskeletal:  Positive for back pain.    Objective:     Physical Exam   Constitutional: She is oriented to person, place, and time. She appears well-developed. She is cooperative. No distress.   HENT:   Head: Normocephalic and atraumatic.   Nose: Nose normal.   Mouth/Throat: Oropharynx is clear and moist and mucous membranes are normal.   Eyes: Conjunctivae and lids are normal.   Neck: Trachea normal and phonation normal. Neck supple.   Cardiovascular: Normal rate, regular rhythm, normal heart sounds and normal pulses.   Pulmonary/Chest: Effort normal and breath sounds normal.   Abdominal: Normal appearance and bowel sounds are normal. She exhibits no mass. Soft.   Musculoskeletal:         General: No deformity.        Back:    Neurological: She is alert and oriented to person, place, and time. She has normal strength and normal reflexes. No sensory deficit.   Skin: Skin is warm, dry, intact and not diaphoretic.   Psychiatric: Her speech is normal and behavior is normal. Judgment and thought content normal.   Nursing note and vitals reviewed.    Assessment:     1. Acute right-sided thoracic back pain    2. Cough, unspecified type        Plan:       Acute right-sided thoracic back pain  -     X-Ray Ribs 2 View Right; Future; Expected date: 06/23/2023    Cough, unspecified type  -     promethazine-dextromethorphan (PROMETHAZINE-DM) 6.25-15 mg/5 mL Syrp; Take 5 mLs by mouth every 4 (four) hours " as needed (cough).  Dispense: 180 mL; Refill: 0      Pt will go to [facility] clinic for xray and will call her with test results. Exam c/w thoracic lower rib strain/fracture possibly secondary to cough. Traumatic injury to low back is healing well, no muscle spasm or spine bony tenderness

## 2023-06-26 ENCOUNTER — TELEPHONE (OUTPATIENT)
Dept: OBSTETRICS AND GYNECOLOGY | Facility: CLINIC | Age: 73
End: 2023-06-26
Payer: MEDICARE

## 2023-06-26 NOTE — TELEPHONE ENCOUNTER
----- Message from Val Almeida MA sent at 6/26/2023  9:17 AM CDT -----  Good morning the above pt would like to reschedule her annual for now or early September.      Pt contact [phone]

## 2023-07-06 ENCOUNTER — TELEPHONE (OUTPATIENT)
Dept: DERMATOLOGY | Facility: CLINIC | Age: 73
End: 2023-07-06
Payer: MEDICARE

## 2023-07-11 DIAGNOSIS — M17.11 PRIMARY OSTEOARTHRITIS OF RIGHT KNEE: Primary | ICD-10-CM

## 2023-07-11 NOTE — PROGRESS NOTES
Dia Ovalles has primary osteoarthritis of right knee. 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 Patient has had orthovisc injections in the past with excellent relief and would like to repeat. Will order and schedule 30 mg weekly for three weeks.       
Left Ankle Ulcer

## 2023-07-26 ENCOUNTER — OFFICE VISIT (OUTPATIENT)
Dept: ORTHOPEDICS | Facility: CLINIC | Age: 73
End: 2023-07-26
Payer: MEDICARE

## 2023-07-26 DIAGNOSIS — M17.11 PRIMARY OSTEOARTHRITIS OF RIGHT KNEE: Primary | ICD-10-CM

## 2023-07-26 PROCEDURE — 20610 PR DRAIN/INJECT LARGE JOINT/BURSA: ICD-10-PCS | Mod: HCNC,RT,S$GLB, | Performed by: NURSE PRACTITIONER

## 2023-07-26 PROCEDURE — 99499 NO LOS: ICD-10-PCS | Mod: HCNC,S$GLB,, | Performed by: NURSE PRACTITIONER

## 2023-07-26 PROCEDURE — 20610 DRAIN/INJ JOINT/BURSA W/O US: CPT | Mod: HCNC,RT,S$GLB, | Performed by: NURSE PRACTITIONER

## 2023-07-26 PROCEDURE — 99499 UNLISTED E&M SERVICE: CPT | Mod: HCNC,S$GLB,, | Performed by: NURSE PRACTITIONER

## 2023-07-26 PROCEDURE — 99999 PR PBB SHADOW E&M-EST. PATIENT-LVL III: ICD-10-PCS | Mod: PBBFAC,HCNC,, | Performed by: NURSE PRACTITIONER

## 2023-07-26 PROCEDURE — 99999 PR PBB SHADOW E&M-EST. PATIENT-LVL III: CPT | Mod: PBBFAC,HCNC,, | Performed by: NURSE PRACTITIONER

## 2023-08-02 ENCOUNTER — OFFICE VISIT (OUTPATIENT)
Dept: ORTHOPEDICS | Facility: CLINIC | Age: 73
End: 2023-08-02
Payer: MEDICARE

## 2023-08-02 DIAGNOSIS — M17.11 PRIMARY OSTEOARTHRITIS OF RIGHT KNEE: Primary | ICD-10-CM

## 2023-08-02 PROCEDURE — 99999 PR PBB SHADOW E&M-EST. PATIENT-LVL III: ICD-10-PCS | Mod: PBBFAC,HCNC,, | Performed by: NURSE PRACTITIONER

## 2023-08-02 PROCEDURE — 99999 PR PBB SHADOW E&M-EST. PATIENT-LVL III: CPT | Mod: PBBFAC,HCNC,, | Performed by: NURSE PRACTITIONER

## 2023-08-02 PROCEDURE — 99499 NO LOS: ICD-10-PCS | Mod: HCNC,S$GLB,, | Performed by: NURSE PRACTITIONER

## 2023-08-02 PROCEDURE — 20610 DRAIN/INJ JOINT/BURSA W/O US: CPT | Mod: HCNC,RT,S$GLB, | Performed by: NURSE PRACTITIONER

## 2023-08-02 PROCEDURE — 99499 UNLISTED E&M SERVICE: CPT | Mod: HCNC,S$GLB,, | Performed by: NURSE PRACTITIONER

## 2023-08-02 PROCEDURE — 20610 PR DRAIN/INJECT LARGE JOINT/BURSA: ICD-10-PCS | Mod: HCNC,RT,S$GLB, | Performed by: NURSE PRACTITIONER

## 2023-08-02 NOTE — PROGRESS NOTES
Dia Ovalles presents to clinic today for the second right knee Orthovisc injection.     Exam demonstrates the no effusion in the  right knee, and the skin is intact.    Radiographs reveal degenerative changes of knee    Diagnosis: primary osteoarthritis right knee    After time out was performed and patient ID, side, and site were verified, the  right  knee was sterilly prepped in the standard fashion. Verbal consent obtained.  A 25-gauge needle was introduced into right knee joint from an danielle-lateral site without complication and knee was then injected with 2 ml of Orthovisc  Sterile dressing was applied.  The patient was instructed to resume activities as tolerated and to call with any problems.     Patient will return next week for the third injection.

## 2023-08-09 ENCOUNTER — OFFICE VISIT (OUTPATIENT)
Dept: ORTHOPEDICS | Facility: CLINIC | Age: 73
End: 2023-08-09
Payer: MEDICARE

## 2023-08-09 DIAGNOSIS — M17.11 PRIMARY OSTEOARTHRITIS OF RIGHT KNEE: Primary | ICD-10-CM

## 2023-08-09 PROCEDURE — 99499 NO LOS: ICD-10-PCS | Mod: HCNC,S$GLB,, | Performed by: NURSE PRACTITIONER

## 2023-08-09 PROCEDURE — 20610 PR DRAIN/INJECT LARGE JOINT/BURSA: ICD-10-PCS | Mod: HCNC,RT,S$GLB, | Performed by: NURSE PRACTITIONER

## 2023-08-09 PROCEDURE — 99999 PR PBB SHADOW E&M-EST. PATIENT-LVL III: ICD-10-PCS | Mod: PBBFAC,HCNC,, | Performed by: NURSE PRACTITIONER

## 2023-08-09 PROCEDURE — 99499 UNLISTED E&M SERVICE: CPT | Mod: HCNC,S$GLB,, | Performed by: NURSE PRACTITIONER

## 2023-08-09 PROCEDURE — 20610 DRAIN/INJ JOINT/BURSA W/O US: CPT | Mod: HCNC,RT,S$GLB, | Performed by: NURSE PRACTITIONER

## 2023-08-09 PROCEDURE — 99999 PR PBB SHADOW E&M-EST. PATIENT-LVL III: CPT | Mod: PBBFAC,HCNC,, | Performed by: NURSE PRACTITIONER

## 2023-08-09 NOTE — PROGRESS NOTES
Dia Ovalles presents to clinic today for the third right knee Orthovisc injection.     Exam demonstrates the no effusion in the  right knee, and the skin is intact.    Radiographs reveal degenerative changes of knee    Diagnosis: primary osteoarthritis right knee    After time out was performed and patient ID, side, and site were verified, the  right  knee was sterilly prepped in the standard fashion. Verbal consent obtained.  A 25-gauge needle was introduced into right knee joint from an danielle-lateral site without complication and knee was then injected with 2 ml of Orthovisc  Sterile dressing was applied.  The patient was instructed to resume activities as tolerated and to call with any problems.     Patient will return as needed.

## 2023-08-14 ENCOUNTER — OFFICE VISIT (OUTPATIENT)
Dept: DERMATOLOGY | Facility: CLINIC | Age: 73
End: 2023-08-14
Payer: MEDICARE

## 2023-08-14 DIAGNOSIS — D22.9 MULTIPLE BENIGN NEVI: ICD-10-CM

## 2023-08-14 DIAGNOSIS — L81.4 LENTIGO: ICD-10-CM

## 2023-08-14 DIAGNOSIS — L82.0 SEBORRHEIC KERATOSES, INFLAMED: Primary | ICD-10-CM

## 2023-08-14 DIAGNOSIS — L82.1 SEBORRHEIC KERATOSES: ICD-10-CM

## 2023-08-14 DIAGNOSIS — D48.5 NEOPLASM OF UNCERTAIN BEHAVIOR OF SKIN: ICD-10-CM

## 2023-08-14 DIAGNOSIS — D18.01 CHERRY ANGIOMA: ICD-10-CM

## 2023-08-14 DIAGNOSIS — Z85.828 HISTORY OF SKIN CANCER: ICD-10-CM

## 2023-08-14 DIAGNOSIS — Z12.83 SCREENING FOR SKIN CANCER: ICD-10-CM

## 2023-08-14 PROCEDURE — 17110 DESTRUCTION B9 LES UP TO 14: CPT | Mod: HCNC,S$GLB,, | Performed by: STUDENT IN AN ORGANIZED HEALTH CARE EDUCATION/TRAINING PROGRAM

## 2023-08-14 PROCEDURE — 1160F PR REVIEW ALL MEDS BY PRESCRIBER/CLIN PHARMACIST DOCUMENTED: ICD-10-PCS | Mod: HCNC,CPTII,S$GLB, | Performed by: STUDENT IN AN ORGANIZED HEALTH CARE EDUCATION/TRAINING PROGRAM

## 2023-08-14 PROCEDURE — 17110 PR DESTRUCTION BENIGN LESIONS UP TO 14: ICD-10-PCS | Mod: HCNC,S$GLB,, | Performed by: STUDENT IN AN ORGANIZED HEALTH CARE EDUCATION/TRAINING PROGRAM

## 2023-08-14 PROCEDURE — 3044F HG A1C LEVEL LT 7.0%: CPT | Mod: HCNC,CPTII,S$GLB, | Performed by: STUDENT IN AN ORGANIZED HEALTH CARE EDUCATION/TRAINING PROGRAM

## 2023-08-14 PROCEDURE — 99203 PR OFFICE/OUTPT VISIT, NEW, LEVL III, 30-44 MIN: ICD-10-PCS | Mod: 25,HCNC,S$GLB, | Performed by: STUDENT IN AN ORGANIZED HEALTH CARE EDUCATION/TRAINING PROGRAM

## 2023-08-14 PROCEDURE — 1160F RVW MEDS BY RX/DR IN RCRD: CPT | Mod: HCNC,CPTII,S$GLB, | Performed by: STUDENT IN AN ORGANIZED HEALTH CARE EDUCATION/TRAINING PROGRAM

## 2023-08-14 PROCEDURE — 3044F PR MOST RECENT HEMOGLOBIN A1C LEVEL <7.0%: ICD-10-PCS | Mod: HCNC,CPTII,S$GLB, | Performed by: STUDENT IN AN ORGANIZED HEALTH CARE EDUCATION/TRAINING PROGRAM

## 2023-08-14 PROCEDURE — 1126F PR PAIN SEVERITY QUANTIFIED, NO PAIN PRESENT: ICD-10-PCS | Mod: HCNC,CPTII,S$GLB, | Performed by: STUDENT IN AN ORGANIZED HEALTH CARE EDUCATION/TRAINING PROGRAM

## 2023-08-14 PROCEDURE — 1126F AMNT PAIN NOTED NONE PRSNT: CPT | Mod: HCNC,CPTII,S$GLB, | Performed by: STUDENT IN AN ORGANIZED HEALTH CARE EDUCATION/TRAINING PROGRAM

## 2023-08-14 PROCEDURE — 1157F ADVNC CARE PLAN IN RCRD: CPT | Mod: HCNC,CPTII,S$GLB, | Performed by: STUDENT IN AN ORGANIZED HEALTH CARE EDUCATION/TRAINING PROGRAM

## 2023-08-14 PROCEDURE — 1159F MED LIST DOCD IN RCRD: CPT | Mod: HCNC,CPTII,S$GLB, | Performed by: STUDENT IN AN ORGANIZED HEALTH CARE EDUCATION/TRAINING PROGRAM

## 2023-08-14 PROCEDURE — 1159F PR MEDICATION LIST DOCUMENTED IN MEDICAL RECORD: ICD-10-PCS | Mod: HCNC,CPTII,S$GLB, | Performed by: STUDENT IN AN ORGANIZED HEALTH CARE EDUCATION/TRAINING PROGRAM

## 2023-08-14 PROCEDURE — 99999 PR PBB SHADOW E&M-EST. PATIENT-LVL III: CPT | Mod: PBBFAC,HCNC,, | Performed by: STUDENT IN AN ORGANIZED HEALTH CARE EDUCATION/TRAINING PROGRAM

## 2023-08-14 PROCEDURE — 1157F PR ADVANCE CARE PLAN OR EQUIV PRESENT IN MEDICAL RECORD: ICD-10-PCS | Mod: HCNC,CPTII,S$GLB, | Performed by: STUDENT IN AN ORGANIZED HEALTH CARE EDUCATION/TRAINING PROGRAM

## 2023-08-14 PROCEDURE — 99203 OFFICE O/P NEW LOW 30 MIN: CPT | Mod: 25,HCNC,S$GLB, | Performed by: STUDENT IN AN ORGANIZED HEALTH CARE EDUCATION/TRAINING PROGRAM

## 2023-08-14 PROCEDURE — 99999 PR PBB SHADOW E&M-EST. PATIENT-LVL III: ICD-10-PCS | Mod: PBBFAC,HCNC,, | Performed by: STUDENT IN AN ORGANIZED HEALTH CARE EDUCATION/TRAINING PROGRAM

## 2023-08-14 NOTE — PROGRESS NOTES
Subjective:      Patient ID:  Dia Ovalles is a 72 y.o. female who presents for   Chief Complaint   Patient presents with    Skin Check     TBSE     History of Present Illness: The patient presents for follow up of skin check.    The patient was last seen on: 10/13/22 for TBSE  H/o SCC - right arm, 2012     Other skin complaints: none     Review of Systems   Skin:  Positive for daily sunscreen use, activity-related sunscreen use and wears hat.   Hematologic/Lymphatic: Bruises/bleeds easily.       Objective:   Physical Exam   Constitutional: She appears well-developed and well-nourished. No distress.   Neurological: She is alert and oriented to person, place, and time. She is not disoriented.   Psychiatric: She has a normal mood and affect.   Skin:   Areas Examined (abnormalities noted in diagram):   Scalp / Hair Palpated and Inspected  Head / Face Inspection Performed  Neck Inspection Performed  Chest / Axilla Inspection Performed  Abdomen Inspection Performed  Back Inspection Performed  RUE Inspected  LUE Inspection Performed  RLE Inspected  LLE Inspection Performed  Nails and Digits Inspection Performed                 Diagram Legend     Erythematous scaling macule/papule c/w actinic keratosis       Vascular papule c/w angioma      Pigmented verrucoid papule/plaque c/w seborrheic keratosis      Yellow umbilicated papule c/w sebaceous hyperplasia      Irregularly shaped tan macule c/w lentigo     1-2 mm smooth white papules consistent with Milia      Movable subcutaneous cyst with punctum c/w epidermal inclusion cyst      Subcutaneous movable cyst c/w pilar cyst      Firm pink to brown papule c/w dermatofibroma      Pedunculated fleshy papule(s) c/w skin tag(s)      Evenly pigmented macule c/w junctional nevus     Mildly variegated pigmented, slightly irregular-bordered macule c/w mildly atypical nevus      Flesh colored to evenly pigmented papule c/w intradermal nevus       Pink pearly papule/plaque c/w basal  cell carcinoma      Erythematous hyperkeratotic cursted plaque c/w SCC      Surgical scar with no sign of skin cancer recurrence      Open and closed comedones      Inflammatory papules and pustules      Verrucoid papule consistent consistent with wart     Erythematous eczematous patches and plaques     Dystrophic onycholytic nail with subungual debris c/w onychomycosis     Umbilicated papule    Erythematous-base heme-crusted tan verrucoid plaque consistent with inflamed seborrheic keratosis     Erythematous Silvery Scaling Plaque c/w Psoriasis     See annotation      Assessment / Plan:        Screening for skin cancer  History of skin cancer  Total body skin examination performed today including at least 12 points as noted in physical examination. No lesions suspicious for malignancy noted.    Recommend daily sun protection/avoidance, use of at least SPF 30, broad spectrum sunscreen (OTC drug), skin self examinations, and routine physician surveillance to optimize early detection    Patient does have a pearly papule on eyelid margin present since she was a child. Morphology could be BCC but history more c/w hidrocystoma. Will refer to oculoplastics    Seborrheic keratoses  Cherry angioma  Lentigo  Multiple benign nevi  Reassurance given to patient. No treatment is necessary.   Treatment of benign, asymptomatic lesions may be considered cosmetic.    Seborrheic keratoses, inflamed  Lesions were itching or inflamed    Cryosurgery procedure note:  Verbal consent from the patient is obtained including, but not limited to, risk of hypopigmentation/hyperpigmentation, scar, recurrence of lesion. Liquid nitrogen cryosurgery is applied to 5 lesions to produce a freeze injury. The patient is aware that blisters may form and is instructed on wound care with gentle cleansing and use of vaseline ointment to keep moist until healed. The patient is supplied a handout on cryosurgery and is instructed to call if lesions do not  completely resolve.           Follow up in about 1 year (around 8/14/2024) for TBSE.

## 2023-08-29 ENCOUNTER — TELEPHONE (OUTPATIENT)
Dept: OPHTHALMOLOGY | Facility: CLINIC | Age: 73
End: 2023-08-29
Payer: MEDICARE

## 2023-09-06 DIAGNOSIS — N39.41 URGE INCONTINENCE: ICD-10-CM

## 2023-09-06 RX ORDER — OXYBUTYNIN CHLORIDE 10 MG/1
10 TABLET, EXTENDED RELEASE ORAL
Qty: 90 TABLET | Refills: 0 | Status: SHIPPED | OUTPATIENT
Start: 2023-09-06 | End: 2024-03-13

## 2023-09-06 NOTE — TELEPHONE ENCOUNTER
Refill Decision Note   Dia Ovalles  is requesting a refill authorization.  Brief Assessment and Rationale for Refill:  Approve     Medication Therapy Plan:         Comments:     No Care Gaps recommended.     Note composed:2:12 PM 09/06/2023

## 2023-10-03 ENCOUNTER — OFFICE VISIT (OUTPATIENT)
Dept: OBSTETRICS AND GYNECOLOGY | Facility: CLINIC | Age: 73
End: 2023-10-03
Payer: MEDICARE

## 2023-10-03 VITALS
HEIGHT: 68 IN | SYSTOLIC BLOOD PRESSURE: 106 MMHG | BODY MASS INDEX: 21.02 KG/M2 | WEIGHT: 138.69 LBS | DIASTOLIC BLOOD PRESSURE: 70 MMHG

## 2023-10-03 DIAGNOSIS — Z01.419 ENCOUNTER FOR GYNECOLOGICAL EXAMINATION WITHOUT ABNORMAL FINDING: Primary | ICD-10-CM

## 2023-10-03 DIAGNOSIS — M81.0 OSTEOPOROSIS, UNSPECIFIED OSTEOPOROSIS TYPE, UNSPECIFIED PATHOLOGICAL FRACTURE PRESENCE: ICD-10-CM

## 2023-10-03 DIAGNOSIS — Z12.31 ENCOUNTER FOR SCREENING MAMMOGRAM FOR BREAST CANCER: ICD-10-CM

## 2023-10-03 DIAGNOSIS — N95.2 VAGINAL ATROPHY: ICD-10-CM

## 2023-10-03 DIAGNOSIS — Z78.0 POSTMENOPAUSAL: ICD-10-CM

## 2023-10-03 PROCEDURE — 1101F PR PT FALLS ASSESS DOC 0-1 FALLS W/OUT INJ PAST YR: ICD-10-PCS | Mod: HCNC,CPTII,S$GLB, | Performed by: OBSTETRICS & GYNECOLOGY

## 2023-10-03 PROCEDURE — 3074F PR MOST RECENT SYSTOLIC BLOOD PRESSURE < 130 MM HG: ICD-10-PCS | Mod: HCNC,CPTII,S$GLB, | Performed by: OBSTETRICS & GYNECOLOGY

## 2023-10-03 PROCEDURE — 3288F FALL RISK ASSESSMENT DOCD: CPT | Mod: HCNC,CPTII,S$GLB, | Performed by: OBSTETRICS & GYNECOLOGY

## 2023-10-03 PROCEDURE — G0101 PR CA SCREEN;PELVIC/BREAST EXAM: ICD-10-PCS | Mod: GZ,HCNC,S$GLB, | Performed by: OBSTETRICS & GYNECOLOGY

## 2023-10-03 PROCEDURE — 1157F PR ADVANCE CARE PLAN OR EQUIV PRESENT IN MEDICAL RECORD: ICD-10-PCS | Mod: HCNC,CPTII,S$GLB, | Performed by: OBSTETRICS & GYNECOLOGY

## 2023-10-03 PROCEDURE — 1159F MED LIST DOCD IN RCRD: CPT | Mod: HCNC,CPTII,S$GLB, | Performed by: OBSTETRICS & GYNECOLOGY

## 2023-10-03 PROCEDURE — G0101 CA SCREEN;PELVIC/BREAST EXAM: HCPCS | Mod: GZ,HCNC,S$GLB, | Performed by: OBSTETRICS & GYNECOLOGY

## 2023-10-03 PROCEDURE — 1101F PT FALLS ASSESS-DOCD LE1/YR: CPT | Mod: HCNC,CPTII,S$GLB, | Performed by: OBSTETRICS & GYNECOLOGY

## 2023-10-03 PROCEDURE — 3078F PR MOST RECENT DIASTOLIC BLOOD PRESSURE < 80 MM HG: ICD-10-PCS | Mod: HCNC,CPTII,S$GLB, | Performed by: OBSTETRICS & GYNECOLOGY

## 2023-10-03 PROCEDURE — 3074F SYST BP LT 130 MM HG: CPT | Mod: HCNC,CPTII,S$GLB, | Performed by: OBSTETRICS & GYNECOLOGY

## 2023-10-03 PROCEDURE — 3044F HG A1C LEVEL LT 7.0%: CPT | Mod: HCNC,CPTII,S$GLB, | Performed by: OBSTETRICS & GYNECOLOGY

## 2023-10-03 PROCEDURE — 99999 PR PBB SHADOW E&M-EST. PATIENT-LVL III: ICD-10-PCS | Mod: PBBFAC,HCNC,, | Performed by: OBSTETRICS & GYNECOLOGY

## 2023-10-03 PROCEDURE — 1159F PR MEDICATION LIST DOCUMENTED IN MEDICAL RECORD: ICD-10-PCS | Mod: HCNC,CPTII,S$GLB, | Performed by: OBSTETRICS & GYNECOLOGY

## 2023-10-03 PROCEDURE — 3008F PR BODY MASS INDEX (BMI) DOCUMENTED: ICD-10-PCS | Mod: HCNC,CPTII,S$GLB, | Performed by: OBSTETRICS & GYNECOLOGY

## 2023-10-03 PROCEDURE — 1160F RVW MEDS BY RX/DR IN RCRD: CPT | Mod: HCNC,CPTII,S$GLB, | Performed by: OBSTETRICS & GYNECOLOGY

## 2023-10-03 PROCEDURE — 3288F PR FALLS RISK ASSESSMENT DOCUMENTED: ICD-10-PCS | Mod: HCNC,CPTII,S$GLB, | Performed by: OBSTETRICS & GYNECOLOGY

## 2023-10-03 PROCEDURE — 99999 PR PBB SHADOW E&M-EST. PATIENT-LVL III: CPT | Mod: PBBFAC,HCNC,, | Performed by: OBSTETRICS & GYNECOLOGY

## 2023-10-03 PROCEDURE — 1126F AMNT PAIN NOTED NONE PRSNT: CPT | Mod: HCNC,CPTII,S$GLB, | Performed by: OBSTETRICS & GYNECOLOGY

## 2023-10-03 PROCEDURE — 3008F BODY MASS INDEX DOCD: CPT | Mod: HCNC,CPTII,S$GLB, | Performed by: OBSTETRICS & GYNECOLOGY

## 2023-10-03 PROCEDURE — 1126F PR PAIN SEVERITY QUANTIFIED, NO PAIN PRESENT: ICD-10-PCS | Mod: HCNC,CPTII,S$GLB, | Performed by: OBSTETRICS & GYNECOLOGY

## 2023-10-03 PROCEDURE — 3078F DIAST BP <80 MM HG: CPT | Mod: HCNC,CPTII,S$GLB, | Performed by: OBSTETRICS & GYNECOLOGY

## 2023-10-03 PROCEDURE — 1157F ADVNC CARE PLAN IN RCRD: CPT | Mod: HCNC,CPTII,S$GLB, | Performed by: OBSTETRICS & GYNECOLOGY

## 2023-10-03 PROCEDURE — 3044F PR MOST RECENT HEMOGLOBIN A1C LEVEL <7.0%: ICD-10-PCS | Mod: HCNC,CPTII,S$GLB, | Performed by: OBSTETRICS & GYNECOLOGY

## 2023-10-03 PROCEDURE — 1160F PR REVIEW ALL MEDS BY PRESCRIBER/CLIN PHARMACIST DOCUMENTED: ICD-10-PCS | Mod: HCNC,CPTII,S$GLB, | Performed by: OBSTETRICS & GYNECOLOGY

## 2023-10-03 NOTE — PROGRESS NOTES
CC: Well woman exam    Dia Ovalles is a 73 y.o. female  presents for a well woman exam.  LMP: No LMP recorded (lmp unknown). Patient is postmenopausal..  No issues, problems, or complaints.    Past Medical History:   Diagnosis Date    Breast cyst     Cancer     SCC x 2 on arms    Cataract     Joint pain     OA (osteoarthritis) of shoulder 2019    OAB (overactive bladder) 2017    Osteoporosis 2017    Squamous cell carcinoma of skin 2012    right arm     Past Surgical History:   Procedure Laterality Date    ASD REPAIR      open heart surgery    BREAST BIOPSY      CATARACT EXTRACTION W/  INTRAOCULAR LENS IMPLANT Right 2021    Procedure: EXTRACTION, CATARACT, WITH IOL INSERTION;  Surgeon: Bruna Silva MD;  Location: [facility] OR;  Service: Ophthalmology;  Laterality: Right;    CATARACT EXTRACTION W/  INTRAOCULAR LENS IMPLANT Left 2021    Procedure: EXTRACTION, CATARACT, WITH IOL INSERTION;  Surgeon: Bruna Silva MD;  Location: [facility] OR;  Service: Ophthalmology;  Laterality: Left;     SECTION      x2    EYE SURGERY      KNEE SURGERY      meniscal repair    SKIN BIOPSY  2012    SKIN CANCER EXCISION       Social History     Socioeconomic History    Marital status:     Number of children: 2   Occupational History    Occupation: retired   Tobacco Use    Smoking status: Never    Smokeless tobacco: Never   Substance and Sexual Activity    Alcohol use: Yes     Alcohol/week: 4.0 standard drinks of alcohol     Types: 4 Glasses of wine per week     Comment: social    Drug use: Never    Sexual activity: Not Currently     Partners: Male     Birth control/protection: Post-menopausal   Other Topics Concern    Are you pregnant or think you may be? No    Breast-feeding No     Social Determinants of Health     Financial Resource Strain: Low Risk  (2023)    Overall Financial Resource Strain (CARDIA)     Difficulty of Paying Living Expenses: Not hard at all   Food Insecurity: No Food  "Insecurity (2023)    Hunger Vital Sign     Worried About Running Out of Food in the Last Year: Never true     Ran Out of Food in the Last Year: Never true   Transportation Needs: No Transportation Needs (2023)    PRAPARE - Transportation     Lack of Transportation (Medical): No     Lack of Transportation (Non-Medical): No   Physical Activity: Sufficiently Active (2023)    Exercise Vital Sign     Days of Exercise per Week: 7 days     Minutes of Exercise per Session: 60 min   Stress: No Stress Concern Present (2023)    [employer] - Occupational Stress Questionnaire     Feeling of Stress : Not at all   Social Connections: Socially Isolated (2023)    Social Connection and Isolation Panel [NHANES]     Frequency of Communication with Friends and Family: More than three times a week     Frequency of Social Gatherings with Friends and Family: Twice a week     Attends [employer] Services: Never     Active Member of Clubs or Organizations: No     Attends Club or Organization Meetings: Patient refused     Marital Status:    Housing Stability: Low Risk  (2023)    Housing Stability Vital Sign     Unable to Pay for Housing in the Last Year: No     Number of Places Lived in the Last Year: 1     Unstable Housing in the Last Year: No     Family History   Problem Relation Age of Onset    Stroke Mother     Hypertension Mother     Asthma Mother     Osteoporosis Mother     Cancer Father         lung cancer    Heart disease Maternal Grandfather     Stroke Maternal Grandfather     Breast cancer Neg Hx     Colon cancer Neg Hx     Ovarian cancer Neg Hx      OB History          2    Para   2    Term   2            AB        Living   2         SAB        IAB        Ectopic        Multiple        Live Births                     /70   Ht 5' 8" (1.727 m)   Wt 62.9 kg (138 lb 10.7 oz)   LMP  (LMP Unknown)   BMI 21.08 kg/m²       ROS:    ROS:  GENERAL: Denies " weight gain or weight loss. Feeling well overall.   SKIN: Denies rash or lesions.   HEAD: Denies head injury or headache.   NODES: Denies enlarged lymph nodes.   CHEST: Denies chest pain or shortness of breath.   CARDIOVASCULAR: Denies palpitations or left sided chest pain.   ABDOMEN: No abdominal pain, constipation, diarrhea, nausea, vomiting or rectal bleeding.   URINARY: No frequency, dysuria, hematuria, or burning on urination.  REPRODUCTIVE: See HPI.   BREASTS: The patient performs breast self-examination and denies pain, lumps, or nipple discharge.   HEMATOLOGIC: No easy bruisability or excessive bleeding.   MUSCULOSKELETAL: Denies joint pain or swelling.   NEUROLOGIC: Denies syncope or weakness.   PSYCHIATRIC: Denies depression, anxiety or mood swings.    PHYSICAL EXAM:    APPEARANCE: Well nourished, well developed, in no acute distress.  AFFECT: WNL, alert and oriented x 3  SKIN: No acne or hirsutism  NECK: Neck symmetric without masses or thyromegaly  NODES: No inguinal, cervical, axillary, or femoral lymph node enlargement  CHEST: Good respiratory effect  ABDOMEN: Soft.  No tenderness or masses.  No hepatosplenomegaly.  No hernias.  BREASTS: Symmetrical, no skin changes or visible lesions.  No palpable masses, nipple discharge bilaterally.  PELVIC: atrophic external genitalia without lesions.  Normal hair distribution.  Adequate perineal body, normal urethral meatus.  Vagina DRY  and well rugated without lesions or discharge.  Cervix pale pink, without lesions, discharge or tenderness.  No significant cystocele or rectocele.  Bimanual exam shows uterus to be normal size, regular, mobile and nontender.  Adnexa without masses or tenderness.    RECTAL: Rectovaginal exam confirms above with normal sphincter tone, no masses.  EXTREMITIES: No edema.    Diagnosis      ICD-10-CM ICD-9-CM    1. Encounter for gynecological examination without abnormal finding  Z01.419 V72.31 CANCELED: Liquid-Based Pap Smear,  Screening      2. Postmenopausal  Z78.0 V49.81       3. Vaginal atrophy  N95.2 627.3       4. Encounter for screening mammogram for breast cancer  Z12.31 V76.12 Mammo Digital Screening Bilat w/ Mathew      5. Osteoporosis, unspecified osteoporosis type, unspecified pathological fracture presence  M81.0 733.00         Daily calcium intake 1316-7657 mg, dietary sources preferred; Vitamin D 2935-8725 IU daily.  *Weight bearing exercise and fall precautions.  *Recommend continued treatment with Evista.  *Repeat BMD in 2 years.    Revaree  Consider Estring    Patient was counseled today on A.C.S. Pap guidelines and recommendations for yearly pelvic exams, mammograms and monthly self breast exams; to see her PCP for other health maintenance.       Follow up in about 1 year (around 10/3/2024), or if symptoms worsen or fail to improve.

## 2023-11-06 ENCOUNTER — OFFICE VISIT (OUTPATIENT)
Dept: PODIATRY | Facility: CLINIC | Age: 73
End: 2023-11-06
Payer: MEDICARE

## 2023-11-06 VITALS
HEART RATE: 82 BPM | WEIGHT: 141.56 LBS | SYSTOLIC BLOOD PRESSURE: 121 MMHG | HEIGHT: 68 IN | DIASTOLIC BLOOD PRESSURE: 78 MMHG | BODY MASS INDEX: 21.45 KG/M2

## 2023-11-06 DIAGNOSIS — R20.0 NUMBNESS OF TOES: Primary | ICD-10-CM

## 2023-11-06 PROCEDURE — 3074F PR MOST RECENT SYSTOLIC BLOOD PRESSURE < 130 MM HG: ICD-10-PCS | Mod: HCNC,CPTII,S$GLB, | Performed by: PODIATRIST

## 2023-11-06 PROCEDURE — 3078F DIAST BP <80 MM HG: CPT | Mod: HCNC,CPTII,S$GLB, | Performed by: PODIATRIST

## 2023-11-06 PROCEDURE — 1126F AMNT PAIN NOTED NONE PRSNT: CPT | Mod: HCNC,CPTII,S$GLB, | Performed by: PODIATRIST

## 2023-11-06 PROCEDURE — 3078F PR MOST RECENT DIASTOLIC BLOOD PRESSURE < 80 MM HG: ICD-10-PCS | Mod: HCNC,CPTII,S$GLB, | Performed by: PODIATRIST

## 2023-11-06 PROCEDURE — 1126F PR PAIN SEVERITY QUANTIFIED, NO PAIN PRESENT: ICD-10-PCS | Mod: HCNC,CPTII,S$GLB, | Performed by: PODIATRIST

## 2023-11-06 PROCEDURE — 3008F BODY MASS INDEX DOCD: CPT | Mod: HCNC,CPTII,S$GLB, | Performed by: PODIATRIST

## 2023-11-06 PROCEDURE — 99999 PR PBB SHADOW E&M-EST. PATIENT-LVL III: ICD-10-PCS | Mod: PBBFAC,HCNC,, | Performed by: PODIATRIST

## 2023-11-06 PROCEDURE — 3044F PR MOST RECENT HEMOGLOBIN A1C LEVEL <7.0%: ICD-10-PCS | Mod: HCNC,CPTII,S$GLB, | Performed by: PODIATRIST

## 2023-11-06 PROCEDURE — 99213 OFFICE O/P EST LOW 20 MIN: CPT | Mod: HCNC,S$GLB,, | Performed by: PODIATRIST

## 2023-11-06 PROCEDURE — 3044F HG A1C LEVEL LT 7.0%: CPT | Mod: HCNC,CPTII,S$GLB, | Performed by: PODIATRIST

## 2023-11-06 PROCEDURE — 1159F PR MEDICATION LIST DOCUMENTED IN MEDICAL RECORD: ICD-10-PCS | Mod: HCNC,CPTII,S$GLB, | Performed by: PODIATRIST

## 2023-11-06 PROCEDURE — 99999 PR PBB SHADOW E&M-EST. PATIENT-LVL III: CPT | Mod: PBBFAC,HCNC,, | Performed by: PODIATRIST

## 2023-11-06 PROCEDURE — 3008F PR BODY MASS INDEX (BMI) DOCUMENTED: ICD-10-PCS | Mod: HCNC,CPTII,S$GLB, | Performed by: PODIATRIST

## 2023-11-06 PROCEDURE — 1157F ADVNC CARE PLAN IN RCRD: CPT | Mod: HCNC,CPTII,S$GLB, | Performed by: PODIATRIST

## 2023-11-06 PROCEDURE — 1159F MED LIST DOCD IN RCRD: CPT | Mod: HCNC,CPTII,S$GLB, | Performed by: PODIATRIST

## 2023-11-06 PROCEDURE — 99213 PR OFFICE/OUTPT VISIT, EST, LEVL III, 20-29 MIN: ICD-10-PCS | Mod: HCNC,S$GLB,, | Performed by: PODIATRIST

## 2023-11-06 PROCEDURE — 1157F PR ADVANCE CARE PLAN OR EQUIV PRESENT IN MEDICAL RECORD: ICD-10-PCS | Mod: HCNC,CPTII,S$GLB, | Performed by: PODIATRIST

## 2023-11-06 PROCEDURE — 3074F SYST BP LT 130 MM HG: CPT | Mod: HCNC,CPTII,S$GLB, | Performed by: PODIATRIST

## 2023-11-06 NOTE — PROGRESS NOTES
Subjective:      Patient ID: Dia Ovalles is a 73 y.o. female.    Chief Complaint: Numbness (Bilateral toenail ), Toe Pain, and Foot Problem (Cuts on R foot not healing)    Pt presents today with two complaints: pt is complaining of sores on her right ankle that have been slow healing. Pt states the cuts were deep, and she did not seek any medical treatment. Pt is also c/o of numbness of her toes. Pt states a few weeks ago her toes became numb. Pt denies any injuries, new diagnosis or medication, or tight shoes. Pt also states she has a familial hx of MS in two of her siblings.     Review of Systems   Constitutional: Negative for chills, fever and malaise/fatigue.   HENT:  Negative for hearing loss.    Cardiovascular:  Negative for claudication.   Respiratory:  Negative for shortness of breath.    Skin:  Positive for poor wound healing. Negative for flushing and rash.   Musculoskeletal:  Negative for joint pain and myalgias.   Gastrointestinal:  Negative for nausea and vomiting.   Neurological:  Positive for numbness and sensory change. Negative for loss of balance and paresthesias.   Psychiatric/Behavioral:  Negative for altered mental status.    Allergic/Immunologic: Negative for hives.           Objective:      Physical Exam  Vitals reviewed.   Cardiovascular:      Pulses:           Dorsalis pedis pulses are 2+ on the right side and 2+ on the left side.        Posterior tibial pulses are 2+ on the right side and 2+ on the left side.      Comments: No edema noted b/L  Musculoskeletal:      Comments:        Feet:      Right foot:      Protective Sensation: 5 sites tested.  4 sites sensed.      Left foot:      Protective Sensation: 5 sites tested.  2 sites sensed.   Skin:     General: Skin is warm.      Capillary Refill: Capillary refill takes 2 to 3 seconds.      Findings: No abscess or erythema.      Comments: Normal skin tugor noted.   Skin temp is warm to warm from proximal to distal b/L.  Webspaces clean, dry,  and intact  Two small scabbed lesions on right ankle   Neurological:      Mental Status: She is alert.      Comments: Sensation diminished, left               Assessment:       Encounter Diagnosis   Name Primary?    Numbness of toes Yes         Plan:       Dia was seen today for numbness, toe pain and foot problem.    Diagnoses and all orders for this visit:    Numbness of toes  -     EMG W/ ULTRASOUND AND NERVE CONDUCTION TEST 2 Extremities; Future      I counseled the patient on her conditions, their implications and medical management.    Pt advised that numbness could be due to systemic or physical etiologies  EMG/NCV ordered  Pt advised to tell her PCP about her family hx of MS    Pt advised that deep wounds sometimes take longer to heal. Wounds have healthy scab over them.     Pt will f/u after EMG/NCV    .

## 2023-11-15 ENCOUNTER — TELEPHONE (OUTPATIENT)
Dept: PRIMARY CARE CLINIC | Facility: CLINIC | Age: 73
End: 2023-11-15
Payer: MEDICARE

## 2023-11-15 NOTE — TELEPHONE ENCOUNTER
Paperwork has been received by [employer] hearing  Papers have been placed in Dr. Leija box for signature

## 2023-12-06 DIAGNOSIS — M17.11 OSTEOARTHRITIS OF RIGHT KNEE, UNSPECIFIED OSTEOARTHRITIS TYPE: ICD-10-CM

## 2023-12-06 DIAGNOSIS — M19.019 OSTEOARTHRITIS OF SHOULDER, UNSPECIFIED LATERALITY, UNSPECIFIED OSTEOARTHRITIS TYPE: ICD-10-CM

## 2023-12-06 RX ORDER — DULOXETIN HYDROCHLORIDE 20 MG/1
40 CAPSULE, DELAYED RELEASE ORAL
Qty: 180 CAPSULE | Refills: 1 | Status: SHIPPED | OUTPATIENT
Start: 2023-12-06

## 2023-12-06 NOTE — TELEPHONE ENCOUNTER
No care due was identified.  [facility] Embedded Care Due Messages. Reference number: [id number].   12/06/2023 4:10:13 PM CST

## 2023-12-07 NOTE — TELEPHONE ENCOUNTER
Refill Decision Note   Dia Ovalles  is requesting a refill authorization.  Brief Assessment and Rationale for Refill:  Approve     Medication Therapy Plan:         Comments:     Note composed:8:59 PM 12/06/2023

## 2023-12-16 DIAGNOSIS — M81.0 OSTEOPOROSIS, UNSPECIFIED OSTEOPOROSIS TYPE, UNSPECIFIED PATHOLOGICAL FRACTURE PRESENCE: ICD-10-CM

## 2023-12-18 RX ORDER — RALOXIFENE HYDROCHLORIDE 60 MG/1
60 TABLET, FILM COATED ORAL DAILY
Qty: 90 TABLET | Refills: 0 | Status: SHIPPED | OUTPATIENT
Start: 2023-12-18

## 2024-01-04 ENCOUNTER — OFFICE VISIT (OUTPATIENT)
Dept: OPHTHALMOLOGY | Facility: CLINIC | Age: 74
End: 2024-01-04
Payer: MEDICARE

## 2024-01-04 DIAGNOSIS — Z96.1 PSEUDOPHAKIA: ICD-10-CM

## 2024-01-04 DIAGNOSIS — H02.9 LESION OF LEFT UPPER EYELID: Primary | ICD-10-CM

## 2024-01-04 DIAGNOSIS — H02.413 MECHANICAL PTOSIS, ACQUIRED, BILATERAL: ICD-10-CM

## 2024-01-04 PROCEDURE — 99214 OFFICE O/P EST MOD 30 MIN: CPT | Mod: HCNC,S$GLB,, | Performed by: OPHTHALMOLOGY

## 2024-01-04 PROCEDURE — 1160F RVW MEDS BY RX/DR IN RCRD: CPT | Mod: HCNC,CPTII,S$GLB, | Performed by: OPHTHALMOLOGY

## 2024-01-04 PROCEDURE — 92285 EXTERNAL OCULAR PHOTOGRAPHY: CPT | Mod: HCNC,S$GLB,, | Performed by: OPHTHALMOLOGY

## 2024-01-04 PROCEDURE — 1157F ADVNC CARE PLAN IN RCRD: CPT | Mod: HCNC,CPTII,S$GLB, | Performed by: OPHTHALMOLOGY

## 2024-01-04 PROCEDURE — 99999 PR PBB SHADOW E&M-EST. PATIENT-LVL III: CPT | Mod: PBBFAC,HCNC,, | Performed by: OPHTHALMOLOGY

## 2024-01-04 PROCEDURE — 1159F MED LIST DOCD IN RCRD: CPT | Mod: HCNC,CPTII,S$GLB, | Performed by: OPHTHALMOLOGY

## 2024-01-04 NOTE — PROGRESS NOTES
CAPO Ovalles is a/an 73 y.o. female here for lesion JUSTYNA evaluation.   Referred by: Dr. Fuentes   Eye Meds: ATS PRN       Patient states lesion has been present on JUSTYNA for about 60 yrs since she   was about 9 yrs old. She is not bothered by the lesion at all and it has   not grown in size for decades. There is no pain, irritation, or bleeding.   (+) history of skin cancer (BCC) on her arm twice. She is not necessarily   ready for excision, but interested to hear what Dr. Mary has to say.   Last edited by Nuha Lang on 1/4/2024 11:30 AM.            Assessment /Plan     For exam results, see Encounter Report.    Lesion of left upper eyelid  -     Ambulatory referral/consult to Ophthalmology  -     External Photography - OU - Both Eyes    Pseudophakia    Mechanical ptosis, acquired, bilateral      The patient is a pleasant 73-year-old female here for evaluation of left upper eyelid lesion.  This has been present since the age of 9 years old.  The patient has a history of basal cell carcinoma and possibly squamous cell carcinoma as well.  She has not noticed any discomfort from the lesion on the left upper eyelid.  There has not been any bleeding associated with the lesion of the left upper eyelid.  The patient does not have any prior history of eyelid surgery or eyelid trauma.  She does have a history of bilateral cataract extraction with placement of posterior chamber intra-ocular lenses.      On exam, the patient has a 6 mm horizontally by 3 mm vertically papule at the left upper eyelid margin.  There is no associated loss of lashes.  There was no preauricular or submandibular adenopathy.      These findings were discussed with the patient.  We discussed the possibility of excisional biopsy versus observation.    With the patient's prior history of skin carcinoma, recommend excisional biopsy in the minor procedure room.      Pertinent risks benefits alternatives of the procedure were discussed with the  patient prior to obtaining informed consent.      Return for surgery     Patient interested in blepharoplasty evaluation in the future.

## 2024-01-08 ENCOUNTER — PROCEDURE VISIT (OUTPATIENT)
Dept: NEUROLOGY | Facility: CLINIC | Age: 74
End: 2024-01-08
Payer: MEDICARE

## 2024-01-08 DIAGNOSIS — R20.0 NUMBNESS OF TOES: ICD-10-CM

## 2024-01-08 PROCEDURE — 95910 NRV CNDJ TEST 7-8 STUDIES: CPT | Mod: HCNC,S$GLB,, | Performed by: PHYSICAL MEDICINE & REHABILITATION

## 2024-01-08 PROCEDURE — 95886 MUSC TEST DONE W/N TEST COMP: CPT | Mod: HCNC,S$GLB,, | Performed by: PHYSICAL MEDICINE & REHABILITATION

## 2024-01-08 NOTE — PROCEDURES
Test Date:  2024    Patient: dia ovalles  : 1950 Physician: Amauri Ernandez D.O.   ID#: [id number] SEX: Female Ref. Phys: Eugenia Birmingham DPM     HPI: Dia Ovalles is a 73 y.o.female who presents for NCS/EMG to evaluate for peripheral polyneuropathy.      NCV & EMG Findings:  Evaluation of the left sural sensory nerve showed no response.  All remaining nerves (as indicated in the following tables) were within normal limits.  All examined muscles (as indicated in the following table) showed no evidence of electrical instability.    Impression:  The left sural nerve response was absent.  While this could indicate a sural neuropathy, her clinical symptoms are not that of a sural neuropathy, so I suspect this to be technical.  No definitive electrodiagnostic evidence of a large fiber polyneuropathy.      ___________________________  Amauri Ernandez D.O.        NCS+  Motor Nerve Results      Latency Amplitude F-Lat Segment Distance CV Comment   Site (ms) Norm (mV) Norm (ms)  (cm) (m/s) Norm    Left Fibular (EDB)   Ankle 4.0  < 6.5 1.43  > 1.10         Bel Fib Head 11.8 - 0.96 -  Bel Fib Head-Ankle 37.5 48  > 36    Pop Fossa 12.6 - 2.00 -  Pop Fossa-Bel Fib Head 7 88  > 42    Right Fibular (EDB)   Ankle 3.9  < 6.5 1.45  > 1.10         Bel Fib Head 10.7 - 0.96 -  Bel Fib Head-Ankle 35 51  > 36    Pop Fossa 12.8 - 2.5 -  Pop Fossa-Bel Fib Head 11 52  > 42    Left Tibial (AHB)   Ankle 5.1  < 6.1 13.8  > 1.10         Right Tibial (AHB)   Ankle 3.1  < 6.1 13.5  > 1.10           Sensory Nerve Results      Latency (Peak) Amplitude (P-P) Segment Distance CV Comment   Site (ms) Norm (µV) Norm  (cm) (m/s) Norm    Left Sural   Calf-Lat Mall *NR  < 4.5 *NR  > 4 Calf-Lat Mall 14 *NR  > 35    Right Sural   Calf-Lat Mall 2.4  < 4.5 27  > 4 Calf-Lat Mall 14 58  > 35    Left Superficial Fibular   14 cm-Ankle 2.2  < 4.2 15  > 5 14 cm-Ankle 14 64  > 32    Right Superficial Fibular   14 cm-Ankle 2.8  < 4.2 8  > 5  14 cm-Ankle 14 50  > 32      EMG+     Side Muscle Nerve Root Ins Act Fibs Psw Amp Dur Poly Recrt Int Pat Comment   Right Vastus Med Femoral L2-L4 Nml Nml Nml Nml Nml 0 Nml Nml    Right Tib Anterior Fibular,  Deep Fibula... L4-L5 Nml Nml Nml Nml Nml 0 Nml Nml    Right Fib Longus Fibular,  Superficial... L5-S1 Nml Nml Nml Nml Nml 0 Nml Nml    Right Gastroc Tibial S1-S2 Nml Nml Nml Nml Nml 0 Nml Nml    Right Dorsal Interossei ped I Lateral Plantar S2-S3 Nml Nml Nml Nml Nml 0 Nml Nml    Left Vastus Med Femoral L2-L4 Nml Nml Nml Nml Nml 0 Nml Nml    Left Tib Anterior Fibular,  Deep Fibula... L4-L5 Nml Nml Nml Nml Nml 0 Nml Nml    Left Fib Longus Fibular,  Superficial... L5-S1 Nml Nml Nml Nml Nml 0 Nml Nml    Left Gastroc Tibial S1-S2 Nml Nml Nml Nml Nml 0 Nml Nml    Left Dorsal Interossei ped I Lateral Plantar S2-S3 Nml Nml Nml Nml Nml 0 Nml Nml            Waveforms:    Motor              Sensory

## 2024-01-12 ENCOUNTER — HOSPITAL ENCOUNTER (OUTPATIENT)
Dept: RADIOLOGY | Facility: OTHER | Age: 74
Discharge: HOME | End: 2024-01-12
Attending: OBSTETRICS & GYNECOLOGY
Payer: MEDICARE

## 2024-01-12 DIAGNOSIS — Z12.31 ENCOUNTER FOR SCREENING MAMMOGRAM FOR BREAST CANCER: ICD-10-CM

## 2024-01-12 PROCEDURE — 77063 BREAST TOMOSYNTHESIS BI: CPT | Mod: 26,HCNC,, | Performed by: RADIOLOGY

## 2024-01-12 PROCEDURE — 77067 SCR MAMMO BI INCL CAD: CPT | Mod: TC,HCNC

## 2024-01-12 PROCEDURE — 77067 SCR MAMMO BI INCL CAD: CPT | Mod: 26,HCNC,, | Performed by: RADIOLOGY

## 2024-01-31 ENCOUNTER — TELEPHONE (OUTPATIENT)
Dept: PRIMARY CARE CLINIC | Facility: CLINIC | Age: 74
End: 2024-01-31
Payer: MEDICARE

## 2024-01-31 DIAGNOSIS — Z13.6 ENCOUNTER FOR LIPID SCREENING FOR CARDIOVASCULAR DISEASE: ICD-10-CM

## 2024-01-31 DIAGNOSIS — Z87.74 HISTORY OF REPAIR OF CONGENITAL ATRIAL SEPTAL DEFECT (ASD): ICD-10-CM

## 2024-01-31 DIAGNOSIS — M81.8 OTHER OSTEOPOROSIS, UNSPECIFIED PATHOLOGICAL FRACTURE PRESENCE: ICD-10-CM

## 2024-01-31 DIAGNOSIS — Z79.899 OTHER LONG TERM (CURRENT) DRUG THERAPY: ICD-10-CM

## 2024-01-31 DIAGNOSIS — Z13.220 ENCOUNTER FOR LIPID SCREENING FOR CARDIOVASCULAR DISEASE: ICD-10-CM

## 2024-01-31 DIAGNOSIS — Z00.00 ANNUAL PHYSICAL EXAM: Primary | ICD-10-CM

## 2024-03-13 DIAGNOSIS — N39.41 URGE INCONTINENCE: ICD-10-CM

## 2024-03-13 RX ORDER — OXYBUTYNIN CHLORIDE 10 MG/1
10 TABLET, EXTENDED RELEASE ORAL
Qty: 90 TABLET | Refills: 1 | Status: SHIPPED | OUTPATIENT
Start: 2024-03-13

## 2024-03-13 NOTE — TELEPHONE ENCOUNTER
Refill Decision Note   Dia Ovalles  is requesting a refill authorization.  Brief Assessment and Rationale for Refill:  Approve     Medication Therapy Plan:         Comments:     Note composed:3:26 PM 03/13/2024

## 2024-03-25 ENCOUNTER — CLINICAL SUPPORT (OUTPATIENT)
Dept: OPHTHALMOLOGY | Facility: CLINIC | Age: 74
End: 2024-03-25
Payer: MEDICARE

## 2024-03-25 ENCOUNTER — OFFICE VISIT (OUTPATIENT)
Dept: OPHTHALMOLOGY | Facility: CLINIC | Age: 74
End: 2024-03-25
Payer: MEDICARE

## 2024-03-25 DIAGNOSIS — H57.813 BROW PTOSIS, BILATERAL: Primary | ICD-10-CM

## 2024-03-25 DIAGNOSIS — H02.834 DERMATOCHALASIS OF BOTH UPPER EYELIDS: ICD-10-CM

## 2024-03-25 DIAGNOSIS — H02.9 LESION OF LEFT UPPER EYELID: ICD-10-CM

## 2024-03-25 DIAGNOSIS — H02.831 DERMATOCHALASIS OF BOTH UPPER EYELIDS: ICD-10-CM

## 2024-03-25 PROCEDURE — 92285 EXTERNAL OCULAR PHOTOGRAPHY: CPT | Mod: HCNC,S$GLB,, | Performed by: OPHTHALMOLOGY

## 2024-03-25 PROCEDURE — 1160F RVW MEDS BY RX/DR IN RCRD: CPT | Mod: HCNC,CPTII,S$GLB, | Performed by: OPHTHALMOLOGY

## 2024-03-25 PROCEDURE — 1159F MED LIST DOCD IN RCRD: CPT | Mod: HCNC,CPTII,S$GLB, | Performed by: OPHTHALMOLOGY

## 2024-03-25 PROCEDURE — 1101F PT FALLS ASSESS-DOCD LE1/YR: CPT | Mod: HCNC,CPTII,S$GLB, | Performed by: OPHTHALMOLOGY

## 2024-03-25 PROCEDURE — 99999 PR PBB SHADOW E&M-EST. PATIENT-LVL III: CPT | Mod: PBBFAC,HCNC,, | Performed by: OPHTHALMOLOGY

## 2024-03-25 PROCEDURE — 92082 INTERMEDIATE VISUAL FIELD XM: CPT | Mod: HCNC,S$GLB,, | Performed by: OPHTHALMOLOGY

## 2024-03-25 PROCEDURE — 99214 OFFICE O/P EST MOD 30 MIN: CPT | Mod: HCNC,S$GLB,, | Performed by: OPHTHALMOLOGY

## 2024-03-25 PROCEDURE — 1157F ADVNC CARE PLAN IN RCRD: CPT | Mod: HCNC,CPTII,S$GLB, | Performed by: OPHTHALMOLOGY

## 2024-03-25 PROCEDURE — 3288F FALL RISK ASSESSMENT DOCD: CPT | Mod: HCNC,CPTII,S$GLB, | Performed by: OPHTHALMOLOGY

## 2024-03-25 NOTE — PROGRESS NOTES
Ptosis GVF/external photos done ou./ rel/fix/coop. Good ou./ chart checked for latex allergy.-[facility]

## 2024-03-25 NOTE — PROGRESS NOTES
HPI    Dia Ovalles is a/an 73 y.o. female established patient here for ptosis.  How long have eyelid(s) been droopy? months ago patient was made she may   have dermatochalasis   Any h/o wearing hard CLs? -  Do eyelids feel heavy? -  Does the height of the eyelid(s) fluctuate throughout the day? -  Do eyelid(s) interfere with daily activities such as driving, reading,   working? +  Spontaneous orbital pain? -  Orbital pain w eye movement? -  Red eyes? -  Red eyelids? -  Eyelid swelling? -  History of cardiac pacemaker? Had open heart sx for birth defect in the   past, no current heart issues   EYE GTTS: OTC lubricating gtts     Patient states lesion has been present on JUSTYNA for about 60 years.She is   not bothered by the lesion at al l and it has not grown in size for   decades. There is no pain, irritation, or bleeding. (+) history of skin   cancer (BCC) on her arm twice. She is not necessarily ready for excision,   but interested to hear what Dr. Mary has to say.      Last edited by Suleman Garza on 3/25/2024  3:22 PM.            Assessment /Plan     For exam results, see Encounter Report.    Brow ptosis, bilateral  -     External Photography - OU - Both Eyes  -     Goldmann Perimetry - OU - Intermediate - Both Eyes    Dermatochalasis of both upper eyelids    Lesion of left upper eyelid      The patient is a pleasant 73-year-old female here for follow-up evaluation of bilateral upper eyelid heaviness symmetrically.  This has been progressive and affects activities of daily living.  The patient feels that she has impairment of her peripheral visual fields and has recently noticed that she has started bumping into things due to this impairment. The patient notices the heaviness mostly when she is reading toward the end of the day.  There has no prior history of facial surgery or facial trauma.  The patient does not have any recent history of any use of botulinum toxin.  She has a history of facial fillers  approximately 4 years ago.    On exam, the patient has chronic frontalis use.  She has bilateral temporal brow ptosis slightly worse on the right than the left with the inferior aspect of the brow sitting below the frontal bone.  She has bilateral upper eyelid dermatochalasis with herniation of the medial fat pads bilaterally.  She has bilateral lower eyelid dermatochalasis with minimal herniation of the orbital fat pads along with moderate lateral canthal laxity.      Pt. With significant improvement of superior visual fields with lids and brows taped vs. untaped.    These findings were discussed with the patient.      Recommend bilateral direct temporal brow plasty right greater than left in the minor procedure room with valium.     We discussed the option of cosmetic bilateral upper eyelid blepharoplasty at the same time.  The patient will decide dependent upon the cost the procedure.    Informed consent obtained after extensive risks/benefits/alternatives were discussed with the patient including but not limited to pain, bleeding, infection, ocular injury, loss of the eye, asymmetry, need for revision in future, scarring.  Alternatives such as waiting were discussed.  All questions were answered.      Hold ASA, NSAIDS, fish oil, Vitamin E and Multivitamins 5 to 7 days prior to procedure.     Return for surgery

## 2024-03-26 ENCOUNTER — TELEPHONE (OUTPATIENT)
Dept: ORTHOPEDICS | Facility: CLINIC | Age: 74
End: 2024-03-26
Payer: MEDICARE

## 2024-03-26 DIAGNOSIS — M54.50 LOW BACK PAIN, UNSPECIFIED BACK PAIN LATERALITY, UNSPECIFIED CHRONICITY, UNSPECIFIED WHETHER SCIATICA PRESENT: Primary | ICD-10-CM

## 2024-03-26 NOTE — TELEPHONE ENCOUNTER
Called patient to offer her an appointment with Dr. Phoenix, patient states she would like an appt with a chiropractor not a surgeon. Informed patient we do not have chiropractors in our clinic. Patient verbalized understanding and refused appt.    ----- Message from Hattie East sent at 3/26/2024 12:59 PM CDT -----  Type:  Sooner Apoointment Request    Caller is requesting a sooner appointment.  Caller declined first available appointment listed below.  Caller will not accept being placed on the waitlist and is requesting a message be sent to doctor.  Name of Caller: Pt  When is the first available appointment?  Symptoms: lower back pain  Would the patient rather a call back or a response via [facility]? Call  Best Call Back Number:  [phone]  Additional Information:

## 2024-04-04 ENCOUNTER — TELEPHONE (OUTPATIENT)
Dept: OPHTHALMOLOGY | Facility: CLINIC | Age: 74
End: 2024-04-04
Payer: MEDICARE

## 2024-04-04 NOTE — TELEPHONE ENCOUNTER
Called pt to [facility] eye procedure for [id number] & C$M [id number] OU in MR for 2hrs  there was no answer a msg was left

## 2024-04-05 ENCOUNTER — TELEPHONE (OUTPATIENT)
Dept: OPHTHALMOLOGY | Facility: CLINIC | Age: 74
End: 2024-04-05
Payer: MEDICARE

## 2024-04-22 RX ORDER — DIAZEPAM 5 MG/1
TABLET ORAL
Qty: 1 TABLET | Refills: 0 | Status: SHIPPED | OUTPATIENT
Start: 2024-04-22 | End: 2024-05-07 | Stop reason: ALTCHOICE

## 2024-04-26 DIAGNOSIS — M17.11 PRIMARY OSTEOARTHRITIS OF RIGHT KNEE: Primary | ICD-10-CM

## 2024-04-29 ENCOUNTER — PROCEDURE VISIT (OUTPATIENT)
Dept: OPHTHALMOLOGY | Facility: CLINIC | Age: 74
End: 2024-04-29
Payer: MEDICARE

## 2024-04-29 VITALS — SYSTOLIC BLOOD PRESSURE: 132 MMHG | HEART RATE: 68 BPM | DIASTOLIC BLOOD PRESSURE: 85 MMHG

## 2024-04-29 DIAGNOSIS — Z96.1 PSEUDOPHAKIA: ICD-10-CM

## 2024-04-29 DIAGNOSIS — H02.9 LESION OF LEFT UPPER EYELID: Primary | ICD-10-CM

## 2024-04-29 DIAGNOSIS — H02.831 DERMATOCHALASIS OF BOTH UPPER EYELIDS: ICD-10-CM

## 2024-04-29 DIAGNOSIS — H57.813 BROW PTOSIS, BILATERAL: ICD-10-CM

## 2024-04-29 DIAGNOSIS — H02.834 DERMATOCHALASIS OF BOTH UPPER EYELIDS: ICD-10-CM

## 2024-04-29 PROCEDURE — 99499 UNLISTED E&M SERVICE: CPT | Mod: HCNC,S$GLB,, | Performed by: OPHTHALMOLOGY

## 2024-04-29 PROCEDURE — 67840 REMOVE EYELID LESION: CPT | Mod: HCNC,LT,S$GLB, | Performed by: OPHTHALMOLOGY

## 2024-04-29 PROCEDURE — 88305 TISSUE EXAM BY PATHOLOGIST: CPT | Mod: 26,HCNC,, | Performed by: PATHOLOGY

## 2024-04-29 PROCEDURE — 88305 TISSUE EXAM BY PATHOLOGIST: CPT | Mod: HCNC | Performed by: PATHOLOGY

## 2024-04-29 RX ORDER — NEOMYCIN SULFATE, POLYMYXIN B SULFATE, AND DEXAMETHASONE 3.5; 10000; 1 MG/G; [USP'U]/G; MG/G
OINTMENT OPHTHALMIC
Qty: 1 EACH | Refills: 1 | Status: SHIPPED | OUTPATIENT
Start: 2024-04-29 | End: 2024-05-07 | Stop reason: ALTCHOICE

## 2024-04-29 NOTE — PROGRESS NOTES
HPI    Patient here for excisional biopsy of left upper eyelid.   No ASA's/NSAIDS taken in the past 7 days.       Last edited by Nuha Lang on 4/29/2024  8:42 AM.            Assessment /Plan     For exam results, see Encounter Report.    Lesion of left upper eyelid  -     neomycin-polymyxin-dexamethasone (DEXACINE) 3.5 mg/g-10,000 unit/g-0.1 % Oint; Apply to wound nightly  Dispense: 1 each; Refill: 1  -     Specimen to Pathology Ophthalmology    Brow ptosis, bilateral    Dermatochalasis of both upper eyelids    Pseudophakia        Procedure Note    Attending: Yamilet Mary  Resident:none  Pre-op Dx: Eyelid lesion  Post-op Dx: same  Local: 2% lidocaine with epinephrine with 0.5% marcaine and vitrase  Specimens:  left upper eyelid lesion  Complications: None  Blood Loss: minimal    Pertinent r/b/a d/w patient prior to obtaining informed consent.      The patient was prepped and draped in the usual sterile manner for ophthalmic plastic surgery.  A corneal shield was placed in the left palpebral fissure. 1 cc of local anesthesia was given to the left upper eyelid.  A 15 blade was used to shave the lesion from its base. The tissue was sent to pathology.  High-temp cautery was used to obtain hemostasis. The corneal shield  was removed. Tobradex ointment was applied  to the wound. The patient tolerated the procedure well. There were no complications.     Post-operative instructions were given to the patient.

## 2024-05-01 ENCOUNTER — HOSPITAL ENCOUNTER (OUTPATIENT)
Dept: RADIOLOGY | Facility: HOSPITAL | Age: 74
Discharge: HOME | End: 2024-05-01
Attending: NURSE PRACTITIONER
Payer: MEDICARE

## 2024-05-01 ENCOUNTER — OFFICE VISIT (OUTPATIENT)
Dept: ORTHOPEDICS | Facility: CLINIC | Age: 74
End: 2024-05-01
Payer: MEDICARE

## 2024-05-01 DIAGNOSIS — M17.11 PRIMARY OSTEOARTHRITIS OF RIGHT KNEE: ICD-10-CM

## 2024-05-01 DIAGNOSIS — M17.11 PRIMARY OSTEOARTHRITIS OF RIGHT KNEE: Primary | ICD-10-CM

## 2024-05-01 PROCEDURE — 73564 X-RAY EXAM KNEE 4 OR MORE: CPT | Mod: 26,HCNC,RT, | Performed by: INTERNAL MEDICINE

## 2024-05-01 PROCEDURE — 1160F RVW MEDS BY RX/DR IN RCRD: CPT | Mod: HCNC,CPTII,S$GLB, | Performed by: NURSE PRACTITIONER

## 2024-05-01 PROCEDURE — 20610 DRAIN/INJ JOINT/BURSA W/O US: CPT | Mod: HCNC,RT,S$GLB, | Performed by: NURSE PRACTITIONER

## 2024-05-01 PROCEDURE — 1157F ADVNC CARE PLAN IN RCRD: CPT | Mod: HCNC,CPTII,S$GLB, | Performed by: NURSE PRACTITIONER

## 2024-05-01 PROCEDURE — 73562 X-RAY EXAM OF KNEE 3: CPT | Mod: 26,HCNC,59,LT | Performed by: INTERNAL MEDICINE

## 2024-05-01 PROCEDURE — 1159F MED LIST DOCD IN RCRD: CPT | Mod: HCNC,CPTII,S$GLB, | Performed by: NURSE PRACTITIONER

## 2024-05-01 PROCEDURE — 1101F PT FALLS ASSESS-DOCD LE1/YR: CPT | Mod: HCNC,CPTII,S$GLB, | Performed by: NURSE PRACTITIONER

## 2024-05-01 PROCEDURE — 99213 OFFICE O/P EST LOW 20 MIN: CPT | Mod: HCNC,25,S$GLB, | Performed by: NURSE PRACTITIONER

## 2024-05-01 PROCEDURE — 99999 PR PBB SHADOW E&M-EST. PATIENT-LVL III: CPT | Mod: PBBFAC,HCNC,, | Performed by: NURSE PRACTITIONER

## 2024-05-01 PROCEDURE — 3288F FALL RISK ASSESSMENT DOCD: CPT | Mod: HCNC,CPTII,S$GLB, | Performed by: NURSE PRACTITIONER

## 2024-05-01 PROCEDURE — 73562 X-RAY EXAM OF KNEE 3: CPT | Mod: TC,HCNC,LT

## 2024-05-01 RX ORDER — TRIAMCINOLONE ACETONIDE 40 MG/ML
40 INJECTION, SUSPENSION INTRA-ARTICULAR; INTRAMUSCULAR
Status: COMPLETED | OUTPATIENT
Start: 2024-05-01 | End: 2024-05-01

## 2024-05-01 RX ADMIN — TRIAMCINOLONE ACETONIDE 40 MG: 40 INJECTION, SUSPENSION INTRA-ARTICULAR; INTRAMUSCULAR at 04:05

## 2024-05-01 NOTE — PROGRESS NOTES
CC: Pain of the Right Knee      HPI: Pt with c/o right knee pain for the past few weeks. The pain started while she was very active helping her daughter with her grandbaby who was home sick. The pain has improved since she returned home and is able to rest more. She takes nsaids prn with some relief. She has been seen in the past and has known djd of the right knee. She had a meniscus repair in that knee in the past. She has had both cortisone and gel injections with relief and the pain is tolerable and doesn't interfere with her day to day life. She is ambulating without assistive device. There is not a limp.    ROS  General: denies fever and chills  Resp: no c/o sob  CVS: no c/o cp  MSK: c/o right knee pain    PE  General: AAOx3, pleasant and cooperative  Resp: respirations even and unlabored  MSK: right knee exam  0 degrees extension  120 degrees flexion  No warmth or erythema   - effusion  + tenderness over the medial joint line   Mild crepitus    Xray:  Reviewed by me with the patient: there are tricompartmental osteophytes and lateral joint space narrowing noted. Kellgren-Jacky stage 3    Assessment:  Right knee djd    Plan:  Cortisone injection right knee today  Orthovisc injections once approved by insurance  RICE  Nsaids prn  F/u as scheduled or sooner as needed    Knee Injection Procedure Note  Diagnosis: right knee degenerative arthritis  Indications: right knee pain  Procedure Details: Verbal consent was obtained for the procedure. The injection site was identified and the skin was prepared with alcohol. The right knee was injected from an anterolateral approach with 1 ml of Kenalog and 4 ml Lidocaine under sterile technique using a 25 gauge needle. The needle was removed and the area cleansed and dressed.  Complications:  Patient tolerated the procedure well.    she was advised to rest the knee today, using ice and elevation as needed for comfort and swelling.Immediate relief of the knee pain may be  short lived and secondary to the lidocaine. she may have an increase in discomfort tonight followed by steady improvement over the next several days. It may take 1-2 weeks following the injection to get the full benefit of the medication.

## 2024-05-03 ENCOUNTER — LAB VISIT (OUTPATIENT)
Dept: LAB | Facility: OTHER | Age: 74
End: 2024-05-03
Attending: FAMILY MEDICINE
Payer: MEDICARE

## 2024-05-03 DIAGNOSIS — Z13.220 ENCOUNTER FOR LIPID SCREENING FOR CARDIOVASCULAR DISEASE: ICD-10-CM

## 2024-05-03 DIAGNOSIS — Z00.00 ANNUAL PHYSICAL EXAM: ICD-10-CM

## 2024-05-03 DIAGNOSIS — M81.8 OTHER OSTEOPOROSIS, UNSPECIFIED PATHOLOGICAL FRACTURE PRESENCE: ICD-10-CM

## 2024-05-03 DIAGNOSIS — Z13.6 ENCOUNTER FOR LIPID SCREENING FOR CARDIOVASCULAR DISEASE: ICD-10-CM

## 2024-05-03 DIAGNOSIS — Z79.899 OTHER LONG TERM (CURRENT) DRUG THERAPY: ICD-10-CM

## 2024-05-03 DIAGNOSIS — Z87.74 HISTORY OF REPAIR OF CONGENITAL ATRIAL SEPTAL DEFECT (ASD): ICD-10-CM

## 2024-05-03 LAB
BILIRUB UR QL STRIP: NEGATIVE
CLARITY UR: CLEAR
COLOR UR: YELLOW
GLUCOSE UR QL STRIP: NEGATIVE
HGB UR QL STRIP: ABNORMAL
KETONES UR QL STRIP: NEGATIVE
LEUKOCYTE ESTERASE UR QL STRIP: NEGATIVE
NITRITE UR QL STRIP: NEGATIVE
PH UR STRIP: 5 [PH] (ref 5–8)
PROT UR QL STRIP: NEGATIVE
SP GR UR STRIP: 1.01 (ref 1–1.03)
URN SPEC COLLECT METH UR: ABNORMAL
UROBILINOGEN UR STRIP-ACNC: NEGATIVE EU/DL

## 2024-05-03 PROCEDURE — 81003 URINALYSIS AUTO W/O SCOPE: CPT | Mod: HCNC | Performed by: FAMILY MEDICINE

## 2024-05-07 ENCOUNTER — OFFICE VISIT (OUTPATIENT)
Dept: PRIMARY CARE CLINIC | Facility: CLINIC | Age: 74
End: 2024-05-07
Payer: MEDICARE

## 2024-05-07 VITALS
HEART RATE: 69 BPM | RESPIRATION RATE: 18 BRPM | DIASTOLIC BLOOD PRESSURE: 76 MMHG | BODY MASS INDEX: 20.28 KG/M2 | SYSTOLIC BLOOD PRESSURE: 112 MMHG | OXYGEN SATURATION: 98 % | WEIGHT: 133.81 LBS | HEIGHT: 68 IN

## 2024-05-07 DIAGNOSIS — N32.81 OAB (OVERACTIVE BLADDER): ICD-10-CM

## 2024-05-07 DIAGNOSIS — M81.0 OSTEOPOROSIS WITHOUT CURRENT PATHOLOGICAL FRACTURE, UNSPECIFIED OSTEOPOROSIS TYPE: Primary | ICD-10-CM

## 2024-05-07 DIAGNOSIS — Z87.74 H/O ATRIAL SEPTAL DEFECT REPAIR: ICD-10-CM

## 2024-05-07 DIAGNOSIS — M19.019 OSTEOARTHRITIS OF SHOULDER, UNSPECIFIED LATERALITY, UNSPECIFIED OSTEOARTHRITIS TYPE: ICD-10-CM

## 2024-05-07 DIAGNOSIS — R31.9 HEMATURIA, UNSPECIFIED TYPE: ICD-10-CM

## 2024-05-07 DIAGNOSIS — M81.0 OSTEOPOROSIS, UNSPECIFIED OSTEOPOROSIS TYPE, UNSPECIFIED PATHOLOGICAL FRACTURE PRESENCE: ICD-10-CM

## 2024-05-07 DIAGNOSIS — M17.11 OSTEOARTHRITIS OF RIGHT KNEE, UNSPECIFIED OSTEOARTHRITIS TYPE: ICD-10-CM

## 2024-05-07 DIAGNOSIS — R29.898 WEAKNESS OF BOTH HANDS: ICD-10-CM

## 2024-05-07 DIAGNOSIS — Z78.0 POST-MENOPAUSAL: ICD-10-CM

## 2024-05-07 LAB
BILIRUB UR QL STRIP: NEGATIVE
CLARITY UR REFRACT.AUTO: CLEAR
COLOR UR AUTO: YELLOW
FINAL PATHOLOGIC DIAGNOSIS: NORMAL
GLUCOSE UR QL STRIP: NEGATIVE
GROSS: NORMAL
HGB UR QL STRIP: NEGATIVE
KETONES UR QL STRIP: ABNORMAL
LEUKOCYTE ESTERASE UR QL STRIP: NEGATIVE
Lab: NORMAL
MICROSCOPIC COMMENT: NORMAL
MICROSCOPIC EXAM: NORMAL
NITRITE UR QL STRIP: NEGATIVE
PH UR STRIP: 7 [PH] (ref 5–8)
PROT UR QL STRIP: NEGATIVE
RBC #/AREA URNS AUTO: 1 /HPF (ref 0–4)
SP GR UR STRIP: 1.02 (ref 1–1.03)
SQUAMOUS #/AREA URNS AUTO: 0 /HPF
URN SPEC COLLECT METH UR: ABNORMAL
WBC #/AREA URNS AUTO: 0 /HPF (ref 0–5)

## 2024-05-07 PROCEDURE — 1157F ADVNC CARE PLAN IN RCRD: CPT | Mod: HCNC,CPTII,S$GLB, | Performed by: FAMILY MEDICINE

## 2024-05-07 PROCEDURE — 1126F AMNT PAIN NOTED NONE PRSNT: CPT | Mod: HCNC,CPTII,S$GLB, | Performed by: FAMILY MEDICINE

## 2024-05-07 PROCEDURE — 81001 URINALYSIS AUTO W/SCOPE: CPT | Mod: HCNC | Performed by: FAMILY MEDICINE

## 2024-05-07 PROCEDURE — 1159F MED LIST DOCD IN RCRD: CPT | Mod: HCNC,CPTII,S$GLB, | Performed by: FAMILY MEDICINE

## 2024-05-07 PROCEDURE — 3288F FALL RISK ASSESSMENT DOCD: CPT | Mod: HCNC,CPTII,S$GLB, | Performed by: FAMILY MEDICINE

## 2024-05-07 PROCEDURE — 99999 PR PBB SHADOW E&M-EST. PATIENT-LVL IV: CPT | Mod: PBBFAC,HCNC,, | Performed by: FAMILY MEDICINE

## 2024-05-07 PROCEDURE — 87086 URINE CULTURE/COLONY COUNT: CPT | Mod: HCNC | Performed by: FAMILY MEDICINE

## 2024-05-07 PROCEDURE — 1100F PTFALLS ASSESS-DOCD GE2>/YR: CPT | Mod: HCNC,CPTII,S$GLB, | Performed by: FAMILY MEDICINE

## 2024-05-07 PROCEDURE — 3044F HG A1C LEVEL LT 7.0%: CPT | Mod: HCNC,CPTII,S$GLB, | Performed by: FAMILY MEDICINE

## 2024-05-07 PROCEDURE — 3078F DIAST BP <80 MM HG: CPT | Mod: HCNC,CPTII,S$GLB, | Performed by: FAMILY MEDICINE

## 2024-05-07 PROCEDURE — 99214 OFFICE O/P EST MOD 30 MIN: CPT | Mod: HCNC,S$GLB,, | Performed by: FAMILY MEDICINE

## 2024-05-07 PROCEDURE — 3074F SYST BP LT 130 MM HG: CPT | Mod: HCNC,CPTII,S$GLB, | Performed by: FAMILY MEDICINE

## 2024-05-07 RX ORDER — DULOXETIN HYDROCHLORIDE 20 MG/1
40 CAPSULE, DELAYED RELEASE ORAL DAILY
Qty: 180 CAPSULE | Refills: 1 | Status: SHIPPED | OUTPATIENT
Start: 2024-05-07 | End: 2024-05-07 | Stop reason: SDUPTHER

## 2024-05-07 RX ORDER — RALOXIFENE HYDROCHLORIDE 60 MG/1
60 TABLET, FILM COATED ORAL DAILY
Qty: 90 TABLET | Refills: 0 | Status: SHIPPED | OUTPATIENT
Start: 2024-05-07

## 2024-05-07 RX ORDER — DULOXETIN HYDROCHLORIDE 20 MG/1
40 CAPSULE, DELAYED RELEASE ORAL DAILY
Qty: 180 CAPSULE | Refills: 1 | Status: SHIPPED | OUTPATIENT
Start: 2024-05-07
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
## 2024-05-08 LAB
BACTERIA UR CULT: NORMAL
BACTERIA UR CULT: NORMAL

## 2024-05-09 ENCOUNTER — TELEPHONE (OUTPATIENT)
Dept: OPHTHALMOLOGY | Facility: CLINIC | Age: 74
End: 2024-05-09
Payer: MEDICARE

## 2024-05-15 ENCOUNTER — OFFICE VISIT (OUTPATIENT)
Dept: ORTHOPEDICS | Facility: CLINIC | Age: 74
End: 2024-05-15
Payer: MEDICARE

## 2024-05-15 DIAGNOSIS — M17.11 PRIMARY OSTEOARTHRITIS OF RIGHT KNEE: Primary | ICD-10-CM

## 2024-05-15 DIAGNOSIS — M79.641 BILATERAL HAND PAIN: Primary | ICD-10-CM

## 2024-05-15 DIAGNOSIS — M79.642 BILATERAL HAND PAIN: Primary | ICD-10-CM

## 2024-05-15 PROCEDURE — 20610 DRAIN/INJ JOINT/BURSA W/O US: CPT | Mod: HCNC,RT,S$GLB, | Performed by: NURSE PRACTITIONER

## 2024-05-15 PROCEDURE — 99999 PR PBB SHADOW E&M-EST. PATIENT-LVL III: CPT | Mod: PBBFAC,HCNC,, | Performed by: NURSE PRACTITIONER

## 2024-05-15 PROCEDURE — 99499 UNLISTED E&M SERVICE: CPT | Mod: HCNC,S$GLB,, | Performed by: NURSE PRACTITIONER

## 2024-05-22 ENCOUNTER — OFFICE VISIT (OUTPATIENT)
Dept: ORTHOPEDICS | Facility: CLINIC | Age: 74
End: 2024-05-22
Payer: MEDICARE

## 2024-05-22 DIAGNOSIS — M17.11 PRIMARY OSTEOARTHRITIS OF RIGHT KNEE: Primary | ICD-10-CM

## 2024-05-22 PROCEDURE — 99499 UNLISTED E&M SERVICE: CPT | Mod: HCNC,S$GLB,, | Performed by: NURSE PRACTITIONER

## 2024-05-22 PROCEDURE — 20610 DRAIN/INJ JOINT/BURSA W/O US: CPT | Mod: HCNC,RT,S$GLB, | Performed by: NURSE PRACTITIONER

## 2024-05-22 PROCEDURE — 99999 PR PBB SHADOW E&M-EST. PATIENT-LVL III: CPT | Mod: PBBFAC,HCNC,, | Performed by: NURSE PRACTITIONER

## 2024-05-27 ENCOUNTER — CLINICAL SUPPORT (OUTPATIENT)
Dept: REHABILITATION | Facility: OTHER | Age: 74
End: 2024-05-27
Attending: FAMILY MEDICINE
Payer: MEDICARE

## 2024-05-27 DIAGNOSIS — R29.898 WEAKNESS OF BOTH HANDS: ICD-10-CM

## 2024-05-27 DIAGNOSIS — M79.642 BILATERAL HAND PAIN: Primary | ICD-10-CM

## 2024-05-27 DIAGNOSIS — M79.641 BILATERAL HAND PAIN: Primary | ICD-10-CM

## 2024-05-27 PROCEDURE — 97165 OT EVAL LOW COMPLEX 30 MIN: CPT | Mod: HCNC,PN

## 2024-05-27 PROCEDURE — 97530 THERAPEUTIC ACTIVITIES: CPT | Mod: HCNC,PN

## 2024-05-27 NOTE — PLAN OF CARE
[facility] Occupational Therapy  Initial Evaluation     Date: 5/27/2024  Patient: Dia Ovalles  Chart Number: [id number]    Therapy Diagnosis:   1. Bilateral hand pain        2. Weakness of both hands  Ambulatory referral/consult to Physical/Occupational Therapy        Medical Diagnosis: R29.898 (ICD-10-CM) - Weakness of both hands    Referring Physician: Shelley Leija MD  Physician Orders: Eval and Treat  Date of Return to MD: appt with Dr. Horne on 6/4/24    Date of Injury: chronic  Date of Surgery: NA    Evaluation Date: 5/27/2024  Authorization Period: 5/7/24 - 5/7/25  Plan of Care Expiration: 7/22/24  Visit #/ Visits Authorized: 1 of 1  FOTO Completion: Initial eval (5/27/2024)  FOTO #2:  FOTO #3:    Time In: 11:05 am  Time Out: 11:45 am  Total Billable Time: 40 min    Precautions: Standard    Subjective     History of Current Condition: Dia Ovalles is a 73 y.o. year old Right hand dominant female who has been experiencing bilateral hand pain for the past several years. She reports weakness and pain are limiting functional use in daily activities such as fine motor tasks, gripping/opening jars. Dia Ovalles is referred to Occupational Therapy for evaluation and treatment. Patient presents today alone.    Falls: none    Involved Side: bilateral  Dominant Side: Right    Date of Onset: chronic  Imaging: NA  Previous Therapy: none    Pain:  Functional Pain Scale Rating 0-10:   Current: 0/10  At Best: 0/10  At Worst: 5-6/10    Location: Bilateral hands, worse in the Right IF  Description: achy  Aggravating Factors: repetitive and prolonged use  Easing Factors: NSAIDs    Functional Limitations/Social History:  Prior Level of Function: Independent with all ADLs/IADLs    Current Level of Function: Lives alone  ADLs: difficulty buttoning shirts, clipping clothes on the , and fine motor skills  IADLs: difficulty opening jars  Leisure: yoga, biking, knitting  Driving: Yes    Occupation:   Retired - volunteer work at [employer]    Patient's Goals for Therapy: to increase use of hands, gain strength    Past Medical History/Physical Systems Review:   Dia Ovalles  has a past medical history of Breast cyst, Cancer, Cataract, Joint pain, OA (osteoarthritis) of shoulder, OAB (overactive bladder), Osteoporosis, and Squamous cell carcinoma of skin.    Dia Ovalles  has a past surgical history that includes Skin cancer excision; ASD repair;  section; Knee surgery; Breast biopsy; Cataract extraction w/  intraocular lens implant (Right, 2021); Cataract extraction w/  intraocular lens implant (Left, 2021); Eye surgery; and Skin biopsy ().    Dia has a current medication list which includes the following prescription(s): biotin, calcium carbonate, cholecalciferol (vitamin d3), clobetasol, duloxetine, meloxicam, multivitamin, oxybutynin, and raloxifene, and the following Facility-Administered Medications: sodium hyaluronate (orthovisc).    Review of patient's allergies indicates:   Allergen Reactions    Codeine Nausea Only          Objective     Mental status: alert, oriented x3    Observation/Appearance:   Herberdens and maría's nodules noted in various digits bilaterally    Sensation: Patient denies numbness/tingling      ELBOW, WRIST RANGE OF MOTION:   Measured in degrees of active motion with goniometer   Right  2024 Left  2024   Wrist Extension/Flexion 55/65 65/75   Ulnar/Radial Deviation 30/15 30/15     Able to make a full fist bilaterally      STRENGTH: (Measured in pounds using a Dynamometer and pinch meter)   Right  2024 Left  2024    Setting 2 45, 40, 40 40, 45, 45    Average 40 lb 43 lb   Key 8 8   3 Pt 5.6* 8.7   *pain Right IF      Intake Outcome Measure for FOTO Initial Evaluation Survey    Therapist reviewed FOTO scores for Dia Ovalles on 2024.   FOTO documents entered into EPIC - see Media section.    Intake Score: 67%          Treatment     Treatment Time In: 11:18 am  Treatment Time Out: 11:45 am  Total Treatment time separate from Evaluation time: 27 min    Supervised modalities after being cleared for contraindications: Paraffin applied to the bilateral hands for 10 minutes for increased blood flow and circulation, increased tissue extensibility, and pain management. (Patient education re: joint protection techniques, adaptive equipment, paraffin/modality use during, TA x10min)      Dia participated in dynamic functional therapeutic activities to improve functional performance for 27 minutes, including:  - Provided hand out for paraffin machine for home use, recommended using in morning and as needed throughout the day for pain management and soft tissue extensibility  - Introduced to website [email or web address] and reviewed adaptive equipment and joint protection techniques  - Provided with josef and demonstrated use for opening jars/bottles, non-slip pad  - Pt education re: joint protection techinques: use of larger joints, respecting pain levels, balance of rest and activity, frequent rest breaks  - Provided and performed HEP: orange putty , lumbrical , adduction and 3 pt pinches      Patient Education and Home Exercises     Patient/Family Education Provided:   - Role of OT, goals for OT, scheduling/cancellations - pt verbalized understanding. Discussed insurance limitations with patient.    Written Home Exercises Provided: yes.  Exercises were reviewed and Dia was able to demonstrate them prior to the end of the session.  Dia demonstrated good  understanding of the education provided. See EMR under Patient Instructions for exercises provided during therapy sessions.     Pt was advised to perform these exercises free of pain, and to stop performing them if pain occurs.      Assessment     Dia Ovalles is a 73 y.o. female referred to outpatient occupational therapy and presents with a medical diagnosis of  R29.898 (ICD-10-CM) - Weakness of both hands. Patient presents with pain about various digit joints and decreased pinch strength interfering with functional use in daily activities.  Dia Ovalles will benefit from continued skilled OT services to progress with ROM, strength, activity modification and facilitate increased functional use of BUE.    Following medical record review it is determined that pt will benefit from occupational therapy services in order to maximize pain free and/or functional use of bilateral hands. The following goals were discussed with the patient and patient is in agreement with them as to be addressed in the treatment plan. The patient's rehab potential is Good.     Anticipated barriers to occupational therapy: PM    Plan of care discussed with patient: Yes    Patient's spiritual, cultural and educational needs considered and patient is agreeable to the plan of care and goals as stated below:     Medical Necessity is demonstrated by the following  Occupational Profile/History  Co-morbidities and personal factors that may impact the plan of care [x] LOW: Brief chart review  [] MODERATE: Expanded chart review   [] HIGH: Extensive chart review    Moderate / High Support Documentation:      Examination  Performance deficits relating to physical, cognitive or psychosocial skills that result in activity limitations and/or participation restrictions  [] LOW: addressing 1-3 Performance deficits  [x] MODERATE: 3-5 Performance deficits  [] HIGH: 5+ Performance deficits (please support below)    Moderate / High Support Documentation:    Physical:  Muscle Power/Strength  Pinch Strength  Fine Motor Coordination  Pain    Cognitive:  No Deficits    Psychosocial:    Habits  Routines     Treatment Options [] LOW: Limited options  [x] MODERATE: Several options  [] HIGH: Multiple options      Decision Making/ Complexity Score: low         The following goals were discussed with the patient and patient is in  agreement with them as to be addressed in the treatment plan.     Long Term Goals (to be met by discharge):  Date Goal Met:     1.) Dia Ovalles will demonstrate significantly improved functional performance from re-assessment as measured by a FOTO Intake score of more than 77.    Goal Status:   In progress    2.) Dia Ovalles will return to near to prior level of function for ADLs and household management reporting independence or modified independence.    Goal Status:   In progress    3. Dia Ovalles will report pain 2 out of 10 at worst to increase functional use of affected hand for work and leisure tasks.    Goal Status:   In progress     Short Term Goals (to be met by 6/17/24):  Date Goal Met:     Dia Ovalles will be independent with home exercise program with written instructions.    Goal Status:   In progress    Dia Ovalles will report use of at least one joint protection technique or activity modification to improve functional performance in ADLs/work/leisure tasks.    Goal Status:   In progress    Dia Ovalles will demonstrate the ability to complete ADL/IADL tasks with 4/10 pain.    Goal Status:   In progress       Plan     Pt to be treated by Occupational Therapy 1 times per week for 8 weeks during the certification period from 5/27/2024 to 7/22/24 to achieve the established goals.     Treatment to include: Paraffin, Fluidotherapy, Manual therapy/joint mobilizations, Modalities for pain management, US 3 mhz, Therapeutic exercises/activities., Iontophoresis with 2.0 cc Dexamethasone, Strengthening, Orthotic Fabrication/Fit/Training, Edema Control, Electrical Modalities, Joint Protection, and Energy Conservation, as well as any other treatments deemed necessary based on the patient's needs or progress.       Barbara Vidales OTR/L

## 2024-05-27 NOTE — PATIENT INSTRUCTIONS
"[facility] OCCUPATIONAL THERAPY  HOME EXERCISE PROGRAM     Complete the following strengthening program 1x/day.        /Squeezes  - Squeeze putty, gripping for a max of 3 - 5 min        Resisted Lumbrical   Bending only at large knuckles, press putty down against thumb. Keep fingertips straight.          Three Point Pinches  - Roll putty into a log  - Pinch along with the thumb, index and middle fingers.  - Make sure to keep an "O"         Adduction  - Place putty between fingers  - Squeeze the fingers together, into the putty    HOWIE Gutierrez/ROXANN       "

## 2024-05-28 ENCOUNTER — TELEPHONE (OUTPATIENT)
Dept: ORTHOPEDICS | Facility: CLINIC | Age: 74
End: 2024-05-28
Payer: MEDICARE

## 2024-05-29 ENCOUNTER — OFFICE VISIT (OUTPATIENT)
Dept: ORTHOPEDICS | Facility: CLINIC | Age: 74
End: 2024-05-29
Payer: MEDICARE

## 2024-05-29 VITALS — BODY MASS INDEX: 20.7 KG/M2 | HEIGHT: 68 IN | WEIGHT: 136.56 LBS

## 2024-05-29 DIAGNOSIS — M17.11 PRIMARY OSTEOARTHRITIS OF RIGHT KNEE: Primary | ICD-10-CM

## 2024-05-29 PROCEDURE — 99499 UNLISTED E&M SERVICE: CPT | Mod: HCNC,S$GLB,, | Performed by: NURSE PRACTITIONER

## 2024-05-29 PROCEDURE — 20610 DRAIN/INJ JOINT/BURSA W/O US: CPT | Mod: HCNC,RT,S$GLB, | Performed by: NURSE PRACTITIONER

## 2024-05-29 PROCEDURE — 99999 PR PBB SHADOW E&M-EST. PATIENT-LVL III: CPT | Mod: PBBFAC,HCNC,, | Performed by: NURSE PRACTITIONER

## 2024-06-03 NOTE — PROGRESS NOTES
Dia Ovalles presents to clinic today for the third right knee Orthovisc injection.     Exam demonstrates the no effusion in the  right knee, and the skin is intact.    Radiographs reveal degenerative changes of knee    Diagnosis: primary osteoarthritis right knee    After time out was performed and patient ID, side, and site were verified, the  right  knee was sterilly prepped in the standard fashion.  Verbal consent obtained. A 25-gauge needle was introduced into right knee joint from an danielle-lateral site without complication and knee was then injected with 2 ml of Orthovisc  Sterile dressing was applied.  The patient was instructed to resume activities as tolerated and to call with any problems.     Patient will return as needed

## 2024-06-04 ENCOUNTER — OFFICE VISIT (OUTPATIENT)
Dept: ORTHOPEDICS | Facility: CLINIC | Age: 74
End: 2024-06-04
Payer: MEDICARE

## 2024-06-04 ENCOUNTER — HOSPITAL ENCOUNTER (OUTPATIENT)
Dept: RADIOLOGY | Facility: OTHER | Age: 74
Discharge: HOME | End: 2024-06-04
Attending: ORTHOPAEDIC SURGERY
Payer: MEDICARE

## 2024-06-04 VITALS — WEIGHT: 136.69 LBS | HEIGHT: 68 IN | BODY MASS INDEX: 20.72 KG/M2

## 2024-06-04 DIAGNOSIS — M79.641 BILATERAL HAND PAIN: ICD-10-CM

## 2024-06-04 DIAGNOSIS — R29.898 WEAKNESS OF BOTH HANDS: ICD-10-CM

## 2024-06-04 DIAGNOSIS — M79.642 BILATERAL HAND PAIN: ICD-10-CM

## 2024-06-04 PROCEDURE — 3288F FALL RISK ASSESSMENT DOCD: CPT | Mod: HCNC,CPTII,S$GLB, | Performed by: ORTHOPAEDIC SURGERY

## 2024-06-04 PROCEDURE — 1126F AMNT PAIN NOTED NONE PRSNT: CPT | Mod: HCNC,CPTII,S$GLB, | Performed by: ORTHOPAEDIC SURGERY

## 2024-06-04 PROCEDURE — 99999 PR PBB SHADOW E&M-EST. PATIENT-LVL III: CPT | Mod: PBBFAC,HCNC,, | Performed by: ORTHOPAEDIC SURGERY

## 2024-06-04 PROCEDURE — 73130 X-RAY EXAM OF HAND: CPT | Mod: TC,50,HCNC,FY

## 2024-06-04 PROCEDURE — 1159F MED LIST DOCD IN RCRD: CPT | Mod: HCNC,CPTII,S$GLB, | Performed by: ORTHOPAEDIC SURGERY

## 2024-06-04 PROCEDURE — 73130 X-RAY EXAM OF HAND: CPT | Mod: 26,50,HCNC, | Performed by: STUDENT IN AN ORGANIZED HEALTH CARE EDUCATION/TRAINING PROGRAM

## 2024-06-04 PROCEDURE — 99204 OFFICE O/P NEW MOD 45 MIN: CPT | Mod: HCNC,S$GLB,, | Performed by: ORTHOPAEDIC SURGERY

## 2024-06-04 PROCEDURE — 1157F ADVNC CARE PLAN IN RCRD: CPT | Mod: HCNC,CPTII,S$GLB, | Performed by: ORTHOPAEDIC SURGERY

## 2024-06-04 PROCEDURE — 1101F PT FALLS ASSESS-DOCD LE1/YR: CPT | Mod: HCNC,CPTII,S$GLB, | Performed by: ORTHOPAEDIC SURGERY

## 2024-06-04 PROCEDURE — 3008F BODY MASS INDEX DOCD: CPT | Mod: HCNC,CPTII,S$GLB, | Performed by: ORTHOPAEDIC SURGERY

## 2024-06-04 PROCEDURE — 3044F HG A1C LEVEL LT 7.0%: CPT | Mod: HCNC,CPTII,S$GLB, | Performed by: ORTHOPAEDIC SURGERY
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
## 2024-06-19 ENCOUNTER — CLINICAL SUPPORT (OUTPATIENT)
Dept: REHABILITATION | Facility: OTHER | Age: 74
End: 2024-06-19
Attending: FAMILY MEDICINE
Payer: MEDICARE

## 2024-06-19 DIAGNOSIS — M79.642 BILATERAL HAND PAIN: Primary | ICD-10-CM

## 2024-06-19 DIAGNOSIS — M79.641 BILATERAL HAND PAIN: Primary | ICD-10-CM

## 2024-06-19 PROCEDURE — 97530 THERAPEUTIC ACTIVITIES: CPT | Mod: HCNC,PN

## 2024-06-19 PROCEDURE — 97110 THERAPEUTIC EXERCISES: CPT | Mod: HCNC,PN

## 2024-06-19 PROCEDURE — 97018 PARAFFIN BATH THERAPY: CPT | Mod: HCNC,PN

## 2024-06-19 NOTE — PROGRESS NOTES
Occupational Therapy Daily Treatment Note     Date: 6/19/2024  Name: Dia Ovalles  Clinic Number: [id number]    Therapy Diagnosis:   1. Bilateral hand pain          Medical Diagnosis: R29.898 (ICD-10-CM) - Weakness of both hands     Referring Physician: Shelley Leija MD  Physician Orders: Eval and Treat  Date of Return to MD: appt with Dr. Horne on 6/4/24     Date of Injury: chronic  Date of Surgery: NA     Evaluation Date: 5/27/2024  Authorization Period: 5/7/24 - 5/7/25  Plan of Care Expiration: 7/22/24  Visit #/ Visits Authorized: 2 of 20  FOTO Completion: Initial eval (5/27/2024)  FOTO #2:  FOTO #3:     Time In: 3:35 pm  Time Out: 4:15 pm  Total Billable Time: 40 min     Precautions: Standard         Subjective     History of Current Condition: Dia Ovalles is a 73 y.o. year old Right hand dominant female who has been experiencing bilateral hand pain for the past several years. She reports weakness and pain are limiting functional use in daily activities such as fine motor tasks, gripping/opening jars. Dia Ovalles is referred to Occupational Therapy for evaluation and treatment. Patient presents today alone.    Pt reports: the paraffin helped, relief lasting for about a week, has been using dycem to open jars/bottles  Dia Ovalles was compliant with home exercise program given last session.   Response to previous treatment: first follow up  Functional change: increase use with dycem    Pain: 0/10  Location: bilateral hands    Objective     Observation/Appearance:   Herberdens and maría's nodules noted in various digits bilaterally     Sensation: Patient denies numbness/tingling        ELBOW, WRIST RANGE OF MOTION:   Measured in degrees of active motion with goniometer    Right  5/27/2024 Left  5/27/2024   Wrist Extension/Flexion 55/65 65/75   Ulnar/Radial Deviation 30/15 30/15      Able to make a full fist bilaterally        STRENGTH: (Measured in pounds using a Dynamometer and pinch meter)     Right  5/27/2024 Left  5/27/2024    Setting 2 45, 40, 40 40, 45, 45    Average 40 lb 43 lb   Key 8 8   3 Pt 5.6* 8.7   *pain Right IF        Intake Outcome Measure for FOTO Initial Evaluation Survey     Therapist reviewed FOTO scores for Dia Ovalles on 5/27/2024.   FOTO documents entered into [employer] - see Media section.     Intake Score: 67%           Treatment       Supervised modalities after being cleared for contraindications: Paraffin applied to the Right hand for 8 minutes for increased blood flow and circulation, increased tissue extensibility, and pain management.        Dia performed therapeutic exercises for 10 minutes including:  - Yellow putty digit adduction (30 reps ea)  - Red rubberband digit abduction (30 reps)  - Red digi extend (30 reps)       Dia participated in dynamic functional therapeutic activities to improve functional performance for 22 minutes, including:  - Provided with silicone gel sleeve for the IF to be worn during functional tasks  - In hand manipulation small poms (1 container)  - Dowel putty presses (3 sets)  - Red pin 3 pt pinch foam  (1 container)  - Resistive gripper, 20# for foam  (1 container)      Home Exercises and Education Provided     Education provided:   - Progress towards goals     Written Home Exercises Provided: Patient instructed to cont prior HEP.  Exercises were reviewed and Dia was able to demonstrate them prior to the end of the session.  Dia demonstrated good  understanding of the HEP provided.   .   See EMR under Patient Instructions for exercises provided prior visit.        Assessment     Dia tolerated all treatment tasks well. She notices much improvement with heat modality. Continued pain about distal phalanx of IF however with functional tasks - provided with silicone gel sleeve for support and compression. Will progress as tolerated.     Dia is progressing well towards her goals and there are no updates to goals at  this time. Pt prognosis is Good. Pt will continue to benefit from skilled outpatient occupational therapy to address the deficits listed in the problem list on initial evaluation provide pt/family education and to maximize pt's level of independence in the home and community environment.     Pt's spiritual, cultural and educational needs considered and pt agreeable to plan of care and goals.    The following goals were discussed with the patient and patient is in agreement with them as to be addressed in the treatment plan.      Long Term Goals (to be met by discharge):  Date Goal Met:       1.) Dia Ovalles will demonstrate significantly improved functional performance from re-assessment as measured by a FOTO Intake score of more than 77.     Goal Status:   In progress     2.) Dia Ovalles will return to near to prior level of function for ADLs and household management reporting independence or modified independence.     Goal Status:   In progress     3. Dia Ovalles will report pain 2 out of 10 at worst to increase functional use of affected hand for work and leisure tasks.     Goal Status:   In progress      Short Term Goals (to be met by 6/17/24):  Date Goal Met:       Dia Ovalles will be independent with home exercise program with written instructions.     Goal Status:   In progress     Dia Ovalles will report use of at least one joint protection technique or activity modification to improve functional performance in ADLs/work/leisure tasks.     Goal Status:   In progress     Dia Ovalles will demonstrate the ability to complete ADL/IADL tasks with 4/10 pain.     Goal Status:   In progress         Plan   Continue skilled occupational therapy with individualized plan of care focusing on increasing functional independence and participation in meaningful daily activities.    Updates/Grading for next session: progress as tolerated      Barbara Vidales, OTR/L, CHT

## 2024-07-01 ENCOUNTER — TELEPHONE (OUTPATIENT)
Dept: OPHTHALMOLOGY | Facility: CLINIC | Age: 74
End: 2024-07-01
Payer: MEDICARE

## 2024-07-01 ENCOUNTER — CLINICAL SUPPORT (OUTPATIENT)
Dept: REHABILITATION | Facility: OTHER | Age: 74
End: 2024-07-01
Attending: FAMILY MEDICINE
Payer: MEDICARE

## 2024-07-01 DIAGNOSIS — M79.642 BILATERAL HAND PAIN: Primary | ICD-10-CM

## 2024-07-01 DIAGNOSIS — M79.641 BILATERAL HAND PAIN: Primary | ICD-10-CM

## 2024-07-01 PROCEDURE — 97110 THERAPEUTIC EXERCISES: CPT | Mod: HCNC,PN

## 2024-07-01 PROCEDURE — 97018 PARAFFIN BATH THERAPY: CPT | Mod: HCNC,PN

## 2024-07-01 PROCEDURE — 97530 THERAPEUTIC ACTIVITIES: CPT | Mod: HCNC,PN

## 2024-07-01 NOTE — PROGRESS NOTES
Occupational Therapy Daily Treatment Note     Date: 7/1/2024  Name: Dia Ovalles  Clinic Number: [id number]    Therapy Diagnosis:   1. Bilateral hand pain          Medical Diagnosis: R29.898 (ICD-10-CM) - Weakness of both hands     Referring Physician: Shelley Leija MD  Physician Orders: Eval and Treat  Date of Return to MD: appt with Dr. Horne on 6/4/24     Date of Injury: chronic  Date of Surgery: NA     Evaluation Date: 5/27/2024  Authorization Period: 5/7/24 - 5/7/25  Plan of Care Expiration: 7/22/24  Visit #/ Visits Authorized: 3 of 20  FOTO Completion: Initial eval (5/27/2024)  FOTO #2:  FOTO #3:     Time In: 1:20 pm  Time Out: 2:00 pm  Total Billable Time: 40 min     Precautions: Standard       Subjective     History of Current Condition: Dia Ovalles is a 73 y.o. year old Right hand dominant female who has been experiencing bilateral hand pain for the past several years. She reports weakness and pain are limiting functional use in daily activities such as fine motor tasks, gripping/opening jars. Dia Ovalles is referred to Occupational Therapy for evaluation and treatment. Patient presents today alone.    Pt reports: she hasn't had much pain so hasn't tried the gel sleeve  Dia Ovalles was compliant with home exercise program given last session.   Response to previous treatment: tolerated well  Functional change: increase use with dycem    Pain: 0/10  Location: bilateral hands    Objective     Observation/Appearance:   Herberdens and maría's nodules noted in various digits bilaterally     Sensation: Patient denies numbness/tingling        ELBOW, WRIST RANGE OF MOTION:   Measured in degrees of active motion with goniometer    Right  5/27/2024 Left  5/27/2024   Wrist Extension/Flexion 55/65 65/75   Ulnar/Radial Deviation 30/15 30/15      Able to make a full fist bilaterally        STRENGTH: (Measured in pounds using a Dynamometer and pinch meter)    Right  5/27/2024 Left  5/27/2024    Setting 2  45, 40, 40 40, 45, 45    Average 40 lb 43 lb   Key 8 8   3 Pt 5.6* 8.7   *pain Right IF        Intake Outcome Measure for FOTO Initial Evaluation Survey     Therapist reviewed FOTO scores for Dia Ovalles on 5/27/2024.   FOTO documents entered into [employer] - see Media section.     Intake Score: 67%           Treatment       Supervised modalities after being cleared for contraindications: Paraffin applied to the Right hand for 8 minutes for increased blood flow and circulation, increased tissue extensibility, and pain management.        Dia performed therapeutic exercises for 15 minutes including:  - Yellow putty  (2 min), lumbrical , 3 pt pinches, digit adduction (30 reps ea)  - Red rubberband digit abduction (30 reps)  - Red digi extend (30 reps)       Dia participated in dynamic functional therapeutic activities to improve functional performance for 16 minutes, including:  - In hand manipulation small poms (1 container)  - Dowel putty presses (3 sets)  - Red pin 3 pt pinch foam  (1 container) - NT  - Resistive gripper, 20# for foam  (1 container)  - 9 hole peg (2 each hand)      Home Exercises and Education Provided     Education provided:   - Progress towards goals     Written Home Exercises Provided: Patient instructed to cont prior HEP.  Exercises were reviewed and Dia was able to demonstrate them prior to the end of the session.  Dia demonstrated good  understanding of the HEP provided.   .   See EMR under Patient Instructions for exercises provided prior visit.        Assessment     Dia tolerated all treatment tasks well with no complaints of pain. Updated HEP with progression of theraputty tasks. Will progress as tolerated.     Dia is progressing well towards her goals and there are no updates to goals at this time. Pt prognosis is Good. Pt will continue to benefit from skilled outpatient occupational therapy to address the deficits listed in the problem list on  initial evaluation provide pt/family education and to maximize pt's level of independence in the home and community environment.     Pt's spiritual, cultural and educational needs considered and pt agreeable to plan of care and goals.    The following goals were discussed with the patient and patient is in agreement with them as to be addressed in the treatment plan.      Long Term Goals (to be met by discharge):  Date Goal Met:       1.) Dia Ovalles will demonstrate significantly improved functional performance from re-assessment as measured by a FOTO Intake score of more than 77.     Goal Status:   In progress     2.) Dia Ovalles will return to near to prior level of function for ADLs and household management reporting independence or modified independence.     Goal Status:   In progress     3. Dia Ovalles will report pain 2 out of 10 at worst to increase functional use of affected hand for work and leisure tasks.     Goal Status:   In progress      Short Term Goals (to be met by 6/17/24):  Date Goal Met:       Dia Ovalles will be independent with home exercise program with written instructions.     Goal Status:   In progress     Dia Ovalles will report use of at least one joint protection technique or activity modification to improve functional performance in ADLs/work/leisure tasks.     Goal Status:   In progress     Dia Ovalles will demonstrate the ability to complete ADL/IADL tasks with 4/10 pain.     Goal Status:   In progress         Plan   Continue skilled occupational therapy with individualized plan of care focusing on increasing functional independence and participation in meaningful daily activities.    Updates/Grading for next session: progress as tolerated      Barbara Vidales, OTR/L, CHT

## 2024-07-01 NOTE — TELEPHONE ENCOUNTER
----- Message from Courtney Glover sent at 7/1/2024 10:18 AM CDT -----  Regarding: Patient Returning Missed Call  Patient is returning missed call from last week . Pts call back-[phone]

## 2024-07-01 NOTE — PATIENT INSTRUCTIONS
"[facility]  HOME EXERCISE PROGRAM     Complete the following strengthening program 1x/day.        /Squeezes  - Squeeze putty, gripping for a max of 2 - 3 min        Resisted Lumbrical   Bending only at large knuckles, press putty down against thumb. Keep fingertips straight.          Three Point Pinches  - Roll putty into a log  - Pinch along with the thumb, index and middle fingers.  - Make sure to keep an "O"         Abduction  - Place a ring of putty around all four fingers  - Spread fingers apart against the putty  - Can also do this with a rubberband instead!         Adduction  - Place putty between fingers  - Squeeze the fingers together, into the putty    HOWIE Gutierrez/ROXANN       "

## 2024-07-08 ENCOUNTER — CLINICAL SUPPORT (OUTPATIENT)
Dept: REHABILITATION | Facility: OTHER | Age: 74
End: 2024-07-08
Attending: FAMILY MEDICINE
Payer: MEDICARE

## 2024-07-08 DIAGNOSIS — M79.641 BILATERAL HAND PAIN: Primary | ICD-10-CM

## 2024-07-08 DIAGNOSIS — M79.642 BILATERAL HAND PAIN: Primary | ICD-10-CM

## 2024-07-08 PROCEDURE — 97530 THERAPEUTIC ACTIVITIES: CPT | Mod: HCNC,PN

## 2024-07-08 PROCEDURE — 97018 PARAFFIN BATH THERAPY: CPT | Mod: HCNC,PN

## 2024-07-08 PROCEDURE — 97110 THERAPEUTIC EXERCISES: CPT | Mod: HCNC,PN

## 2024-07-08 NOTE — PROGRESS NOTES
Occupational Therapy Discharge Note     Date: 7/8/2024  Name: Dia Ovalles  Clinic Number: [id number]    Therapy Diagnosis:   1. Bilateral hand pain          Medical Diagnosis: R29.898 (ICD-10-CM) - Weakness of both hands     Referring Physician: Shelley Leija MD  Physician Orders: Eval and Treat  Date of Return to MD: appt with Dr. Horne on 6/4/24     Date of Injury: chronic  Date of Surgery: NA     Evaluation Date: 5/27/2024  Authorization Period: 5/7/24 - 5/7/25  Plan of Care Expiration: 7/22/24  Visit #/ Visits Authorized: 4 of 20  FOTO Completion: Initial eval (5/27/2024)  FOTO #2:  FOTO #3:     Time In: 11:45 am  Time Out: 12:25 pm  Total Billable Time: 40 min     Precautions: Standard       Subjective     History of Current Condition: Dia Ovalles is a 73 y.o. year old Right hand dominant female who has been experiencing bilateral hand pain for the past several years. She reports weakness and pain are limiting functional use in daily activities such as fine motor tasks, gripping/opening jars. Dia Ovalles is referred to Occupational Therapy for evaluation and treatment. Patient presents today alone.    Pt reports: index finger was hurting this morning so she applied the silicone sleeve and reports relief  Dia Ovalles was compliant with home exercise program given last session.   Response to previous treatment: tolerated well  Functional change: increase use with dycem    Pain: 0/10  Location: bilateral hands    Objective     Observation/Appearance:   Herberdens and maría's nodules noted in various digits bilaterally     Sensation: Patient denies numbness/tingling        ELBOW, WRIST RANGE OF MOTION:   Measured in degrees of active motion with goniometer    Right  5/27/2024 Left  5/27/2024   Wrist Extension/Flexion 55/65 65/75   Ulnar/Radial Deviation 30/15 30/15      Able to make a full fist bilaterally        STRENGTH: (Measured in pounds using a Dynamometer and pinch meter)    Right  5/27/2024  Right  7/8/24 Left  5/27/2024    Setting 2 45, 40, 40 WNL 40, 45, 45    Average 40 lb  43 lb   Key 8 = 8   3 Pt 5.6* 7.2 8.7   *pain Right IF        Intake Outcome Measure for FOTO Initial Evaluation Survey     Therapist reviewed FOTO scores for Dia Ovalles on 5/27/2024.   FOTO documents entered into [employer] - see Media section.     Intake Score: 67%           Treatment       Supervised modalities after being cleared for contraindications: Paraffin applied to the Right hand for 8 minutes for increased blood flow and circulation, increased tissue extensibility, and pain management.        Dia performed therapeutic exercises for 17 minutes including:  - Reassessment pinch strength  - Yellow putty  (2 min), lumbrical , 3 pt pinches, digit adduction (30 reps ea) - NT  - Red rubberband digit abduction (30 reps)  - Red digi extend (30 reps)  - Ball opposition squeezes (20 reps)  - Palmar abduction slides (20 reps)      Dia participated in dynamic functional therapeutic activities to improve functional performance for 15 minutes, including:  - In hand manipulation with coins(1 container)  - Isospheres (3 min)  - Dowel putty presses (3 sets)  - Lumbrical tool putty presses and scrapes (5 sets)  - Yellow pin opposition pinch foam  (1 container)  - Resistive gripper, 20# for foam  (1 container)      Home Exercises and Education Provided     Education provided:   - Progress towards goals     Written Home Exercises Provided: Patient instructed to cont prior HEP.  Exercises were reviewed and Dia was able to demonstrate them prior to the end of the session.  Dia demonstrated good  understanding of the HEP provided.   .   See EMR under Patient Instructions for exercises provided prior visit.        Assessment     Dia tolerated all treatment tasks well with no complaints of pain. She demonstrates independence with HEP and has met all goals. She will follow up as needed.      Pt's  spiritual, cultural and educational needs considered and pt agreeable to plan of care and goals.    The following goals were discussed with the patient and patient is in agreement with them as to be addressed in the treatment plan.      Long Term Goals (to be met by discharge):  Date Goal Met:       1.) Dia Ovalles will demonstrate significantly improved functional performance from re-assessment as measured by a FOTO Intake score of more than 77.     Goal Status:   In progress     2.) Dia Ovalles will return to near to prior level of function for ADLs and household management reporting independence or modified independence.    7/8/24 Goal Status:  Met     3. Dia Ovalles will report pain 2 out of 10 at worst to increase functional use of affected hand for work and leisure tasks.    7/8/24 Goal Status:  Met      Short Term Goals (to be met by 6/17/24):  Date Goal Met:       Dia Ovalles will be independent with home exercise program with written instructions.    7/8/24 Goal Status:  Met     Dia Ovalles will report use of at least one joint protection technique or activity modification to improve functional performance in ADLs/work/leisure tasks.    7/8/24 Goal Status:  Met     Dia Ovalles will demonstrate the ability to complete ADL/IADL tasks with 4/10 pain.    7/8/24 Goal Status:  Met         Plan     Discharge to [facility]      HOWIE Gutierrez/ROXANN, CHT

## 2024-07-18 DIAGNOSIS — H57.813 BROW PTOSIS, BILATERAL: Primary | ICD-10-CM

## 2024-07-18 RX ORDER — NEOMYCIN SULFATE, POLYMYXIN B SULFATE, AND DEXAMETHASONE 3.5; 10000; 1 MG/G; [USP'U]/G; MG/G
OINTMENT OPHTHALMIC
Qty: 1 EACH | Refills: 3 | Status: SHIPPED | OUTPATIENT
Start: 2024-07-18

## 2024-07-18 RX ORDER — NEOMYCIN SULFATE, POLYMYXIN B SULFATE, AND DEXAMETHASONE 3.5; 10000; 1 MG/G; [USP'U]/G; MG/G
OINTMENT OPHTHALMIC
Qty: 1 EACH | Refills: 3 | Status: CANCELLED | OUTPATIENT
Start: 2024-07-18

## 2024-07-22 ENCOUNTER — PROCEDURE VISIT (OUTPATIENT)
Dept: OPHTHALMOLOGY | Facility: CLINIC | Age: 74
End: 2024-07-22
Payer: MEDICARE

## 2024-07-22 VITALS — HEART RATE: 82 BPM | DIASTOLIC BLOOD PRESSURE: 77 MMHG | SYSTOLIC BLOOD PRESSURE: 117 MMHG

## 2024-07-22 DIAGNOSIS — H02.834 DERMATOCHALASIS OF BOTH UPPER EYELIDS: ICD-10-CM

## 2024-07-22 DIAGNOSIS — H02.831 DERMATOCHALASIS OF BOTH UPPER EYELIDS: ICD-10-CM

## 2024-07-22 DIAGNOSIS — H57.813 BROW PTOSIS, BILATERAL: Primary | ICD-10-CM

## 2024-07-22 RX ORDER — HYDROCODONE BITARTRATE AND ACETAMINOPHEN 5; 325 MG/1; MG/1
1 TABLET ORAL EVERY 6 HOURS PRN
Qty: 16 TABLET | Refills: 0 | Status: SHIPPED | OUTPATIENT
Start: 2024-07-22

## 2024-07-22 RX ORDER — NEOMYCIN SULFATE, POLYMYXIN B SULFATE, AND DEXAMETHASONE 3.5; 10000; 1 MG/G; [USP'U]/G; MG/G
OINTMENT OPHTHALMIC EVERY 6 HOURS
Qty: 3.5 G | Refills: 2 | Status: SHIPPED | OUTPATIENT
Start: 2024-07-22

## 2024-07-31 NOTE — PROGRESS NOTES
CPAO    Dia Ovalles is a/an 73 y.o. female here for 1 week post op.  Date of procedure: 7/22/24  Procedure: seda bleph/ seda brow   Oral antibiotics: none   Eye meds: maxitrol robin tid     Patient doing well following procedure. No pain or discomfort.     Last edited by Nuha Lang on 8/1/2024  1:47 PM.            Assessment /Plan     For exam results, see Encounter Report.    Post-operative state  -     External Photography - OU - Both Eyes    Brow ptosis, bilateral    Dermatochalasis of both upper eyelids      Patient doing well! Post-operative instructions reviewed. Sutures removed. All questions answered.  Return in 5 weeks or sooner any worsening of vision/symptoms or any concerns.

## 2024-08-01 ENCOUNTER — OFFICE VISIT (OUTPATIENT)
Dept: OPHTHALMOLOGY | Facility: CLINIC | Age: 74
End: 2024-08-01
Payer: MEDICARE

## 2024-08-01 DIAGNOSIS — H02.834 DERMATOCHALASIS OF BOTH UPPER EYELIDS: ICD-10-CM

## 2024-08-01 DIAGNOSIS — H02.831 DERMATOCHALASIS OF BOTH UPPER EYELIDS: ICD-10-CM

## 2024-08-01 DIAGNOSIS — Z98.890 POST-OPERATIVE STATE: Primary | ICD-10-CM

## 2024-08-01 DIAGNOSIS — H57.813 BROW PTOSIS, BILATERAL: ICD-10-CM

## 2024-08-01 PROCEDURE — 1157F ADVNC CARE PLAN IN RCRD: CPT | Mod: HCNC,CPTII,S$GLB, | Performed by: OPHTHALMOLOGY

## 2024-08-01 PROCEDURE — 99999 PR PBB SHADOW E&M-EST. PATIENT-LVL III: CPT | Mod: PBBFAC,HCNC,, | Performed by: OPHTHALMOLOGY

## 2024-08-01 PROCEDURE — 1159F MED LIST DOCD IN RCRD: CPT | Mod: HCNC,CPTII,S$GLB, | Performed by: OPHTHALMOLOGY

## 2024-08-01 PROCEDURE — 3044F HG A1C LEVEL LT 7.0%: CPT | Mod: HCNC,CPTII,S$GLB, | Performed by: OPHTHALMOLOGY

## 2024-08-01 PROCEDURE — 92285 EXTERNAL OCULAR PHOTOGRAPHY: CPT | Mod: HCNC,S$GLB,, | Performed by: OPHTHALMOLOGY

## 2024-08-01 PROCEDURE — 99024 POSTOP FOLLOW-UP VISIT: CPT | Mod: HCNC,S$GLB,, | Performed by: OPHTHALMOLOGY

## 2024-08-01 PROCEDURE — 1160F RVW MEDS BY RX/DR IN RCRD: CPT | Mod: HCNC,CPTII,S$GLB, | Performed by: OPHTHALMOLOGY

## 2024-08-21 ENCOUNTER — PATIENT MESSAGE (OUTPATIENT)
Dept: OPHTHALMOLOGY | Facility: CLINIC | Age: 74
End: 2024-08-21
Payer: MEDICARE

## 2024-09-02 DIAGNOSIS — N39.41 URGE INCONTINENCE: ICD-10-CM

## 2024-09-02 RX ORDER — OXYBUTYNIN CHLORIDE 10 MG/1
10 TABLET, EXTENDED RELEASE ORAL
Qty: 90 TABLET | Refills: 0 | Status: SHIPPED | OUTPATIENT
Start: 2024-09-02

## 2024-09-03 NOTE — TELEPHONE ENCOUNTER
Refill Decision Note   Dia Ovalles  is requesting a refill authorization.  Brief Assessment and Rationale for Refill:  Approve     Medication Therapy Plan:       Medication Reconciliation Completed: No   Comments:     No Care Gaps recommended.     Note composed:10:32 PM 09/02/2024

## 2024-09-06 ENCOUNTER — TELEPHONE (OUTPATIENT)
Dept: OPTOMETRY | Facility: CLINIC | Age: 74
End: 2024-09-06
Payer: MEDICARE

## 2024-09-26 ENCOUNTER — OFFICE VISIT (OUTPATIENT)
Dept: OPHTHALMOLOGY | Facility: CLINIC | Age: 74
End: 2024-09-26
Payer: MEDICARE

## 2024-09-26 DIAGNOSIS — H57.813 BROW PTOSIS, BILATERAL: ICD-10-CM

## 2024-09-26 DIAGNOSIS — H02.834 DERMATOCHALASIS OF BOTH UPPER EYELIDS: ICD-10-CM

## 2024-09-26 DIAGNOSIS — Z98.890 POST-OPERATIVE STATE: Primary | ICD-10-CM

## 2024-09-26 DIAGNOSIS — H02.831 DERMATOCHALASIS OF BOTH UPPER EYELIDS: ICD-10-CM

## 2024-09-26 PROCEDURE — 3044F HG A1C LEVEL LT 7.0%: CPT | Mod: HCNC,CPTII,S$GLB, | Performed by: OPHTHALMOLOGY

## 2024-09-26 PROCEDURE — 1101F PT FALLS ASSESS-DOCD LE1/YR: CPT | Mod: HCNC,CPTII,S$GLB, | Performed by: OPHTHALMOLOGY

## 2024-09-26 PROCEDURE — 99999 PR PBB SHADOW E&M-EST. PATIENT-LVL III: CPT | Mod: PBBFAC,HCNC,, | Performed by: OPHTHALMOLOGY

## 2024-09-26 PROCEDURE — 99024 POSTOP FOLLOW-UP VISIT: CPT | Mod: HCNC,S$GLB,, | Performed by: OPHTHALMOLOGY

## 2024-09-26 PROCEDURE — 1157F ADVNC CARE PLAN IN RCRD: CPT | Mod: HCNC,CPTII,S$GLB, | Performed by: OPHTHALMOLOGY

## 2024-09-26 PROCEDURE — 1160F RVW MEDS BY RX/DR IN RCRD: CPT | Mod: HCNC,CPTII,S$GLB, | Performed by: OPHTHALMOLOGY

## 2024-09-26 PROCEDURE — 92285 EXTERNAL OCULAR PHOTOGRAPHY: CPT | Mod: HCNC,S$GLB,, | Performed by: OPHTHALMOLOGY

## 2024-09-26 PROCEDURE — 3288F FALL RISK ASSESSMENT DOCD: CPT | Mod: HCNC,CPTII,S$GLB, | Performed by: OPHTHALMOLOGY

## 2024-09-26 PROCEDURE — 1159F MED LIST DOCD IN RCRD: CPT | Mod: HCNC,CPTII,S$GLB, | Performed by: OPHTHALMOLOGY

## 2024-09-26 NOTE — PROGRESS NOTES
HPI    Dia Oavlles is a/an 73 y.o. female here for 6 week post op.  Date of procedure: 7/22/24  Procedure: seda bleph/ seda brow   Oral antibiotics: none   Eye meds: none    Patient doing well following procedure. No pain or discomfort.     Last edited by Nuha Lang on 9/26/2024  9:57 AM.            Assessment /Plan     For exam results, see Encounter Report.    Post-operative state  -     External Photography - OU - Both Eyes    Brow ptosis, bilateral    Dermatochalasis of both upper eyelids      Patient doing well! Post-operative instructions reviewed. All questions answered. Return prn or sooner any worsening of vision/symptoms or any concerns.

## 2024-10-02 ENCOUNTER — PATIENT MESSAGE (OUTPATIENT)
Dept: ORTHOPEDICS | Facility: CLINIC | Age: 74
End: 2024-10-02
Payer: MEDICARE

## 2024-10-02 DIAGNOSIS — M17.11 PRIMARY OSTEOARTHRITIS OF RIGHT KNEE: Primary | ICD-10-CM

## 2024-10-03 ENCOUNTER — HOSPITAL ENCOUNTER (OUTPATIENT)
Dept: RADIOLOGY | Facility: HOSPITAL | Age: 74
Discharge: HOME | End: 2024-10-03
Attending: STUDENT IN AN ORGANIZED HEALTH CARE EDUCATION/TRAINING PROGRAM
Payer: MEDICARE

## 2024-10-03 ENCOUNTER — OFFICE VISIT (OUTPATIENT)
Dept: ORTHOPEDICS | Facility: CLINIC | Age: 74
End: 2024-10-03
Payer: MEDICARE

## 2024-10-03 VITALS — HEIGHT: 67 IN | WEIGHT: 138.25 LBS | BODY MASS INDEX: 21.7 KG/M2

## 2024-10-03 DIAGNOSIS — M17.11 PRIMARY OSTEOARTHRITIS OF RIGHT KNEE: Primary | ICD-10-CM

## 2024-10-03 DIAGNOSIS — M17.11 PRIMARY OSTEOARTHRITIS OF RIGHT KNEE: ICD-10-CM

## 2024-10-03 PROCEDURE — 73562 X-RAY EXAM OF KNEE 3: CPT | Mod: TC,HCNC,LT

## 2024-10-03 PROCEDURE — 99999 PR PBB SHADOW E&M-EST. PATIENT-LVL III: CPT | Mod: PBBFAC,HCNC,, | Performed by: STUDENT IN AN ORGANIZED HEALTH CARE EDUCATION/TRAINING PROGRAM

## 2024-10-03 PROCEDURE — 73562 X-RAY EXAM OF KNEE 3: CPT | Mod: 26,HCNC,LT, | Performed by: RADIOLOGY

## 2024-10-03 PROCEDURE — 73564 X-RAY EXAM KNEE 4 OR MORE: CPT | Mod: 26,HCNC,RT, | Performed by: RADIOLOGY

## 2024-10-03 PROCEDURE — 73564 X-RAY EXAM KNEE 4 OR MORE: CPT | Mod: TC,HCNC,RT

## 2024-10-03 RX ORDER — CELECOXIB 200 MG/1
200 CAPSULE ORAL 2 TIMES DAILY PRN
Qty: 60 CAPSULE | Refills: 0 | Status: SHIPPED | OUTPATIENT
Start: 2024-10-03

## 2024-10-03 NOTE — PROGRESS NOTES
Assessment & Plan   Encounter Diagnosis   Name Primary?    Primary osteoarthritis of right knee Yes        Dia Ovalles  has severe arthritis in the right knee. Shedescribes minimal to no relief with non-operative treatment including CSI as well as HA injections.  At this point the patient feels that the joint is limiting their quality of life with complaints of instability, pain going up and down stairs, pain walking long distances.  She is interesting in proceeding with knee replacement.  She will discuss with her family and contact us with a preferred date, possibly as soon as 10/14 or 10/21.        Problem List:  Problem List Items Addressed This Visit          Orthopedic    Osteoarthritis of right knee - Primary           Patient ID: Dia Ovalles is a 74 y.o. female.  Chief Complaint   Patient presents with    Right Knee - Pain        History of Present Illness:  iDa Ovalles is a 74 y.o. female who presents for evaluation of Right knee pain. The knee pain has been present for 10+ years and has been progressive. Conservative treatment in the form of   analgesics, NSAIDs, activity modification, HNP,HA and CS injections has been attempted previously.  She also has a  remote history of meniscal surgery on the right knee but is unsure if it was the lateral or medial meniscus.The patient requires no assistive device. The pain is worsened by   Prolonged ambulation, uneven ground.  She typically has 4 mi walks but more recently has been unable to do these because of her knee pain malaise she is able to walk shorter distances.    She has had episodes of instability with two falls last year. The patient has a decreased quality of life due to knee pain and presents today for evaluation.     Past Medical History:  Past Medical History:   Diagnosis Date    Breast cyst     Cancer     SCC x 2 on arms    Cataract     Joint pain     OA (osteoarthritis) of shoulder 03/13/2019    OAB (overactive bladder) 03/08/2017     Osteoporosis 2017    Squamous cell carcinoma of skin     right arm        Surgical History:  Past Surgical History:   Procedure Laterality Date    ASD REPAIR      open heart surgery    BREAST BIOPSY      CATARACT EXTRACTION W/  INTRAOCULAR LENS IMPLANT Right 2021    Procedure: EXTRACTION, CATARACT, WITH IOL INSERTION;  Surgeon: Bruna Silva MD;  Location: [facility];  Service: Ophthalmology;  Laterality: Right;    CATARACT EXTRACTION W/  INTRAOCULAR LENS IMPLANT Left 2021    Procedure: EXTRACTION, CATARACT, WITH IOL INSERTION;  Surgeon: Bruna Silva MD;  Location: [facility];  Service: Ophthalmology;  Laterality: Left;     SECTION      x2    EYE SURGERY      KNEE SURGERY      meniscal repair    SKIN BIOPSY      SKIN CANCER EXCISION          Social History:  She reports that she has never smoked. She has never used smokeless tobacco. She reports current alcohol use of about 4.0 standard drinks of alcohol per week. She reports that she does not use drugs.     Family History:  family history includes Asthma in her mother; Cancer in her father; Heart disease in her maternal grandfather; Hypertension in her mother; Osteoporosis in her mother; Stroke in her maternal grandfather and mother.     Current Outpatient Medications on File Prior to Visit   Medication Sig Dispense Refill    biotin 1 mg tablet Take 5,000 mcg by mouth once daily.      calcium carbonate (CALCIUM 600 ORAL) Take 1,200 mg by mouth once daily.       cholecalciferol, vitamin D3, (VITAMIN D3) 50 mcg (2,000 unit) Cap Take 1,000 Units by mouth every other day.       clobetasoL (TEMOVATE) 0.05 % external solution Use on scalp one - two times daily as needed for scaling or itching 50 mL 3    diazePAM (VALIUM) 5 MG tablet Bring to appointment 1 tablet 0    DULoxetine (CYMBALTA) 20 MG capsule Take 2 capsules (40 mg total) by mouth once daily. 180 capsule 1    HYDROcodone-acetaminophen (NORCO) 5-325 mg per tablet Take 1 tablet by  "mouth every 6 (six) hours as needed for Pain. 16 tablet 0    multivitamin (THERAGRAN) per tablet Take 1 tablet by mouth once daily.      neomycin-polymyxin-dexamethasone (DEXACINE) 3.5 mg/g-10,000 unit/g-0.1 % Oint Apply to suture line three times daily for one week, twice daily for one week, once daily for one week, and then stop. 1 each 3    neomycin-polymyxin-dexamethasone (MAXITROL) 3.5 mg/g-10,000 unit/g-0.1 % Oint Place into both eyes every 6 (six) hours. Place on suture lines 3 times a day 3.5 g 2    oxybutynin (DITROPAN-XL) 10 MG 24 hr tablet TAKE 1 TABLET EVERY DAY 90 tablet 0    raloxifene (EVISTA) 60 mg tablet Take 1 tablet (60 mg total) by mouth once daily. Needs appointment for future refills. 90 tablet 0    [DISCONTINUED] meloxicam (MOBIC) 15 MG tablet Take 1 tablet (15 mg total) by mouth once daily. (Patient taking differently: Take 15 mg by mouth daily as needed.) 90 tablet 0     No current facility-administered medications on file prior to visit.     Review of patient's allergies indicates:   Allergen Reactions    Codeine Nausea Only          Physical exam:  Height 5' 6.54" (1.69 m), weight 62.7 kg (138 lb 3.7 oz).  General: no apparent distress    Gait: + antalgic, no limp, no use of assistive devices    R knee:     Skin: intact, mild effusion  Range of motion: 0 to 120  Strength: 5/5 with extension, 5/5 with flexion  Ligament exam: laxity to varus/valgus stress in extension and midflexion and stable to AP stress in flexion and extension  Neurovascular: 2+ DP pulse,  Light touch sensation intact     Relevant Results:  Imaging:  Plain x-rays of the R knee were obtained and independently reviewed by me today, 10/03/2024, and demonstrate severe narrowing of the lateral compartment, marginal osteophytes and sclerosis c/w KL grade 4 arthritic change   In the lateral compartment.  There are marginal osteophytes on medial compartment below the joint spa. There is ce is well preservedv.algus alignment.   "     DEXA scan (6/5/23)- T=-2.9  (on [facility] for Osteoporosis)    Labs:  Hb 12.2  Cr 0.8  Alb 3.9

## 2024-10-04 ENCOUNTER — TELEPHONE (OUTPATIENT)
Dept: ORTHOPEDICS | Facility: CLINIC | Age: 74
End: 2024-10-04
Payer: MEDICARE

## 2024-10-04 DIAGNOSIS — M17.11 PRIMARY OSTEOARTHRITIS OF RIGHT KNEE: Primary | ICD-10-CM

## 2024-10-04 NOTE — TELEPHONE ENCOUNTER
Spoke with patient via telephone , family member unable to come to town for assistance with original surgical date. Surgery will be moved to 11/11/24, information confirmed with patient, surgery and all other necessary appointments will be scheduled.

## 2024-10-07 ENCOUNTER — TELEPHONE (OUTPATIENT)
Dept: PREADMISSION TESTING | Facility: HOSPITAL | Age: 74
End: 2024-10-07
Payer: MEDICARE

## 2024-10-07 DIAGNOSIS — M79.609 PAIN IN EXTREMITY, UNSPECIFIED EXTREMITY: Primary | ICD-10-CM

## 2024-10-07 DIAGNOSIS — Z01.818 PRE-OP TESTING: ICD-10-CM

## 2024-10-07 NOTE — ANESTHESIA PAT ROS NOTE
10/07/2024  Dia Ovalles is a 74 y.o., female.      Pre-op Assessment          Review of Systems           Anesthesia Assessment: Preoperative EQUATION    Planned Procedure: Procedure(s) (LRB):  ARTHROPLASTY, KNEE, TOTAL RIGHT: Depuy - Attune (Right)  Requested Anesthesia Type:Regional  Surgeon: Keagan Oneal MD  Service: Orthopedics  Known or anticipated Date of Surgery:11/11/2024    Surgeon notes: reviewed    Electronic QUestionnaire Assessment completed via nurse interview with patient.        Triage considerations:     The patient has no apparent active cardiac condition (No unstable coronary Syndrome such as severe unstable angina or recent [<1 month] myocardial infarction, decompensated CHF, severe valvular   disease or significant arrhythmia)    Previous anesthesia records:MAC and No problems  5/17/21 EXTRACTION, CATARACT, WITH IOL INSERTION (Left: Eye)       Last PCP note: 3-6 months ago , within [facility]   Subspecialty notes: Cardiology: General, Dermatology, Ortho    Other important co-morbidities:  Atrial Septal Defect Repair 1978, SCC, OAB        Tests already available:  Available tests,  within [facility] .   5/3/24 A1C, CMP, CBC  5/25/22 Stress Echo  5/9/22 ECHO   4/26/22 EKG            Instructions given. (See in Nurse's note)    Optimization:  Anesthesia Preop Clinic Assessment  Indicated POC    Medical Opinion Indicated       Sub-specialist consult indicated:   [facility] NP       Plan:    Testing:  CMP, EKG, Hematology Profile, and PT/INR   Pre-anesthesia  visit       Visit focus: possible regional anesthesia and/or nerve block      Consultation:Pre Op [facility] NP for medical and anesthesia optimization       Patient  has previously scheduled Medical Appointment: 10/29    Navigation: Tests Scheduled.              Consults scheduled.             Results will be tracked by Preop  Clinic.

## 2024-10-28 ENCOUNTER — TELEPHONE (OUTPATIENT)
Dept: PREADMISSION TESTING | Facility: HOSPITAL | Age: 74
End: 2024-10-28
Payer: MEDICARE

## 2024-10-29 ENCOUNTER — HOSPITAL ENCOUNTER (OUTPATIENT)
Dept: CARDIOLOGY | Facility: CLINIC | Age: 74
Discharge: HOME | End: 2024-10-29
Payer: MEDICARE

## 2024-10-29 ENCOUNTER — OFFICE VISIT (OUTPATIENT)
Dept: ORTHOPEDICS | Facility: CLINIC | Age: 74
End: 2024-10-29
Payer: MEDICARE

## 2024-10-29 ENCOUNTER — HOSPITAL ENCOUNTER (OUTPATIENT)
Dept: RADIOLOGY | Facility: HOSPITAL | Age: 74
Discharge: HOME | End: 2024-10-29
Attending: STUDENT IN AN ORGANIZED HEALTH CARE EDUCATION/TRAINING PROGRAM
Payer: MEDICARE

## 2024-10-29 ENCOUNTER — HOSPITAL ENCOUNTER (OUTPATIENT)
Dept: RADIOLOGY | Facility: HOSPITAL | Age: 74
Discharge: HOME | End: 2024-10-29
Attending: NURSE PRACTITIONER
Payer: MEDICARE

## 2024-10-29 ENCOUNTER — OFFICE VISIT (OUTPATIENT)
Dept: INTERNAL MEDICINE | Facility: CLINIC | Age: 74
End: 2024-10-29
Payer: MEDICARE

## 2024-10-29 VITALS
TEMPERATURE: 98 F | DIASTOLIC BLOOD PRESSURE: 68 MMHG | HEART RATE: 80 BPM | WEIGHT: 139.56 LBS | HEIGHT: 68 IN | BODY MASS INDEX: 21.15 KG/M2 | SYSTOLIC BLOOD PRESSURE: 111 MMHG | OXYGEN SATURATION: 96 %

## 2024-10-29 DIAGNOSIS — M17.11 PRIMARY OSTEOARTHRITIS OF RIGHT KNEE: Primary | ICD-10-CM

## 2024-10-29 DIAGNOSIS — Z92.89 HISTORY OF CARDIOVASCULAR STRESS TEST: ICD-10-CM

## 2024-10-29 DIAGNOSIS — M17.11 OSTEOARTHRITIS OF RIGHT KNEE, UNSPECIFIED OSTEOARTHRITIS TYPE: ICD-10-CM

## 2024-10-29 DIAGNOSIS — M81.0 OSTEOPOROSIS, UNSPECIFIED OSTEOPOROSIS TYPE, UNSPECIFIED PATHOLOGICAL FRACTURE PRESENCE: ICD-10-CM

## 2024-10-29 DIAGNOSIS — M17.11 PRIMARY OSTEOARTHRITIS OF RIGHT KNEE: ICD-10-CM

## 2024-10-29 DIAGNOSIS — Z01.818 PRE-OP TESTING: ICD-10-CM

## 2024-10-29 DIAGNOSIS — N32.81 OAB (OVERACTIVE BLADDER): Primary | ICD-10-CM

## 2024-10-29 PROBLEM — H25.13 NUCLEAR SCLEROSIS, BILATERAL: Status: RESOLVED | Noted: 2021-05-17 | Resolved: 2024-10-29

## 2024-10-29 PROBLEM — S52.541A CLOSED SMITH'S FRACTURE OF RIGHT RADIUS: Chronic | Status: RESOLVED | Noted: 2017-09-27 | Resolved: 2024-10-29

## 2024-10-29 PROBLEM — H25.13 NS (NUCLEAR SCLEROSIS), BILATERAL: Status: RESOLVED | Noted: 2021-04-19 | Resolved: 2024-10-29

## 2024-10-29 PROBLEM — R07.89 OTHER CHEST PAIN: Status: RESOLVED | Noted: 2022-05-13 | Resolved: 2024-10-29

## 2024-10-29 PROBLEM — M25.611 DECREASED ROM OF RIGHT SHOULDER: Status: RESOLVED | Noted: 2018-03-20 | Resolved: 2024-10-29

## 2024-10-29 PROBLEM — R29.898 DECREASED STRENGTH OF UPPER EXTREMITY: Status: RESOLVED | Noted: 2018-03-20 | Resolved: 2024-10-29

## 2024-10-29 PROBLEM — H02.9 LESION OF LEFT UPPER EYELID: Status: RESOLVED | Noted: 2023-05-02 | Resolved: 2024-10-29

## 2024-10-29 PROCEDURE — 93010 ELECTROCARDIOGRAM REPORT: CPT | Mod: S$GLB,,, | Performed by: INTERNAL MEDICINE

## 2024-10-29 PROCEDURE — 73560 X-RAY EXAM OF KNEE 1 OR 2: CPT | Mod: TC,RT

## 2024-10-29 PROCEDURE — 99999 PR PBB SHADOW E&M-EST. PATIENT-LVL IV: CPT | Mod: PBBFAC,,, | Performed by: NURSE PRACTITIONER

## 2024-10-29 PROCEDURE — 1160F RVW MEDS BY RX/DR IN RCRD: CPT | Mod: CPTII,S$GLB,, | Performed by: NURSE PRACTITIONER

## 2024-10-29 PROCEDURE — 99999 PR PBB SHADOW E&M-EST. PATIENT-LVL III: CPT | Mod: PBBFAC,,, | Performed by: STUDENT IN AN ORGANIZED HEALTH CARE EDUCATION/TRAINING PROGRAM

## 2024-10-29 PROCEDURE — 1159F MED LIST DOCD IN RCRD: CPT | Mod: CPTII,S$GLB,, | Performed by: NURSE PRACTITIONER

## 2024-10-29 PROCEDURE — 1157F ADVNC CARE PLAN IN RCRD: CPT | Mod: CPTII,S$GLB,, | Performed by: NURSE PRACTITIONER

## 2024-10-29 PROCEDURE — 99999 PR PBB SHADOW E&M-EST. PATIENT-LVL III: CPT | Mod: PBBFAC,,, | Performed by: NURSE PRACTITIONER

## 2024-10-29 PROCEDURE — 93005 ELECTROCARDIOGRAM TRACING: CPT | Mod: S$GLB,,, | Performed by: ANESTHESIOLOGY

## 2024-10-29 PROCEDURE — 73700 CT LOWER EXTREMITY W/O DYE: CPT | Mod: TC,RT

## 2024-10-29 PROCEDURE — 99499 UNLISTED E&M SERVICE: CPT | Mod: S$GLB,,, | Performed by: STUDENT IN AN ORGANIZED HEALTH CARE EDUCATION/TRAINING PROGRAM

## 2024-10-29 PROCEDURE — 73700 CT LOWER EXTREMITY W/O DYE: CPT | Mod: 26,RT,, | Performed by: RADIOLOGY

## 2024-10-29 PROCEDURE — 3044F HG A1C LEVEL LT 7.0%: CPT | Mod: CPTII,S$GLB,, | Performed by: NURSE PRACTITIONER

## 2024-10-29 PROCEDURE — 73560 X-RAY EXAM OF KNEE 1 OR 2: CPT | Mod: 26,RT,, | Performed by: INTERNAL MEDICINE

## 2024-10-29 PROCEDURE — 99214 OFFICE O/P EST MOD 30 MIN: CPT | Mod: S$GLB,,, | Performed by: NURSE PRACTITIONER

## 2024-10-29 RX ORDER — LANOLIN ALCOHOL/MO/W.PET/CERES
400 CREAM (GRAM) TOPICAL DAILY
COMMUNITY

## 2024-10-30 DIAGNOSIS — M17.11 PRIMARY OSTEOARTHRITIS OF RIGHT KNEE: Primary | ICD-10-CM

## 2024-10-30 LAB
OHS QRS DURATION: 74 MS
OHS QTC CALCULATION: 446 MS

## 2024-10-30 RX ORDER — CELECOXIB 200 MG/1
200 CAPSULE ORAL 2 TIMES DAILY PRN
Qty: 60 CAPSULE | Refills: 0 | Status: SHIPPED | OUTPATIENT
Start: 2024-10-30

## 2024-11-01 ENCOUNTER — CLINICAL SUPPORT (OUTPATIENT)
Dept: REHABILITATION | Facility: OTHER | Age: 74
End: 2024-11-01
Attending: FAMILY MEDICINE
Payer: MEDICARE

## 2024-11-01 DIAGNOSIS — R29.898 RIGHT LEG WEAKNESS: Primary | ICD-10-CM

## 2024-11-01 PROCEDURE — 97161 PT EVAL LOW COMPLEX 20 MIN: CPT | Mod: HCNC,PN

## 2024-11-01 RX ORDER — OXYCODONE HYDROCHLORIDE 5 MG/1
10 TABLET ORAL
OUTPATIENT
Start: 2024-11-01

## 2024-11-01 RX ORDER — SODIUM CHLORIDE 0.9 % (FLUSH) 0.9 %
10 SYRINGE (ML) INJECTION
OUTPATIENT
Start: 2024-11-01

## 2024-11-01 RX ORDER — ONDANSETRON HYDROCHLORIDE 2 MG/ML
4 INJECTION, SOLUTION INTRAVENOUS EVERY 8 HOURS PRN
OUTPATIENT
Start: 2024-11-01

## 2024-11-01 RX ORDER — METHOCARBAMOL 750 MG/1
750 TABLET, FILM COATED ORAL 3 TIMES DAILY
OUTPATIENT
Start: 2024-11-01

## 2024-11-01 RX ORDER — MORPHINE SULFATE 2 MG/ML
2 INJECTION, SOLUTION INTRAMUSCULAR; INTRAVENOUS
OUTPATIENT
Start: 2024-11-01

## 2024-11-01 RX ORDER — BISACODYL 10 MG/1
10 SUPPOSITORY RECTAL EVERY 12 HOURS PRN
OUTPATIENT
Start: 2024-11-01

## 2024-11-01 RX ORDER — MUPIROCIN 20 MG/G
1 OINTMENT TOPICAL 2 TIMES DAILY
OUTPATIENT
Start: 2024-11-01 | End: 2024-11-06

## 2024-11-01 RX ORDER — PREGABALIN 75 MG/1
75 CAPSULE ORAL NIGHTLY
OUTPATIENT
Start: 2024-11-01

## 2024-11-01 RX ORDER — PROCHLORPERAZINE EDISYLATE 5 MG/ML
5 INJECTION INTRAMUSCULAR; INTRAVENOUS EVERY 6 HOURS PRN
OUTPATIENT
Start: 2024-11-01

## 2024-11-01 RX ORDER — TALC
6 POWDER (GRAM) TOPICAL NIGHTLY PRN
OUTPATIENT
Start: 2024-11-01

## 2024-11-01 RX ORDER — MUPIROCIN 20 MG/G
1 OINTMENT TOPICAL
OUTPATIENT
Start: 2024-11-01

## 2024-11-01 RX ORDER — LIDOCAINE HYDROCHLORIDE 10 MG/ML
1 INJECTION, SOLUTION EPIDURAL; INFILTRATION; INTRACAUDAL; PERINEURAL
OUTPATIENT
Start: 2024-11-01

## 2024-11-01 RX ORDER — ASPIRIN 81 MG/1
81 TABLET ORAL 2 TIMES DAILY
OUTPATIENT
Start: 2024-11-01

## 2024-11-01 RX ORDER — SODIUM CHLORIDE 9 MG/ML
INJECTION, SOLUTION INTRAVENOUS
OUTPATIENT
Start: 2024-11-01

## 2024-11-01 RX ORDER — MIDAZOLAM HYDROCHLORIDE 1 MG/ML
4 INJECTION, SOLUTION INTRAMUSCULAR; INTRAVENOUS
OUTPATIENT
Start: 2024-11-01 | End: 2024-11-02

## 2024-11-01 RX ORDER — FENTANYL CITRATE 50 UG/ML
100 INJECTION, SOLUTION INTRAMUSCULAR; INTRAVENOUS
OUTPATIENT
Start: 2024-11-01 | End: 2024-11-02

## 2024-11-01 RX ORDER — NALOXONE HCL 0.4 MG/ML
0.02 VIAL (ML) INJECTION
OUTPATIENT
Start: 2024-11-01

## 2024-11-01 RX ORDER — ACETAMINOPHEN 500 MG
1000 TABLET ORAL
OUTPATIENT
Start: 2024-11-01

## 2024-11-01 RX ORDER — POLYETHYLENE GLYCOL 3350 17 G/17G
17 POWDER, FOR SOLUTION ORAL DAILY
OUTPATIENT
Start: 2024-11-02

## 2024-11-01 RX ORDER — FENTANYL CITRATE 50 UG/ML
25 INJECTION, SOLUTION INTRAMUSCULAR; INTRAVENOUS EVERY 5 MIN PRN
OUTPATIENT
Start: 2024-11-01

## 2024-11-01 RX ORDER — SODIUM CHLORIDE 9 MG/ML
INJECTION, SOLUTION INTRAVENOUS CONTINUOUS
OUTPATIENT
Start: 2024-11-01 | End: 2024-11-02

## 2024-11-01 RX ORDER — AMOXICILLIN 250 MG
1 CAPSULE ORAL 2 TIMES DAILY
OUTPATIENT
Start: 2024-11-01

## 2024-11-01 RX ORDER — CELECOXIB 200 MG/1
200 CAPSULE ORAL DAILY
OUTPATIENT
Start: 2024-11-02

## 2024-11-01 RX ORDER — PREGABALIN 75 MG/1
75 CAPSULE ORAL
OUTPATIENT
Start: 2024-11-01

## 2024-11-01 RX ORDER — OXYCODONE HYDROCHLORIDE 5 MG/1
5 TABLET ORAL
OUTPATIENT
Start: 2024-11-01

## 2024-11-01 RX ORDER — ACETAMINOPHEN 500 MG
1000 TABLET ORAL EVERY 6 HOURS
OUTPATIENT
Start: 2024-11-01

## 2024-11-01 RX ORDER — CELECOXIB 200 MG/1
400 CAPSULE ORAL ONCE
OUTPATIENT
Start: 2024-11-01 | End: 2024-11-01

## 2024-11-01 RX ORDER — FAMOTIDINE 20 MG/1
20 TABLET, FILM COATED ORAL 2 TIMES DAILY
OUTPATIENT
Start: 2024-11-01

## 2024-11-01 NOTE — PROGRESS NOTES
[facility]  Physical Therapy Initial Evaluation    Name: Dia Ovalles  Clinic Number: [id number]    Therapy Diagnosis:   Encounter Diagnosis   Name Primary?    Right leg weakness Yes     Physician: Keagan Oneal MD    Physician Orders: Physical Therapy Evaluate and Treat  Medical Diagnosis from Referral: right knee OA  Evaluation Date: 24  Authorization Period Expiration: 10/4/25  Plan of Care Expiration: 2024 to 25  Visit # / Visits authorized:  (pending additional authorization following initial evaluation)   FOTO: 0/3  on 24      Time In: 100p  Time Out: 135p  Total Billable Time: 35 minutes    Precautions: standard    Subjective     History of current condition - Dia reports: right TKA 24    Driving, prolonged sitting cause discomfort    Currently walking 3 mi/day and there is pain after the walk     Medical History:   Past Medical History:   Diagnosis Date    Breast cyst     Cancer     SCC x 2 on arms    Cataract     Closed Ortiz's fracture of right radius 2017    Decreased ROM of right shoulder 2018    Decreased strength of upper extremity 2018    Deep vein thrombosis     GERD (gastroesophageal reflux disease)     History of cardiovascular stress test 10/29/2024    Joint pain     Lesion of left upper eyelid 2023    Nuclear sclerosis, bilateral 2021    OA (osteoarthritis) of shoulder 2019    OAB (overactive bladder) 2017    Osteoporosis 2017    Other chest pain 2022    Squamous cell carcinoma of skin 2012    right arm       Surgical History:   Dia Ovalles  has a past surgical history that includes Skin cancer excision; ASD repair;  section; Knee surgery (Right); Breast biopsy; Cataract extraction w/  intraocular lens implant (Right, 2021); Cataract extraction w/  intraocular lens implant (Left, 2021); Eye surgery; and Skin biopsy ().    Medications:   Dia has a  current medication list which includes the following prescription(s): biotin, calcium carbonate, celecoxib, cholecalciferol (vitamin d3), clobetasol, duloxetine, magnesium oxide, multivitamin, oxybutynin, raloxifene, and UNKNOWN TO PATIENT.    Allergies:   Review of patient's allergies indicates:   Allergen Reactions    Codeine Nausea Only        Imaging: FINDINGS:  The right knee demonstrates significant narrowing in the lateral compartment.  There is tricompartmental osteophytic spurring.  No evidence of fracture.  Small ossific densities again seen in the posterior soft tissues.  Joint effusion.    Prior Therapy: yes  Social History: 73 yo female lives alone  Occupation: retired  Prior Level of Function: walking 4 miles per day, yoga  Current Level of Function: limited with recreational activity    Pain:  Current 4/10, worst 6/10, best 3/10   Location: right knee  Description: Aching  Aggravating Factors: Walking  Easing Factors: ice and rest    Pts goals: Pt would like to return to recreational activity with no increase in knee pain.    Objective     WNL=within normal limits  WFL=within functional limits  NT=not tested  *=pain    Posture: WNL  Palpation: tenderness to palpation at lateral joint line  Sensation: intact  Deep tendon reflexes: NT      Range of Motion:   Knee Left active Left Passive Right Active R passive   Flexion 130 135 125 128   Extension 8 10 4 6           Lower Extremity Strength  Right LE   Left LE      Iliopsoas:  L2 4/5 Iliopsoas: L2 4/5    Quadriceps:  L3 - femoral nerve 3+/5* Quadriceps: L3 - femoral nerve 4/5    Hip adduction:  L3 - obterator 4/5 Hip adduction: L3 - obterator 4/5    Hamstrings:  S2 3+/5 Hamstrings: S2 4/5    Ankle DF/EV:  L4 NT Ankle DF/EV: L4 NT    GT Ext:  L5 NT GT Ext L5 NT    Hip Abduction:  L5-S1 - Superior gluteal nerve 3+/5 Hip Abduction: L5-S1 - Superior gluteal nerve 4/5    Hip extension:  L5-S2 - Inferior gluteal nerve 3+/5 Hip extension: L5-S2 - Inferior  gluteal nerve 4/5    Ankle PF:   S1 - tibial nerve NT Ankle PF S1 - tibial nerve NT               CMS Impairment/Limitation/Restriction for FOTO Survey    Therapist reviewed FOTO scores for Dia Ovalles on 11/1/2024.   FOTO documents entered into [employer] [facility] - see Media section.    Limitation Score: 66%  Predicted Goal: 44%    Category: Mobility     TREATMENT     Home exercise program prescribed    SLR, heel slides (supine and seated), LAQ, STS        Home Exercises and Patient Education Provided:    Education provided:   - Findings; prognosis and plan of care (POC)  - Home exercise program (HEP)  - Modality options  - Therapist contact information    Written Home Exercises Provided: Yes  Exercises were reviewed and Dia was able to demonstrate them prior to the end of the session.  Dia demonstrated good understanding of the education provided.       Assessment     Dia is a 74 y.o. female referred to outpatient Physical Therapy with a medical diagnosis of right knee OA. Pt presents to PT with pain, decreased knee ROM, decreased strength and flexibility, poor posture, and functional deficits with recreational activity. These deficits are negatively impacting this patient's ability to complete their work duties and activities of daily living.     Pt prognosis is Good.   Pt will benefit from skilled outpatient Physical Therapy to address the deficits stated above and in the chart below, provide pt/family education, and to maximize pt's level of independence.     Plan of care discussed with patient: Yes  Pt's spiritual, cultural and educational needs considered and pt agreeable to plan of care and goals as stated below:     Anticipated Barriers for therapy: None      Medical Necessity is demonstrated by the following  History  Co-morbidities and personal factors that may impact the plan of care Co-morbidities:   advanced age    Personal Factors:   age     low   Examination  Body Structures and Functions, activity  limitations and participation restrictions that may impact the plan of care Body Regions:   lower extremities    Body Systems:    ROM  strength  balance  gait  motor control    Participation Restrictions:   Walking    Activity limitations:   Learning and applying knowledge  no deficits    General Tasks and Commands  No Deficits    Communication  No Deficits    Mobility  walking    Self care  toileting  dressing    Domestic Life  No Deficits    Interactions/Relationships  No Deficits    Life Areas  No Deficits    Community and Social Life  No Deficits         low   Clinical Presentation stable and uncomplicated low   Decision Making/ Complexity Score: low     GOALS:    No goals. Eval only. We will set post-op goals after surgery      Plan     Eval only    Clifford George, PT,  DPT, OCS

## 2024-11-01 NOTE — H&P (VIEW-ONLY)
CC:  Right knee pain    Dia Ovalles is a 74 y.o. female with history of Right knee pain. Pain is worse with activity and weight bearing.  Patient has experienced interference of activities of daily living due to decreased range of motion and an increase in joint pain and swelling.  Patient has failed non-operative treatment including NSAIDs, corticosteroid injections, viscosupplement injections, and activity modification.  Dia Ovalles currently ambulates independently.     Relevant medical conditions of significance in perioperative period:  Urge incontinence- on medication managed by gyn  H/o right knee meniscus surgery      Past Medical History:   Diagnosis Date    Breast cyst     Cancer     SCC x 2 on arms    Cataract     Closed Ortiz's fracture of right radius 2017    Decreased ROM of right shoulder 2018    Decreased strength of upper extremity 2018    Deep vein thrombosis     GERD (gastroesophageal reflux disease)     History of cardiovascular stress test 10/29/2024    Joint pain     Lesion of left upper eyelid 2023    Nuclear sclerosis, bilateral 2021    OA (osteoarthritis) of shoulder 2019    OAB (overactive bladder) 2017    Osteoporosis 2017    Other chest pain 2022    Squamous cell carcinoma of skin 2012    right arm       Past Surgical History:   Procedure Laterality Date    ASD REPAIR      open heart surgery    BREAST BIOPSY      CATARACT EXTRACTION W/  INTRAOCULAR LENS IMPLANT Right 2021    Procedure: EXTRACTION, CATARACT, WITH IOL INSERTION;  Surgeon: Bruna Silva MD;  Location: [facility];  Service: Ophthalmology;  Laterality: Right;    CATARACT EXTRACTION W/  INTRAOCULAR LENS IMPLANT Left 2021    Procedure: EXTRACTION, CATARACT, WITH IOL INSERTION;  Surgeon: Bruna Silva MD;  Location: [facility] OR;  Service: Ophthalmology;  Laterality: Left;     SECTION      x2    EYE SURGERY      KNEE SURGERY Right     meniscal repair    SKIN  BIOPSY  2012    SKIN CANCER EXCISION         Family History   Problem Relation Name Age of Onset    Hypertension Mother      Asthma Mother      Osteoporosis Mother      Hypertension Father      Cancer Father          lung cancer    Heart disease Maternal Grandfather      Stroke Maternal Grandfather      Breast cancer Neg Hx      Colon cancer Neg Hx      Ovarian cancer Neg Hx         Review of patient's allergies indicates:   Allergen Reactions    Codeine Nausea Only         Current Outpatient Medications:     biotin 1 mg tablet, Take 5,000 mcg by mouth once daily., Disp: , Rfl:     calcium carbonate (CALCIUM 600 ORAL), Take 1,200 mg by mouth once daily. , Disp: , Rfl:     celecoxib (CELEBREX) 200 MG capsule, Take 1 capsule (200 mg total) by mouth 2 (two) times daily as needed for Pain (knee pain)., Disp: 60 capsule, Rfl: 0    cholecalciferol, vitamin D3, (VITAMIN D3) 50 mcg (2,000 unit) Cap, Take 1,000 Units by mouth every other day. , Disp: , Rfl:     clobetasoL (TEMOVATE) 0.05 % external solution, Use on scalp one - two times daily as needed for scaling or itching (Patient not taking: Reported on 10/29/2024), Disp: 50 mL, Rfl: 3    DULoxetine (CYMBALTA) 20 MG capsule, Take 2 capsules (40 mg total) by mouth once daily., Disp: 180 capsule, Rfl: 1    magnesium oxide (MAG-OX) 400 mg (241.3 mg magnesium) tablet, Take 400 mg by mouth once daily., Disp: , Rfl:     multivitamin (THERAGRAN) per tablet, Take 1 tablet by mouth once daily., Disp: , Rfl:     oxybutynin (DITROPAN-XL) 10 MG 24 hr tablet, TAKE 1 TABLET EVERY DAY, Disp: 90 tablet, Rfl: 0    raloxifene (EVISTA) 60 mg tablet, Take 1 tablet (60 mg total) by mouth once daily., Disp: 90 tablet, Rfl: 0    UNKNOWN TO PATIENT, Nutrafol- hair growth supplement, Disp: , Rfl:     Review of Systems:  Constitutional: no fever or chills  Eyes: no visual changes  ENT: no nasal congestion or sore throat  Respiratory: no cough or shortness of breath  Cardiovascular: no chest pain  or palpitations  Gastrointestinal: no nausea or vomiting, tolerating diet  Genitourinary: no hematuria or dysuria  Integument/Breast: no rash or pruritis  Hematologic/Lymphatic: no easy bruising or lymphadenopathy  Musculoskeletal: positive for knee pain  Neurological: no seizures or tremors  Behavioral/Psych: no auditory or visual hallucinations  Endocrine: no heat or cold intolerance    PE:  LMP  (LMP Unknown)   General: Pleasant, cooperative, NAD   Gait: antalgic  HEENT: NCAT, sclera nonicteric   Lungs: Respirations clear bilaterally; equal and unlabored.   CV: S1S2; 2+ bilateral upper and lower extremity pulses.   Skin: Intact throughout with no rashes, erythema, or lesions  Extremities: No LE edema,  no erythema or warmth of the skin in either lower extremity.    Right knee exam:  Knee Range of Motion:normal, pain with passive range of motion  Effusion:none  Condition of skin:intact  Location of tenderness:lateral joint line   Strength:normal  Stability: stable to testing    Hip Examination: painless PROM of hip     Radiographs: Radiographs reveal advanced degenerative changes including subchondral cyst formation, subchondral sclerosis, osteophyte formation, joint space narrowing.     Knee Alignment: normal    Diagnosis: Primary osteoarthritis Right knee    Plan: Right total knee arthroplasty    Due to the serious nature of total joint infection and the high prevalence of community acquired MRSA, vancomycin will be used perioperatively.

## 2024-11-07 ENCOUNTER — ANESTHESIA EVENT (OUTPATIENT)
Dept: SURGERY | Facility: HOSPITAL | Age: 74
End: 2024-11-07
Payer: MEDICARE

## 2024-11-08 ENCOUNTER — TELEPHONE (OUTPATIENT)
Dept: ORTHOPEDICS | Facility: CLINIC | Age: 74
End: 2024-11-08
Payer: MEDICARE

## 2024-11-08 NOTE — TELEPHONE ENCOUNTER
I called the patient today regarding surgery on 11/11/2024 with Dr. Oneal. I informed the patient that her surgery will be at  [facility] ([facility] [place] [address]). I informed the patient they must arrive at 5:00am and their surgery will start at 7:00am.     I reminded the patient that they cannot eat or drink after midnight, the night before surgery.     I reminded the patient to be careful of their skin over the next few days to make sure they do not get any cuts, scratches or scrapes.    The patient verbalized understanding and has no further questions.

## 2024-11-11 ENCOUNTER — ANESTHESIA (OUTPATIENT)
Dept: SURGERY | Facility: HOSPITAL | Age: 74
End: 2024-11-11
Payer: MEDICARE

## 2024-11-11 ENCOUNTER — HOSPITAL ENCOUNTER (OUTPATIENT)
Facility: HOSPITAL | Age: 74
Discharge: HOME | End: 2024-11-11
Attending: STUDENT IN AN ORGANIZED HEALTH CARE EDUCATION/TRAINING PROGRAM | Admitting: STUDENT IN AN ORGANIZED HEALTH CARE EDUCATION/TRAINING PROGRAM
Payer: MEDICARE

## 2024-11-11 VITALS
DIASTOLIC BLOOD PRESSURE: 55 MMHG | RESPIRATION RATE: 16 BRPM | WEIGHT: 137 LBS | BODY MASS INDEX: 20.76 KG/M2 | HEART RATE: 80 BPM | SYSTOLIC BLOOD PRESSURE: 107 MMHG | TEMPERATURE: 98 F | HEIGHT: 68 IN | OXYGEN SATURATION: 97 %

## 2024-11-11 DIAGNOSIS — Z96.651 S/P RIGHT UNICOMPARTMENTAL KNEE REPLACEMENT: Primary | ICD-10-CM

## 2024-11-11 DIAGNOSIS — M17.11 PRIMARY OSTEOARTHRITIS OF RIGHT KNEE: ICD-10-CM

## 2024-11-11 PROCEDURE — 63600175 PHARM REV CODE 636 W HCPCS: Performed by: ANESTHESIOLOGY

## 2024-11-11 PROCEDURE — C1776 JOINT DEVICE (IMPLANTABLE): HCPCS | Performed by: STUDENT IN AN ORGANIZED HEALTH CARE EDUCATION/TRAINING PROGRAM

## 2024-11-11 PROCEDURE — 37000008 HC ANESTHESIA 1ST 15 MINUTES: Performed by: STUDENT IN AN ORGANIZED HEALTH CARE EDUCATION/TRAINING PROGRAM

## 2024-11-11 PROCEDURE — 25000003 PHARM REV CODE 250: Performed by: NURSE ANESTHETIST, CERTIFIED REGISTERED

## 2024-11-11 PROCEDURE — 63600175 PHARM REV CODE 636 W HCPCS: Performed by: NURSE PRACTITIONER

## 2024-11-11 PROCEDURE — C1751 CATH, INF, PER/CENT/MIDLINE: HCPCS | Performed by: ANESTHESIOLOGY

## 2024-11-11 PROCEDURE — 0055T BONE SRGRY CMPTR CT/MRI IMAG: CPT | Mod: HCNC,,, | Performed by: STUDENT IN AN ORGANIZED HEALTH CARE EDUCATION/TRAINING PROGRAM

## 2024-11-11 PROCEDURE — 94761 N-INVAS EAR/PLS OXIMETRY MLT: CPT

## 2024-11-11 PROCEDURE — 36000712 HC OR TIME LEV V 1ST 15 MIN: Performed by: STUDENT IN AN ORGANIZED HEALTH CARE EDUCATION/TRAINING PROGRAM

## 2024-11-11 PROCEDURE — C1713 ANCHOR/SCREW BN/BN,TIS/BN: HCPCS | Performed by: STUDENT IN AN ORGANIZED HEALTH CARE EDUCATION/TRAINING PROGRAM

## 2024-11-11 PROCEDURE — 71000016 HC POSTOP RECOV ADDL HR: Performed by: STUDENT IN AN ORGANIZED HEALTH CARE EDUCATION/TRAINING PROGRAM

## 2024-11-11 PROCEDURE — 25000003 PHARM REV CODE 250: Performed by: STUDENT IN AN ORGANIZED HEALTH CARE EDUCATION/TRAINING PROGRAM

## 2024-11-11 PROCEDURE — 37000009 HC ANESTHESIA EA ADD 15 MINS: Performed by: STUDENT IN AN ORGANIZED HEALTH CARE EDUCATION/TRAINING PROGRAM

## 2024-11-11 PROCEDURE — 99900035 HC TECH TIME PER 15 MIN (STAT)

## 2024-11-11 PROCEDURE — 97530 THERAPEUTIC ACTIVITIES: CPT

## 2024-11-11 PROCEDURE — 25000003 PHARM REV CODE 250: Performed by: NURSE PRACTITIONER

## 2024-11-11 PROCEDURE — 25000003 PHARM REV CODE 250: Performed by: ANESTHESIOLOGY

## 2024-11-11 PROCEDURE — 63600175 PHARM REV CODE 636 W HCPCS: Performed by: NURSE ANESTHETIST, CERTIFIED REGISTERED

## 2024-11-11 PROCEDURE — 97535 SELF CARE MNGMENT TRAINING: CPT

## 2024-11-11 PROCEDURE — 97116 GAIT TRAINING THERAPY: CPT

## 2024-11-11 PROCEDURE — 71000033 HC RECOVERY, INTIAL HOUR: Performed by: STUDENT IN AN ORGANIZED HEALTH CARE EDUCATION/TRAINING PROGRAM

## 2024-11-11 PROCEDURE — 27201423 OPTIME MED/SURG SUP & DEVICES STERILE SUPPLY: Performed by: STUDENT IN AN ORGANIZED HEALTH CARE EDUCATION/TRAINING PROGRAM

## 2024-11-11 PROCEDURE — 36000713 HC OR TIME LEV V EA ADD 15 MIN: Performed by: STUDENT IN AN ORGANIZED HEALTH CARE EDUCATION/TRAINING PROGRAM

## 2024-11-11 PROCEDURE — 97165 OT EVAL LOW COMPLEX 30 MIN: CPT

## 2024-11-11 PROCEDURE — 97161 PT EVAL LOW COMPLEX 20 MIN: CPT

## 2024-11-11 PROCEDURE — 64448 NJX AA&/STRD FEM NRV NFS IMG: CPT | Performed by: ANESTHESIOLOGY

## 2024-11-11 PROCEDURE — 27447 TOTAL KNEE ARTHROPLASTY: CPT | Mod: HCNC,RT,, | Performed by: STUDENT IN AN ORGANIZED HEALTH CARE EDUCATION/TRAINING PROGRAM

## 2024-11-11 PROCEDURE — 63600175 PHARM REV CODE 636 W HCPCS: Performed by: PHYSICIAN ASSISTANT

## 2024-11-11 PROCEDURE — 71000015 HC POSTOP RECOV 1ST HR: Performed by: STUDENT IN AN ORGANIZED HEALTH CARE EDUCATION/TRAINING PROGRAM

## 2024-11-11 PROCEDURE — 71000039 HC RECOVERY, EACH ADD'L HOUR: Performed by: STUDENT IN AN ORGANIZED HEALTH CARE EDUCATION/TRAINING PROGRAM

## 2024-11-11 DEVICE — CRUCIATE RETAINING FEMORAL
Type: IMPLANTABLE DEVICE | Site: KNEE | Status: FUNCTIONAL
Brand: TRIATHLON

## 2024-11-11 DEVICE — TIBIAL BEARING INSERT
Type: IMPLANTABLE DEVICE | Site: KNEE | Status: FUNCTIONAL
Brand: TRIATHLON

## 2024-11-11 DEVICE — UNIVERSAL TIBIAL BASEPLATE
Type: IMPLANTABLE DEVICE | Site: KNEE | Status: FUNCTIONAL
Brand: TRIATHLON

## 2024-11-11 DEVICE — CEMENT BONE SIMPLEX HV RADPQ: Type: IMPLANTABLE DEVICE | Site: KNEE | Status: FUNCTIONAL

## 2024-11-11 RX ORDER — OXYCODONE HYDROCHLORIDE 5 MG/1
10 TABLET ORAL
Status: DISCONTINUED | OUTPATIENT
Start: 2024-11-11 | End: 2024-11-11 | Stop reason: HOSPADM

## 2024-11-11 RX ORDER — DEXAMETHASONE SODIUM PHOSPHATE 4 MG/ML
INJECTION, SOLUTION INTRA-ARTICULAR; INTRALESIONAL; INTRAMUSCULAR; INTRAVENOUS; SOFT TISSUE
Status: DISCONTINUED | OUTPATIENT
Start: 2024-11-11 | End: 2024-11-11

## 2024-11-11 RX ORDER — DEXTROMETHORPHAN HYDROBROMIDE, GUAIFENESIN 5; 100 MG/5ML; MG/5ML
650 LIQUID ORAL EVERY 8 HOURS
Qty: 120 TABLET | Refills: 0 | Status: SHIPPED | OUTPATIENT
Start: 2024-11-11

## 2024-11-11 RX ORDER — SODIUM CHLORIDE 0.9 % (FLUSH) 0.9 %
10 SYRINGE (ML) INJECTION
Status: DISCONTINUED | OUTPATIENT
Start: 2024-11-11 | End: 2024-11-11 | Stop reason: HOSPADM

## 2024-11-11 RX ORDER — METHOCARBAMOL 750 MG/1
750 TABLET, FILM COATED ORAL 3 TIMES DAILY
Status: DISCONTINUED | OUTPATIENT
Start: 2024-11-11 | End: 2024-11-11 | Stop reason: HOSPADM

## 2024-11-11 RX ORDER — LIDOCAINE HYDROCHLORIDE 10 MG/ML
1 INJECTION, SOLUTION EPIDURAL; INFILTRATION; INTRACAUDAL; PERINEURAL
Status: DISCONTINUED | OUTPATIENT
Start: 2024-11-11 | End: 2024-11-11 | Stop reason: HOSPADM

## 2024-11-11 RX ORDER — ONDANSETRON HYDROCHLORIDE 2 MG/ML
4 INJECTION, SOLUTION INTRAVENOUS EVERY 8 HOURS PRN
Status: DISCONTINUED | OUTPATIENT
Start: 2024-11-11 | End: 2024-11-11 | Stop reason: HOSPADM

## 2024-11-11 RX ORDER — CEFADROXIL 500 MG/1
500 CAPSULE ORAL EVERY 12 HOURS
Qty: 14 CAPSULE | Refills: 0 | Status: SHIPPED | OUTPATIENT
Start: 2024-11-11 | End: 2024-11-18

## 2024-11-11 RX ORDER — LIDOCAINE HYDROCHLORIDE AND EPINEPHRINE 15; 5 MG/ML; UG/ML
INJECTION, SOLUTION EPIDURAL
Status: DISCONTINUED | OUTPATIENT
Start: 2024-11-11 | End: 2024-11-11

## 2024-11-11 RX ORDER — MUPIROCIN 20 MG/G
1 OINTMENT TOPICAL
Status: COMPLETED | OUTPATIENT
Start: 2024-11-11 | End: 2024-11-11

## 2024-11-11 RX ORDER — FAMOTIDINE 20 MG/1
20 TABLET, FILM COATED ORAL 2 TIMES DAILY
Status: DISCONTINUED | OUTPATIENT
Start: 2024-11-11 | End: 2024-11-11 | Stop reason: HOSPADM

## 2024-11-11 RX ORDER — SODIUM CHLORIDE 9 MG/ML
INJECTION, SOLUTION INTRAVENOUS CONTINUOUS
Status: DISCONTINUED | OUTPATIENT
Start: 2024-11-11 | End: 2024-11-11 | Stop reason: HOSPADM

## 2024-11-11 RX ORDER — ACETAMINOPHEN 500 MG
1000 TABLET ORAL
Status: COMPLETED | OUTPATIENT
Start: 2024-11-11 | End: 2024-11-11

## 2024-11-11 RX ORDER — CELECOXIB 200 MG/1
200 CAPSULE ORAL DAILY
Status: DISCONTINUED | OUTPATIENT
Start: 2024-11-11 | End: 2024-11-11 | Stop reason: HOSPADM

## 2024-11-11 RX ORDER — PANTOPRAZOLE SODIUM 40 MG/1
40 TABLET, DELAYED RELEASE ORAL DAILY
Qty: 30 TABLET | Refills: 0 | Status: SHIPPED | OUTPATIENT
Start: 2024-11-11

## 2024-11-11 RX ORDER — POLYETHYLENE GLYCOL 3350 17 G/17G
17 POWDER, FOR SOLUTION ORAL DAILY
Status: DISCONTINUED | OUTPATIENT
Start: 2024-11-11 | End: 2024-11-11 | Stop reason: HOSPADM

## 2024-11-11 RX ORDER — CEFAZOLIN 2 G/1
2 INJECTION, POWDER, FOR SOLUTION INTRAMUSCULAR; INTRAVENOUS
Status: COMPLETED | OUTPATIENT
Start: 2024-11-11 | End: 2024-11-11

## 2024-11-11 RX ORDER — MUPIROCIN 20 MG/G
1 OINTMENT TOPICAL 2 TIMES DAILY
Status: DISCONTINUED | OUTPATIENT
Start: 2024-11-11 | End: 2024-11-11 | Stop reason: HOSPADM

## 2024-11-11 RX ORDER — LIDOCAINE HYDROCHLORIDE 20 MG/ML
INJECTION INTRAVENOUS
Status: DISCONTINUED | OUTPATIENT
Start: 2024-11-11 | End: 2024-11-11

## 2024-11-11 RX ORDER — FENTANYL CITRATE 50 UG/ML
100 INJECTION, SOLUTION INTRAMUSCULAR; INTRAVENOUS
Status: DISCONTINUED | OUTPATIENT
Start: 2024-11-11 | End: 2024-11-11 | Stop reason: HOSPADM

## 2024-11-11 RX ORDER — FAMOTIDINE 10 MG/ML
INJECTION INTRAVENOUS
Status: DISCONTINUED | OUTPATIENT
Start: 2024-11-11 | End: 2024-11-11

## 2024-11-11 RX ORDER — ACETAMINOPHEN 500 MG
1000 TABLET ORAL EVERY 6 HOURS
Status: DISCONTINUED | OUTPATIENT
Start: 2024-11-11 | End: 2024-11-11 | Stop reason: HOSPADM

## 2024-11-11 RX ORDER — CELECOXIB 200 MG/1
400 CAPSULE ORAL ONCE
Status: COMPLETED | OUTPATIENT
Start: 2024-11-11 | End: 2024-11-11

## 2024-11-11 RX ORDER — FENTANYL CITRATE 50 UG/ML
INJECTION, SOLUTION INTRAMUSCULAR; INTRAVENOUS
Status: DISCONTINUED | OUTPATIENT
Start: 2024-11-11 | End: 2024-11-11

## 2024-11-11 RX ORDER — ROPIVACAINE HYDROCHLORIDE 2 MG/ML
INJECTION, SOLUTION EPIDURAL; INFILTRATION; PERINEURAL CONTINUOUS
Status: DISCONTINUED | OUTPATIENT
Start: 2024-11-11 | End: 2024-11-11 | Stop reason: HOSPADM

## 2024-11-11 RX ORDER — AMOXICILLIN 250 MG
1 CAPSULE ORAL 2 TIMES DAILY
Status: DISCONTINUED | OUTPATIENT
Start: 2024-11-11 | End: 2024-11-11 | Stop reason: HOSPADM

## 2024-11-11 RX ORDER — PROPOFOL 10 MG/ML
VIAL (ML) INTRAVENOUS CONTINUOUS PRN
Status: DISCONTINUED | OUTPATIENT
Start: 2024-11-11 | End: 2024-11-11

## 2024-11-11 RX ORDER — METHOCARBAMOL 750 MG/1
750 TABLET, FILM COATED ORAL 4 TIMES DAILY
Status: DISCONTINUED | OUTPATIENT
Start: 2024-11-11 | End: 2024-11-11 | Stop reason: HOSPADM

## 2024-11-11 RX ORDER — NALOXONE HCL 0.4 MG/ML
0.02 VIAL (ML) INJECTION
Status: DISCONTINUED | OUTPATIENT
Start: 2024-11-11 | End: 2024-11-11 | Stop reason: HOSPADM

## 2024-11-11 RX ORDER — CEFAZOLIN 2 G/1
2 INJECTION, POWDER, FOR SOLUTION INTRAMUSCULAR; INTRAVENOUS
Status: DISCONTINUED | OUTPATIENT
Start: 2024-11-11 | End: 2024-11-11 | Stop reason: HOSPADM

## 2024-11-11 RX ORDER — PROCHLORPERAZINE EDISYLATE 5 MG/ML
5 INJECTION INTRAMUSCULAR; INTRAVENOUS EVERY 6 HOURS PRN
Status: DISCONTINUED | OUTPATIENT
Start: 2024-11-11 | End: 2024-11-11 | Stop reason: HOSPADM

## 2024-11-11 RX ORDER — MIDAZOLAM HYDROCHLORIDE 1 MG/ML
INJECTION INTRAMUSCULAR; INTRAVENOUS
Status: DISCONTINUED | OUTPATIENT
Start: 2024-11-11 | End: 2024-11-11

## 2024-11-11 RX ORDER — ONDANSETRON HYDROCHLORIDE 2 MG/ML
INJECTION, SOLUTION INTRAVENOUS
Status: DISCONTINUED | OUTPATIENT
Start: 2024-11-11 | End: 2024-11-11

## 2024-11-11 RX ORDER — OXYCODONE HYDROCHLORIDE 5 MG/1
TABLET ORAL
Qty: 50 TABLET | Refills: 0 | Status: SHIPPED | OUTPATIENT
Start: 2024-11-11

## 2024-11-11 RX ORDER — BISACODYL 10 MG/1
10 SUPPOSITORY RECTAL EVERY 12 HOURS PRN
Status: DISCONTINUED | OUTPATIENT
Start: 2024-11-11 | End: 2024-11-11 | Stop reason: HOSPADM

## 2024-11-11 RX ORDER — TALC
6 POWDER (GRAM) TOPICAL NIGHTLY PRN
Status: DISCONTINUED | OUTPATIENT
Start: 2024-11-11 | End: 2024-11-11 | Stop reason: HOSPADM

## 2024-11-11 RX ORDER — TRANEXAMIC ACID 100 MG/ML
INJECTION, SOLUTION INTRAVENOUS
Status: DISCONTINUED | OUTPATIENT
Start: 2024-11-11 | End: 2024-11-11 | Stop reason: HOSPADM

## 2024-11-11 RX ORDER — SODIUM CHLORIDE 9 MG/ML
INJECTION, SOLUTION INTRAVENOUS
Status: COMPLETED | OUTPATIENT
Start: 2024-11-11 | End: 2024-11-11

## 2024-11-11 RX ORDER — FENTANYL CITRATE 50 UG/ML
25 INJECTION, SOLUTION INTRAMUSCULAR; INTRAVENOUS EVERY 5 MIN PRN
Status: DISCONTINUED | OUTPATIENT
Start: 2024-11-11 | End: 2024-11-11 | Stop reason: HOSPADM

## 2024-11-11 RX ORDER — MORPHINE SULFATE 2 MG/ML
2 INJECTION, SOLUTION INTRAMUSCULAR; INTRAVENOUS
Status: DISCONTINUED | OUTPATIENT
Start: 2024-11-11 | End: 2024-11-11 | Stop reason: HOSPADM

## 2024-11-11 RX ORDER — HALOPERIDOL 5 MG/ML
0.5 INJECTION INTRAMUSCULAR EVERY 10 MIN PRN
Status: DISCONTINUED | OUTPATIENT
Start: 2024-11-11 | End: 2024-11-11 | Stop reason: HOSPADM

## 2024-11-11 RX ORDER — CARBOXYMETHYLCELLULOSE SODIUM 10 MG/ML
GEL OPHTHALMIC
Status: DISCONTINUED | OUTPATIENT
Start: 2024-11-11 | End: 2024-11-11

## 2024-11-11 RX ORDER — ROPIVACAINE HYDROCHLORIDE 5 MG/ML
INJECTION, SOLUTION EPIDURAL; INFILTRATION; PERINEURAL
Status: COMPLETED | OUTPATIENT
Start: 2024-11-11 | End: 2024-11-11

## 2024-11-11 RX ORDER — OXYCODONE HYDROCHLORIDE 5 MG/1
5 TABLET ORAL
Status: DISCONTINUED | OUTPATIENT
Start: 2024-11-11 | End: 2024-11-11 | Stop reason: HOSPADM

## 2024-11-11 RX ORDER — METHOCARBAMOL 750 MG/1
750 TABLET, FILM COATED ORAL 4 TIMES DAILY PRN
Qty: 40 TABLET | Refills: 0 | Status: SHIPPED | OUTPATIENT
Start: 2024-11-11

## 2024-11-11 RX ORDER — PREGABALIN 75 MG/1
75 CAPSULE ORAL
Status: COMPLETED | OUTPATIENT
Start: 2024-11-11 | End: 2024-11-11

## 2024-11-11 RX ORDER — PREGABALIN 75 MG/1
75 CAPSULE ORAL NIGHTLY
Status: DISCONTINUED | OUTPATIENT
Start: 2024-11-11 | End: 2024-11-11 | Stop reason: HOSPADM

## 2024-11-11 RX ORDER — AMOXICILLIN 250 MG
1 CAPSULE ORAL DAILY
Qty: 30 TABLET | Refills: 0 | Status: SHIPPED | OUTPATIENT
Start: 2024-11-11

## 2024-11-11 RX ORDER — ONDANSETRON HYDROCHLORIDE 2 MG/ML
4 INJECTION, SOLUTION INTRAVENOUS DAILY PRN
Status: DISCONTINUED | OUTPATIENT
Start: 2024-11-11 | End: 2024-11-11 | Stop reason: HOSPADM

## 2024-11-11 RX ORDER — ASPIRIN 81 MG/1
81 TABLET ORAL 2 TIMES DAILY
Qty: 60 TABLET | Refills: 0 | Status: SHIPPED | OUTPATIENT
Start: 2024-11-11 | End: 2024-12-11

## 2024-11-11 RX ORDER — KETAMINE HCL IN 0.9 % NACL 50 MG/5 ML
SYRINGE (ML) INTRAVENOUS
Status: DISCONTINUED | OUTPATIENT
Start: 2024-11-11 | End: 2024-11-11

## 2024-11-11 RX ORDER — ROPIVACAINE/EPI/CLONIDINE/KET 2.46-0.005
SYRINGE (ML) INJECTION
Status: DISCONTINUED | OUTPATIENT
Start: 2024-11-11 | End: 2024-11-11 | Stop reason: HOSPADM

## 2024-11-11 RX ORDER — CELECOXIB 200 MG/1
200 CAPSULE ORAL DAILY
Qty: 30 CAPSULE | Refills: 0 | Status: SHIPPED | OUTPATIENT
Start: 2024-11-11

## 2024-11-11 RX ORDER — MIDAZOLAM HYDROCHLORIDE 1 MG/ML
4 INJECTION, SOLUTION INTRAMUSCULAR; INTRAVENOUS
Status: DISCONTINUED | OUTPATIENT
Start: 2024-11-11 | End: 2024-11-11 | Stop reason: HOSPADM

## 2024-11-11 RX ORDER — ASPIRIN 81 MG/1
81 TABLET ORAL 2 TIMES DAILY
Status: DISCONTINUED | OUTPATIENT
Start: 2024-11-11 | End: 2024-11-11 | Stop reason: HOSPADM

## 2024-11-11 RX ADMIN — Medication: at 10:11

## 2024-11-11 RX ADMIN — FENTANYL CITRATE 50 MCG: 50 INJECTION INTRAMUSCULAR; INTRAVENOUS at 06:11

## 2024-11-11 RX ADMIN — OXYCODONE 5 MG: 5 TABLET ORAL at 10:11

## 2024-11-11 RX ADMIN — MIDAZOLAM HYDROCHLORIDE 1 MG: 2 INJECTION, SOLUTION INTRAMUSCULAR; INTRAVENOUS at 07:11

## 2024-11-11 RX ADMIN — TRANEXAMIC ACID 1000 MG: 100 INJECTION INTRAVENOUS at 07:11

## 2024-11-11 RX ADMIN — Medication 10 MG: at 08:11

## 2024-11-11 RX ADMIN — HALOPERIDOL LACTATE 0.5 MG: 5 INJECTION, SOLUTION INTRAMUSCULAR at 02:11

## 2024-11-11 RX ADMIN — FENTANYL CITRATE 50 MCG: 50 INJECTION, SOLUTION INTRAMUSCULAR; INTRAVENOUS at 07:11

## 2024-11-11 RX ADMIN — TRANEXAMIC ACID 1000 MG: 100 INJECTION INTRAVENOUS at 09:11

## 2024-11-11 RX ADMIN — FAMOTIDINE 20 MG: 10 INJECTION, SOLUTION INTRAVENOUS at 07:11

## 2024-11-11 RX ADMIN — SODIUM CHLORIDE: 0.9 INJECTION, SOLUTION INTRAVENOUS at 06:11

## 2024-11-11 RX ADMIN — LIDOCAINE HYDROCHLORIDE,EPINEPHRINE BITARTRATE 2 ML: 15; .005 INJECTION, SOLUTION EPIDURAL; INFILTRATION; INTRACAUDAL; PERINEURAL at 09:11

## 2024-11-11 RX ADMIN — PREGABALIN 75 MG: 75 CAPSULE ORAL at 06:11

## 2024-11-11 RX ADMIN — ACETAMINOPHEN 1000 MG: 500 TABLET ORAL at 06:11

## 2024-11-11 RX ADMIN — CARBOXYMETHYLCELLULOSE SODIUM 4 DROP: 10 GEL OPHTHALMIC at 07:11

## 2024-11-11 RX ADMIN — LIDOCAINE HYDROCHLORIDE,EPINEPHRINE BITARTRATE 3 ML: 15; .005 INJECTION, SOLUTION EPIDURAL; INFILTRATION; INTRACAUDAL; PERINEURAL at 09:11

## 2024-11-11 RX ADMIN — CELECOXIB 400 MG: 200 CAPSULE ORAL at 06:11

## 2024-11-11 RX ADMIN — MIDAZOLAM 1 MG: 1 INJECTION INTRAMUSCULAR; INTRAVENOUS at 06:11

## 2024-11-11 RX ADMIN — ONDANSETRON 4 MG: 2 INJECTION INTRAMUSCULAR; INTRAVENOUS at 09:11

## 2024-11-11 RX ADMIN — MUPIROCIN 1 G: 20 OINTMENT TOPICAL at 06:11

## 2024-11-11 RX ADMIN — SODIUM CHLORIDE: 9 INJECTION, SOLUTION INTRAVENOUS at 06:11

## 2024-11-11 RX ADMIN — METHOCARBAMOL TABLETS 750 MG: 750 TABLET, COATED ORAL at 10:11

## 2024-11-11 RX ADMIN — FENTANYL CITRATE 25 MCG: 50 INJECTION INTRAMUSCULAR; INTRAVENOUS at 11:11

## 2024-11-11 RX ADMIN — Medication 10 MG: at 07:11

## 2024-11-11 RX ADMIN — SODIUM CHLORIDE, SODIUM GLUCONATE, SODIUM ACETATE, POTASSIUM CHLORIDE, MAGNESIUM CHLORIDE, SODIUM PHOSPHATE, DIBASIC, AND POTASSIUM PHOSPHATE: .53; .5; .37; .037; .03; .012; .00082 INJECTION, SOLUTION INTRAVENOUS at 08:11

## 2024-11-11 RX ADMIN — LIDOCAINE HYDROCHLORIDE 75 MG: 20 INJECTION INTRAVENOUS at 07:11

## 2024-11-11 RX ADMIN — DEXAMETHASONE SODIUM PHOSPHATE 8 MG: 4 INJECTION, SOLUTION INTRAMUSCULAR; INTRAVENOUS at 07:11

## 2024-11-11 RX ADMIN — MEPIVACAINE HYDROCHLORIDE 3 ML: 15 INJECTION, SOLUTION EPIDURAL; INFILTRATION at 07:11

## 2024-11-11 RX ADMIN — PROPOFOL 50 MCG/KG/MIN: 10 INJECTION, EMULSION INTRAVENOUS at 07:11

## 2024-11-11 RX ADMIN — CEFAZOLIN 2 G: 2 INJECTION, POWDER, FOR SOLUTION INTRAMUSCULAR; INTRAVENOUS at 07:11

## 2024-11-11 RX ADMIN — VANCOMYCIN HYDROCHLORIDE 1000 MG: 1 INJECTION, POWDER, LYOPHILIZED, FOR SOLUTION INTRAVENOUS at 06:11

## 2024-11-11 RX ADMIN — ROPIVACAINE HYDROCHLORIDE 7.5 ML: 5 INJECTION EPIDURAL; INFILTRATION; PERINEURAL at 06:11

## 2024-11-11 RX ADMIN — Medication 10 MG: at 09:11

## 2024-11-11 NOTE — PLAN OF CARE
Pre Op complete with no distress noted.  Call light in reach and no questions at this time.  Patient belongings locked in locker and patient will need walker.    Daughter will  but 30 minutes away, called and verified.

## 2024-11-11 NOTE — PT/OT/SLP EVAL
"Physical Therapy Evaluation and Discharge Note    Patient Name:  Dia Ovalles   MRN:  [id number]    Recommendations:     Discharge Recommendations: Low Intensity Therapy  Discharge Equipment Recommendations: walker, rolling, bedside commode   Barriers to discharge: None    Assessment:     Dia Ovalles is a 74 y.o. female admitted with a medical diagnosis of Procedure(s):  ARTHROPLASTY, KNEE, TOTAL, USING COMPUTER-ASSISTED NAVIGATION Day of Surgery.   Pt was agreeable to work with PT, cooperative, and pleasant. Pt performed sit<>stand to RW with CGA. Pt ambulated 110ft with RW and CGA. No SOB or LOB. Pt ascended/descended 4 6" stair steps using RHR with good control. At this time, patient is functioning at their prior level of function and does not require further acute PT services.     Recent Surgery: Procedure(s):  ARTHROPLASTY, KNEE, TOTAL, USING COMPUTER-ASSISTED NAVIGATION Day of Surgery    Plan:     During this hospitalization, patient does not require further acute PT services.  Please re-consult if situation changes.      Subjective     Chief Complaint: R knee pain  Patient/Family Comments/goals: Walk [place] and yoga  Pain/Comfort:  Pain Rating 1: 6/10  Location - Side 1: Right  Location - Orientation 1: generalized  Location 1: knee  Pain Addressed 1: Pre-medicate for activity, Reposition, Distraction, Cessation of Activity  Pain Rating Post-Intervention 1: 6/10    Patients cultural, spiritual, Zoroastrian conflicts given the current situation: no    Living Environment:  Pt lives alone but daughter while be staying with her for a few days in her SSH with 5 ELKE and BHR. Pt has a tub/shower combo and walk-in shower.   Prior to admission, patients level of function was I with mobility and ADLs.  Equipment used at home:  none.  DME owned (not currently used): rolling walker, single point cane, and shower chair.  Upon discharge, patient will have assistance from Daughter.    Objective:     Communicated with " RN and OT prior to session.  Patient found up in chair with  (No active lines present) upon PT entry to room.    General Precautions: Standard, fall    Orthopedic Precautions:RLE weight bearing as tolerated   Braces: N/A  Respiratory Status: Room air    Exams:  RLE ROM: WFL except limited knee flex/ext due to pain from recent surgery  RLE Strength: WFL except not formally tested due to recent surgery  LLE ROM: WFL  LLE Strength: WFL    Functional Mobility:  Transfers:     Sit to Stand:  contact guard assistance with rolling walker  Gait: Pt ambulated 110ft with RW and CGA over flat hard surface. Pt ambulated with step through pattern and good stride length. Deficits include discontinuous steps and poor RW management.    Stairs:  Pt ascended/descended 4 stair(s) with No Assistive Device with right handrail with Contact Guard Assistance. Verbal cues to step up with unaffected LE and down with affected LE.     AM-PAC 6 CLICK MOBILITY  Total Score:18       Treatment and Education:  Supine/Seated Therex for LE strength and ROM x10:  - AP    Pt edu on:  - Importance of mobility for maximum recovery  - Elevation and ice for edema and pain reduction  - Proper transfers and ambulation using RW  - PT role and POC expectations  - HEP handout and explanation  - Proper Weightbearing precautions  - Ankle pumps to decrease chance of blood clots     AM-PAC 6 CLICK MOBILITY  Total Score:18     Patient left up in transport wheelchair and RN. Pt's daughter present in room.     GOALS:   Multidisciplinary Problems       Physical Therapy Goals       Not on file                    History:     Past Medical History:   Diagnosis Date    Breast cyst     Cancer     SCC x 2 on arms    Cataract     Closed Ortiz's fracture of right radius 09/27/2017    Decreased ROM of right shoulder 03/20/2018    Decreased strength of upper extremity 03/20/2018    Deep vein thrombosis     GERD (gastroesophageal reflux disease)     History of cardiovascular  stress test 10/29/2024    Joint pain     Lesion of left upper eyelid 2023    Nuclear sclerosis, bilateral 2021    OA (osteoarthritis) of shoulder 2019    OAB (overactive bladder) 2017    Osteoporosis 2017    Other chest pain 2022    Squamous cell carcinoma of skin 2012    right arm       Past Surgical History:   Procedure Laterality Date    ASD REPAIR      open heart surgery    BREAST BIOPSY      CATARACT EXTRACTION W/  INTRAOCULAR LENS IMPLANT Right 2021    Procedure: EXTRACTION, CATARACT, WITH IOL INSERTION;  Surgeon: Bruna Silva MD;  Location: [facility] OR;  Service: Ophthalmology;  Laterality: Right;    CATARACT EXTRACTION W/  INTRAOCULAR LENS IMPLANT Left 2021    Procedure: EXTRACTION, CATARACT, WITH IOL INSERTION;  Surgeon: Bruna Silva MD;  Location: [facility] OR;  Service: Ophthalmology;  Laterality: Left;     SECTION      x2    EYE SURGERY      KNEE SURGERY Right     meniscal repair    SKIN BIOPSY  2012    SKIN CANCER EXCISION         Time Tracking:     PT Received On: 24  PT Start Time: 1450     PT Stop Time: 1518  PT Total Time (min): 28 min     Billable Minutes: Evaluation 10 and Gait Training 18      2024

## 2024-11-11 NOTE — PT/OT/SLP EVAL
"Occupational Therapy   Evaluation and Discharge Note    Name: Dia Ovalles  MRN: [id number]  Admitting Diagnosis: <principal problem not specified>  Recent Surgery: Procedure(s):  ARTHROPLASTY, KNEE, TOTAL, USING COMPUTER-ASSISTED NAVIGATION Day of Surgery    Recommendations:     Discharge Recommendations: Low Intensity Therapy  Discharge Equipment Recommendations:  walker, rolling, bedside commode  Barriers to discharge:  None    Assessment:     Dia Ovalles is a 74 y.o. female with a medical diagnosis of <principal problem not specified>.  She presents with R TKA. Performance deficits affecting function: impaired self care skills, impaired functional mobility, orthopedic precautions.  Pt was able to perform supine/sit T/F c S and Sit <> Stand Transfer with modified independence with rolling walker Bed <> Chair Transfer using Stand Pivot technique with modified independence with rolling walker  Toilet Transfer Stand Pivot technique with modified independence with rolling walker and bedside commode.  Able to perform UB/LB dressing c modified independence  Educated pt on bathing, car transfers, and cryotherapy.       Rehab Prognosis: Good; patient would benefit from acute skilled OT services to address these deficits and reach maximum level of function.       Plan:     Patient to be seen daily to address the above listed problems via self-care/home management, therapeutic activities, therapeutic exercises  Plan of Care Expires: 11/11/24  Plan of Care Reviewed with: patient, daughter    Subjective     Chief Complaint: R TKA  Patient/Family Comments/goals: "I feel nauseous."    Occupational Profile:  Living Environment: Pt lives in a 2 story house c 5 ELKE and B rails and has 20 steps on the inside of her house.  She is able to live on the first floor.  Has a walk-in shower c a shower chair.  Previous level of function: I PTA  Roles and Routines: Retired  Equipment Used at Home: cane, straight, shower chair  Assistance " upon Discharge: Pt lives alone and has a daughter that will be staying c her for a week.    Pain/Comfort:  Pain Rating 1: 4/10    Patients cultural, spiritual, Uatsdin conflicts given the current situation: no    Objective:     Communicated with: RN prior to session.  Patient found supine with blood pressure cuff, cryotherapy, FCD, perineural catheter, peripheral IV, pulse ox (continuous), telemetry upon OT entry to room.    General Precautions: Standard, fall  Orthopedic Precautions: RLE weight bearing as tolerated  Braces: N/A  Respiratory Status: Room air    Occupational Performance:    Bed Mobility:    Patient completed Supine to Sit with supervision    Functional Mobility/Transfers:  Patient completed Sit <> Stand Transfer with modified independence  with  rolling walker   Patient completed Bed <> Chair Transfer using Stand Pivot technique with modified independence with rolling walker  Patient completed Toilet Transfer Stand Pivot technique with modified independence with  rolling walker and bedside commode  Functional Mobility: Pt was able to walk from bed to bathroom and then back to bed c CGA and RW.    Activities of Daily Living:  Grooming: modified independence to wash hands while standing at sink c RW.  Upper Body Dressing: modified independence to don shirt.  Lower Body Dressing: modified independence to don underwear, pants, socks, and shoes.  Toileting: modified independence for toilet hygiene.      Physical Exam:  Upper Extremity Range of Motion:     -       Right Upper Extremity: WFL  -       Left Upper Extremity: WFL  Upper Extremity Strength:    -       Right Upper Extremity: WFL  -       Left Upper Extremity: WFL    AMPAC 6 Click ADL:  AMPAC Total Score: 24    Patient left up in chair with all lines intact, call button in reach, RN notified, and daughter present    GOALS:   Multidisciplinary Problems       Occupational Therapy Goals       Not on file              Multidisciplinary Problems  (Resolved)          Problem: Occupational Therapy    Goal Priority Disciplines Outcome Interventions   Occupational Therapy Goal   (Resolved)     OT, PT/OT Met    Description: Goals to be met by: 24     Patient will increase functional independence with ADLs by performing:    UE Dressing with Modified [place].  LE Dressing with Modified [place] and Assistive Devices as needed.  Grooming while standing at sink with Modified [place].  Toileting from bedside commode with Modified [place] for hygiene and clothing management.   Bathing from  shower chair/bench with Modified [place].  Toilet transfer to bedside commode with Modified [place].                         History:     Past Medical History:   Diagnosis Date    Breast cyst     Cancer     SCC x 2 on arms    Cataract     Closed Ortiz's fracture of right radius 2017    Decreased ROM of right shoulder 2018    Decreased strength of upper extremity 2018    Deep vein thrombosis     GERD (gastroesophageal reflux disease)     History of cardiovascular stress test 10/29/2024    Joint pain     Lesion of left upper eyelid 2023    Nuclear sclerosis, bilateral 2021    OA (osteoarthritis) of shoulder 2019    OAB (overactive bladder) 2017    Osteoporosis 2017    Other chest pain 2022    Squamous cell carcinoma of skin 2012    right arm         Past Surgical History:   Procedure Laterality Date    ASD REPAIR      open heart surgery    BREAST BIOPSY      CATARACT EXTRACTION W/  INTRAOCULAR LENS IMPLANT Right 2021    Procedure: EXTRACTION, CATARACT, WITH IOL INSERTION;  Surgeon: Bruna Silva MD;  Location: [facility] OR;  Service: Ophthalmology;  Laterality: Right;    CATARACT EXTRACTION W/  INTRAOCULAR LENS IMPLANT Left 2021    Procedure: EXTRACTION, CATARACT, WITH IOL INSERTION;  Surgeon: Bruna Silva MD;  Location: [facility] OR;  Service: Ophthalmology;  Laterality: Left;      SECTION      x2    EYE SURGERY      KNEE SURGERY Right     meniscal repair    SKIN BIOPSY  2012    SKIN CANCER EXCISION         Time Tracking:     OT Date of Treatment: 11/11/24  OT Start Time: 1313  OT Stop Time: 1410  OT Total Time (min): 57 min    Billable Minutes:Evaluation 12  Self Care/Home Management 30  Therapeutic Activity 15    11/11/2024

## 2024-11-11 NOTE — ANESTHESIA PROCEDURE NOTES
Peripheral Block    Patient location during procedure: pre-op   Block not for primary anesthetic.  Reason for block: at surgeon's request and post-op pain management   Post-op Pain Location: right knee   Start time: 11/11/2024 6:51 AM  Timeout: 11/11/2024 6:50 AM   End time: 11/11/2024 6:58 AM    Staffing  Authorizing Provider: Loulou Sandhu MD  Performing Provider: Loulou Sandhu MD    Staffing  Performed by: Loulou Sandhu MD  Authorized by: Loulou Sandhu MD    Preanesthetic Checklist  Completed: patient identified, IV checked, site marked, risks and benefits discussed, surgical consent, monitors and equipment checked, pre-op evaluation and timeout performed  Peripheral Block  Patient position: supine  Prep: ChloraPrep and site prepped and draped  Patient monitoring: heart rate, cardiac monitor, continuous pulse ox, continuous capnometry and frequent blood pressure checks  Block type: adductor canal  Laterality: right  Injection technique: continuous  Needle  Needle type: Tuohy   Needle gauge: 17 G  Needle length: 3.5 in  Needle localization: anatomical landmarks and ultrasound guidance  Catheter type: spring wound  Catheter size: 19 G  Catheter at skin depth: 11 cm  Test dose: lidocaine 1.5% with Epi 1-to-200,000 and negative   -ultrasound image captured on disc.  Assessment  Injection assessment: negative aspiration, negative parasthesia and local visualized surrounding nerve  Paresthesia pain: none  Heart rate change: no  Slow fractionated injection: yes  Pain Tolerance: no complaints and comfortable throughout block  Medications:    Medications: ropivacaine (NAROPIN) injection 0.5% - Perineural   7.5 mL - 11/11/2024 6:56:00 AM    Additional Notes  VSS.  [facility] RN monitoring vitals throughout procedure.  Patient tolerated procedure well.     15ml 0.25% ropivacaine with epi injected

## 2024-11-11 NOTE — BRIEF OP NOTE
[place] - Surgery ([facility])  Brief Operative Note    Surgery Date: 11/11/2024     Surgeons and Role:     * Keagan Oneal MD - Primary    Assisting Surgeon: None    Pre-op Diagnosis:  Primary osteoarthritis of right knee [M17.11]    Post-op Diagnosis:  Post-Op Diagnosis Codes:     * Primary osteoarthritis of right knee [M17.11]    Procedure(s):  ARTHROPLASTY, KNEE, TOTAL, USING COMPUTER-ASSISTED NAVIGATION    Anesthesia: * No anesthesia type entered *    Operative Findings: OA of right knee    Estimated Blood Loss: See op note         Specimens:   Specimen (24h ago, onward)      None              Discharge Note    OUTCOME: Patient tolerated treatment/procedure well without complication and is now ready for discharge.    DISPOSITION: Home or Self Care    FINAL DIAGNOSIS:  <principal problem not specified>    FOLLOWUP: In clinic    DISCHARGE INSTRUCTIONS:  No discharge procedures on file.

## 2024-11-11 NOTE — ANESTHESIA POSTPROCEDURE EVALUATION
Anesthesia Post Evaluation    Patient: Dia Ovalles    Procedure(s) Performed: Procedure(s):  ARTHROPLASTY, KNEE, TOTAL, USING COMPUTER-ASSISTED NAVIGATION    Final Anesthesia Type: CSE      Patient location during evaluation: PACU  Patient participation: Yes- Able to Participate  Level of consciousness: awake and alert and oriented  Post-procedure vital signs: reviewed and stable  Pain management: adequate  Airway patency: patent    PONV status at discharge: No PONV  Anesthetic complications: no      Cardiovascular status: blood pressure returned to baseline and hemodynamically stable  Respiratory status: unassisted, spontaneous ventilation and room air  Hydration status: euvolemic  Follow-up not needed.              Vitals Value Taken Time   /62 11/11/24 1332   Temp 36.5 °C (97.7 °F) 11/11/24 1042   Pulse 94 11/11/24 1326   Resp 22 11/11/24 1326   SpO2 98 % 11/11/24 1326   Vitals shown include unfiled device data.      Event Time   Out of Recovery 12:05:00         Pain/Debbie Score: Pain Rating Prior to Med Admin: 6 (11/11/2024 11:49 AM)  Pain Rating Post Med Admin: 5 (11/11/2024 12:40 PM)  Debbie Score: 9 (11/11/2024 11:45 AM)

## 2024-11-11 NOTE — INTERVAL H&P NOTE
The patient has been examined and the H&P has been reviewed: Plan for R lateral UKA versus R TKA today, with final decision to be made intraoperatively. Patient understands and agrees with this plan.     I concur with the findings and no changes have occurred since H&P was written.    Anesthesia/Surgery risks, benefits and alternative options discussed and understood by patient/family.    [facility]  Orthopaedic Surgery  Hip and Knee Replacement        There are no hospital problems to display for this patient.

## 2024-11-11 NOTE — OP NOTE
[facility])  Orthopaedic Surgery  Operative Note    SUMMARY     Date of Procedure: 11/11/2024     Procedure:  Robotically-assisted right total knee arthroplasty.       Surgeons and Role:     * Keagan Oneal MD - Primary    Assisting Surgeon: Rodrick Fallon MD- Resident    Pre-Operative Diagnosis: Primary osteoarthritis of right knee [M17.11]    Post-Operative Diagnosis: Post-Op Diagnosis Codes:     * Primary osteoarthritis of right knee [M17.11]    Anesthesia: CSE    Operative Findings (including complications, if any):  Full-thickness lateral compartment wear, partial-thickness medial compartment wear with intrasubstance attenuation of the ACL.  Successful TKA performed without complication    Estimated Blood Loss (EBL):  100 cc           Implants: [place] [employer] size 4 right cruciate retaining femoral component and a size 4 primary tibial baseplate, and a size 4x 11mm CS tibial insert.  Implant Name Type Inv. Item Serial No.  Lot No. LRB No. Used Action   CEMENT BONE SIMPLEX HV RADPQ - [id number]  CEMENT BONE SIMPLEX HV RADPQ  [employer] [id number]  3 Implanted   PIN BONE [id number] - [id number]  PIN BONE [id number]  [employer] [id number]  1 Implanted and Explanted   PIN FIXATION BONE 140X3.2MM - [id number]  PIN FIXATION BONE 140X3.2MM  [employer] [id number]  1 Implanted and Explanted   BASEPLATE TRIATHLON TS SZ4 - [id number]  BASEPLATE TRIATHLON TS SZ4  [employer] [id number]  1 Implanted   FEMORAL CEMENTED #4 RIGHT - [id number]  FEMORAL CEMENTED #4 RIGHT  [employer] [id number]  1 Implanted   INSERT TIB CS POLYETH 11MM 4 - [id number]  INSERT TIB CS POLYETH 11MM 4  [employer] [id number]  1 Implanted       Specimens:   Specimen (24h ago, onward)      None                    Condition: Good    Disposition: PACU - hemodynamically stable.    Attestation: I was present and scrubbed for the entire procedure.      INDICATIONS FOR PROCEDURE:   Dia  Charlette  is a 74-year-old female who is having symptoms of right knee pain.  Physical examination and imaging studies   were consistent with arthritis.Treatment options were explained and it was decided to proceed with robotically-assisted right lateral partial versus total knee arthroplasty.  She was   aware of reasonable treatment options as well as risks and benefits.       PROCEDURE IN DETAIL:  After appropriate consent was obtained, the patient was brought in the Operating Room, anesthesia was administered.  She received antibiotic prophylaxis.  Padding and tourniquet was applied to the proximal right thigh.  right lower extremity was then prepped and draped in usual sterile fashion.   Timeout was called.  Limb was elevated and tourniquet was inflated.  The knee was flexed.  An incision was made from the tibial tubercle just proximal to the superior pole of the patella.  It was taken down through the skin and retinacular.  A medial parapatellar arthrotomy was performed. The knee was inspected and was found to have some partial medial cartilage degeneration as well as intrasubstance ACL degeneration. Therefore decision was made to proceed with total knee replacement. A medial release was performed at the mid-coronal joint line.  Following this the ACL was resected, fat pad was excised.  The patella was everted.  It was inspected and found to be well preserved so decision was made not to resurface.    Following this, the 2 tibial guide pins were placed along with the fixation device through these.  The array was applied and tightened to the clamp. We checked the security of the array and it was fine. Following this, we placed the femoral pins 1 cm superior and anterior to the MCL insertion.  The guide clamp and array were secured..  Once this was accomplished, the hip center was obtained, the medial and lateral malleoli were demarcated.  The femoral check point and tibial  check point were established and the  femur and tibia were then registered.  Acceptable registration was obtained.  Osteophytes were removed. We then captured poses in extension, And approximately 90 degrees of flexion.  Initial alignment was 6 degrees valgus.   The  initial gaps were then obtained and these confirmed that a neutral tibial cut with 4 degrees slope would be appropriate. This was performed with the saw. We ensured that the cut was flat. The tibial remnant was removed. The tensioner was placed in the knee. The extension gaps was loose medially and tight laterally so a degree of valgus was added to the femur.  After component position was adjusted.  We were very satisfied with the component position and sizing.  We had a size 4 femur and a 4 tibia.  Once this was accomplished, the robotic arm was brought in and our cuts were made.  We cut the anterior femur anterior chamfer and posterior femur.  The saw blade was then switched and we made our distal and posterior chamfer cut.  We were very satisfied all the cuts.  Bone fragments were removed.  Lamina  was used to open up posteriorly and we removed any remaining osteophytes and meniscal remnants. The PCL was intact, there was no bone block around its tibial insertion.  We placed the tibial trial.  We had good coverage with this size 4.  We used the medial one-third of the tibial tubercle to guide rotation and the trial was pinned in place.  A 9 mm insert was placed and then the femoral component was placed.  This was centered on the femur and the knee was brought through a range of motion. It was tight lateral so a 15 blade was used to release the posterolateral corner directly off the tibial bone and this release was done under direct visualization. The lateral soft tissues were palpated during the release and the tension was felt to diminish. We replaced the trials and She was very well balanced in flexion with an 11 mm poly insert in place.  Clinically,there was excellent balance  and stability.  Final alignment was 3 degrees flexion with gravity and full extension to -1 degree with manual pressure and with gravity the aligment was 1 degree valgus.  Patella tracked well.  Therefore, at this point, we were satisfied with knee range of motion and stability, component position, sizing and alignment as well as patella tracking.  It should be noted that she had full extension and she had at least 130 degrees of flexion with gravity and there was no instability at full extension, midflexion, or deep flexion.  Trial components were removed.  Arrays and femoral and tibial pins were removed. The bone was then prepared for cementing for pulsatile lavage and drying. Components were then cemented into place. Tibia followed by femur.  Cement was applied to the tibial keel as well.  Excess cement was removed.  The trial tibial insert was seated.  The knee was inspected.  There was no loose body, foreign body, or soft tissue interposition.  It was then reduced.  Once the cement was dry, the knee was irrigated with irrisept solution.  The trial was removed and the knee was inspected for any cement debris which was removed. Hemostasis was obtained with the bovie. The final insert was placed and firmly seated. Final ROM, stability and patellar tracking was excellent.  The knee was again the ear gated with IrriSept.    The incision was then closed.  The arthrotomy was closed with #1 Vicryl followed by running 2. Quill.  Once the arthrotomy was closed, the knee was brought through range of motion and was stable as previously described and the patella tracked well.  The remainder of the incision was closed with 2 0 PDS for the deep subcutaneous tissue, 3-0 Stratafix for the deep dermis and again for the subcuticular layer.  Staples were placed.  The skin was cleaned and dried and Dermabond was then placed allowed to dry.  Aquacel dressing was placed.  The leg was wrapped with cast padding and an Ace bandage and  ice packs were placed.       She was transferred from the Operating Room table to a stretcher, brought to Recovery Room in stable condition.  She tolerated the procedure well and there were no known complications. A gram of IV tranexamic acid was received prior to incision and at closure.    Post-operative plan:  Weight bearing limitations: WBAT w/ assistive device  ROM limitations: none   Precautions: Fall  Diet: ADAT to regular diet   Drains: n/a   Wound: Staples, Dermabond, Aquacel dressing   DVT ppx: ASA 81 BID  Abx: Perop Ancef, then 1 week duricef  Pain: multimodal   Consults: PT   Dispo: discharge dispo planned for d/c home POD 0 after PT eval & clearance

## 2024-11-11 NOTE — PLAN OF CARE
Problem: Occupational Therapy  Goal: Occupational Therapy Goal  Description: Goals to be met by: 11/11/24     Patient will increase functional independence with ADLs by performing:    UE Dressing with Modified [place].  LE Dressing with Modified [place] and Assistive Devices as needed.  Grooming while standing at sink with Modified [place].  Toileting from bedside commode with Modified [place] for hygiene and clothing management.   Bathing from  shower chair/bench with Modified [place].  Toilet transfer to bedside commode with Modified [place].    Outcome: Met

## 2024-11-11 NOTE — TRANSFER OF CARE
"Anesthesia Transfer of Care Note    Patient: Dia Ovalles    Procedure(s) Performed: Procedure(s):  ARTHROPLASTY, KNEE, TOTAL, USING COMPUTER-ASSISTED NAVIGATION    Patient location: PACU    Anesthesia Type: general    Transport from OR: Transported from OR on 6-10 L/min O2 by face mask with adequate spontaneous ventilation    Post pain: adequate analgesia    Post assessment: no apparent anesthetic complications and tolerated procedure well    Post vital signs: stable    Level of consciousness: awake, alert and oriented    Nausea/Vomiting: no nausea/vomiting    Complications: none    Transfer of care protocol was followed      Last vitals: Visit Vitals  /70 (BP Location: Left arm, Patient Position: Lying)   Pulse 66   Temp 36.6 °C (97.9 °F) (Oral)   Resp 19   Ht 5' 8" (1.727 m)   Wt 62.1 kg (137 lb)   LMP  (LMP Unknown)   SpO2 100%   Breastfeeding No   BMI 20.83 kg/m²     "

## 2024-11-11 NOTE — PLAN OF CARE
VSS. Pt able to tolerate oral liquids. Pt reports tolerable pain level for D/C. Ice packs and dressing intact. Daughter received home meds per bedside delivery. Walker at bedside for home use. No distress noted. Pt states she is ready for D/C. D/C and CADD pump instructions reviewed with pt and daughter, verbalized understanding. Blue armband on pt.

## 2024-11-11 NOTE — ANESTHESIA PREPROCEDURE EVALUATION
11/11/2024  Dia Ovalles is a 74 y.o., female Pre-operative evaluation for Procedure(s) (LRB):  ARTHROPLASTY, KNEE, UNICOMPARTMENTAL, LUKAS , SAME DAY (Right)    Dia Ovalles is a 74 y.o. female     Patient Active Problem List   Diagnosis    History of squamous cell carcinoma    H/O atrial septal defect repair    Osteoporosis    OAB (overactive bladder)    Range of motion deficit    Wrist pain, right    Decreased  strength of right hand    OA (osteoarthritis) of shoulder    Osteoarthritis of right knee    Senile purpura    Bilateral hand pain    History of cardiovascular stress test    Right leg weakness       Review of patient's allergies indicates:   Allergen Reactions    Codeine Nausea Only       No current facility-administered medications on file prior to encounter.     Current Outpatient Medications on File Prior to Encounter   Medication Sig Dispense Refill    DULoxetine (CYMBALTA) 20 MG capsule Take 2 capsules (40 mg total) by mouth once daily. 180 capsule 1    oxybutynin (DITROPAN-XL) 10 MG 24 hr tablet TAKE 1 TABLET EVERY DAY 90 tablet 0    biotin 1 mg tablet Take 5,000 mcg by mouth once daily.      calcium carbonate (CALCIUM 600 ORAL) Take 1,200 mg by mouth once daily.       cholecalciferol, vitamin D3, (VITAMIN D3) 50 mcg (2,000 unit) Cap Take 1,000 Units by mouth every other day.       clobetasoL (TEMOVATE) 0.05 % external solution Use on scalp one - two times daily as needed for scaling or itching (Patient not taking: Reported on 10/29/2024) 50 mL 3    multivitamin (THERAGRAN) per tablet Take 1 tablet by mouth once daily.         Past Surgical History:   Procedure Laterality Date    ASD REPAIR      open heart surgery    BREAST BIOPSY      CATARACT EXTRACTION W/  INTRAOCULAR LENS IMPLANT Right 04/19/2021    Procedure: EXTRACTION, CATARACT, WITH IOL INSERTION;  Surgeon: Bruna Silva MD;   Location: [facility] OR;  Service: Ophthalmology;  Laterality: Right;    CATARACT EXTRACTION W/  INTRAOCULAR LENS IMPLANT Left 2021    Procedure: EXTRACTION, CATARACT, WITH IOL INSERTION;  Surgeon: Bruna Silva MD;  Location: [facility];  Service: Ophthalmology;  Laterality: Left;     SECTION      x2    EYE SURGERY      KNEE SURGERY Right     meniscal repair    SKIN BIOPSY      SKIN CANCER EXCISION       Lab Results   Component Value Date    WBC 8.24 10/29/2024    HGB 12.6 10/29/2024    HCT 38.5 10/29/2024    MCV 91 10/29/2024     10/29/2024           Pre-op Assessment    I have reviewed the Patient Summary Reports.     I have reviewed the Nursing Notes. I have reviewed the NPO Status.   I have reviewed the Medications.     Review of Systems  Anesthesia Hx:  No problems with previous Anesthesia   History of prior surgery of interest to airway management or planning:          Denies Family Hx of Anesthesia complications.    Denies Personal Hx of Anesthesia complications.                    Social:  Non-Smoker, Alcohol Use Marijuana use      Hematology/Oncology:  Hematology Normal   Oncology Normal                                   EENT/Dental:  EENT/Dental Normal           Cardiovascular:  Cardiovascular Normal Exercise tolerance: good                                             Pulmonary:  Pulmonary Normal                       Renal/:  Renal/ Normal                 Hepatic/GI:     GERD                Musculoskeletal:  Arthritis               Neurological:  Neurology Normal                                      Endocrine:  Endocrine Normal            Psych:  Psychiatric Normal                    Physical Exam  General: Well nourished, Cooperative, Alert and Oriented    Airway:  Mallampati: II   Mouth Opening: Normal  TM Distance: Normal  Tongue: Normal  Neck ROM: Normal ROM    Dental:  Intact    Chest/Lungs:  Clear to auscultation, Normal Respiratory Rate    Heart:  Rate: Normal  Rhythm: Regular  Rhythm        Anesthesia Plan  Type of Anesthesia, risks & benefits discussed:    Anesthesia Type: CSE, Spinal, Regional  Intra-op Monitoring Plan: Standard ASA Monitors  Post Op Pain Control Plan: multimodal analgesia and IV/PO Opioids PRN  Induction:  IV  Airway Plan: Direct  Informed Consent: Informed consent signed with the Patient and all parties understand the risks and agree with anesthesia plan.  All questions answered. Patient consented to blood products? No  ASA Score: 2  Day of Surgery Review of History & Physical: H&P Update referred to the surgeon/provider.    Ready For Surgery From Anesthesia Perspective.     .

## 2024-11-12 NOTE — ANESTHESIA POST-OP PAIN MANAGEMENT
"Acute Pain Service Progress Note    11/12/2024 1428    Patient contacted regarding CADD infusion follow up. Reports that pain has been well controlled with the infusion pump. Denies signs of local anesthetic toxicity. PNC dressing remains clean, dry, and intact. All questions answered. Reminded patient that the infusion should be discontinued and PNC removed whenever the "infusion complete" alert is seen on the display screen or by POD 5 (11/16/24) at 12pm, whichever comes first. Encouraged patient to call the CADD 24/7 support line at [phone] or the [facility] Anesthesia line at [phone] for any CADD related questions/issues. Verbalizes understanding.            "

## 2024-11-13 ENCOUNTER — OFFICE VISIT (OUTPATIENT)
Dept: ORTHOPEDICS | Facility: CLINIC | Age: 74
End: 2024-11-13
Payer: MEDICARE

## 2024-11-13 ENCOUNTER — TELEPHONE (OUTPATIENT)
Dept: ORTHOPEDICS | Facility: CLINIC | Age: 74
End: 2024-11-13
Payer: MEDICARE

## 2024-11-13 ENCOUNTER — CLINICAL SUPPORT (OUTPATIENT)
Dept: REHABILITATION | Facility: OTHER | Age: 74
End: 2024-11-13
Attending: FAMILY MEDICINE
Payer: MEDICARE

## 2024-11-13 DIAGNOSIS — Z96.651 S/P TOTAL KNEE REPLACEMENT, RIGHT: Primary | ICD-10-CM

## 2024-11-13 DIAGNOSIS — M25.661 DECREASED RANGE OF MOTION (ROM) OF RIGHT KNEE: Primary | ICD-10-CM

## 2024-11-13 PROCEDURE — 99024 POSTOP FOLLOW-UP VISIT: CPT | Mod: HCNC,95,,

## 2024-11-13 PROCEDURE — 1157F ADVNC CARE PLAN IN RCRD: CPT | Mod: HCNC,CPTII,95,

## 2024-11-13 PROCEDURE — 1160F RVW MEDS BY RX/DR IN RCRD: CPT | Mod: HCNC,CPTII,95,

## 2024-11-13 PROCEDURE — 1159F MED LIST DOCD IN RCRD: CPT | Mod: HCNC,CPTII,95,

## 2024-11-13 PROCEDURE — 3044F HG A1C LEVEL LT 7.0%: CPT | Mod: HCNC,CPTII,95,

## 2024-11-13 PROCEDURE — 97161 PT EVAL LOW COMPLEX 20 MIN: CPT | Mod: HCNC,PN

## 2024-11-13 NOTE — PROGRESS NOTES
[facility]  Physical Therapy Initial Evaluation    Name: Dia Ovalles  Clinic Number: [id number]    Therapy Diagnosis:   Encounter Diagnosis   Name Primary?    Decreased range of motion (ROM) of right knee Yes     Physician: Keagan Oneal MD    Physician Orders: Physical Therapy Evaluate and Treat  Medical Diagnosis from Referral: right knee OA  Evaluation Date: 24  Authorization Period Expiration: 10/4/25  Plan of Care Expiration: 2024 to 25  Visit # / Visits authorized:  (pending additional authorization following initial evaluation)   FOTO: 0/3  on 24      Time In: 110p  Time Out: 140p  Total Billable Time: 30 minutes    Precautions: standard    Subjective     History of current condition - Dia reports: right TKA on 24    Pain is pretty well controlled. She has been unable to tolerate walking. Ice/meds have helped     Medical History:   Past Medical History:   Diagnosis Date    Breast cyst     Cancer     SCC x 2 on arms    Cataract     Closed Ortiz's fracture of right radius 2017    Decreased ROM of right shoulder 2018    Decreased strength of upper extremity 2018    Deep vein thrombosis     GERD (gastroesophageal reflux disease)     History of cardiovascular stress test 10/29/2024    Joint pain     Lesion of left upper eyelid 2023    Nuclear sclerosis, bilateral 2021    OA (osteoarthritis) of shoulder 2019    OAB (overactive bladder) 2017    Osteoporosis 2017    Other chest pain 2022    Squamous cell carcinoma of skin 2012    right arm       Surgical History:   Dai Ovalles  has a past surgical history that includes Skin cancer excision; ASD repair;  section; Knee surgery (Right); Breast biopsy; Cataract extraction w/  intraocular lens implant (Right, 2021); Cataract extraction w/  intraocular lens implant (Left, 2021); Eye surgery; Skin biopsy (); and arthroplasty,  knee, total, using computer-assisted navigation (2024).    Medications:   Dia has a current medication list which includes the following prescription(s): acetaminophen, aspirin, biotin, calcium carbonate, cefadroxil, celecoxib, celecoxib, cholecalciferol (vitamin d3), clobetasol, duloxetine, magnesium oxide, methocarbamol, multivitamin, oxybutynin, oxycodone, pantoprazole, raloxifene, senna-docusate 8.6-50 mg, and UNKNOWN TO PATIENT.    Allergies:   Review of patient's allergies indicates:   Allergen Reactions    Codeine Nausea Only        Imaging: FINDINGS:  The right knee demonstrates significant narrowing in the lateral compartment.  There is tricompartmental osteophytic spurring.  No evidence of fracture.  Small ossific densities again seen in the posterior soft tissues.  Joint effusion.    Prior Therapy: yes  Social History: 73 yo female lives alone  Occupation: retired  Prior Level of Function: walking 4 miles per day, yoga  Current Level of Function: limited with household ambulation    Pain:  Current 4/10, worst 6/10, best 4/10   Location: right knee  Description: Aching  Aggravating Factors: movement  Easing Factors: rest, ice, elevation    Pts goals: Pt would like to return to PLOF with no increase in knee pain.    Objective     WNL=within normal limits  WFL=within functional limits  NT=not tested  *=pain    Posture: resting knee flexion on R  Palpation: NT  Sensation: NT  Deep tendon reflexes: NT    Observation: incision was dressed with waterproof dressing, nerve block had some bleeding underneath the tegoderm, so we added some gauze and removed the posterior op gauze dressing. There was no sign of drainage or redness      Range of Motion:   Knee Left active Left Passive Right Active R passive   Flexion NT NT 60 65   Extension NT NT -8 -6         30 sec STS: 6 reps with upper extremity assist    TU.4 seconds with RW    CMS Impairment/Limitation/Restriction for FOTO Survey    Therapist  reviewed FOTO scores for Dia Ovalles on 11/13/2024.   FOTO documents entered into [employer] - see Media section.    Limitation Score: 64%  Predicted Goal: 41%    Category: Mobility     TREATMENT       Patient Education Provided:    Education provided:   - Findings; prognosis and plan of care (POC)  - Home exercise program (HEP)  - Modality options  - Therapist contact information    Written Home Exercises Provided: Yes  Exercises were reviewed and Dia was able to demonstrate them prior to the end of the session.  Dia demonstrated good understanding of the education provided.       Assessment     Dia is a 74 y.o. female referred to outpatient Physical Therapy with a medical diagnosis of right knee OA. Pt presents to PT with pain, decreased right knee ROM, decreased strength and flexibility, poor posture, and functional deficits with standing/walking, transfers, stairs. These deficits are negatively impacting this patient's ability to complete their work duties and activities of daily living.     Pt prognosis is Excellent.   Pt will benefit from skilled outpatient Physical Therapy to address the deficits stated above and in the chart below, provide pt/family education, and to maximize pt's level of independence.     Plan of care discussed with patient: Yes  Pt's spiritual, cultural and educational needs considered and pt agreeable to plan of care and goals as stated below:     Anticipated Barriers for therapy: None      Medical Necessity is demonstrated by the following  History  Co-morbidities and personal factors that may impact the plan of care Co-morbidities:   advanced age    Personal Factors:   age     low   Examination  Body Structures and Functions, activity limitations and participation restrictions that may impact the plan of care Body Regions:   lower extremities    Body Systems:    ROM  strength  gross coordinated movement  balance  gait  transfers  transitions  motor control    Participation  Restrictions:   Walking    Activity limitations:   Learning and applying knowledge  no deficits    General Tasks and Commands  No Deficits    Communication  No Deficits    Mobility  lifting and carrying objects  walking  driving (bike, car, motorcycle)    Self care  washing oneself (bathing, drying, washing hands)  toileting  dressing    Domestic Life  No Deficits    Interactions/Relationships  No Deficits    Life Areas  No Deficits    Community and Social Life  No Deficits         low   Clinical Presentation stable and uncomplicated low   Decision Making/ Complexity Score: low     GOALS:  Short Term Goals (4 Weeks):  1. Patient will be compliant with home exercise program to supplement therapy in restoring pain free function. (progressing, not met)    2. Patient will improve impaired lower extremity manual muscle tests to >/= 4/5 to improve dynamic hip/knee support for functional tasks. (progressing, not met)    3. Pt will tolerate standing/walking for 30 minutes with no increase in knee pain to return to PLOF. (progressing, not met)    4. Pt will improve knee pain to 4/10 at worst for improved QOL. (progressing, not met)        Long Term Goals (8 Weeks):  1. Patient will improve FOTO score to </= 41% limited to decrease perceived limitation with mobility. (progressing, not met)    2. Patient will improve impaired lower extremity manual muscle tests to >/= 4+/5 to improve dynamic hip/knee support for functional tasks. (progressing, not met)    3. Patient will tolerate walking for 60 minutes with no increase in knee pain to return to PLOF. (progressing, not met)            Plan     Plan of care Certification: 11/13/2024 to 2/13/25    Outpatient Physical Therapy 3 times weekly for 12 weeks to include the following interventions: Therapeutic Exercises, Manual Therapeutic Technique, Neuromuscular Re Education, Therapeutic Activities. Modalities, Kinesiotape prn, and Functional Dry Needling as needed.    Clifford George,  PT,  DPT, OCS

## 2024-11-13 NOTE — TELEPHONE ENCOUNTER
Attempted to call patient to inform him that he needs to go to the hospital prior to his appointment time to get xrays. Patient did not answer but I did leave a brief message with instructions to arrive 30-45 minutes prior to appointment time and that the xray orders will be in the computer when he arrives.    Pt called total joint line this morning, for concerns of Ace wrap and Redness by pain pump/ Catheter. Advised pt to send photo to Joint line and we would reach back out for further assistance.

## 2024-11-13 NOTE — PROGRESS NOTES
I called the patient today regarding her  surgery with Dr. Oneal. The patient had a right TKA on 11/11/24.     Pain Scale: 4 / 10    Any issues with Fever: No.    Any issues with medications (specifically DVT prophylaxis): No.    Pain medication: yes; oxycodone 5mg  Celebrex : yes  Protonix : yes  Resume home meds : Yes    Any issues with:   Bowel movements: Yes:  no BM yet  Passing daisha: No.  Urination: No.    Completing at home exercises: Yes    Any concerns regarding their dressing/bandage:  No.    Patient confirmed first OP-PT appointment:  [facility] today at 13:00.     Any other concerns: No.    Blue Bracelet : yes    The patient was informed that if they have any urgent issues with their bandage, medications or any other health concerns regarding their surgery to call the 24/7 Orthopedic Post-op Hot Line at [phone]. The patient was reminded that if they have any chest pain or shortness of breath to call 911 or go to the ER.    The patient verbalized understanding and does not have any other questions

## 2024-11-15 DIAGNOSIS — M19.019 OSTEOARTHRITIS OF SHOULDER, UNSPECIFIED LATERALITY, UNSPECIFIED OSTEOARTHRITIS TYPE: ICD-10-CM

## 2024-11-15 DIAGNOSIS — M17.11 OSTEOARTHRITIS OF RIGHT KNEE, UNSPECIFIED OSTEOARTHRITIS TYPE: ICD-10-CM

## 2024-11-15 RX ORDER — DULOXETIN HYDROCHLORIDE 20 MG/1
40 CAPSULE, DELAYED RELEASE ORAL
Qty: 180 CAPSULE | Refills: 1 | Status: SHIPPED | OUTPATIENT
Start: 2024-11-15

## 2024-11-15 NOTE — TELEPHONE ENCOUNTER
No care due was identified.  [facility] Embedded Care Due Messages. Reference number: [id number].   11/15/2024 1:51:30 AM CST

## 2024-11-18 ENCOUNTER — CLINICAL SUPPORT (OUTPATIENT)
Dept: REHABILITATION | Facility: OTHER | Age: 74
End: 2024-11-18
Payer: MEDICARE

## 2024-11-18 DIAGNOSIS — M25.60 RANGE OF MOTION DEFICIT: ICD-10-CM

## 2024-11-18 DIAGNOSIS — M25.531 WRIST PAIN, RIGHT: Primary | ICD-10-CM

## 2024-11-18 DIAGNOSIS — M25.661 DECREASED RANGE OF MOTION (ROM) OF RIGHT KNEE: ICD-10-CM

## 2024-11-18 PROCEDURE — 97116 GAIT TRAINING THERAPY: CPT | Mod: HCNC,PN

## 2024-11-18 PROCEDURE — 97110 THERAPEUTIC EXERCISES: CPT | Mod: HCNC,PN

## 2024-11-18 PROCEDURE — 97112 NEUROMUSCULAR REEDUCATION: CPT | Mod: HCNC,PN

## 2024-11-18 NOTE — PROGRESS NOTES
[facility]   Physical Therapy Treatment Note     Name: Dia Ovalles  Clinic Number: [id number]    Therapy Diagnosis:   Encounter Diagnoses   Name Primary?    Wrist pain, right Yes    Range of motion deficit     Decreased range of motion (ROM) of right knee      Physician: Keagan Oneal MD    Visit Date: 11/18/2024    Physician Orders: Physical Therapy Evaluate and Treat  Medical Diagnosis from Referral: right knee OA  Evaluation Date: 11/13/24  Authorization Period Expiration: 10/4/25  Plan of Care Expiration: 11/13/2024 to 2/13/25  Visit # / Visits authorized: 1/10  FOTO: 0/3  on 11/13/245     Time In: 100p  Time Out: 200p  Total Billable Time: 60 minutes    SUBJECTIVE     Pt reports: increased pain today 2nd to taking out nerve block yesterday.  She was compliant with home exercise program.  Response to previous treatment: improved knee flexion  Functional change: getting in/out of car is easier    Pain:  Current 4/10, worst 6/10, best 4/10   Location: right knee  Description: Aching  Aggravating Factors: movement  Easing Factors: rest, ice, elevation     Pts goals: Pt would like to return to PLOF with no increase in knee pain.    OBJECTIVE     Objective Measures updated at progress report unless specified.     Treatment     Dia received the treatments listed below in bold:      therapeutic exercises to develop strength, endurance, ROM, and flexibility for 20 minutes including:    Knee extension props 0# x3mins  NuStep L5 x 10 minutes  Heel slides with strap x 20      manual therapy techniques: Joint mobilizations and Myofacial release were applied to the: right knee for 0 minutes, including:      neuromuscular re-education activities to improve: Kinesthetic, Sense, and Proprioception for 15 minutes. The following activities were included:    Quadriceps sets with NMES x5 minutes  SLR with NMES 2x10      therapeutic activities to improve functional performance for 0  minutes,  including:      gait training to improve functional mobility and safety for 10  minutes, including:    Gait training with SPC SBA x 400' with VC for sequencing and step length          Patient Education and Home Exercises     Home Exercises Provided and Patient Education Provided     Education provided:   - Findings; prognosis and plan of care (POC)  - Home exercise program (HEP)  - Modality options  - Therapist contact information     Written Home Exercises Provided: Yes  Exercises were reviewed and Dia was able to demonstrate them prior to the end of the session.  Dia demonstrated good understanding of the education provided.     Written Home Exercises Provided: yes. Exercises were reviewed and Dia was able to demonstrate them prior to the end of the session.  Dia demonstrated good  understanding of the education provided. See EMR under Patient Instructions for exercises provided during therapy sessions    ASSESSMENT     Pt tolerated therapeutic interventions well with no complaint of increased pain. Patient reported relief after physical therapy treatment today especially with performance of NuStep. We will continue to progress as tolerated.    Dia Is progressing well towards her goals.   Pt prognosis is Good.     Pt will continue to benefit from skilled outpatient physical therapy to address the deficits listed in the problem list box on initial evaluation, provide pt/family education and to maximize pt's level of independence in the home and community environment.     Pt's spiritual, cultural and educational needs considered and pt agreeable to plan of care and goals.     Anticipated barriers to physical therapy: NA    Goals: GOALS:  Short Term Goals (4 Weeks):  1. Patient will be compliant with home exercise program to supplement therapy in restoring pain free function. (progressing, not met)    2. Patient will improve impaired lower extremity manual muscle tests to >/= 4/5 to improve dynamic  hip/knee support for functional tasks. (progressing, not met)    3. Pt will tolerate standing/walking for 30 minutes with no increase in knee pain to return to PLOF. (progressing, not met)    4. Pt will improve knee pain to 4/10 at worst for improved QOL. (progressing, not met)          Long Term Goals (8 Weeks):  1. Patient will improve FOTO score to </= 41% limited to decrease perceived limitation with mobility. (progressing, not met)    2. Patient will improve impaired lower extremity manual muscle tests to >/= 4+/5 to improve dynamic hip/knee support for functional tasks. (progressing, not met)    3. Patient will tolerate walking for 60 minutes with no increase in knee pain to return to PLOF. (progressing, not met)         PLAN     Plan of care Certification: 11/13/2024 to 2/13/25     Outpatient Physical Therapy 3 times weekly for 12 weeks to include the following interventions: Therapeutic Exercises, Manual Therapeutic Technique, Neuromuscular Re Education, Therapeutic Activities. Modalities, Kinesiotape prn, and Functional Dry Needling as needed.    Clifford George, PT

## 2024-11-20 ENCOUNTER — CLINICAL SUPPORT (OUTPATIENT)
Dept: REHABILITATION | Facility: OTHER | Age: 74
End: 2024-11-20
Payer: MEDICARE

## 2024-11-20 DIAGNOSIS — M25.661 DECREASED RANGE OF MOTION (ROM) OF RIGHT KNEE: Primary | ICD-10-CM

## 2024-11-20 DIAGNOSIS — Z96.651 S/P RIGHT UNICOMPARTMENTAL KNEE REPLACEMENT: ICD-10-CM

## 2024-11-20 DIAGNOSIS — Z96.651 S/P TOTAL KNEE REPLACEMENT, RIGHT: Primary | ICD-10-CM

## 2024-11-20 PROCEDURE — 97112 NEUROMUSCULAR REEDUCATION: CPT | Mod: KX,HCNC,PN,CQ

## 2024-11-20 PROCEDURE — 97110 THERAPEUTIC EXERCISES: CPT | Mod: KX,HCNC,PN,CQ

## 2024-11-20 RX ORDER — OXYCODONE HYDROCHLORIDE 5 MG/1
TABLET ORAL
Qty: 40 TABLET | Refills: 0 | Status: SHIPPED | OUTPATIENT
Start: 2024-11-20

## 2024-11-20 RX ORDER — CELECOXIB 200 MG/1
200 CAPSULE ORAL 2 TIMES DAILY PRN
Qty: 60 CAPSULE | Refills: 0 | Status: SHIPPED | OUTPATIENT
Start: 2024-11-20

## 2024-11-21 NOTE — PROGRESS NOTES
"[facility]   Physical Therapy Treatment Note     Name: Dia Ovalles  Clinic Number: [id number]    Therapy Diagnosis:   Encounter Diagnosis   Name Primary?    Decreased range of motion (ROM) of right knee Yes     Physician: Keagan Oneal MD    Visit Date: 11/20/2024    Physician Orders: Physical Therapy Evaluate and Treat  Medical Diagnosis from Referral: right knee OA  Evaluation Date: 11/13/24  Authorization Period Expiration: 10/4/25  Plan of Care Expiration: 11/13/2024 to 2/13/25  Visit # / Visits authorized: 3/10  FOTO: 0/3  on 11/13/245     Time In: 1320  Time Out: 1430  Total Billable Time: 30 minutes    SUBJECTIVE     Pt reports: she felt better after the nu-step exercise last PT visit. She is bending her knee better.   She was compliant with home exercise program.  Response to previous treatment: improved knee flexion  Functional change: getting in/out of car is easier    Pain:  Current 4/10, worst 6/10, best 4/10   Location: right knee  Description: Aching  Aggravating Factors: movement  Easing Factors: rest, ice, elevation     Pts goals: Pt would like to return to PLOF with no increase in knee pain.    OBJECTIVE     Objective Measures updated at progress report unless specified.     Treatment     Dia received the treatments listed below in bold:      therapeutic exercises to develop strength, endurance, ROM, and flexibility for 15 minutes including:    Knee extension props 0# x3mins  NuStep L5 x 10 minutes  Heel slides with strap x 20      manual therapy techniques: Joint mobilizations and Myofacial release were applied to the: right knee for 0 minutes, including:      neuromuscular re-education activities to improve: Kinesthetic, Sense, and Proprioception for 15 minutes. The following activities were included:    Quadriceps sets with NMES x5 minutes  SLR with NMES 5  +SAQ x 5 min  +Step ups 6" 20x  +Sit to stand 2x10  +HR 30x  +Squats 3x10    therapeutic activities to " improve functional performance for 0  minutes, including:      gait training to improve functional mobility and safety for 00  minutes, including:    Gait training with SPC SBA x 400' with VC for sequencing and step length          Patient Education and Home Exercises     Home Exercises Provided and Patient Education Provided     Education provided:   - Findings; prognosis and plan of care (POC)  - Home exercise program (HEP)  - Modality options  - Therapist contact information     Written Home Exercises Provided: Yes  Exercises were reviewed and Dia was able to demonstrate them prior to the end of the session.  Dia demonstrated good understanding of the education provided.     Written Home Exercises Provided: yes. Exercises were reviewed and Dia was able to demonstrate them prior to the end of the session.  Dia demonstrated good  understanding of the education provided. See EMR under Patient Instructions for exercises provided during therapy sessions    ASSESSMENT     Pt tolerated session well today. Emphasis was focused on improving endurance and strength with pt demonstrating good training effect at challangepoint. We will continue to progress as tolerated towards goals set forth in POC as functional/strength deficits continue to remain notable at this time.     Dia Is progressing well towards her goals.   Pt prognosis is Good.     Pt will continue to benefit from skilled outpatient physical therapy to address the deficits listed in the problem list box on initial evaluation, provide pt/family education and to maximize pt's level of independence in the home and community environment.     Pt's spiritual, cultural and educational needs considered and pt agreeable to plan of care and goals.     Anticipated barriers to physical therapy: NA    Goals: GOALS:  Short Term Goals (4 Weeks):  1. Patient will be compliant with home exercise program to supplement therapy in restoring pain free function.  (progressing, not met)    2. Patient will improve impaired lower extremity manual muscle tests to >/= 4/5 to improve dynamic hip/knee support for functional tasks. (progressing, not met)    3. Pt will tolerate standing/walking for 30 minutes with no increase in knee pain to return to PLOF. (progressing, not met)    4. Pt will improve knee pain to 4/10 at worst for improved QOL. (progressing, not met)          Long Term Goals (8 Weeks):  1. Patient will improve FOTO score to </= 41% limited to decrease perceived limitation with mobility. (progressing, not met)    2. Patient will improve impaired lower extremity manual muscle tests to >/= 4+/5 to improve dynamic hip/knee support for functional tasks. (progressing, not met)    3. Patient will tolerate walking for 60 minutes with no increase in knee pain to return to PLOF. (progressing, not met)         PLAN     Plan of care Certification: 11/13/2024 to 2/13/25    Focus on ROM and LE strength with emphasis on ADL performance     Outpatient Physical Therapy 3 times weekly for 12 weeks to include the following interventions: Therapeutic Exercises, Manual Therapeutic Technique, Neuromuscular Re Education, Therapeutic Activities. Modalities, Kinesiotape prn, and Functional Dry Needling as needed.    Tobias Foster, PTA

## 2024-11-22 ENCOUNTER — CLINICAL SUPPORT (OUTPATIENT)
Dept: REHABILITATION | Facility: OTHER | Age: 74
End: 2024-11-22
Payer: MEDICARE

## 2024-11-22 DIAGNOSIS — M25.661 DECREASED RANGE OF MOTION (ROM) OF RIGHT KNEE: Primary | ICD-10-CM

## 2024-11-22 PROCEDURE — 97110 THERAPEUTIC EXERCISES: CPT | Mod: HCNC,PN

## 2024-11-22 PROCEDURE — 97140 MANUAL THERAPY 1/> REGIONS: CPT | Mod: HCNC,PN

## 2024-11-22 PROCEDURE — 97112 NEUROMUSCULAR REEDUCATION: CPT | Mod: HCNC,PN

## 2024-11-22 NOTE — PROGRESS NOTES
" [facility]   Physical Therapy Treatment Note     Name: Dia Ovalles  Clinic Number: [id number]    Therapy Diagnosis:   Encounter Diagnosis   Name Primary?    Decreased range of motion (ROM) of right knee Yes     Physician: Keagan Oneal MD    Visit Date: 11/22/2024    Physician Orders: Physical Therapy Evaluate and Treat  Medical Diagnosis from Referral: right knee OA  Evaluation Date: 11/13/24  Authorization Period Expiration: 10/4/25  Plan of Care Expiration: 11/13/2024 to 2/13/25  Visit # / Visits authorized: 3/10  FOTO: 0/3  on 11/13/245     Time In: 1250p  Time Out: 205p  Total Billable Time: 40 minutes    SUBJECTIVE     Pt reports: she feels better every day. Pain control is getting easier with less medication.    She was compliant with home exercise program.  Response to previous treatment: improved knee flexion  Functional change: getting in/out of car is easier    Pain:  Current 4/10, worst 6/10, best 4/10   Location: right knee  Description: Aching  Aggravating Factors: movement  Easing Factors: rest, ice, elevation     Pts goals: Pt would like to return to PLOF with no increase in knee pain.    OBJECTIVE     Objective Measures updated at progress report unless specified.     Treatment     Dia received the treatments listed below in bold:      therapeutic exercises to develop strength, endurance, ROM, and flexibility for 13 minutes including:    Knee extension props 0# x3mins  NuStep L5 x 10 minutes  Heel slides with strap x 20      manual therapy techniques: Joint mobilizations and Myofacial release were applied to the: right knee for 10 minutes, including:    Patellar mobilization  Infrapatellar fat pad mobilization      neuromuscular re-education activities to improve: Kinesthetic, Sense, and Proprioception for 15 minutes. The following activities were included:    Quadriceps sets with NMES x5 minutes  SLR with NMES x30  LAQ with NMES 2x10  SAQ x 5 min  Step ups 6" " 20x  Sit to stand 2x10  HR 2x20  Squats with upper extremity assist 3x10    therapeutic activities to improve functional performance for 0  minutes, including:      gait training to improve functional mobility and safety for 00  minutes, including:    Gait training with SPC SBA x 400' with VC for sequencing and step length          Patient Education and Home Exercises     Home Exercises Provided and Patient Education Provided     Education provided:   - Findings; prognosis and plan of care (POC)  - Home exercise program (HEP)  - Modality options  - Therapist contact information     Written Home Exercises Provided: Yes  Exercises were reviewed and Dia was able to demonstrate them prior to the end of the session.  Dia demonstrated good understanding of the education provided.     Written Home Exercises Provided: yes. Exercises were reviewed and Dia was able to demonstrate them prior to the end of the session.  Dia demonstrated good  understanding of the education provided. See EMR under Patient Instructions for exercises provided during therapy sessions    ASSESSMENT     Pt tolerated session well today. Gait mechanics and quadriceps activation have improved. She continues to have limited right knee range of motion, but it is getting progressively better. We will continue to progress as tolerated.    Dia Is progressing well towards her goals.   Pt prognosis is Good.     Pt will continue to benefit from skilled outpatient physical therapy to address the deficits listed in the problem list box on initial evaluation, provide pt/family education and to maximize pt's level of independence in the home and community environment.     Pt's spiritual, cultural and educational needs considered and pt agreeable to plan of care and goals.     Anticipated barriers to physical therapy: NA    Goals: GOALS:  Short Term Goals (4 Weeks):  1. Patient will be compliant with home exercise program to supplement therapy in  restoring pain free function. (progressing, not met)    2. Patient will improve impaired lower extremity manual muscle tests to >/= 4/5 to improve dynamic hip/knee support for functional tasks. (progressing, not met)    3. Pt will tolerate standing/walking for 30 minutes with no increase in knee pain to return to PLOF. (progressing, not met)    4. Pt will improve knee pain to 4/10 at worst for improved QOL. (progressing, not met)          Long Term Goals (8 Weeks):  1. Patient will improve FOTO score to </= 41% limited to decrease perceived limitation with mobility. (progressing, not met)    2. Patient will improve impaired lower extremity manual muscle tests to >/= 4+/5 to improve dynamic hip/knee support for functional tasks. (progressing, not met)    3. Patient will tolerate walking for 60 minutes with no increase in knee pain to return to PLOF. (progressing, not met)         PLAN     Plan of care Certification: 11/13/2024 to 2/13/25    Focus on ROM and LE strength with emphasis on ADL performance     Outpatient Physical Therapy 3 times weekly for 12 weeks to include the following interventions: Therapeutic Exercises, Manual Therapeutic Technique, Neuromuscular Re Education, Therapeutic Activities. Modalities, Kinesiotape prn, and Functional Dry Needling as needed.    Clifford George, PT

## 2024-11-25 ENCOUNTER — OFFICE VISIT (OUTPATIENT)
Dept: OBSTETRICS AND GYNECOLOGY | Facility: CLINIC | Age: 74
End: 2024-11-25
Payer: MEDICARE

## 2024-11-25 ENCOUNTER — CLINICAL SUPPORT (OUTPATIENT)
Dept: REHABILITATION | Facility: OTHER | Age: 74
End: 2024-11-25
Payer: MEDICARE

## 2024-11-25 VITALS
DIASTOLIC BLOOD PRESSURE: 70 MMHG | SYSTOLIC BLOOD PRESSURE: 120 MMHG | WEIGHT: 140 LBS | BODY MASS INDEX: 21.22 KG/M2 | HEIGHT: 68 IN

## 2024-11-25 DIAGNOSIS — Z01.419 ENCOUNTER FOR GYNECOLOGICAL EXAMINATION WITHOUT ABNORMAL FINDING: Primary | ICD-10-CM

## 2024-11-25 DIAGNOSIS — Z78.0 POSTMENOPAUSAL: ICD-10-CM

## 2024-11-25 DIAGNOSIS — M25.661 DECREASED RANGE OF MOTION (ROM) OF RIGHT KNEE: Primary | ICD-10-CM

## 2024-11-25 PROCEDURE — 3078F DIAST BP <80 MM HG: CPT | Mod: HCNC,CPTII,S$GLB, | Performed by: OBSTETRICS & GYNECOLOGY

## 2024-11-25 PROCEDURE — 97112 NEUROMUSCULAR REEDUCATION: CPT | Mod: KX,HCNC,PN

## 2024-11-25 PROCEDURE — 97140 MANUAL THERAPY 1/> REGIONS: CPT | Mod: KX,HCNC,PN

## 2024-11-25 PROCEDURE — 1160F RVW MEDS BY RX/DR IN RCRD: CPT | Mod: HCNC,CPTII,S$GLB, | Performed by: OBSTETRICS & GYNECOLOGY

## 2024-11-25 PROCEDURE — 3074F SYST BP LT 130 MM HG: CPT | Mod: HCNC,CPTII,S$GLB, | Performed by: OBSTETRICS & GYNECOLOGY

## 2024-11-25 PROCEDURE — 3288F FALL RISK ASSESSMENT DOCD: CPT | Mod: HCNC,CPTII,S$GLB, | Performed by: OBSTETRICS & GYNECOLOGY

## 2024-11-25 PROCEDURE — 3044F HG A1C LEVEL LT 7.0%: CPT | Mod: HCNC,CPTII,S$GLB, | Performed by: OBSTETRICS & GYNECOLOGY

## 2024-11-25 PROCEDURE — 97164 PT RE-EVAL EST PLAN CARE: CPT | Mod: KX,HCNC,PN

## 2024-11-25 PROCEDURE — 99999 PR PBB SHADOW E&M-EST. PATIENT-LVL III: CPT | Mod: PBBFAC,HCNC,, | Performed by: OBSTETRICS & GYNECOLOGY

## 2024-11-25 PROCEDURE — G0101 CA SCREEN;PELVIC/BREAST EXAM: HCPCS | Mod: HCNC,S$GLB,, | Performed by: OBSTETRICS & GYNECOLOGY

## 2024-11-25 PROCEDURE — 1157F ADVNC CARE PLAN IN RCRD: CPT | Mod: HCNC,CPTII,S$GLB, | Performed by: OBSTETRICS & GYNECOLOGY

## 2024-11-25 PROCEDURE — 1101F PT FALLS ASSESS-DOCD LE1/YR: CPT | Mod: HCNC,CPTII,S$GLB, | Performed by: OBSTETRICS & GYNECOLOGY

## 2024-11-25 PROCEDURE — 1159F MED LIST DOCD IN RCRD: CPT | Mod: HCNC,CPTII,S$GLB, | Performed by: OBSTETRICS & GYNECOLOGY

## 2024-11-25 PROCEDURE — 1126F AMNT PAIN NOTED NONE PRSNT: CPT | Mod: HCNC,CPTII,S$GLB, | Performed by: OBSTETRICS & GYNECOLOGY

## 2024-11-25 PROCEDURE — 97110 THERAPEUTIC EXERCISES: CPT | Mod: KX,HCNC,PN

## 2024-11-25 NOTE — PROGRESS NOTES
[facility]   Physical Therapy Updated Plan of Care Note     Name: Dia Ovalles  Clinic Number: [id number]    Therapy Diagnosis:   Encounter Diagnosis   Name Primary?    Decreased range of motion (ROM) of right knee Yes     Physician: Keagan Oneal MD    Visit Date: 2024    Physician Orders: Physical Therapy Evaluate and Treat  Medical Diagnosis from Referral: right knee OA  Evaluation Date: 24  Authorization Period Expiration: 10/4/25  Plan of Care Expiration: 2024 to 25  Visit # / Visits authorized: 4/10  FOTO: 2/3  on 24    Time In: 150p  Time Out: 255p  Total Billable Time: 60 minutes    SUBJECTIVE     Pt reports: she continues     She was compliant with home exercise program.  Response to previous treatment: improved knee flexion  Functional change: getting in/out of car is easier    Pain:  Current 4/10, worst 6/10, best 4/10   Location: right knee  Description: Aching  Aggravating Factors: movement  Easing Factors: rest, ice, elevation     Pts goals: Pt would like to return to PLOF with no increase in knee pain.    OBJECTIVE     24    WNL=within normal limits  WFL=within functional limits  NT=not tested  *=pain     Posture: resting knee flexion on R  Palpation: NT  Sensation: NT  Deep tendon reflexes: NT     Observation: incision was dressed with waterproof dressing, nerve block had some bleeding underneath the tegoderm, so we added some gauze and removed the posterior op gauze dressing. There was no sign of drainage or redness        Range of Motion:   Knee Left active Left Passive Right Active R passive   Flexion NT  108   Extension NT NT -3 -1            30 sec STS: 12 reps with upper extremity assist     TU.22 seconds without AD     CMS Impairment/Limitation/Restriction for FOTO Survey     Therapist reviewed FOTO scores for Dia Ovalles on 2024.   FOTO documents entered into [employer] - see Media section.     Limitation  "Score: 64%  Predicted Goal: 41%     Category: Mobility       Treatment     Dia received the treatments listed below in bold:      therapeutic exercises to develop strength, endurance, ROM, and flexibility for 13 minutes including:    Knee extension props 3# x3mins  NuStep L5 x 10 minutes  Bike rocking/full revolutions x 5 mins  Heel slides with strap x 20      manual therapy techniques: Joint mobilizations and Myofacial release were applied to the: right knee for 10 minutes, including:    Patellar mobilization  Infrapatellar fat pad mobilization      neuromuscular re-education activities to improve: Kinesthetic, Sense, and Proprioception for 15 minutes. The following activities were included:    Quadriceps sets with NMES x5 minutes  SLR 2x20  LAQ vs 3# 2x10  SAQ x 5 min  Step ups 6" 20x  Sit to stand 2x10  HR 2x20  Squats with upper extremity assist 3x10    therapeutic activities to improve functional performance for 0  minutes, including:      gait training to improve functional mobility and safety for 00  minutes, including:    Gait training with SPC SBA x 400' with VC for sequencing and step length          Patient Education and Home Exercises     Home Exercises Provided and Patient Education Provided     Education provided:   - Findings; prognosis and plan of care (POC)  - Home exercise program (HEP)  - Modality options  - Therapist contact information     Written Home Exercises Provided: Yes  Exercises were reviewed and Dia was able to demonstrate them prior to the end of the session.  Dia demonstrated good understanding of the education provided.     Written Home Exercises Provided: yes. Exercises were reviewed and Dia was able to demonstrate them prior to the end of the session.  Dia demonstrated good  understanding of the education provided. See EMR under Patient Instructions for exercises provided during therapy sessions    ASSESSMENT     Dia has made significant improvement since starting " physical therapy treatment. She demonstrates improved right knee range of motion, decreased pain, and improved functional ability with less restrictive AD. She continues to have moderate range of motion limitations and functional deficits. We will continue to progress as tolerated.    Dia Is progressing well towards her goals.   Pt prognosis is Good.     Pt will continue to benefit from skilled outpatient physical therapy to address the deficits listed in the problem list box on initial evaluation, provide pt/family education and to maximize pt's level of independence in the home and community environment.     Pt's spiritual, cultural and educational needs considered and pt agreeable to plan of care and goals.     Anticipated barriers to physical therapy: NA    Goals: GOALS:  Short Term Goals (4 Weeks):  1. Patient will be compliant with home exercise program to supplement therapy in restoring pain free function. (met)    2. Patient will improve impaired lower extremity manual muscle tests to >/= 4/5 to improve dynamic hip/knee support for functional tasks. (progressing, not met)    3. Pt will tolerate standing/walking for 30 minutes with no increase in knee pain to return to PLOF. (progressing, not met)    4. Pt will improve knee pain to 4/10 at worst for improved QOL. (progressing, not met)          Long Term Goals (8 Weeks):  1. Patient will improve FOTO score to </= 41% limited to decrease perceived limitation with mobility. (progressing, not met)    2. Patient will improve impaired lower extremity manual muscle tests to >/= 4+/5 to improve dynamic hip/knee support for functional tasks. (progressing, not met)    3. Patient will tolerate walking for 60 minutes with no increase in knee pain to return to PLOF. (progressing, not met)         PLAN     Plan of care Certification: 11/13/2024 to 2/13/25    Focus on ROM and LE strength with emphasis on ADL performance     Outpatient Physical Therapy 3 times weekly for  12 weeks to include the following interventions: Therapeutic Exercises, Manual Therapeutic Technique, Neuromuscular Re Education, Therapeutic Activities. Modalities, Kinesiotape prn, and Functional Dry Needling as needed.    Clifford George, PT

## 2024-11-25 NOTE — PROGRESS NOTES
CC: Well woman exam    Dia Ovalles is a 74 y.o. female  presents for a well woman exam.  LMP: No LMP recorded (lmp unknown). Patient is postmenopausal..        Past Medical History:   Diagnosis Date    Breast cyst     Cancer     SCC x 2 on arms    Cataract     Closed Ortiz's fracture of right radius 2017    Decreased ROM of right shoulder 2018    Decreased strength of upper extremity 2018    Deep vein thrombosis     GERD (gastroesophageal reflux disease)     History of cardiovascular stress test 10/29/2024    Joint pain     Lesion of left upper eyelid 2023    Nuclear sclerosis, bilateral 2021    OA (osteoarthritis) of shoulder 2019    OAB (overactive bladder) 2017    Osteoporosis 2017    Other chest pain 2022    Squamous cell carcinoma of skin 2012    right arm     Past Surgical History:   Procedure Laterality Date    ARTHROPLASTY, KNEE, TOTAL, USING COMPUTER-ASSISTED NAVIGATION  2024    Procedure: ARTHROPLASTY, KNEE, TOTAL, USING COMPUTER-ASSISTED NAVIGATION;  Surgeon: Keagan Oneal MD;  Location: [facility] OR;  Service: Orthopedics;;    ASD REPAIR      open heart surgery    BREAST BIOPSY      CATARACT EXTRACTION W/  INTRAOCULAR LENS IMPLANT Right 2021    Procedure: EXTRACTION, CATARACT, WITH IOL INSERTION;  Surgeon: Bruna Silva MD;  Location: [facility] OR;  Service: Ophthalmology;  Laterality: Right;    CATARACT EXTRACTION W/  INTRAOCULAR LENS IMPLANT Left 2021    Procedure: EXTRACTION, CATARACT, WITH IOL INSERTION;  Surgeon: Bruna Silva MD;  Location: [facility] OR;  Service: Ophthalmology;  Laterality: Left;     SECTION      x2    EYE SURGERY      KNEE SURGERY Right     meniscal repair    SKIN BIOPSY  2012    SKIN CANCER EXCISION       Social History     Socioeconomic History    Marital status:     Number of children: 2   Occupational History    Occupation: retired   Tobacco Use    Smoking status: Never     Passive  exposure: Never    Smokeless tobacco: Never   Substance and Sexual Activity    Alcohol use: Yes     Alcohol/week: 4.0 standard drinks of alcohol     Types: 4 Glasses of wine per week     Comment: social    Drug use: Yes     Types: Marijuana     Comment: gummies 2 days ago    Sexual activity: Not Currently     Partners: Male     Birth control/protection: Post-menopausal   Other Topics Concern    Are you pregnant or think you may be? No    Breast-feeding No     Social Drivers of Health     Financial Resource Strain: Low Risk  (3/18/2024)    Overall Financial Resource Strain (CARDIA)     Difficulty of Paying Living Expenses: Not hard at all   Food Insecurity: No Food Insecurity (3/18/2024)    Hunger Vital Sign     Worried About Running Out of Food in the Last Year: Never true     Ran Out of Food in the Last Year: Never true   Transportation Needs: No Transportation Needs (3/18/2024)    PRAPARE - Transportation     Lack of Transportation (Medical): No     Lack of Transportation (Non-Medical): No   Physical Activity: Sufficiently Active (3/18/2024)    Exercise Vital Sign     Days of Exercise per Week: 6 days     Minutes of Exercise per Session: 60 min   Stress: No Stress Concern Present (3/18/2024)    [employer] [place] [employer] - Occupational Stress Questionnaire     Feeling of Stress : Not at all   Housing Stability: Low Risk  (3/18/2024)    Housing Stability Vital Sign     Unable to Pay for Housing in the Last Year: No     Number of Places Lived in the Last Year: 1     Unstable Housing in the Last Year: No     Family History   Problem Relation Name Age of Onset    Hypertension Mother      Asthma Mother      Osteoporosis Mother      Hypertension Father      Cancer Father          lung cancer    Heart disease Maternal Grandfather      Stroke Maternal Grandfather      Breast cancer Neg Hx      Colon cancer Neg Hx      Ovarian cancer Neg Hx       OB History          2    Para   2    Term   2      "       AB        Living   2         SAB        IAB        Ectopic        Multiple        Live Births   2                 /70 (BP Location: Left arm, Patient Position: Sitting)   Ht 5' 8" (1.727 m)   Wt 63.5 kg (139 lb 15.9 oz)   LMP  (LMP Unknown)   BMI 21.29 kg/m²       ROS:    ROS:  GENERAL: Denies weight gain or weight loss. Feeling well overall.   SKIN: Denies rash or lesions.   HEAD: Denies head injury or headache.   NODES: Denies enlarged lymph nodes.   CHEST: Denies chest pain or shortness of breath.   CARDIOVASCULAR: Denies palpitations or left sided chest pain.   ABDOMEN: No abdominal pain, constipation, diarrhea, nausea, vomiting or rectal bleeding.   URINARY: No frequency, dysuria, hematuria, or burning on urination.  REPRODUCTIVE: See HPI.   BREASTS: The patient performs breast self-examination and denies pain, lumps, or nipple discharge.   HEMATOLOGIC: No easy bruisability or excessive bleeding.   MUSCULOSKELETAL: Denies joint pain or swelling.   NEUROLOGIC: Denies syncope or weakness.   PSYCHIATRIC: Denies depression, anxiety or mood swings.    PHYSICAL EXAM:    APPEARANCE: Well nourished, well developed, in no acute distress.  AFFECT: WNL, alert and oriented x 3  SKIN: No acne or hirsutism  NECK: Neck symmetric without masses or thyromegaly  NODES: No inguinal, cervical, axillary, or femoral lymph node enlargement  CHEST: Good respiratory effect  ABDOMEN: Soft.  No tenderness or masses.  No hepatosplenomegaly.  No hernias.  BREASTS: Symmetrical, no skin changes or visible lesions.  No palpable masses, no nipple discharge bilaterally.  PELVIC: atrophic  external genitalia without lesions.  Normal hair distribution.  Adequate perineal body, normal urethral meatus.  Vagina moist and well rugated without lesions or discharge.  Cervix pink, without lesions, discharge or tenderness.  No significant cystocele or rectocele.  Bimanual exam shows uterus to be normal size, regular, mobile and " nontender.  Adnexa without masses or tenderness.    RECTAL: Rectovaginal exam confirms above with normal sphincter tone, no masses.  EXTREMITIES: No edema.    Diagnosis        ICD-10-CM ICD-9-CM    1. Encounter for gynecological examination without abnormal finding  Z01.419 V72.31       2. Postmenopausal  Z78.0 V49.81           Patient was counseled today on A.C.S. Pap guidelines and recommendations for yearly pelvic exams, mammograms and monthly self breast exams; to see her PCP for other health maintenance.       Follow up in about 1 year (around 11/25/2025), or if symptoms worsen or fail to improve.

## 2024-11-26 ENCOUNTER — OFFICE VISIT (OUTPATIENT)
Dept: ORTHOPEDICS | Facility: CLINIC | Age: 74
End: 2024-11-26
Payer: MEDICARE

## 2024-11-26 DIAGNOSIS — Z96.651 S/P TOTAL KNEE REPLACEMENT, RIGHT: ICD-10-CM

## 2024-11-26 PROCEDURE — 99999 PR PBB SHADOW E&M-EST. PATIENT-LVL III: CPT | Mod: PBBFAC,HCNC,, | Performed by: NURSE PRACTITIONER

## 2024-11-26 PROCEDURE — 99024 POSTOP FOLLOW-UP VISIT: CPT | Mod: HCNC,S$GLB,, | Performed by: NURSE PRACTITIONER

## 2024-11-26 PROCEDURE — 1157F ADVNC CARE PLAN IN RCRD: CPT | Mod: HCNC,CPTII,S$GLB, | Performed by: NURSE PRACTITIONER

## 2024-11-26 PROCEDURE — 3044F HG A1C LEVEL LT 7.0%: CPT | Mod: HCNC,CPTII,S$GLB, | Performed by: NURSE PRACTITIONER

## 2024-11-26 PROCEDURE — 1159F MED LIST DOCD IN RCRD: CPT | Mod: HCNC,CPTII,S$GLB, | Performed by: NURSE PRACTITIONER

## 2024-11-26 PROCEDURE — 1160F RVW MEDS BY RX/DR IN RCRD: CPT | Mod: HCNC,CPTII,S$GLB, | Performed by: NURSE PRACTITIONER

## 2024-11-26 RX ORDER — OXYCODONE HYDROCHLORIDE 5 MG/1
TABLET ORAL
Qty: 40 TABLET | Refills: 0 | Status: SHIPPED | OUTPATIENT
Start: 2024-11-26

## 2024-11-26 NOTE — PROGRESS NOTES
Dia Ovalles presents for initial post-operative visit following a right total knee arthroplasty performed by Dr. Oneal on 11/11/2024.      Exam:     Ambulating well with assistive device.  Incision is clean and dry without drainage or erythema.   ROM:0-110    Initial post-operative radiographs reviewed today revealing a well fixed and aligned prosthesis.    A/P:  2 weeks s/p right total knee arthroplasty    - The patient was advised to keep the incision clean and dry for the next 24 hours after which she may wash the area with antibacterial soap in the shower. Will not submerge until the incision is completely healed.   - Outpatient PT ongoing: at the [facility] location  - Continue aspirin for 1 month post op  - Pain medication: refilled  - Reviewed antibiotic prophylaxis   - Follow up in 4 weeks with xrays. Pt will call clinic with problems/concerns.

## 2024-11-27 ENCOUNTER — CLINICAL SUPPORT (OUTPATIENT)
Dept: REHABILITATION | Facility: OTHER | Age: 74
End: 2024-11-27
Payer: MEDICARE

## 2024-11-27 DIAGNOSIS — M25.661 DECREASED RANGE OF MOTION (ROM) OF RIGHT KNEE: Primary | ICD-10-CM

## 2024-11-27 PROCEDURE — 97140 MANUAL THERAPY 1/> REGIONS: CPT | Mod: KX,HCNC,PN,CQ

## 2024-11-27 PROCEDURE — 97112 NEUROMUSCULAR REEDUCATION: CPT | Mod: KX,HCNC,PN,CQ

## 2024-11-27 PROCEDURE — 97110 THERAPEUTIC EXERCISES: CPT | Mod: KX,HCNC,PN,CQ

## 2024-11-27 PROCEDURE — 97530 THERAPEUTIC ACTIVITIES: CPT | Mod: KX,HCNC,PN,CQ

## 2024-11-27 NOTE — PROGRESS NOTES
[facility]   Physical Therapy Daily Treatment Note     Name: Dia Ovalles  Clinic Number: [id number]    Therapy Diagnosis:   Encounter Diagnosis   Name Primary?    Decreased range of motion (ROM) of right knee Yes       Physician: Keagan Oneal MD    Visit Date: 2024    Physician Orders: Physical Therapy Evaluate and Treat  Medical Diagnosis from Referral: right knee OA  Evaluation Date: 24  Authorization Period Expiration: 10/4/25  Plan of Care Expiration: 2024 to 25  Visit # / Visits authorized: 6/10  FOTO: 2/3  on 24    Time In: 1100  Time Out: 1215  Total Billable Time: 53 minutes    SUBJECTIVE     Pt reports: she continues see improvement in her knee bending.     She was compliant with home exercise program.  Response to previous treatment: improved knee flexion  Functional change: getting in/out of car is easier    Pain:  Current 4/10, worst 6/10, best 4/10   Location: right knee  Description: Aching  Aggravating Factors: movement  Easing Factors: rest, ice, elevation     Pts goals: Pt would like to return to PLOF with no increase in knee pain.    OBJECTIVE     24    WNL=within normal limits  WFL=within functional limits  NT=not tested  *=pain     Posture: resting knee flexion on R  Palpation: NT  Sensation: NT  Deep tendon reflexes: NT     Observation: incision was dressed with waterproof dressing, nerve block had some bleeding underneath the tegoderm, so we added some gauze and removed the posterior op gauze dressing. There was no sign of drainage or redness        Range of Motion:   Knee Left active Left Passive Right Active R passive   Flexion NT  108   Extension NT NT -3 -1            30 sec STS: 12 reps with upper extremity assist     TU.22 seconds without AD     CMS Impairment/Limitation/Restriction for FOTO Survey     Therapist reviewed FOTO scores for Dia Ovalles on 2024.   FOTO documents entered into [employer] - see  "Media section.     Limitation Score: 64%  Predicted Goal: 41%     Category: Mobility       Treatment     Dia received the treatments listed below in bold:      therapeutic exercises to develop strength, endurance, ROM, and flexibility for 20 minutes including:    Knee extension props 3# x5mins  NuStep L5 x 8 minutes  Bike rocking/full revolutions x 8 mins - seat #9  Heel slides with strap x 20      manual therapy techniques: Joint mobilizations and Myofacial release were applied to the: right knee for 10 minutes, including:    Patellar mobilization  Infrapatellar fat pad mobilization  +Tibiofemoral JM AP glides, Gr II/III    neuromuscular re-education activities to improve: Kinesthetic, Sense, and Proprioception for 15 minutes. The following activities were included:    Quadriceps sets with NMES x5 minutes  SLR 2x20  LAQ vs 3# 2x10  SAQ x 5 min  HR 2x20  Squats with upper extremity assist 3x10    therapeutic activities to improve functional performance for 8 minutes, including:  Step ups 6" 20x  Sit to stand 2x10    gait training to improve functional mobility and safety for 00  minutes, including:    Gait training with SPC SBA x 400' with VC for sequencing and step length          Patient Education and Home Exercises     Home Exercises Provided and Patient Education Provided     Education provided:   - Findings; prognosis and plan of care (POC)  - Home exercise program (HEP)  - Modality options  - Therapist contact information     Written Home Exercises Provided: Yes  Exercises were reviewed and Dia was able to demonstrate them prior to the end of the session.  Dia demonstrated good understanding of the education provided.     Written Home Exercises Provided: Patient instructed to cont prior HEP. Exercises were reviewed and Dia was able to demonstrate them prior to the end of the session.  Dia demonstrated good  understanding of the education provided. See EMR under Patient Instructions for exercises " provided during therapy sessions    ASSESSMENT     Dia is making excellent progress within her POC as her quad and glute strength and ROM continue to improve. She was able to demonstrate improved eccentric control with functional step ups/sit to stand. She was also able to achieve increased knee flexion with recumbent bike by making full revolutions after rocking for a period of time.      Dia Is progressing well towards her goals.   Pt prognosis is Good.     Pt will continue to benefit from skilled outpatient physical therapy to address the deficits listed in the problem list box on initial evaluation, provide pt/family education and to maximize pt's level of independence in the home and community environment.     Pt's spiritual, cultural and educational needs considered and pt agreeable to plan of care and goals.     Anticipated barriers to physical therapy: NA    Goals: GOALS:  Short Term Goals (4 Weeks):  1. Patient will be compliant with home exercise program to supplement therapy in restoring pain free function. (met)    2. Patient will improve impaired lower extremity manual muscle tests to >/= 4/5 to improve dynamic hip/knee support for functional tasks. (progressing, not met)    3. Pt will tolerate standing/walking for 30 minutes with no increase in knee pain to return to PLOF. (progressing, not met)    4. Pt will improve knee pain to 4/10 at worst for improved QOL. (progressing, not met)          Long Term Goals (8 Weeks):  1. Patient will improve FOTO score to </= 41% limited to decrease perceived limitation with mobility. (progressing, not met)    2. Patient will improve impaired lower extremity manual muscle tests to >/= 4+/5 to improve dynamic hip/knee support for functional tasks. (progressing, not met)    3. Patient will tolerate walking for 60 minutes with no increase in knee pain to return to PLOF. (progressing, not met)         PLAN     Plan of care Certification: 11/13/2024 to  2/13/25    Focus on ROM and LE strength with emphasis on ADL performance     Outpatient Physical Therapy 3 times weekly for 12 weeks to include the following interventions: Therapeutic Exercises, Manual Therapeutic Technique, Neuromuscular Re Education, Therapeutic Activities. Modalities, Kinesiotape prn, and Functional Dry Needling as needed.    Tobias Foster, PTA

## 2024-11-29 ENCOUNTER — CLINICAL SUPPORT (OUTPATIENT)
Dept: REHABILITATION | Facility: OTHER | Age: 74
End: 2024-11-29
Payer: MEDICARE

## 2024-11-29 DIAGNOSIS — M25.661 DECREASED RANGE OF MOTION (ROM) OF RIGHT KNEE: Primary | ICD-10-CM

## 2024-11-29 PROCEDURE — 97530 THERAPEUTIC ACTIVITIES: CPT | Mod: HCNC,PN

## 2024-11-29 PROCEDURE — 97112 NEUROMUSCULAR REEDUCATION: CPT | Mod: HCNC,PN

## 2024-11-29 NOTE — PROGRESS NOTES
[facility]   Physical Therapy Daily Treatment Note     Name: Dia Ovalles  Clinic Number: [id number]    Therapy Diagnosis:   Encounter Diagnosis   Name Primary?    Decreased range of motion (ROM) of right knee Yes         Physician: Keagan Oneal MD    Visit Date: 2024    Physician Orders: Physical Therapy Evaluate and Treat  Medical Diagnosis from Referral: right knee OA  Evaluation Date: 24  Authorization Period Expiration: 10/4/25  Plan of Care Expiration: 2024 to 25  Visit # / Visits authorized: 7/10  FOTO: /3  on 24    Time In: 1100  Time Out: 1220  Total Billable Time: 50 minutes    SUBJECTIVE     Pt reports: she is doing well today. Pt states that she now only needs her cane to walk out in the community. Pt walks with no AD at home.     She was compliant with home exercise program.  Response to previous treatment: improved knee flexion  Functional change: getting in/out of car is easier    Pain:  Current 4/10, worst 6/10, best 4/10   Location: right knee  Description: Aching  Aggravating Factors: movement  Easing Factors: rest, ice, elevation     Pts goals: Pt would like to return to PLOF with no increase in knee pain.    OBJECTIVE     24    WNL=within normal limits  WFL=within functional limits  NT=not tested  *=pain     Posture: resting knee flexion on R  Palpation: NT  Sensation: NT  Deep tendon reflexes: NT     Observation: incision was dressed with waterproof dressing, nerve block had some bleeding underneath the tegoderm, so we added some gauze and removed the posterior op gauze dressing. There was no sign of drainage or redness        Range of Motion:   Knee Left active Left Passive Right Active R passive   Flexion NT  108   Extension NT NT -3 -1            30 sec STS: 12 reps with upper extremity assist     TU.22 seconds without AD     CMS Impairment/Limitation/Restriction for FOTO Survey     Therapist reviewed FOTO  "scores for Dia Ovalles on 11/13/2024.   FOTO documents entered into [employer] - see Media section.     Limitation Score: 64%  Predicted Goal: 41%     Category: Mobility       Treatment     Dia received the treatments listed below in bold:      therapeutic exercises to develop strength, endurance, ROM, and flexibility for 0 minutes including:      Heel slides with strap x 20      manual therapy techniques: Joint mobilizations and Myofacial release were applied to the: right knee for 0 minutes, including:    Patellar mobilization  Infrapatellar fat pad mobilization  Tibiofemoral JM AP laura, Gr II/III    neuromuscular re-education activities to improve: Kinesthetic, Sense, and Proprioception for 40 minutes. The following activities were included:  Knee extension props 3# x5mins  NuStep L5 x 8 minutes  Bike rocking/full revolutions x 8 mins - seat #9  QS 30x5" hold   SLR 2x20  LAQ vs 3# 2x10  SAQ x 5 min  HR 2x20  Squats with upper extremity assist 3x10    therapeutic activities to improve functional performance for 10 minutes, including:  Step ups 6" 20x  Sit to stand 2x10            Patient Education and Home Exercises     Home Exercises Provided and Patient Education Provided     Education provided:   - Findings; prognosis and plan of care (POC)  - Home exercise program (HEP)  - Modality options  - Therapist contact information     Written Home Exercises Provided: Yes  Exercises were reviewed and Dia was able to demonstrate them prior to the end of the session.  Dia demonstrated good understanding of the education provided.     Written Home Exercises Provided: Patient instructed to cont prior HEP. Exercises were reviewed and Dia was able to demonstrate them prior to the end of the session.  Dia demonstrated good  understanding of the education provided. See EMR under Patient Instructions for exercises provided during therapy sessions    ASSESSMENT     Pt tolerated treatment session well today and " demonstrates improved terminal knee extension during straight leg raise exercises. Pt demonstrates full revolutions on the bike today evidencing her improved AROM. Continue PT POC.     Dia Is progressing well towards her goals.   Pt prognosis is Good.     Pt will continue to benefit from skilled outpatient physical therapy to address the deficits listed in the problem list box on initial evaluation, provide pt/family education and to maximize pt's level of independence in the home and community environment.     Pt's spiritual, cultural and educational needs considered and pt agreeable to plan of care and goals.     Anticipated barriers to physical therapy: NA    Goals: GOALS:  Short Term Goals (4 Weeks):  1. Patient will be compliant with home exercise program to supplement therapy in restoring pain free function. (met)    2. Patient will improve impaired lower extremity manual muscle tests to >/= 4/5 to improve dynamic hip/knee support for functional tasks. (progressing, not met)    3. Pt will tolerate standing/walking for 30 minutes with no increase in knee pain to return to PLOF. (progressing, not met)    4. Pt will improve knee pain to 4/10 at worst for improved QOL. (progressing, not met)          Long Term Goals (8 Weeks):  1. Patient will improve FOTO score to </= 41% limited to decrease perceived limitation with mobility. (progressing, not met)    2. Patient will improve impaired lower extremity manual muscle tests to >/= 4+/5 to improve dynamic hip/knee support for functional tasks. (progressing, not met)    3. Patient will tolerate walking for 60 minutes with no increase in knee pain to return to PLOF. (progressing, not met)         PLAN     Plan of care Certification: 11/13/2024 to 2/13/25    Focus on ROM and LE strength with emphasis on ADL performance     Outpatient Physical Therapy 3 times weekly for 12 weeks to include the following interventions: Therapeutic Exercises, Manual Therapeutic  Technique, Neuromuscular Re Education, Therapeutic Activities. Modalities, Kinesiotape prn, and Functional Dry Needling as needed.    Nilda Delgado, PT

## 2024-12-02 ENCOUNTER — CLINICAL SUPPORT (OUTPATIENT)
Dept: REHABILITATION | Facility: OTHER | Age: 74
End: 2024-12-02
Payer: MEDICARE

## 2024-12-02 ENCOUNTER — PATIENT MESSAGE (OUTPATIENT)
Dept: PRIMARY CARE CLINIC | Facility: CLINIC | Age: 74
End: 2024-12-02
Payer: MEDICARE

## 2024-12-02 DIAGNOSIS — F43.9 SITUATIONAL STRESS: Primary | ICD-10-CM

## 2024-12-02 DIAGNOSIS — M25.661 DECREASED RANGE OF MOTION (ROM) OF RIGHT KNEE: Primary | ICD-10-CM

## 2024-12-02 PROCEDURE — 97112 NEUROMUSCULAR REEDUCATION: CPT | Mod: KX,HCNC,PN,CQ

## 2024-12-02 PROCEDURE — 97530 THERAPEUTIC ACTIVITIES: CPT | Mod: KX,HCNC,PN,CQ

## 2024-12-02 NOTE — TELEPHONE ENCOUNTER
Pt is requesting a referral for psychologist for counseling. Pt states she is having relationship issues with her daughter and need assistance with communicating with her.     Referral pended

## 2024-12-02 NOTE — PROGRESS NOTES
[facility]   Physical Therapy Daily Treatment Note     Name: Dia Ovalles  Clinic Number: [id number]    Therapy Diagnosis:   Encounter Diagnosis   Name Primary?    Decreased range of motion (ROM) of right knee Yes         Physician: Keagan Oneal MD    Visit Date: 2024    Physician Orders: Physical Therapy Evaluate and Treat  Medical Diagnosis from Referral: right knee OA  Evaluation Date: 24  Authorization Period Expiration: 10/4/25  Plan of Care Expiration: 2024 to 25  Visit # / Visits authorized: 7/10  FOTO: 2/3  on 24    Time In: 1100  Time Out: 1210  Total Billable Time: 53 minutes    SUBJECTIVE     Pt reports: she is doing well today. She still having issues with stairs/steps.     She was compliant with home exercise program.  Response to previous treatment: improved knee flexion  Functional change: getting in/out of car is easier    Pain:  Current 4/10, worst 6/10, best 4/10   Location: right knee  Description: Aching  Aggravating Factors: movement  Easing Factors: rest, ice, elevation     Pts goals: Pt would like to return to PLOF with no increase in knee pain.    OBJECTIVE     24    WNL=within normal limits  WFL=within functional limits  NT=not tested  *=pain     Posture: resting knee flexion on R  Palpation: NT  Sensation: NT  Deep tendon reflexes: NT     Observation: incision was dressed with waterproof dressing, nerve block had some bleeding underneath the tegoderm, so we added some gauze and removed the posterior op gauze dressing. There was no sign of drainage or redness        Range of Motion:   Knee Left active Left Passive Right Active R passive   Flexion NT  108   Extension NT NT -3 -1            30 sec STS: 12 reps with upper extremity assist     TU.22 seconds without AD     CMS Impairment/Limitation/Restriction for FOTO Survey     Therapist reviewed FOTO scores for Dia Ovalles on 2024.   FOTO documents  "entered into EPIC - see Media section.     Limitation Score: 64%  Predicted Goal: 41%     Category: Mobility       Treatment     Dia received the treatments listed below in bold:      therapeutic exercises to develop strength, endurance, ROM, and flexibility for 0 minutes including:      Heel slides with strap x 20      manual therapy techniques: Joint mobilizations and Myofacial release were applied to the: right knee for 0 minutes, including:    Patellar mobilization  Infrapatellar fat pad mobilization  Tibiofemoral JM AP glides, Gr II/III    neuromuscular re-education activities to improve: Kinesthetic, Sense, and Proprioception for 35 minutes. The following activities were included:  Knee extension props 3# x5mins  NuStep L5 x 8 minutes  Bike rocking/full revolutions x 8 mins - seat #8  QS 30x5" hold   SLR 2x20  LAQ vs 3# 3x10  SAQ vs 3# 3x10  HR 2x20  Squats with upper extremity assist 3x10    therapeutic activities to improve functional performance for 25 minutes, including:  Step ups 6" 30x  Sit to stand 2x10  +Seated knee flexion stretch with overpressure from LLE, 10x10"  +Shuttle press 50# 3x10  +Stair training reciprocal pattern 10x          Patient Education and Home Exercises     Home Exercises Provided and Patient Education Provided     Education provided:   - Findings; prognosis and plan of care (POC)  - Home exercise program (HEP)  - Modality options  - Therapist contact information     Written Home Exercises Provided: Yes  Exercises were reviewed and Dia was able to demonstrate them prior to the end of the session.  Dia demonstrated good understanding of the education provided.     Written Home Exercises Provided: Patient instructed to cont prior HEP. Exercises were reviewed and Dia was able to demonstrate them prior to the end of the session.  Dia demonstrated good  understanding of the education provided. See EMR under Patient Instructions for exercises provided during therapy " sessions    ASSESSMENT     Pt tolerated treatment session well today. We added functional stair training for improved ROM and ADL performance. ROM is improving with knee flexion and extension.     Dia Is progressing well towards her goals.   Pt prognosis is Good.     Pt will continue to benefit from skilled outpatient physical therapy to address the deficits listed in the problem list box on initial evaluation, provide pt/family education and to maximize pt's level of independence in the home and community environment.     Pt's spiritual, cultural and educational needs considered and pt agreeable to plan of care and goals.     Anticipated barriers to physical therapy: NA    Goals: GOALS:  Short Term Goals (4 Weeks):  1. Patient will be compliant with home exercise program to supplement therapy in restoring pain free function. (met)    2. Patient will improve impaired lower extremity manual muscle tests to >/= 4/5 to improve dynamic hip/knee support for functional tasks. (progressing, not met)    3. Pt will tolerate standing/walking for 30 minutes with no increase in knee pain to return to PLOF. (progressing, not met)    4. Pt will improve knee pain to 4/10 at worst for improved QOL. (progressing, not met)          Long Term Goals (8 Weeks):  1. Patient will improve FOTO score to </= 41% limited to decrease perceived limitation with mobility. (progressing, not met)    2. Patient will improve impaired lower extremity manual muscle tests to >/= 4+/5 to improve dynamic hip/knee support for functional tasks. (progressing, not met)    3. Patient will tolerate walking for 60 minutes with no increase in knee pain to return to PLOF. (progressing, not met)         PLAN     Plan of care Certification: 11/13/2024 to 2/13/25    Focus on ROM and LE strength with emphasis on ADL performance     Outpatient Physical Therapy 3 times weekly for 12 weeks to include the following interventions: Therapeutic Exercises, Manual  Therapeutic Technique, Neuromuscular Re Education, Therapeutic Activities. Modalities, Kinesiotape prn, and Functional Dry Needling as needed.    Tobias Foster, PTA

## 2024-12-04 ENCOUNTER — CLINICAL SUPPORT (OUTPATIENT)
Dept: REHABILITATION | Facility: OTHER | Age: 74
End: 2024-12-04
Payer: MEDICARE

## 2024-12-04 DIAGNOSIS — M25.661 DECREASED RANGE OF MOTION (ROM) OF RIGHT KNEE: Primary | ICD-10-CM

## 2024-12-04 DIAGNOSIS — N39.41 URGE INCONTINENCE: ICD-10-CM

## 2024-12-04 PROCEDURE — 97530 THERAPEUTIC ACTIVITIES: CPT | Mod: KX,HCNC,PN

## 2024-12-04 PROCEDURE — 97140 MANUAL THERAPY 1/> REGIONS: CPT | Mod: KX,HCNC,PN

## 2024-12-04 PROCEDURE — 97112 NEUROMUSCULAR REEDUCATION: CPT | Mod: KX,HCNC,PN

## 2024-12-04 RX ORDER — OXYBUTYNIN CHLORIDE 10 MG/1
10 TABLET, EXTENDED RELEASE ORAL
Qty: 90 TABLET | Refills: 3 | Status: SHIPPED | OUTPATIENT
Start: 2024-12-04

## 2024-12-04 NOTE — TELEPHONE ENCOUNTER
Refill Decision Note   Dia Ovalles  is requesting a refill authorization.  Brief Assessment and Rationale for Refill:  Approve     Medication Therapy Plan:       Medication Reconciliation Completed: No   Comments:     No Care Gaps recommended.     Note composed:11:25 AM 12/04/2024

## 2024-12-04 NOTE — PROGRESS NOTES
[facility]   Physical Therapy Daily Treatment Note     Name: Dia Ovalles  Clinic Number: [id number]    Therapy Diagnosis:   Encounter Diagnosis   Name Primary?    Decreased range of motion (ROM) of right knee Yes         Physician: Keagan Oneal MD    Visit Date: 2024    Physician Orders: Physical Therapy Evaluate and Treat  Medical Diagnosis from Referral: right knee OA  Evaluation Date: 24  Authorization Period Expiration: 10/4/25  Plan of Care Expiration: 2024 to 25  Visit # / Visits authorized: 7/10  FOTO: 2/3  on 24    Time In: 130p  Time Out: 235p  Total Billable Time: 55 minutes    SUBJECTIVE     Pt reports: she is doing well today. Going down stairs is still problematic.    She was compliant with home exercise program.  Response to previous treatment: improved knee flexion  Functional change: getting in/out of car is easier    Pain:  Current 4/10, worst 6/10, best 4/10   Location: right knee  Description: Aching  Aggravating Factors: movement  Easing Factors: rest, ice, elevation     Pts goals: Pt would like to return to PLOF with no increase in knee pain.    OBJECTIVE     24    WNL=within normal limits  WFL=within functional limits  NT=not tested  *=pain     Posture: resting knee flexion on R  Palpation: NT  Sensation: NT  Deep tendon reflexes: NT     Observation: incision was dressed with waterproof dressing, nerve block had some bleeding underneath the tegoderm, so we added some gauze and removed the posterior op gauze dressing. There was no sign of drainage or redness        Range of Motion:   Knee Left active Left Passive Right Active R passive   Flexion NT  108   Extension NT NT -3 -1            30 sec STS: 12 reps with upper extremity assist     TU.22 seconds without AD     CMS Impairment/Limitation/Restriction for FOTO Survey     Therapist reviewed FOTO scores for Dia Ovalles on 2024.   FOTO documents entered  "into EPIC - see Media section.     Limitation Score: 64%  Predicted Goal: 41%     Category: Mobility       Treatment     Dia received the treatments listed below in bold:      therapeutic exercises to develop strength, endurance, ROM, and flexibility for 0 minutes including:      Heel slides with strap x 20      manual therapy techniques: Joint mobilizations and Myofacial release were applied to the: right knee for 10 minutes, including:    Patellar mobilization  Infrapatellar fat pad mobilization  Tibiofemoral JM AP glides, Gr II/III  Knee extension mobilization grade IV    neuromuscular re-education activities to improve: Kinesthetic, Sense, and Proprioception for 30 minutes. The following activities were included:  Knee extension props 3# x5mins  NuStep L5 x 8 minutes  Bike full revolutions x 10 mins - seat #8-10  QS 30x5" hold   SLR x20  Matrix LAQ vs 15# 2x10  Matrix hamstring curls vs 15# 2x10  SAQ vs 3# 3x10  HR 2x20  Squats with upper extremity assist 3x10    therapeutic activities to improve functional performance for 15 minutes, including:    Step ups 6" with slow eccentric step down 30x  Sit to stand 2x10  TRX squats to chair tap 3x10 with slow eccentric phase  Seated knee flexion stretch with overpressure from LLE, 10x10"  Shuttle press 50# 3x10  +SL shuttle press 12.5#  Stair training reciprocal pattern 10x  Lateral step down 2x10          Patient Education and Home Exercises     Home Exercises Provided and Patient Education Provided     Education provided:   - Findings; prognosis and plan of care (POC)  - Home exercise program (HEP)  - Modality options  - Therapist contact information     Written Home Exercises Provided: Yes  Exercises were reviewed and Dia was able to demonstrate them prior to the end of the session.  Dia demonstrated good understanding of the education provided.     Written Home Exercises Provided: Patient instructed to cont prior HEP. Exercises were reviewed and Dia was " able to demonstrate them prior to the end of the session.  Dia demonstrated good  understanding of the education provided. See EMR under Patient Instructions for exercises provided during therapy sessions    ASSESSMENT     Pt tolerated treatment session well today. We continue to focus on stair training and eccentric quadriceps control. ROM is improving with knee flexion and extension. We will continue to progress as tolerated.    Dia Is progressing well towards her goals.   Pt prognosis is Good.     Pt will continue to benefit from skilled outpatient physical therapy to address the deficits listed in the problem list box on initial evaluation, provide pt/family education and to maximize pt's level of independence in the home and community environment.     Pt's spiritual, cultural and educational needs considered and pt agreeable to plan of care and goals.     Anticipated barriers to physical therapy: NA    Goals: GOALS:  Short Term Goals (4 Weeks):  1. Patient will be compliant with home exercise program to supplement therapy in restoring pain free function. (met)    2. Patient will improve impaired lower extremity manual muscle tests to >/= 4/5 to improve dynamic hip/knee support for functional tasks. (progressing, not met)    3. Pt will tolerate standing/walking for 30 minutes with no increase in knee pain to return to PLOF. (progressing, not met)    4. Pt will improve knee pain to 4/10 at worst for improved QOL. (progressing, not met)          Long Term Goals (8 Weeks):  1. Patient will improve FOTO score to </= 41% limited to decrease perceived limitation with mobility. (progressing, not met)    2. Patient will improve impaired lower extremity manual muscle tests to >/= 4+/5 to improve dynamic hip/knee support for functional tasks. (progressing, not met)    3. Patient will tolerate walking for 60 minutes with no increase in knee pain to return to PLOF. (progressing, not met)         PLAN     Plan of care  Certification: 11/13/2024 to 2/13/25    Focus on ROM and LE strength with emphasis on ADL performance     Outpatient Physical Therapy 3 times weekly for 12 weeks to include the following interventions: Therapeutic Exercises, Manual Therapeutic Technique, Neuromuscular Re Education, Therapeutic Activities. Modalities, Kinesiotape prn, and Functional Dry Needling as needed.    Clifford George, PT

## 2024-12-06 ENCOUNTER — CLINICAL SUPPORT (OUTPATIENT)
Dept: REHABILITATION | Facility: OTHER | Age: 74
End: 2024-12-06
Payer: MEDICARE

## 2024-12-06 DIAGNOSIS — M25.661 DECREASED RANGE OF MOTION (ROM) OF RIGHT KNEE: Primary | ICD-10-CM

## 2024-12-06 PROCEDURE — 97112 NEUROMUSCULAR REEDUCATION: CPT | Mod: KX,HCNC,PN

## 2024-12-06 PROCEDURE — 97530 THERAPEUTIC ACTIVITIES: CPT | Mod: KX,HCNC,PN

## 2024-12-06 PROCEDURE — 97140 MANUAL THERAPY 1/> REGIONS: CPT | Mod: KX,HCNC,PN

## 2024-12-06 NOTE — PROGRESS NOTES
[facility]   Physical Therapy Daily Treatment Note     Name: Dia Ovalles  Clinic Number: [id number]    Therapy Diagnosis:   Encounter Diagnosis   Name Primary?    Decreased range of motion (ROM) of right knee Yes       Physician: Keagan Oneal MD    Visit Date: 2024    Physician Orders: Physical Therapy Evaluate and Treat  Medical Diagnosis from Referral: right knee OA  Evaluation Date: 24  Authorization Period Expiration: 10/4/25  Plan of Care Expiration: 2024 to 25  Visit # / Visits authorized: 9/10  FOTO: 2/3  on 24    Time In: 1000a  Time Out: 1100a  Total Billable Time: 55 minutes    SUBJECTIVE     Pt reports: she is doing well today. Knee flexibility continues to improve    She was compliant with home exercise program.  Response to previous treatment: improved knee flexion  Functional change: getting in/out of car is easier    Pain:  Current 4/10, worst 6/10, best 4/10   Location: right knee  Description: Aching  Aggravating Factors: movement  Easing Factors: rest, ice, elevation     Pts goals: Pt would like to return to PLOF with no increase in knee pain.    OBJECTIVE     24    WNL=within normal limits  WFL=within functional limits  NT=not tested  *=pain     Posture: resting knee flexion on R  Palpation: NT  Sensation: NT  Deep tendon reflexes: NT     Observation: incision was dressed with waterproof dressing, nerve block had some bleeding underneath the tegoderm, so we added some gauze and removed the posterior op gauze dressing. There was no sign of drainage or redness        Range of Motion:   Knee Left active Left Passive Right Active R passive   Flexion NT  108   Extension NT NT -3 -1            30 sec STS: 12 reps with upper extremity assist     TU.22 seconds without AD     CMS Impairment/Limitation/Restriction for FOTO Survey     Therapist reviewed FOTO scores for Dia Ovalles on 2024.   FOTO documents entered into  "EPIC - see Media section.     Limitation Score: 64%  Predicted Goal: 41%     Category: Mobility       Treatment     Dia received the treatments listed below in bold:      therapeutic exercises to develop strength, endurance, ROM, and flexibility for 0 minutes including:      Heel slides with strap x 20      manual therapy techniques: Joint mobilizations and Myofacial release were applied to the: right knee for 10 minutes, including:    Patellar mobilization  Infrapatellar fat pad mobilization  Tibiofemoral JM AP glides, Gr II/III  Knee extension mobilization grade IV    neuromuscular re-education activities to improve: Kinesthetic, Sense, and Proprioception for 25 minutes. The following activities were included:    Knee extension props 3# x5mins  NuStep L5 x 8 minutes  Bike full revolutions x 10 mins - seat #8-10  QS 30x5" hold   SLR x20  Matrix LAQ vs 15# 2x10  Matrix hamstring curls vs 15# 2x10  SAQ vs 3# 3x10  HR 2x20  Squats with upper extremity assist 3x10    therapeutic activities to improve functional performance for 15 minutes, including:    Step ups 6" with slow eccentric step down 30x  Sit to stand 2x10  TRX squats to chair tap 3x10 with slow eccentric phase  Seated knee flexion stretch with overpressure from LLE, 10x10"  Shuttle press 50# 3x10  +SL shuttle press 12.5#  Stair training reciprocal pattern 10x  Lateral step down 2x10          Patient Education and Home Exercises     Home Exercises Provided and Patient Education Provided     Education provided:   - Findings; prognosis and plan of care (POC)  - Home exercise program (HEP)  - Modality options  - Therapist contact information     Written Home Exercises Provided: Yes  Exercises were reviewed and Dia was able to demonstrate them prior to the end of the session.  Dia demonstrated good understanding of the education provided.     Written Home Exercises Provided: Patient instructed to cont prior HEP. Exercises were reviewed and Dia was " able to demonstrate them prior to the end of the session.  Dia demonstrated good  understanding of the education provided. See EMR under Patient Instructions for exercises provided during therapy sessions    ASSESSMENT     Pt tolerated treatment session well today. Knee flexion/extension range of motion continues to improve and stairs were tolerated well today. We will continue to progress as tolerated.    Dia Is progressing well towards her goals.   Pt prognosis is Good.     Pt will continue to benefit from skilled outpatient physical therapy to address the deficits listed in the problem list box on initial evaluation, provide pt/family education and to maximize pt's level of independence in the home and community environment.     Pt's spiritual, cultural and educational needs considered and pt agreeable to plan of care and goals.     Anticipated barriers to physical therapy: NA    Goals: GOALS:  Short Term Goals (4 Weeks):  1. Patient will be compliant with home exercise program to supplement therapy in restoring pain free function. (met)    2. Patient will improve impaired lower extremity manual muscle tests to >/= 4/5 to improve dynamic hip/knee support for functional tasks. (progressing, not met)    3. Pt will tolerate standing/walking for 30 minutes with no increase in knee pain to return to PLOF. (progressing, not met)    4. Pt will improve knee pain to 4/10 at worst for improved QOL. (progressing, not met)          Long Term Goals (8 Weeks):  1. Patient will improve FOTO score to </= 41% limited to decrease perceived limitation with mobility. (progressing, not met)    2. Patient will improve impaired lower extremity manual muscle tests to >/= 4+/5 to improve dynamic hip/knee support for functional tasks. (progressing, not met)    3. Patient will tolerate walking for 60 minutes with no increase in knee pain to return to PLOF. (progressing, not met)         PLAN     Plan of care Certification: 11/13/2024  to 2/13/25    Focus on ROM and LE strength with emphasis on ADL performance     Outpatient Physical Therapy 3 times weekly for 12 weeks to include the following interventions: Therapeutic Exercises, Manual Therapeutic Technique, Neuromuscular Re Education, Therapeutic Activities. Modalities, Kinesiotape prn, and Functional Dry Needling as needed.    Clifford George, PT

## 2024-12-09 ENCOUNTER — CLINICAL SUPPORT (OUTPATIENT)
Dept: REHABILITATION | Facility: OTHER | Age: 74
End: 2024-12-09
Payer: MEDICARE

## 2024-12-09 DIAGNOSIS — Z96.651 S/P TOTAL KNEE REPLACEMENT, RIGHT: ICD-10-CM

## 2024-12-09 DIAGNOSIS — M25.661 DECREASED RANGE OF MOTION (ROM) OF RIGHT KNEE: Primary | ICD-10-CM

## 2024-12-09 PROCEDURE — 97530 THERAPEUTIC ACTIVITIES: CPT | Mod: HCNC,PN

## 2024-12-09 PROCEDURE — 97112 NEUROMUSCULAR REEDUCATION: CPT | Mod: HCNC,PN

## 2024-12-09 RX ORDER — OXYCODONE HYDROCHLORIDE 5 MG/1
TABLET ORAL
Qty: 40 TABLET | Refills: 0 | Status: SHIPPED | OUTPATIENT
Start: 2024-12-09

## 2024-12-09 NOTE — PROGRESS NOTES
[facility]   Physical Therapy Daily Treatment Note     Name: Dia Ovalles  Clinic Number: [id number]    Therapy Diagnosis:   Encounter Diagnosis   Name Primary?    Decreased range of motion (ROM) of right knee Yes       Physician: Keagan Oneal MD    Visit Date: 2024    Physician Orders: Physical Therapy Evaluate and Treat  Medical Diagnosis from Referral: right knee OA  Evaluation Date: 24  Authorization Period Expiration: 10/4/25  Plan of Care Expiration: 2024 to 25  Visit # / Visits authorized: 10/10  FOTO: 2/3  on 24    Time In: 1130am  Time Out: 1232pm  Total Billable Time: 60 minutes    SUBJECTIVE     Pt reports: she is doing well today. Pt states that she had less pain while sleeping last night.     She was compliant with home exercise program.  Response to previous treatment: improved knee flexion  Functional change: getting in/out of car is easier    Pain:  Current 4/10, worst 6/10, best 4/10   Location: right knee  Description: Aching  Aggravating Factors: movement  Easing Factors: rest, ice, elevation     Pts goals: Pt would like to return to PLOF with no increase in knee pain.    OBJECTIVE     24    WNL=within normal limits  WFL=within functional limits  NT=not tested  *=pain     Posture: resting knee flexion on R  Palpation: NT  Sensation: NT  Deep tendon reflexes: NT     Observation: incision was dressed with waterproof dressing, nerve block had some bleeding underneath the tegoderm, so we added some gauze and removed the posterior op gauze dressing. There was no sign of drainage or redness        Range of Motion:   Knee Left active Left Passive Right Active R passive   Flexion NT  108   Extension NT NT -3 -1            30 sec STS: 12 reps with upper extremity assist     TU.22 seconds without AD     CMS Impairment/Limitation/Restriction for FOTO Survey     Therapist reviewed FOTO scores for Dia Ovalles on 2024.  "  FOTO documents entered into [employer] - see Media section.     Limitation Score: 64%  Predicted Goal: 41%     Category: Mobility       Treatment     Dia received the treatments listed below in bold:      therapeutic exercises to develop strength, endurance, ROM, and flexibility for 0 minutes including:      Heel slides with strap x 20      manual therapy techniques: Joint mobilizations and Myofacial release were applied to the: right knee for 2 minutes, including:    Patellar mobilization  Infrapatellar fat pad mobilization  Tibiofemoral JM AP glides, Gr II/III  Knee extension mobilization grade IV    neuromuscular re-education activities to improve: Kinesthetic, Sense, and Proprioception for 15 minutes. The following activities were included:    Knee extension props 3# x5mins  NuStep L5 x 8 minutes  Bike full revolutions x 10 mins - seat #8-10  QS 30x5" hold   SLR x20      therapeutic activities to improve functional performance for 45 minutes, including:    Step ups 6" with slow eccentric step down 30x  Sit to stand 2x10  TRX squats to chair tap 3x10 with slow eccentric phase  Seated knee flexion stretch with overpressure from LLE, 10x10"  Shuttle press 50# 3x10  SL shuttle press 12.5#  Stair training reciprocal pattern 10x  Lateral step down 2x10  Matrix LAQ vs 15# 2x10  Matrix hamstring curls vs 25# 2x10  SAQ vs 3# 3x10  HR 2x20  Squats with upper extremity assist 3x10        Patient Education and Home Exercises     Home Exercises Provided and Patient Education Provided     Education provided:   - Findings; prognosis and plan of care (POC)  - Home exercise program (HEP)  - Modality options  - Therapist contact information     Written Home Exercises Provided: Yes  Exercises were reviewed and Dia was able to demonstrate them prior to the end of the session.  Dia demonstrated good understanding of the education provided.     Written Home Exercises Provided: Patient instructed to cont prior HEP. Exercises were " reviewed and Dia was able to demonstrate them prior to the end of the session.  Dia demonstrated good  understanding of the education provided. See EMR under Patient Instructions for exercises provided during therapy sessions    ASSESSMENT     Pt tolerated treatment session well today. Pt demonstrates improved terminal knee extension. Continue PT POC.    Dia Is progressing well towards her goals.   Pt prognosis is Good.     Pt will continue to benefit from skilled outpatient physical therapy to address the deficits listed in the problem list box on initial evaluation, provide pt/family education and to maximize pt's level of independence in the home and community environment.     Pt's spiritual, cultural and educational needs considered and pt agreeable to plan of care and goals.     Anticipated barriers to physical therapy: NA    Goals: GOALS:  Short Term Goals (4 Weeks):  1. Patient will be compliant with home exercise program to supplement therapy in restoring pain free function. (met)    2. Patient will improve impaired lower extremity manual muscle tests to >/= 4/5 to improve dynamic hip/knee support for functional tasks. (progressing, not met)    3. Pt will tolerate standing/walking for 30 minutes with no increase in knee pain to return to PLOF. (progressing, not met)    4. Pt will improve knee pain to 4/10 at worst for improved QOL. (progressing, not met)          Long Term Goals (8 Weeks):  1. Patient will improve FOTO score to </= 41% limited to decrease perceived limitation with mobility. (progressing, not met)    2. Patient will improve impaired lower extremity manual muscle tests to >/= 4+/5 to improve dynamic hip/knee support for functional tasks. (progressing, not met)    3. Patient will tolerate walking for 60 minutes with no increase in knee pain to return to PLOF. (progressing, not met)         PLAN     Plan of care Certification: 11/13/2024 to 2/13/25    Focus on ROM and LE strength with  emphasis on ADL performance     Outpatient Physical Therapy 3 times weekly for 12 weeks to include the following interventions: Therapeutic Exercises, Manual Therapeutic Technique, Neuromuscular Re Education, Therapeutic Activities. Modalities, Kinesiotape prn, and Functional Dry Needling as needed.    Nilda Zurita, PT

## 2024-12-11 ENCOUNTER — CLINICAL SUPPORT (OUTPATIENT)
Dept: REHABILITATION | Facility: OTHER | Age: 74
End: 2024-12-11
Payer: MEDICARE

## 2024-12-11 ENCOUNTER — HOSPITAL ENCOUNTER (EMERGENCY)
Facility: HOSPITAL | Age: 74
Discharge: HOME | End: 2024-12-11
Attending: STUDENT IN AN ORGANIZED HEALTH CARE EDUCATION/TRAINING PROGRAM
Payer: MEDICARE

## 2024-12-11 VITALS
TEMPERATURE: 98 F | DIASTOLIC BLOOD PRESSURE: 84 MMHG | OXYGEN SATURATION: 100 % | HEART RATE: 83 BPM | SYSTOLIC BLOOD PRESSURE: 176 MMHG | WEIGHT: 137 LBS | RESPIRATION RATE: 17 BRPM | BODY MASS INDEX: 20.76 KG/M2 | HEIGHT: 68 IN

## 2024-12-11 DIAGNOSIS — T81.31XA DEHISCENCE OF OPERATIVE WOUND, INITIAL ENCOUNTER: ICD-10-CM

## 2024-12-11 DIAGNOSIS — Z96.651 S/P TOTAL KNEE REPLACEMENT, RIGHT: Primary | ICD-10-CM
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
## 2024-12-11 DIAGNOSIS — M25.661 DECREASED RANGE OF MOTION (ROM) OF RIGHT KNEE: Primary | ICD-10-CM

## 2024-12-11 DIAGNOSIS — S89.91XA RIGHT KNEE INJURY, INITIAL ENCOUNTER: Primary | ICD-10-CM

## 2024-12-11 LAB
ALBUMIN SERPL BCP-MCNC: 4.2 G/DL (ref 3.5–5.2)
ALP SERPL-CCNC: 78 U/L (ref 40–150)
ALT SERPL W/O P-5'-P-CCNC: 21 U/L (ref 10–44)
ANION GAP SERPL CALC-SCNC: 12 MMOL/L (ref 8–16)
AST SERPL-CCNC: 30 U/L (ref 10–40)
BASOPHILS # BLD AUTO: 0.04 K/UL (ref 0–0.2)
BASOPHILS NFR BLD: 0.6 % (ref 0–1.9)
BILIRUB SERPL-MCNC: 0.5 MG/DL (ref 0.1–1)
BUN SERPL-MCNC: 16 MG/DL (ref 8–23)
CALCIUM SERPL-MCNC: 9.3 MG/DL (ref 8.7–10.5)
CHLORIDE SERPL-SCNC: 107 MMOL/L (ref 95–110)
CO2 SERPL-SCNC: 22 MMOL/L (ref 23–29)
CREAT SERPL-MCNC: 0.7 MG/DL (ref 0.5–1.4)
DIFFERENTIAL METHOD BLD: ABNORMAL
EOSINOPHIL # BLD AUTO: 0.1 K/UL (ref 0–0.5)
EOSINOPHIL NFR BLD: 0.7 % (ref 0–8)
ERYTHROCYTE [DISTWIDTH] IN BLOOD BY AUTOMATED COUNT: 13.3 % (ref 11.5–14.5)
EST. GFR  (NO RACE VARIABLE): >60 ML/MIN/1.73 M^2
GLUCOSE SERPL-MCNC: 96 MG/DL (ref 70–110)
HCT VFR BLD AUTO: 37.3 % (ref 37–48.5)
HCV AB SERPL QL IA: NORMAL
HGB BLD-MCNC: 12.2 G/DL (ref 12–16)
HIV 1+2 AB+HIV1 P24 AG SERPL QL IA: NORMAL
IMM GRANULOCYTES # BLD AUTO: 0.02 K/UL (ref 0–0.04)
IMM GRANULOCYTES NFR BLD AUTO: 0.3 % (ref 0–0.5)
INR PPP: 1 (ref 0.8–1.2)
LYMPHOCYTES # BLD AUTO: 1.2 K/UL (ref 1–4.8)
LYMPHOCYTES NFR BLD: 15.9 % (ref 18–48)
MCH RBC QN AUTO: 28.9 PG (ref 27–31)
MCHC RBC AUTO-ENTMCNC: 32.7 G/DL (ref 32–36)
MCV RBC AUTO: 88 FL (ref 82–98)
MONOCYTES # BLD AUTO: 0.5 K/UL (ref 0.3–1)
MONOCYTES NFR BLD: 6.5 % (ref 4–15)
NEUTROPHILS # BLD AUTO: 5.5 K/UL (ref 1.8–7.7)
NEUTROPHILS NFR BLD: 76 % (ref 38–73)
NRBC BLD-RTO: 0 /100 WBC
PLATELET # BLD AUTO: 273 K/UL (ref 150–450)
PMV BLD AUTO: 9.5 FL (ref 9.2–12.9)
POTASSIUM SERPL-SCNC: 3.8 MMOL/L (ref 3.5–5.1)
PROT SERPL-MCNC: 7.2 G/DL (ref 6–8.4)
PROTHROMBIN TIME: 10.6 SEC (ref 9–12.5)
RBC # BLD AUTO: 4.22 M/UL (ref 4–5.4)
SODIUM SERPL-SCNC: 141 MMOL/L (ref 136–145)
WBC # BLD AUTO: 7.23 K/UL (ref 3.9–12.7)

## 2024-12-11 PROCEDURE — 12001 RPR S/N/AX/GEN/TRNK 2.5CM/<: CPT | Mod: HCNC

## 2024-12-11 PROCEDURE — 86803 HEPATITIS C AB TEST: CPT | Mod: HCNC | Performed by: PHYSICIAN ASSISTANT

## 2024-12-11 PROCEDURE — 87389 HIV-1 AG W/HIV-1&-2 AB AG IA: CPT | Mod: HCNC | Performed by: PHYSICIAN ASSISTANT

## 2024-12-11 PROCEDURE — 97112 NEUROMUSCULAR REEDUCATION: CPT | Mod: KX,HCNC,PN,CQ

## 2024-12-11 PROCEDURE — 25000003 PHARM REV CODE 250: Mod: HCNC

## 2024-12-11 PROCEDURE — 96375 TX/PRO/DX INJ NEW DRUG ADDON: CPT | Mod: HCNC

## 2024-12-11 PROCEDURE — 96365 THER/PROPH/DIAG IV INF INIT: CPT | Mod: HCNC

## 2024-12-11 PROCEDURE — 99284 EMERGENCY DEPT VISIT MOD MDM: CPT | Mod: 25,HCNC

## 2024-12-11 PROCEDURE — 63600175 PHARM REV CODE 636 W HCPCS: Mod: HCNC | Performed by: STUDENT IN AN ORGANIZED HEALTH CARE EDUCATION/TRAINING PROGRAM

## 2024-12-11 PROCEDURE — 85610 PROTHROMBIN TIME: CPT | Mod: HCNC | Performed by: STUDENT IN AN ORGANIZED HEALTH CARE EDUCATION/TRAINING PROGRAM

## 2024-12-11 PROCEDURE — 97530 THERAPEUTIC ACTIVITIES: CPT | Mod: KX,HCNC,PN,CQ

## 2024-12-11 PROCEDURE — 80053 COMPREHEN METABOLIC PANEL: CPT | Mod: HCNC | Performed by: STUDENT IN AN ORGANIZED HEALTH CARE EDUCATION/TRAINING PROGRAM

## 2024-12-11 PROCEDURE — 85025 COMPLETE CBC W/AUTO DIFF WBC: CPT | Mod: HCNC | Performed by: STUDENT IN AN ORGANIZED HEALTH CARE EDUCATION/TRAINING PROGRAM

## 2024-12-11 PROCEDURE — 63600175 PHARM REV CODE 636 W HCPCS: Mod: HCNC

## 2024-12-11 RX ORDER — LIDOCAINE HYDROCHLORIDE 10 MG/ML
20 INJECTION, SOLUTION INFILTRATION; PERINEURAL ONCE
Status: COMPLETED | OUTPATIENT
Start: 2024-12-11 | End: 2024-12-11

## 2024-12-11 RX ORDER — ONDANSETRON HYDROCHLORIDE 2 MG/ML
4 INJECTION, SOLUTION INTRAVENOUS
Status: COMPLETED | OUTPATIENT
Start: 2024-12-11 | End: 2024-12-11

## 2024-12-11 RX ORDER — SULFAMETHOXAZOLE AND TRIMETHOPRIM 800; 160 MG/1; MG/1
1 TABLET ORAL 2 TIMES DAILY
Qty: 20 TABLET | Refills: 0 | Status: SHIPPED | OUTPATIENT
Start: 2024-12-11 | End: 2024-12-17

## 2024-12-11 RX ORDER — MORPHINE SULFATE 4 MG/ML
4 INJECTION, SOLUTION INTRAMUSCULAR; INTRAVENOUS
Status: COMPLETED | OUTPATIENT
Start: 2024-12-11 | End: 2024-12-11

## 2024-12-11 RX ORDER — CEFAZOLIN 2 G/1
2 INJECTION, POWDER, FOR SOLUTION INTRAMUSCULAR; INTRAVENOUS ONCE
Status: COMPLETED | OUTPATIENT
Start: 2024-12-11 | End: 2024-12-11

## 2024-12-11 RX ADMIN — VANCOMYCIN HYDROCHLORIDE 1000 MG: 1 INJECTION, POWDER, LYOPHILIZED, FOR SOLUTION INTRAVENOUS at 08:12

## 2024-12-11 RX ADMIN — LIDOCAINE HYDROCHLORIDE 20 ML: 10 INJECTION, SOLUTION INFILTRATION; PERINEURAL at 07:12

## 2024-12-11 RX ADMIN — MORPHINE SULFATE 4 MG: 4 INJECTION INTRAVENOUS at 07:12

## 2024-12-11 RX ADMIN — ONDANSETRON 4 MG: 2 INJECTION INTRAMUSCULAR; INTRAVENOUS at 07:12

## 2024-12-11 RX ADMIN — CEFAZOLIN 2 G: 2 INJECTION, POWDER, FOR SOLUTION INTRAMUSCULAR; INTRAVENOUS at 07:12

## 2024-12-11 NOTE — PROGRESS NOTES
[facility]   Physical Therapy Daily Treatment Note     Name: Dia Ovalles  Clinic Number: [id number]    Therapy Diagnosis:   Encounter Diagnosis   Name Primary?    Decreased range of motion (ROM) of right knee Yes       Physician: Keagan Oneal MD    Visit Date: 2024    Physician Orders: Physical Therapy Evaluate and Treat  Medical Diagnosis from Referral: right knee OA  Evaluation Date: 24  Authorization Period Expiration: 10/4/25  Plan of Care Expiration: 2024 to 25  Visit # / Visits authorized: 12/10  FOTO: 2/3  on 24    Time In: 1100  Time Out: 1215  Total Billable Time: 38 minutes    SUBJECTIVE     Pt reports: she is able to bend her knee better since coming to PT     She was compliant with home exercise program.  Response to previous treatment: improved knee flexion  Functional change: getting in/out of car is easier    Pain:  Current 4/10, worst 6/10, best 4/10   Location: right knee  Description: Aching  Aggravating Factors: movement  Easing Factors: rest, ice, elevation     Pts goals: Pt would like to return to PLOF with no increase in knee pain.    OBJECTIVE     24    WNL=within normal limits  WFL=within functional limits  NT=not tested  *=pain     Posture: resting knee flexion on R  Palpation: NT  Sensation: NT  Deep tendon reflexes: NT     Observation: incision was dressed with waterproof dressing, nerve block had some bleeding underneath the tegoderm, so we added some gauze and removed the posterior op gauze dressing. There was no sign of drainage or redness        Range of Motion:   Knee Left active Left Passive Right Active R passive   Flexion NT  108   Extension NT NT -3 -1            30 sec STS: 12 reps with upper extremity assist     TU.22 seconds without AD     CMS Impairment/Limitation/Restriction for FOTO Survey     Therapist reviewed FOTO scores for Dia Ovalles on 2024.   FOTO documents entered into [employer] -  "see Media section.     Limitation Score: 64%  Predicted Goal: 41%     Category: Mobility       Treatment     Dia received the treatments listed below in bold:      therapeutic exercises to develop strength, endurance, ROM, and flexibility for 0 minutes including:      Heel slides with strap x 20      manual therapy techniques: Joint mobilizations and Myofacial release were applied to the: right knee for 5 minutes, including:    Patellar mobilization  Infrapatellar fat pad mobilization  Tibiofemoral JM AP glides, Gr II/III  Knee extension mobilization grade IV    neuromuscular re-education activities to improve: Kinesthetic, Sense, and Proprioception for 10 minutes. The following activities were included:    Knee extension props 3# x5mins  NuStep L5 x 8 minutes  Bike full revolutions x 10 mins - seat #8  QS 30x5" hold   SLR x20      therapeutic activities to improve functional performance for 23 minutes, including:    Step ups 6" with slow eccentric step down 30x R/L  Sit to stand 2x10 18" (Seated on [employer])  TRX squats to chair tap 3x10 with slow eccentric phase  Seated knee flexion stretch with overpressure from LLE, 10x10"  Shuttle press 50# 3x10  SL shuttle press 12.5#  Stair training reciprocal pattern 10x  Lateral step down 3x10  Matrix LAQ vs 15# 3x10  Matrix hamstring curls vs 25# 3x10  SAQ vs 3# 3x10  HR 2x20  Squats with upper extremity assist 3x10  Hesitation Marches 10# KB 80ft  Lateral Walk RTB 10#KB 80ft        Patient Education and Home Exercises     Home Exercises Provided and Patient Education Provided     Education provided:   - Findings; prognosis and plan of care (POC)  - Home exercise program (HEP)  - Modality options  - Therapist contact information     Written Home Exercises Provided: Yes  Exercises were reviewed and Dia was able to demonstrate them prior to the end of the session.  Dia demonstrated good understanding of the education provided.     Written Home Exercises Provided: " Patient instructed to cont prior HEP. Exercises were reviewed and Dia was able to demonstrate them prior to the end of the session.  Dia demonstrated good  understanding of the education provided. See EMR under Patient Instructions for exercises provided during therapy sessions    ASSESSMENT     Pt tolerated treatment session well today. ROM with knee flexion and extension is improving. Her glute and quad strength is also showing improved endurance during functional step ups and sit to stand.     Dia Is progressing well towards her goals.   Pt prognosis is Good.     Pt will continue to benefit from skilled outpatient physical therapy to address the deficits listed in the problem list box on initial evaluation, provide pt/family education and to maximize pt's level of independence in the home and community environment.     Pt's spiritual, cultural and educational needs considered and pt agreeable to plan of care and goals.     Anticipated barriers to physical therapy: NA    Goals: GOALS:  Short Term Goals (4 Weeks):  1. Patient will be compliant with home exercise program to supplement therapy in restoring pain free function. (met)    2. Patient will improve impaired lower extremity manual muscle tests to >/= 4/5 to improve dynamic hip/knee support for functional tasks. (progressing, not met)    3. Pt will tolerate standing/walking for 30 minutes with no increase in knee pain to return to PLOF. (progressing, not met)    4. Pt will improve knee pain to 4/10 at worst for improved QOL. (progressing, not met)          Long Term Goals (8 Weeks):  1. Patient will improve FOTO score to </= 41% limited to decrease perceived limitation with mobility. (progressing, not met)    2. Patient will improve impaired lower extremity manual muscle tests to >/= 4+/5 to improve dynamic hip/knee support for functional tasks. (progressing, not met)    3. Patient will tolerate walking for 60 minutes with no increase in knee pain to  return to PLOF. (progressing, not met)         PLAN     Plan of care Certification: 11/13/2024 to 2/13/25    Focus on ROM and LE strength with emphasis on ADL performance     Outpatient Physical Therapy 3 times weekly for 12 weeks to include the following interventions: Therapeutic Exercises, Manual Therapeutic Technique, Neuromuscular Re Education, Therapeutic Activities. Modalities, Kinesiotape prn, and Functional Dry Needling as needed.    oTbias Foster, PTA

## 2024-12-11 NOTE — FIRST PROVIDER EVALUATION
Emergency Department TeleTriage Encounter Note      CHIEF COMPLAINT    Chief Complaint   Patient presents with    Fall     Post knee replacement 4 weeks ago, fell , incision to r knee open and was bleeding, dressing intact with sm amount of blood       VITAL SIGNS   Initial Vitals [12/11/24 1652]   BP Pulse Resp Temp SpO2   (!) 197/89 82 18 97.9 °F (36.6 °C) 100 %      MAP       --            ALLERGIES    Review of patient's allergies indicates:   Allergen Reactions    Codeine Nausea Only       PROVIDER TRIAGE NOTE  Patient with knee replacement 4 weeks ago fell and reports pain to the knee and incision has opened.       ORDERS  Labs Reviewed   HEPATITIS C ANTIBODY   HIV 1 / 2 ANTIBODY       ED Orders (720h ago, onward)      Start Ordered     Status Ordering Provider    12/11/24 1701 12/11/24 1700  Hepatitis C Antibody  STAT         Ordered DOUGLAS BUSTILLOS    12/11/24 1701 12/11/24 1700  HIV 1/2 Ag/Ab (4th Gen)  STAT         Ordered DOUGLAS BUSTILLOS              Virtual Visit Note: The provider triage portion of this emergency department evaluation and documentation was performed via [employer], a HIPAA-compliant telemedicine application, in concert with a tele-presenter in the room. A face to face patient evaluation with one of my colleagues will occur once the patient is placed in an emergency department room.      DISCLAIMER: This note was prepared with [employer] voice recognition transcription software. Garbled syntax, mangled pronouns, and other bizarre constructions may be attributed to that software system.

## 2024-12-12 ENCOUNTER — PATIENT MESSAGE (OUTPATIENT)
Dept: ORTHOPEDICS | Facility: CLINIC | Age: 74
End: 2024-12-12
Payer: MEDICARE

## 2024-12-12 PROBLEM — T81.30XA WOUND DEHISCENCE: Status: ACTIVE | Noted: 2024-12-12

## 2024-12-12 NOTE — ED PROVIDER NOTES
Encounter Date: 12/11/2024       History     Chief Complaint   Patient presents with    Fall     Post knee replacement 4 weeks ago, fell , incision to r knee open and was bleeding, dressing intact with sm amount of blood     HPI    73 yo F w/recent R TKA (11/11/24 [facility]) 2/2 arthritis, presenting after fall, with bleeding from R knee surgical incision.    Patient reports that she has been ambulating independently prior to the fall, attending physical therapy.  Tripped on a hose at a gas station, and hit her right knee and her left chest, no LOC, no anticoagulation use.    Proximal surgical incision on the right knee was torn open, was able to bear weight following the fall, however has not ambulated.      Review of patient's allergies indicates:   Allergen Reactions    Codeine Nausea Only     Past Medical History:   Diagnosis Date    Anemia 12/23/2024    Atrial fibrillation 12/22/2024    Breast cyst     Cancer     SCC x 2 on arms    Cataract     Closed Ortiz's fracture of right radius 09/27/2017    Decreased ROM of right shoulder 03/20/2018    Decreased strength of upper extremity 03/20/2018    Deep vein thrombosis     GERD (gastroesophageal reflux disease)     History of cardiovascular stress test 10/29/2024    Joint pain     Lesion of left upper eyelid 05/02/2023    Nuclear sclerosis, bilateral 05/17/2021    OA (osteoarthritis) of shoulder 03/13/2019    OAB (overactive bladder) 03/08/2017    Osteoporosis 03/08/2017    Other chest pain 05/13/2022    Squamous cell carcinoma of skin 2012    right arm     Past Surgical History:   Procedure Laterality Date    ARTHROPLASTY, KNEE, TOTAL, USING COMPUTER-ASSISTED NAVIGATION  11/11/2024    Procedure: ARTHROPLASTY, KNEE, TOTAL, USING COMPUTER-ASSISTED NAVIGATION;  Surgeon: Keagan Oneal MD;  Location: [facility];  Service: Orthopedics;;    ASD REPAIR      open heart surgery    BREAST BIOPSY      CATARACT EXTRACTION W/  INTRAOCULAR LENS IMPLANT Right 04/19/2021     Procedure: EXTRACTION, CATARACT, WITH IOL INSERTION;  Surgeon: Bruna Silva MD;  Location: [facility] [place] OR;  Service: Ophthalmology;  Laterality: Right;    CATARACT EXTRACTION W/  INTRAOCULAR LENS IMPLANT Left 2021    Procedure: EXTRACTION, CATARACT, WITH IOL INSERTION;  Surgeon: Bruna Silva MD;  Location: [facility] [place] OR;  Service: Ophthalmology;  Laterality: Left;     SECTION      x2    EYE SURGERY      KNEE SURGERY Right     meniscal repair    SKIN BIOPSY  2012    SKIN CANCER EXCISION       Family History   Problem Relation Name Age of Onset    Hypertension Mother      Asthma Mother      Osteoporosis Mother      Hypertension Father      Cancer Father          lung cancer    Heart disease Maternal Grandfather      Stroke Maternal Grandfather      Breast cancer Neg Hx      Colon cancer Neg Hx      Ovarian cancer Neg Hx       Social History     Tobacco Use    Smoking status: Never     Passive exposure: Never    Smokeless tobacco: Never   Substance Use Topics    Alcohol use: Yes     Alcohol/week: 4.0 standard drinks of alcohol     Types: 4 Glasses of wine per week     Comment: social    Drug use: Yes     Types: Marijuana     Comment: gummies 2 days ago     Review of Systems    Physical Exam     Initial Vitals [24 1652]   BP Pulse Resp Temp SpO2   (!) 197/89 82 18 97.9 °F (36.6 °C) 100 %      MAP       --         Physical Exam    Nursing note and vitals reviewed.  Constitutional: No distress.   HENT:   Head: Atraumatic. Mouth/Throat: Oropharynx is clear and moist and mucous membranes are normal.   Eyes: Right conjunctiva is not injected. Left conjunctiva is not injected. No scleral icterus.   Cardiovascular:  Normal heart sounds.           Pulmonary/Chest: Effort normal and breath sounds normal. No respiratory distress.   Abdominal: Abdomen is soft. She exhibits no distension. There is no abdominal tenderness.   Musculoskeletal:      Comments: Complete wound dehiscence of superior surgical incision on right  knee, oozing, no pulsatile bleeding (unable to upload pictures today due to EHR malfunction)     Neurological: No cranial nerve deficit. Gait normal.   Skin: Skin is warm. No ecchymosis and no rash noted.         ED Course   Procedures  Labs Reviewed   CBC W/ AUTO DIFFERENTIAL - Abnormal       Result Value    WBC 7.23      RBC 4.22      Hemoglobin 12.2      Hematocrit 37.3      MCV 88      MCH 28.9      MCHC 32.7      RDW 13.3      Platelets 273      MPV 9.5      Immature Granulocytes 0.3      Gran # (ANC) 5.5      Immature Grans (Abs) 0.02      Lymph # 1.2      Mono # 0.5      Eos # 0.1      Baso # 0.04      nRBC 0      Gran % 76.0 (*)     Lymph % 15.9 (*)     Mono % 6.5      Eosinophil % 0.7      Basophil % 0.6      Differential Method Automated     COMPREHENSIVE METABOLIC PANEL - Abnormal    Sodium 141      Potassium 3.8      Chloride 107      CO2 22 (*)     Glucose 96      BUN 16      Creatinine 0.7      Calcium 9.3      Total Protein 7.2      Albumin 4.2      Total Bilirubin 0.5      Alkaline Phosphatase 78      AST 30      ALT 21      eGFR >60.0      Anion Gap 12     HEPATITIS C ANTIBODY    Hepatitis C Ab Non-reactive      Narrative:     Release to patient->Immediate   HIV 1 / 2 ANTIBODY    HIV 1/2 Ag/Ab Non-reactive      Narrative:     Release to patient->Immediate   PROTIME-INR    Prothrombin Time 10.6      INR 1.0            Imaging Results              X-Ray Knee 3 View Right (Final result)  Result time 12/11/24 19:51:50      Final result by Keagan Camarena MD (12/11/24 19:51:50)                   Impression:      Right knee localized soft tissue swelling/contusion with suspected nonspecific suprapatellar joint effusion.    Right TKA without acute hardware malalignment or loosening or displaced fracture-dislocation identified.      Electronically signed by: Keagan Camarena MD  Date:    12/11/2024  Time:    19:51               Narrative:    EXAMINATION:  XR KNEE 3 VIEW RIGHT    CLINICAL HISTORY:  Unspecified  injury of right lower leg, initial encounter    TECHNIQUE:  AP, lateral, and Merchant views of the right knee were performed.    COMPARISON:  Right knee series 11/11/2024    FINDINGS:  Evidence of previous right TKA demonstrating anatomic positioning and alignment.  No displaced fracture, dislocation or destructive osseous process.  Generalized osteopenia.  There is suspected nonspecific suprapatellar joint effusion as well as nonspecific soft tissue swelling about the knee, particularly along the anterior aspect.  Previous midline skin staples have been removed and resolution of previous scattered subcutaneous gas about the knee.                                       Medications   ceFAZolin 2 g (2 g Intravenous Given 12/11/24 1929)   morphine injection 4 mg (4 mg Intravenous Given 12/11/24 1928)   ondansetron injection 4 mg (4 mg Intravenous Given 12/11/24 1928)   vancomycin (VANCOCIN) 1,000 mg in 0.9% NaCl 250 mL IVPB (admixture device) (0 mg Intravenous Stopped 12/11/24 2149)   LIDOcaine HCL 10 mg/ml (1%) injection 20 mL (20 mLs Intradermal Given by Provider 12/11/24 1939)   LIDOcaine HCL 10 mg/ml (1%) injection 20 mL (20 mLs Intradermal Given by Provider 12/11/24 1938)     Medical Decision Making    75 yo F w/recent R TKA (11/11/24 [facility]) 2/2 arthritis, presenting after fall, with bleeding from R knee surgical incision.      Differential diagnosis includes but is not limited to: traumatic wound dehiscence +/- arthrotomy, fracture, ligamental injury    Labs obtained today with no abnormalities on CBC, CMP for patient. Ancef 2g, vanc given IV, morphine for pain control. XR with no fracture or dislocation, with suprapatellar effusion visualized.    Discussed case with orthopedics who cleaned and irrigated and repaired wound, did not believe there to be joint involvement, discharged home with oral antibiotics as per their recommendations, no further indication for imaging or intervention. Return precautions given  for signs of infection, dehiscence.    I discussed with the patient/family the diagnosis, treatment plan, indications for return to the emergency department, as well as for expected follow-up. The patient/family verbalized an understanding. The patient/family is asked if there were any questions or concerns, which were addressed to patient/family satisfaction. Patient/family understands and is agreeable to the plan.           Amount and/or Complexity of Data Reviewed  Labs: ordered.    Risk  Prescription drug management.               ED Course as of 12/23/24 2230   Wed Dec 11, 2024   1923 Ortho consulted, at bedside to assess for possible joint involvement given extent of dehiscence [LF]      ED Course User Index  [LF] Christina Domingo MD                           Clinical Impression:  Final diagnoses:  [T81.31XA] Dehiscence of operative wound, initial encounter  [W19.XXXA] Fall, initial encounter          ED Disposition Condition    Discharge Stable          ED Prescriptions       Medication Sig Dispense Start Date End Date Auth. Provider    sulfamethoxazole-trimethoprim 800-160mg (BACTRIM DS) 800-160 mg Tab () Take 1 tablet by mouth 2 (two) times daily. for 10 days 20 tablet 2024 Christina Domingo MD          Follow-up Information       Follow up With Specialties Details Why Contact Info    Keagan Oneal MD Orthopedic Surgery Go to  For wound re-check [facility] [facility] [address]  [phone]               Christina Domingo MD  24

## 2024-12-12 NOTE — ED TRIAGE NOTES
Patient arrived via personal transport for the chief complaint of a fall. The patient had a knee replacement four weeks ago on the right knee. The patient had a mechanical trip and fall and landed on right knee. The patient did not LOC or hit head and is not on blood thinners. The patient had a small amount of bleeding initially, bleeding now controlled.

## 2024-12-12 NOTE — HPI
Dia Ovalles is a 74 y.o. female who had a R TKA with Dr. Oneal on 11/11/24. She had been progressing well with PT with no issues. On 12/11, patient tripped on a hose and fell directly onto her R knee and sustained a superficial wound dehiscence. Patient denies any head trauma or LOC. Patient denies numbness and tingling. Denies any other musculoskeletal pain or injuries. Patient received 2g of ancef and 1g of vanc in ED.

## 2024-12-12 NOTE — DISCHARGE INSTRUCTIONS
You were seen in the ER today after your fall, due to the surgical incision opening up on your right knee.  Please take Bactrim as prescribed for the next 10 days, follow up with Orthopedic surgery in clinic as planned.  If you develop any drainage, worsening pain, fever/chills/nausea/vomiting from the incision, please return immediately to the ER.    Thank you for coming to our Emergency Department today. It is important to remember that some problems or medical conditions are difficult to diagnose and may not be found or addressed during your Emergency Department visit.  These conditions often start with non-specific symptoms and can only be diagnosed on follow up visits with your primary care physician or specialist when the symptoms continue or change. Please remember that all medical conditions can change, and we cannot predict how you will be feeling tomorrow or the next day. Return to the ER with any questions/concerns, new/concerning symptoms, worsening or failure to improve.       Be sure to follow up with your primary care doctor and review all labs/imaging/tests that were performed during your ER visit with them. It is very common for us to identify non-emergent incidental findings which must be followed up with your primary care physician.  Some labs/imaging/tests may be outside of the normal range, and require non-emergent follow-up and/or further investigation/treatment/procedures/testing to help diagnose/exclude/prevent complications or other potentially serious medical conditions. Some abnormalities may not have been discussed or addressed during your ER visit. Some lab results may not return during your ER visit but can be accessible by downloading the free [facility] My[facility] ritika or by visiting [email or web address] . It is important for you to review all labs/imaging/tests which are outside of the normal range with your physician.    An ER visit does not replace a primary care visit, and many  screening tests or follow-up tests cannot be ordered by an ER doctor or performed by the ER. Some tests may even require pre-approval.    If you do not have a primary care doctor, you may contact the one listed on your discharge paperwork or you may also call the [facility] Appointment Desk at [phone] , or [email or web address] at  [phone] to schedule an appointment, or establish care with a primary care doctor or even a specialist and to obtain information about local resources. It is important to your health that you have a primary care doctor.    Please take all medications as directed. We have done our best to select a medication for you that will treat your condition however, all medications may potentially have side-effects and it is impossible to predict which medications may give you side-effects or what those side-effects (if any) those medications may give you.  If you feel that you are having a negative effect or side-effect of any medication you should stop taking those medications immediately and seek medical attention. If you feel that you are having a life-threatening reaction call 911.        Do not drive, swim, climb to height, take a bath, operate heavy machinery, drink alcohol or take potentially sedating medications, sign any legal documents or make any important decisions for 24 hours if you have received any pain medications, sedatives or mood altering drugs during your ER visit or within 24 hours of taking them if they have been prescribed to you.     You can find additional resources for Dentists, hearing aids, durable medical equipment, low cost pharmacies and other resources at [email or web address]

## 2024-12-12 NOTE — SUBJECTIVE & OBJECTIVE
Past Medical History:   Diagnosis Date    Breast cyst     Cancer     SCC x 2 on arms    Cataract     Closed Ortiz's fracture of right radius 2017    Decreased ROM of right shoulder 2018    Decreased strength of upper extremity 2018    Deep vein thrombosis     GERD (gastroesophageal reflux disease)     History of cardiovascular stress test 10/29/2024    Joint pain     Lesion of left upper eyelid 2023    Nuclear sclerosis, bilateral 2021    OA (osteoarthritis) of shoulder 2019    OAB (overactive bladder) 2017    Osteoporosis 2017    Other chest pain 2022    Squamous cell carcinoma of skin 2012    right arm       Past Surgical History:   Procedure Laterality Date    ARTHROPLASTY, KNEE, TOTAL, USING COMPUTER-ASSISTED NAVIGATION  2024    Procedure: ARTHROPLASTY, KNEE, TOTAL, USING COMPUTER-ASSISTED NAVIGATION;  Surgeon: Keagan Oneal MD;  Location: [facility] OR;  Service: Orthopedics;;    ASD REPAIR      open heart surgery    BREAST BIOPSY      CATARACT EXTRACTION W/  INTRAOCULAR LENS IMPLANT Right 2021    Procedure: EXTRACTION, CATARACT, WITH IOL INSERTION;  Surgeon: Bruna Silva MD;  Location: [facility] OR;  Service: Ophthalmology;  Laterality: Right;    CATARACT EXTRACTION W/  INTRAOCULAR LENS IMPLANT Left 2021    Procedure: EXTRACTION, CATARACT, WITH IOL INSERTION;  Surgeon: Bruna Silva MD;  Location: [facility] OR;  Service: Ophthalmology;  Laterality: Left;     SECTION      x2    EYE SURGERY      KNEE SURGERY Right     meniscal repair    SKIN BIOPSY  2012    SKIN CANCER EXCISION         Review of patient's allergies indicates:   Allergen Reactions    Codeine Nausea Only       No current facility-administered medications for this encounter.     Current Outpatient Medications   Medication Sig    acetaminophen (TYLENOL) 650 MG TbSR Take 1 tablet (650 mg total) by mouth every 8 (eight) hours.    aspirin (ECOTRIN) 81 MG EC tablet Take 1 tablet (81  mg total) by mouth 2 (two) times a day.    biotin 1 mg tablet Take 5,000 mcg by mouth once daily.    calcium carbonate (CALCIUM 600 ORAL) Take 1,200 mg by mouth once daily.     celecoxib (CELEBREX) 200 MG capsule Take 1 capsule (200 mg total) by mouth once daily.    celecoxib (CELEBREX) 200 MG capsule Take 1 capsule (200 mg total) by mouth 2 (two) times daily as needed for Pain (knee pain).    cholecalciferol, vitamin D3, (VITAMIN D3) 50 mcg (2,000 unit) Cap Take 1,000 Units by mouth every other day.     clobetasoL (TEMOVATE) 0.05 % external solution Use on scalp one - two times daily as needed for scaling or itching (Patient not taking: Reported on 10/29/2024)    DULoxetine (CYMBALTA) 20 MG capsule TAKE 2 CAPSULES ONE TIME DAILY    magnesium oxide (MAG-OX) 400 mg (241.3 mg magnesium) tablet Take 400 mg by mouth once daily.    methocarbamoL (ROBAXIN) 750 MG Tab Take 1 tablet (750 mg total) by mouth 4 (four) times daily as needed (for muscle spasms).    multivitamin (THERAGRAN) per tablet Take 1 tablet by mouth once daily.    oxybutynin (DITROPAN-XL) 10 MG 24 hr tablet TAKE 1 TABLET EVERY DAY    oxyCODONE (ROXICODONE) 5 MG immediate release tablet Take 1 to 2 tablets by mouth  every 4 to 6 hours as needed for pain    pantoprazole (PROTONIX) 40 MG tablet Take 1 tablet (40 mg total) by mouth once daily.    raloxifene (EVISTA) 60 mg tablet Take 1 tablet (60 mg total) by mouth once daily.    senna-docusate 8.6-50 mg (SENNA WITH DOCUSATE SODIUM) 8.6-50 mg per tablet Take 1 tablet by mouth once daily.    sulfamethoxazole-trimethoprim 800-160mg (BACTRIM DS) 800-160 mg Tab Take 1 tablet by mouth 2 (two) times daily. for 10 days    UNKNOWN TO PATIENT Nutrafol- hair growth supplement     Family History       Problem Relation (Age of Onset)    Asthma Mother    Cancer Father    Heart disease Maternal Grandfather    Hypertension Mother, Father    Osteoporosis Mother    Stroke Maternal Grandfather          Tobacco Use    Smoking  "status: Never     Passive exposure: Never    Smokeless tobacco: Never   Substance and Sexual Activity    Alcohol use: Yes     Alcohol/week: 4.0 standard drinks of alcohol     Types: 4 Glasses of wine per week     Comment: social    Drug use: Yes     Types: Marijuana     Comment: gummies 2 days ago    Sexual activity: Not Currently     Partners: Male     Birth control/protection: Post-menopausal     ROS  Constitutional: negative for fevers  Eyes: negative visual changes  ENT: negative for hearing loss  Respiratory: negative for dyspnea  Cardiovascular: negative for chest pain  Gastrointestinal: negative for abdominal pain  Genitourinary: negative for dysuria  Neurological: negative for headaches  Behavioral/Psych: negative for hallucinations  Endocrine: negative for temperature intolerance    Objective:     Vital Signs (Most Recent):  Temp: 98.1 °F (36.7 °C) (12/11/24 2300)  Pulse: 83 (12/11/24 2300)  Resp: 17 (12/11/24 2300)  BP: (!) 176/84 (12/11/24 2300)  SpO2: 100 % (12/11/24 2300) Vital Signs (24h Range):  Temp:  [97.9 °F (36.6 °C)-98.1 °F (36.7 °C)] 98.1 °F (36.7 °C)  Pulse:  [82-83] 83  Resp:  [17-18] 17  SpO2:  [100 %] 100 %  BP: (176-197)/(84-89) 176/84     Weight: 62.1 kg (137 lb)  Height: 5' 8" (172.7 cm)  Body mass index is 20.83 kg/m².    No intake or output data in the 24 hours ending 12/12/24 0624     Ortho/SPM Exam  General:  no acute distress, appears stated age   Neuro: alert and oriented x3  Psych: normal mood  Head: normocephalic, atraumatic.  Eyes: no scleral icterus  Mouth: moist mucous membranes  Cardiovascular: extremities warm and well perfused  Lungs: breathing comfortably, equal chest rise bilat      MSK:  RLE:  - Superficial wound dehiscence over anterior aspect of knee  - No hardware visible  - Mild swelling to knee  - No ecchymosis, erythema, or signs of cellulitis  - TTP over anterior knee  - AROM and PROM of the hip, ankle, and foot intact without pain  - Pain with any ROM of knee  - " Extensor mechanism is intact  - TA/EHL/Gastroc/FHL assessed in isolation without deficit  - SILT throughout  - Compartments soft  - DP palpated  - Capillary Refill <3s           Significant Labs: CBC:   Recent Labs   Lab 12/11/24 1919   WBC 7.23   HGB 12.2   HCT 37.3        CMP:   Recent Labs   Lab 12/11/24 1919      K 3.8      CO2 22*   GLU 96   BUN 16   CREATININE 0.7   CALCIUM 9.3   PROT 7.2   ALBUMIN 4.2   BILITOT 0.5   ALKPHOS 78   AST 30   ALT 21   ANIONGAP 12     All pertinent labs within the past 24 hours have been reviewed.    Significant Imaging: I have reviewed and interpreted all pertinent imaging results/findings.  XR R knee: No acute fracture or dislocation. Hardware appears stable. No evidence of hardware loosening.

## 2024-12-12 NOTE — CONSULTS
[facility] - Emergency Dept  Orthopedics  Consult Note    Patient Name: Dia Ovalles  MRN: [id number]  Admission Date: 12/11/2024  Hospital Length of Stay: 0 days  Attending Provider: No att. providers found  Primary Care Provider: Shelley Leija MD        Consults  Subjective:     Principal Problem:Wound dehiscence    Chief Complaint:   Chief Complaint   Patient presents with    Fall     Post knee replacement 4 weeks ago, fell , incision to r knee open and was bleeding, dressing intact with sm amount of blood        HPI: Dia Ovalles is a 74 y.o. female who had a R TKA with Dr. Oneal on 11/11/24. She had been progressing well with PT with no issues. On 12/11, patient tripped on a hose and fell directly onto her R knee and sustained a superficial wound dehiscence. Patient denies any head trauma or LOC. Patient denies numbness and tingling. Denies any other musculoskeletal pain or injuries. Patient received 2g of ancef and 1g of vanc in ED.         Past Medical History:   Diagnosis Date    Breast cyst     Cancer     SCC x 2 on arms    Cataract     Closed Ortiz's fracture of right radius 09/27/2017    Decreased ROM of right shoulder 03/20/2018    Decreased strength of upper extremity 03/20/2018    Deep vein thrombosis     GERD (gastroesophageal reflux disease)     History of cardiovascular stress test 10/29/2024    Joint pain     Lesion of left upper eyelid 05/02/2023    Nuclear sclerosis, bilateral 05/17/2021    OA (osteoarthritis) of shoulder 03/13/2019    OAB (overactive bladder) 03/08/2017    Osteoporosis 03/08/2017    Other chest pain 05/13/2022    Squamous cell carcinoma of skin 2012    right arm       Past Surgical History:   Procedure Laterality Date    ARTHROPLASTY, KNEE, TOTAL, USING COMPUTER-ASSISTED NAVIGATION  11/11/2024    Procedure: ARTHROPLASTY, KNEE, TOTAL, USING COMPUTER-ASSISTED NAVIGATION;  Surgeon: Keagan Oneal MD;  Location: [facility] OR;  Service: Orthopedics;;    ASD REPAIR      open heart  surgery    BREAST BIOPSY      CATARACT EXTRACTION W/  INTRAOCULAR LENS IMPLANT Right 2021    Procedure: EXTRACTION, CATARACT, WITH IOL INSERTION;  Surgeon: Bruna Silva MD;  Location: [facility] OR;  Service: Ophthalmology;  Laterality: Right;    CATARACT EXTRACTION W/  INTRAOCULAR LENS IMPLANT Left 2021    Procedure: EXTRACTION, CATARACT, WITH IOL INSERTION;  Surgeon: Bruna Silva MD;  Location: [facility] OR;  Service: Ophthalmology;  Laterality: Left;     SECTION      x2    EYE SURGERY      KNEE SURGERY Right     meniscal repair    SKIN BIOPSY  2012    SKIN CANCER EXCISION         Review of patient's allergies indicates:   Allergen Reactions    Codeine Nausea Only       No current facility-administered medications for this encounter.     Current Outpatient Medications   Medication Sig    acetaminophen (TYLENOL) 650 MG TbSR Take 1 tablet (650 mg total) by mouth every 8 (eight) hours.    aspirin (ECOTRIN) 81 MG EC tablet Take 1 tablet (81 mg total) by mouth 2 (two) times a day.    biotin 1 mg tablet Take 5,000 mcg by mouth once daily.    calcium carbonate (CALCIUM 600 ORAL) Take 1,200 mg by mouth once daily.     celecoxib (CELEBREX) 200 MG capsule Take 1 capsule (200 mg total) by mouth once daily.    celecoxib (CELEBREX) 200 MG capsule Take 1 capsule (200 mg total) by mouth 2 (two) times daily as needed for Pain (knee pain).    cholecalciferol, vitamin D3, (VITAMIN D3) 50 mcg (2,000 unit) Cap Take 1,000 Units by mouth every other day.     clobetasoL (TEMOVATE) 0.05 % external solution Use on scalp one - two times daily as needed for scaling or itching (Patient not taking: Reported on 10/29/2024)    DULoxetine (CYMBALTA) 20 MG capsule TAKE 2 CAPSULES ONE TIME DAILY    magnesium oxide (MAG-OX) 400 mg (241.3 mg magnesium) tablet Take 400 mg by mouth once daily.    methocarbamoL (ROBAXIN) 750 MG Tab Take 1 tablet (750 mg total) by mouth 4 (four) times daily as needed (for muscle spasms).    multivitamin  (THERAGRAN) per tablet Take 1 tablet by mouth once daily.    oxybutynin (DITROPAN-XL) 10 MG 24 hr tablet TAKE 1 TABLET EVERY DAY    oxyCODONE (ROXICODONE) 5 MG immediate release tablet Take 1 to 2 tablets by mouth  every 4 to 6 hours as needed for pain    pantoprazole (PROTONIX) 40 MG tablet Take 1 tablet (40 mg total) by mouth once daily.    raloxifene (EVISTA) 60 mg tablet Take 1 tablet (60 mg total) by mouth once daily.    senna-docusate 8.6-50 mg (SENNA WITH DOCUSATE SODIUM) 8.6-50 mg per tablet Take 1 tablet by mouth once daily.    sulfamethoxazole-trimethoprim 800-160mg (BACTRIM DS) 800-160 mg Tab Take 1 tablet by mouth 2 (two) times daily. for 10 days    UNKNOWN TO PATIENT Nutrafol- hair growth supplement     Family History       Problem Relation (Age of Onset)    Asthma Mother    Cancer Father    Heart disease Maternal Grandfather    Hypertension Mother, Father    Osteoporosis Mother    Stroke Maternal Grandfather          Tobacco Use    Smoking status: Never     Passive exposure: Never    Smokeless tobacco: Never   Substance and Sexual Activity    Alcohol use: Yes     Alcohol/week: 4.0 standard drinks of alcohol     Types: 4 Glasses of wine per week     Comment: social    Drug use: Yes     Types: Marijuana     Comment: gummies 2 days ago    Sexual activity: Not Currently     Partners: Male     Birth control/protection: Post-menopausal     ROS  Constitutional: negative for fevers  Eyes: negative visual changes  ENT: negative for hearing loss  Respiratory: negative for dyspnea  Cardiovascular: negative for chest pain  Gastrointestinal: negative for abdominal pain  Genitourinary: negative for dysuria  Neurological: negative for headaches  Behavioral/Psych: negative for hallucinations  Endocrine: negative for temperature intolerance    Objective:     Vital Signs (Most Recent):  Temp: 98.1 °F (36.7 °C) (12/11/24 2300)  Pulse: 83 (12/11/24 2300)  Resp: 17 (12/11/24 2300)  BP: (!) 176/84 (12/11/24 2300)  SpO2: 100  "% (12/11/24 2300) Vital Signs (24h Range):  Temp:  [97.9 °F (36.6 °C)-98.1 °F (36.7 °C)] 98.1 °F (36.7 °C)  Pulse:  [82-83] 83  Resp:  [17-18] 17  SpO2:  [100 %] 100 %  BP: (176-197)/(84-89) 176/84     Weight: 62.1 kg (137 lb)  Height: 5' 8" (172.7 cm)  Body mass index is 20.83 kg/m².    No intake or output data in the 24 hours ending 12/12/24 0624     Ortho/SPM Exam  General:  no acute distress, appears stated age   Neuro: alert and oriented x3  Psych: normal mood  Head: normocephalic, atraumatic.  Eyes: no scleral icterus  Mouth: moist mucous membranes  Cardiovascular: extremities warm and well perfused  Lungs: breathing comfortably, equal chest rise bilat      MSK:  RLE:  - Superficial wound dehiscence over anterior aspect of knee  - No hardware visible  - Mild swelling to knee  - No ecchymosis, erythema, or signs of cellulitis  - TTP over anterior knee  - AROM and PROM of the hip, ankle, and foot intact without pain  - Pain with any ROM of knee  - Extensor mechanism is intact  - TA/EHL/Gastroc/FHL assessed in isolation without deficit  - SILT throughout  - Compartments soft  - DP palpated  - Capillary Refill <3s           Significant Labs: CBC:   Recent Labs   Lab 12/11/24 1919   WBC 7.23   HGB 12.2   HCT 37.3        CMP:   Recent Labs   Lab 12/11/24 1919      K 3.8      CO2 22*   GLU 96   BUN 16   CREATININE 0.7   CALCIUM 9.3   PROT 7.2   ALBUMIN 4.2   BILITOT 0.5   ALKPHOS 78   AST 30   ALT 21   ANIONGAP 12     All pertinent labs within the past 24 hours have been reviewed.    Significant Imaging: I have reviewed and interpreted all pertinent imaging results/findings.  XR R knee: No acute fracture or dislocation. Hardware appears stable. No evidence of hardware loosening.   Assessment/Plan:     * Wound dehiscence  Dia Ovalles is a 74 y.o. female s/p R TKA with Dr. Oneal on 11/11 who presents with a superficial wound dehiscence over the anterior aspect of knee. Wound was irrigated, " explored and closed. See procedure note for further details. No visible hardware. Did not appear to track any deeper than the dermis.     - Leave dressing dry and intact  - Do not soak incision  - Bactrim BID x 14 days  -Patient has scheduled f/u on 12/20    Procedure Note: Laceration Repair Right knee  After time out was performed and patient ID, side, and site were verified, the right knee was sterilly prepped in the standard fashion.  20 mL's of 1% lidocaine were injected around the site with a 25-gauge needle. After adequate analgesia was obtained, the wound was irrigated using 4L dilute betadine, peroxide and CHG solution. Wound was then examined. Examination showed superficial wound dehiscence and did not appear to track deep into the joint.. At this point, the wound was primarily closed using 3 - 0 Ethilon. The patient tolerated the procedure well with no complications.  Blood loss was minimal.                  KIM Walters MD  Orthopedics  [facility] - Emergency Dept

## 2024-12-12 NOTE — ASSESSMENT & PLAN NOTE
Dia Ovalles is a 74 y.o. female s/p R TKA with Dr. Oneal on 11/11 who presents with a superficial wound dehiscence over the anterior aspect of knee. Wound was irrigated, explored and closed. See procedure note for further details. No visible hardware. Did not appear to track any deeper than the dermis.     - Leave dressing dry and intact  - Do not soak incision  - Bactrim BID x 14 days  -Patient has scheduled f/u on 12/20    Procedure Note: Laceration Repair Right knee  After time out was performed and patient ID, side, and site were verified, the right knee was sterilly prepped in the standard fashion.  20 mL's of 1% lidocaine were injected around the site with a 25-gauge needle. After adequate analgesia was obtained, the wound was irrigated using 4L dilute betadine, peroxide and CHG solution. Wound was then examined. Examination showed superficial wound dehiscence and did not appear to track deep into the joint.. At this point, the wound was primarily closed using 3 - 0 Ethilon. The patient tolerated the procedure well with no complications.  Blood loss was minimal.

## 2024-12-16 ENCOUNTER — OFFICE VISIT (OUTPATIENT)
Dept: ENDOCRINOLOGY | Facility: CLINIC | Age: 74
End: 2024-12-16
Payer: MEDICARE

## 2024-12-16 DIAGNOSIS — M81.0 OSTEOPOROSIS, UNSPECIFIED OSTEOPOROSIS TYPE, UNSPECIFIED PATHOLOGICAL FRACTURE PRESENCE: Primary | ICD-10-CM

## 2024-12-16 PROCEDURE — 1125F AMNT PAIN NOTED PAIN PRSNT: CPT | Mod: HCNC,CPTII,95, | Performed by: INTERNAL MEDICINE

## 2024-12-16 PROCEDURE — 3044F HG A1C LEVEL LT 7.0%: CPT | Mod: HCNC,CPTII,95, | Performed by: INTERNAL MEDICINE

## 2024-12-16 PROCEDURE — 99214 OFFICE O/P EST MOD 30 MIN: CPT | Mod: HCNC,95,, | Performed by: INTERNAL MEDICINE

## 2024-12-16 PROCEDURE — 1159F MED LIST DOCD IN RCRD: CPT | Mod: HCNC,CPTII,95, | Performed by: INTERNAL MEDICINE

## 2024-12-16 PROCEDURE — 1100F PTFALLS ASSESS-DOCD GE2>/YR: CPT | Mod: HCNC,CPTII,95, | Performed by: INTERNAL MEDICINE

## 2024-12-16 PROCEDURE — 3288F FALL RISK ASSESSMENT DOCD: CPT | Mod: HCNC,CPTII,95, | Performed by: INTERNAL MEDICINE

## 2024-12-16 PROCEDURE — 1157F ADVNC CARE PLAN IN RCRD: CPT | Mod: HCNC,CPTII,95, | Performed by: INTERNAL MEDICINE

## 2024-12-16 RX ORDER — RALOXIFENE HYDROCHLORIDE 60 MG/1
60 TABLET, FILM COATED ORAL DAILY
Qty: 90 TABLET | Refills: 3 | Status: SHIPPED | OUTPATIENT
Start: 2024-12-16

## 2024-12-16 NOTE — PROGRESS NOTES
Audiovisual Virtual Visit Established Patient    Subjective:      Chief Complaint: Osteoporosis      HPI: Dia Ovalles is a 74 y.o. female who is seen for a follow up evaluation via audiovisual telemedicine for osteoporosis    Reviewed past medical, family, social history and updated as appropriate.    Knee replacement one month ago   Denies fractures   Falls:   Tripped on the gasoline hose  Tripped  over a side walk.  Balance isnot great.   Sill fully engaged in PT   Not able to resume Yoga regularly yet.      Currently on evista 60 mg daily, started treatment in 2014. Tolerating raloxifene well. Denies DVT, hot flashing.   High risk meds:   Pantoprazole      Denies height loss or back pain.      Previously used:   alendronate 70 mg once weekly for ten or more years     Supplements:   Vitamin D and calcium chewable daily 650 mg ad 500 IUs (12.5 mcg)  MVI chewable (2000 IUs daily)   Vitamin D3 1000 IUs daily    Lab Results   Component Value Date    [id number] 72 04/01/2021    [id number] 70 03/12/2020          Exercise:  Yoga  Walking regularly     DXA:   2023: T score -2.9 at LS FRAX 11 and 2.7%  3/2021: osteoporosis of the lumbar spine, no changes when compared to previous, FRAX is 18% and 6.9%.   3/2019: Osteoporosis of the lumbar spine. ()   3/2017: osteoporosis of the lumbar spine ([facility])     Review of Systems  No recent illness    Objective:     BP Readings from Last 5 Encounters:   12/11/24 (!) 176/84   11/25/24 120/70   11/11/24 (!) 107/55   10/29/24 111/68   07/22/24 117/77       Physical exam was not performed and vitals were not obtained due to this being a telemedicine (virtual) visit.    Wt Readings from Last 10 Encounters:   12/11/24 1652 62.1 kg (137 lb)   11/25/24 1114 63.5 kg (139 lb 15.9 oz)   11/11/24 0546 62.1 kg (137 lb)   10/29/24 1307 63.3 kg (139 lb 8.8 oz)   10/03/24 1310 62.7 kg (138 lb 3.7 oz)   06/04/24 1444 62 kg (136 lb 11 oz)   05/29/24 1412 62 kg (136 lb 9.2 oz)   05/07/24  "0930 60.7 kg (133 lb 13.1 oz)   11/06/23 1222 64.2 kg (141 lb 8.6 oz)   10/03/23 1324 62.9 kg (138 lb 10.7 oz)       Lab Results   Component Value Date    HGBA1C 5.3 05/03/2024    HGBA1C 5.1 04/28/2023    HGBA1C 5.2 04/13/2022    HGBA1C 5.2 04/01/2021     Lab Results   Component Value Date    CHOL 179 05/03/2024    CHOL 160 04/28/2023    HDL 63 05/03/2024    HDL 58 04/28/2023    LDLCALC 104.4 05/03/2024    LDLCALC 94.6 04/28/2023    TRIG 58 05/03/2024    TRIG 37 04/28/2023    CHOLHDL 35.2 05/03/2024    CHOLHDL 36.3 04/28/2023     Lab Results   Component Value Date     12/11/2024    K 3.8 12/11/2024     12/11/2024    CO2 22 (L) 12/11/2024    GLU 96 12/11/2024    BUN 16 12/11/2024    CREATININE 0.7 12/11/2024    CALCIUM 9.3 12/11/2024    PROT 7.2 12/11/2024    ALBUMIN 4.2 12/11/2024    BILITOT 0.5 12/11/2024    ALKPHOS 78 12/11/2024    AST 30 12/11/2024    ALT 21 12/11/2024    ANIONGAP 12 12/11/2024    ESTGFRAFRICA >60 04/13/2022    EGFRNONAA >60 04/13/2022    TSH 1.514 05/03/2024      No results found for: "LABMICR", "CREATRANDUR", "MICALBCREAT"    Assessment/Plan:     Osteoporosis  Risk factors:   Postmenopausal, family history of osteoporosis, two years of MHT  Current treatment: raloxifene daily   Balance is good  Continue yoga for balance, continue walking     Vitamin D 2500 IUs daily   Continue calcium daily via diet     DXA scan 6/2025 [facility]   FRAX is not high    Other options include IV BP versus prolia -- for now wants to continue evista  Age may be a factor in switching medications as she gets closer to 79      The patient location is: [place]  The chief complaint leading to consultation is: osteo  Visit type: Virtual visit with synchronous audio and video  Total time spent with patient: 15    RTC in 6 months      Noemi Florian MD    "

## 2024-12-16 NOTE — ASSESSMENT & PLAN NOTE
Risk factors:   Postmenopausal, family history of osteoporosis, two years of MHT  Current treatment: raloxifene daily   Balance is good  Continue yoga for balance, continue walking     Vitamin D 2500 IUs daily   Continue calcium daily via diet     DXA scan 6/2025 [facility]   FRAX is not high    Other options include IV BP versus prolia -- for now wants to continue evista  Age may be a factor in switching medications as she gets closer to 79

## 2024-12-17 ENCOUNTER — HOSPITAL ENCOUNTER (OUTPATIENT)
Dept: RADIOLOGY | Facility: HOSPITAL | Age: 74
Discharge: HOME | End: 2024-12-17
Attending: STUDENT IN AN ORGANIZED HEALTH CARE EDUCATION/TRAINING PROGRAM
Payer: MEDICARE

## 2024-12-17 ENCOUNTER — OFFICE VISIT (OUTPATIENT)
Dept: ORTHOPEDICS | Facility: CLINIC | Age: 74
End: 2024-12-17
Payer: MEDICARE

## 2024-12-17 VITALS — HEIGHT: 68 IN | WEIGHT: 136.88 LBS | BODY MASS INDEX: 20.75 KG/M2

## 2024-12-17 DIAGNOSIS — T81.33XA TRAUMATIC WOUND DEHISCENCE, INITIAL ENCOUNTER: ICD-10-CM

## 2024-12-17 DIAGNOSIS — Z96.651 S/P TOTAL KNEE REPLACEMENT, RIGHT: ICD-10-CM

## 2024-12-17 DIAGNOSIS — Z96.651 S/P TOTAL KNEE REPLACEMENT, RIGHT: Primary | ICD-10-CM

## 2024-12-17 PROCEDURE — 1125F AMNT PAIN NOTED PAIN PRSNT: CPT | Mod: HCNC,CPTII,S$GLB, | Performed by: STUDENT IN AN ORGANIZED HEALTH CARE EDUCATION/TRAINING PROGRAM

## 2024-12-17 PROCEDURE — 73562 X-RAY EXAM OF KNEE 3: CPT | Mod: 26,HCNC,RT, | Performed by: RADIOLOGY

## 2024-12-17 PROCEDURE — 3044F HG A1C LEVEL LT 7.0%: CPT | Mod: HCNC,CPTII,S$GLB, | Performed by: STUDENT IN AN ORGANIZED HEALTH CARE EDUCATION/TRAINING PROGRAM

## 2024-12-17 PROCEDURE — 1159F MED LIST DOCD IN RCRD: CPT | Mod: HCNC,CPTII,S$GLB, | Performed by: STUDENT IN AN ORGANIZED HEALTH CARE EDUCATION/TRAINING PROGRAM

## 2024-12-17 PROCEDURE — 99024 POSTOP FOLLOW-UP VISIT: CPT | Mod: HCNC,S$GLB,, | Performed by: STUDENT IN AN ORGANIZED HEALTH CARE EDUCATION/TRAINING PROGRAM

## 2024-12-17 PROCEDURE — 1157F ADVNC CARE PLAN IN RCRD: CPT | Mod: HCNC,CPTII,S$GLB, | Performed by: STUDENT IN AN ORGANIZED HEALTH CARE EDUCATION/TRAINING PROGRAM

## 2024-12-17 PROCEDURE — 73562 X-RAY EXAM OF KNEE 3: CPT | Mod: TC,HCNC,RT

## 2024-12-17 PROCEDURE — 99999 PR PBB SHADOW E&M-EST. PATIENT-LVL III: CPT | Mod: PBBFAC,HCNC,, | Performed by: STUDENT IN AN ORGANIZED HEALTH CARE EDUCATION/TRAINING PROGRAM

## 2024-12-17 RX ORDER — SULFAMETHOXAZOLE AND TRIMETHOPRIM 800; 160 MG/1; MG/1
1 TABLET ORAL 2 TIMES DAILY
Qty: 14 TABLET | Refills: 0 | Status: SHIPPED | OUTPATIENT
Start: 2024-12-17 | End: 2024-12-24

## 2024-12-17 NOTE — PROGRESS NOTES
Dia Ovalles presents for second post-operative visit following a right total knee arthroplasty performed on 11/11/2024.    We brought her into clinic a week early because she had a recent fall onto her knee.  She tripped over the hose at a gas station and landed on her knee.  The middle aspect of her incision am she was then evaluated in the emergency department.  Because it  appeared to be a superficial dehiscence of the skin closure with the superficial fascial layer apparent to be intact and there was no concern for traumatic arthrotomy,  the wound was irrigated in the emergency department and then closed with nylon sutures by the orthopedic resident on-call.  She has been on Bactrim since then.  She states the she had pain around the knee for a day or 2 after, but now her knee is feeling pretty good.  She has been using Celebrex and needed oxycodone once or twice after the fall, but has not needed it since then.  She is here today for wound check.    Exam:     Ambulating well without assistive device.   Nylon sutures in place. Incision is clean and dry without drainage or erythema.   Some surrounding ecchymosis and superficial abrasion distally.  No expressible purulence or drainage.  ROM:5-100      Follow up radiographs  obtained and reviewed today  by me reveal a well fixed and aligned  cemented total knee prosthesis.  No sign of subsidence, migration, loosening or fracture.    A/P:  Superficial traumatic incisional dehiscence 5 weeks s/p right total knee arthroplasty, otherwise doing well    -   We   Cleaned the incision and dressed at today with Aquacel Ag foam and Tegaderm.  She can leave this dressing place until her follow up visit in a week.  We also gave her some dressing supplies so that she can change it as needed if it gets wet or dirty.  However, the patient was advised to keep it clean and dry  and cover it with  plastic wrap and tape in the shower.   -  she can continue going to Outpatient PT;  going to the [facility] location  - Continue  Celebrex for pain swelling as needed  -  continue oral Bactrim twice daily until December 24th for antibiotic prophylaxis   - Follow up in 1 week  for suture removal.  No x-rays are needed at that time.   Then she should follow up in 7 weeks for her 12 week postop visit, with repeat xrays. Pt will call clinic with problems/concerns in the interim.

## 2024-12-19 ENCOUNTER — PATIENT MESSAGE (OUTPATIENT)
Dept: REHABILITATION | Facility: OTHER | Age: 74
End: 2024-12-19
Payer: MEDICARE

## 2024-12-19 ENCOUNTER — PATIENT MESSAGE (OUTPATIENT)
Dept: ORTHOPEDICS | Facility: CLINIC | Age: 74
End: 2024-12-19
Payer: MEDICARE

## 2024-12-19 RX ORDER — CEFADROXIL 500 MG/1
500 CAPSULE ORAL EVERY 12 HOURS
Qty: 10 CAPSULE | Refills: 0 | Status: SHIPPED | OUTPATIENT
Start: 2024-12-19 | End: 2024-12-24

## 2024-12-22 ENCOUNTER — HOSPITAL ENCOUNTER (OUTPATIENT)
Facility: HOSPITAL | Age: 74
Discharge: HOME | End: 2024-12-23
Attending: EMERGENCY MEDICINE | Admitting: STUDENT IN AN ORGANIZED HEALTH CARE EDUCATION/TRAINING PROGRAM
Payer: MEDICARE

## 2024-12-22 ENCOUNTER — OFFICE VISIT (OUTPATIENT)
Dept: URGENT CARE | Facility: CLINIC | Age: 74
End: 2024-12-22
Payer: MEDICARE

## 2024-12-22 VITALS
RESPIRATION RATE: 19 BRPM | SYSTOLIC BLOOD PRESSURE: 97 MMHG | WEIGHT: 136 LBS | HEART RATE: 125 BPM | DIASTOLIC BLOOD PRESSURE: 68 MMHG | OXYGEN SATURATION: 98 % | BODY MASS INDEX: 20.14 KG/M2 | TEMPERATURE: 98 F | HEIGHT: 69 IN

## 2024-12-22 DIAGNOSIS — R07.9 CHEST PAIN: ICD-10-CM

## 2024-12-22 DIAGNOSIS — I48.91 ATRIAL FIBRILLATION, UNSPECIFIED TYPE: Primary | ICD-10-CM

## 2024-12-22 DIAGNOSIS — R06.09 DYSPNEA ON EXERTION: ICD-10-CM

## 2024-12-22 DIAGNOSIS — I48.91 ATRIAL FIBRILLATION, UNSPECIFIED TYPE: ICD-10-CM

## 2024-12-22 DIAGNOSIS — R53.83 FATIGUE, UNSPECIFIED TYPE: ICD-10-CM

## 2024-12-22 DIAGNOSIS — R00.0 TACHYCARDIA: ICD-10-CM

## 2024-12-22 DIAGNOSIS — I48.91 ATRIAL FIBRILLATION: ICD-10-CM

## 2024-12-22 DIAGNOSIS — I48.91 A-FIB: ICD-10-CM

## 2024-12-22 DIAGNOSIS — R06.02 SOB (SHORTNESS OF BREATH): Primary | ICD-10-CM

## 2024-12-22 DIAGNOSIS — Z96.651 S/P TOTAL KNEE REPLACEMENT, RIGHT: ICD-10-CM

## 2024-12-22 DIAGNOSIS — M79.89 LEG SWELLING: ICD-10-CM

## 2024-12-22 DIAGNOSIS — I48.91 ATRIAL FIBRILLATION STATUS POST CARDIOVERSION: ICD-10-CM

## 2024-12-22 PROBLEM — R74.01 TRANSAMINITIS: Status: ACTIVE | Noted: 2024-12-22

## 2024-12-22 PROBLEM — R79.89 ELEVATED D-DIMER: Status: ACTIVE | Noted: 2024-12-22

## 2024-12-22 PROBLEM — N30.00 ACUTE CYSTITIS: Status: ACTIVE | Noted: 2024-12-22

## 2024-12-22 PROBLEM — Z96.659 HX OF TOTAL KNEE ARTHROPLASTY: Status: ACTIVE | Noted: 2024-12-22

## 2024-12-22 LAB
ALBUMIN SERPL BCP-MCNC: 3.9 G/DL (ref 3.5–5.2)
ALP SERPL-CCNC: 64 U/L (ref 40–150)
ALT SERPL W/O P-5'-P-CCNC: 63 U/L (ref 10–44)
ANION GAP SERPL CALC-SCNC: 13 MMOL/L (ref 8–16)
AST SERPL-CCNC: 84 U/L (ref 10–40)
BACTERIA #/AREA URNS AUTO: ABNORMAL /HPF
BASOPHILS # BLD AUTO: 0.07 K/UL (ref 0–0.2)
BASOPHILS NFR BLD: 0.8 % (ref 0–1.9)
BILIRUB SERPL-MCNC: 0.3 MG/DL (ref 0.1–1)
BILIRUB UR QL STRIP: NEGATIVE
BNP SERPL-MCNC: 272 PG/ML (ref 0–99)
BUN SERPL-MCNC: 18 MG/DL (ref 8–23)
CALCIUM SERPL-MCNC: 9 MG/DL (ref 8.7–10.5)
CHLORIDE SERPL-SCNC: 100 MMOL/L (ref 95–110)
CLARITY UR REFRACT.AUTO: ABNORMAL
CO2 SERPL-SCNC: 19 MMOL/L (ref 23–29)
COLOR UR AUTO: YELLOW
CREAT SERPL-MCNC: 0.8 MG/DL (ref 0.5–1.4)
CTP QC/QA: YES
D DIMER PPP IA.FEU-MCNC: 3 MG/L FEU
DIFFERENTIAL METHOD BLD: ABNORMAL
EOSINOPHIL # BLD AUTO: 0.2 K/UL (ref 0–0.5)
EOSINOPHIL NFR BLD: 2 % (ref 0–8)
ERYTHROCYTE [DISTWIDTH] IN BLOOD BY AUTOMATED COUNT: 13.5 % (ref 11.5–14.5)
EST. GFR  (NO RACE VARIABLE): >60 ML/MIN/1.73 M^2
ESTIMATED AVG GLUCOSE: 100 MG/DL (ref 68–131)
GLUCOSE SERPL-MCNC: 146 MG/DL (ref 70–110)
GLUCOSE SERPL-MCNC: 197 MG/DL (ref 70–110)
GLUCOSE UR QL STRIP: ABNORMAL
HBA1C MFR BLD: 5.1 % (ref 4–5.6)
HCT VFR BLD AUTO: 40.2 % (ref 37–48.5)
HGB BLD-MCNC: 14.1 G/DL (ref 12–16)
HGB UR QL STRIP: NEGATIVE
HYALINE CASTS UR QL AUTO: 99 /LPF
IMM GRANULOCYTES # BLD AUTO: 0.05 K/UL (ref 0–0.04)
IMM GRANULOCYTES NFR BLD AUTO: 0.6 % (ref 0–0.5)
KETONES UR QL STRIP: ABNORMAL
LEUKOCYTE ESTERASE UR QL STRIP: ABNORMAL
LYMPHOCYTES # BLD AUTO: 2.3 K/UL (ref 1–4.8)
LYMPHOCYTES NFR BLD: 27 % (ref 18–48)
MCH RBC QN AUTO: 30.7 PG (ref 27–31)
MCHC RBC AUTO-ENTMCNC: 35.1 G/DL (ref 32–36)
MCV RBC AUTO: 88 FL (ref 82–98)
MICROSCOPIC COMMENT: ABNORMAL
MONOCYTES # BLD AUTO: 1.2 K/UL (ref 0.3–1)
MONOCYTES NFR BLD: 14.6 % (ref 4–15)
NEUTROPHILS # BLD AUTO: 4.7 K/UL (ref 1.8–7.7)
NEUTROPHILS NFR BLD: 55 % (ref 38–73)
NITRITE UR QL STRIP: NEGATIVE
NRBC BLD-RTO: 0 /100 WBC
PH UR STRIP: 6 [PH] (ref 5–8)
PLATELET # BLD AUTO: 226 K/UL (ref 150–450)
PMV BLD AUTO: 10.3 FL (ref 9.2–12.9)
POCT GLUCOSE: 115 MG/DL (ref 70–110)
POTASSIUM SERPL-SCNC: 3.9 MMOL/L (ref 3.5–5.1)
PROT SERPL-MCNC: 6.9 G/DL (ref 6–8.4)
PROT UR QL STRIP: ABNORMAL
RBC # BLD AUTO: 4.59 M/UL (ref 4–5.4)
RBC #/AREA URNS AUTO: 4 /HPF (ref 0–4)
SARS-COV-2 AG RESP QL IA.RAPID: NEGATIVE
SODIUM SERPL-SCNC: 132 MMOL/L (ref 136–145)
SP GR UR STRIP: >1.03 (ref 1–1.03)
SQUAMOUS #/AREA URNS AUTO: 2 /HPF
TROPONIN I SERPL DL<=0.01 NG/ML-MCNC: 11 NG/L (ref 0–14)
TSH SERPL DL<=0.005 MIU/L-ACNC: 1.41 UIU/ML (ref 0.4–4)
URN SPEC COLLECT METH UR: ABNORMAL
WBC # BLD AUTO: 8.47 K/UL (ref 3.9–12.7)
WBC #/AREA URNS AUTO: 6 /HPF (ref 0–5)

## 2024-12-22 PROCEDURE — 83036 HEMOGLOBIN GLYCOSYLATED A1C: CPT | Mod: HCNC | Performed by: PHYSICIAN ASSISTANT

## 2024-12-22 PROCEDURE — 87811 SARS-COV-2 COVID19 W/OPTIC: CPT | Mod: QW,S$GLB,, | Performed by: FAMILY MEDICINE

## 2024-12-22 PROCEDURE — 99214 OFFICE O/P EST MOD 30 MIN: CPT | Mod: S$GLB,,, | Performed by: FAMILY MEDICINE

## 2024-12-22 PROCEDURE — 84443 ASSAY THYROID STIM HORMONE: CPT | Mod: HCNC

## 2024-12-22 PROCEDURE — 25000003 PHARM REV CODE 250: Mod: HCNC

## 2024-12-22 PROCEDURE — 81001 URINALYSIS AUTO W/SCOPE: CPT | Mod: HCNC | Performed by: PHYSICIAN ASSISTANT

## 2024-12-22 PROCEDURE — 84484 ASSAY OF TROPONIN QUANT: CPT | Mod: HCNC | Performed by: PHYSICIAN ASSISTANT

## 2024-12-22 PROCEDURE — 99285 EMERGENCY DEPT VISIT HI MDM: CPT | Mod: 25,HCNC

## 2024-12-22 PROCEDURE — G0378 HOSPITAL OBSERVATION PER HR: HCPCS | Mod: HCNC

## 2024-12-22 PROCEDURE — 82962 GLUCOSE BLOOD TEST: CPT | Mod: S$GLB,,, | Performed by: FAMILY MEDICINE

## 2024-12-22 PROCEDURE — 85379 FIBRIN DEGRADATION QUANT: CPT | Mod: HCNC | Performed by: PHYSICIAN ASSISTANT

## 2024-12-22 PROCEDURE — 85025 COMPLETE CBC W/AUTO DIFF WBC: CPT | Mod: HCNC | Performed by: PHYSICIAN ASSISTANT

## 2024-12-22 PROCEDURE — 25500020 PHARM REV CODE 255: Mod: HCNC | Performed by: STUDENT IN AN ORGANIZED HEALTH CARE EDUCATION/TRAINING PROGRAM

## 2024-12-22 PROCEDURE — 83880 ASSAY OF NATRIURETIC PEPTIDE: CPT | Mod: HCNC | Performed by: PHYSICIAN ASSISTANT

## 2024-12-22 PROCEDURE — 80053 COMPREHEN METABOLIC PANEL: CPT | Mod: HCNC | Performed by: PHYSICIAN ASSISTANT

## 2024-12-22 RX ORDER — IBUPROFEN 200 MG
24 TABLET ORAL
Status: DISCONTINUED | OUTPATIENT
Start: 2024-12-22 | End: 2024-12-23 | Stop reason: HOSPADM

## 2024-12-22 RX ORDER — METOPROLOL TARTRATE 25 MG/1
25 TABLET, FILM COATED ORAL
Status: DISCONTINUED | OUTPATIENT
Start: 2024-12-22 | End: 2024-12-22

## 2024-12-22 RX ORDER — CEFADROXIL 500 MG/1
500 CAPSULE ORAL EVERY 12 HOURS
Status: DISCONTINUED | OUTPATIENT
Start: 2024-12-22 | End: 2024-12-23 | Stop reason: HOSPADM

## 2024-12-22 RX ORDER — GLUCAGON 1 MG
1 KIT INJECTION
Status: DISCONTINUED | OUTPATIENT
Start: 2024-12-22 | End: 2024-12-23 | Stop reason: HOSPADM

## 2024-12-22 RX ORDER — IBUPROFEN 200 MG
16 TABLET ORAL
Status: DISCONTINUED | OUTPATIENT
Start: 2024-12-22 | End: 2024-12-23 | Stop reason: HOSPADM

## 2024-12-22 RX ORDER — NALOXONE HCL 0.4 MG/ML
0.02 VIAL (ML) INJECTION
Status: DISCONTINUED | OUTPATIENT
Start: 2024-12-22 | End: 2024-12-23 | Stop reason: HOSPADM

## 2024-12-22 RX ORDER — CELECOXIB 100 MG/1
200 CAPSULE ORAL 2 TIMES DAILY PRN
Status: DISCONTINUED | OUTPATIENT
Start: 2024-12-22 | End: 2024-12-23

## 2024-12-22 RX ORDER — OXYBUTYNIN CHLORIDE 5 MG/1
10 TABLET, EXTENDED RELEASE ORAL DAILY
Status: DISCONTINUED | OUTPATIENT
Start: 2024-12-23 | End: 2024-12-23 | Stop reason: HOSPADM

## 2024-12-22 RX ORDER — DULOXETIN HYDROCHLORIDE 20 MG/1
40 CAPSULE, DELAYED RELEASE ORAL DAILY
Status: DISCONTINUED | OUTPATIENT
Start: 2024-12-23 | End: 2024-12-23 | Stop reason: HOSPADM

## 2024-12-22 RX ORDER — RALOXIFENE HYDROCHLORIDE 60 MG/1
60 TABLET, FILM COATED ORAL DAILY
Status: DISCONTINUED | OUTPATIENT
Start: 2024-12-23 | End: 2024-12-23 | Stop reason: HOSPADM

## 2024-12-22 RX ORDER — SODIUM CHLORIDE 0.9 % (FLUSH) 0.9 %
10 SYRINGE (ML) INJECTION EVERY 12 HOURS PRN
Status: DISCONTINUED | OUTPATIENT
Start: 2024-12-22 | End: 2024-12-23 | Stop reason: HOSPADM

## 2024-12-22 RX ADMIN — CELECOXIB 200 MG: 100 CAPSULE ORAL at 08:12

## 2024-12-22 RX ADMIN — IOHEXOL 75 ML: 350 INJECTION, SOLUTION INTRAVENOUS at 04:12

## 2024-12-22 RX ADMIN — APIXABAN 5 MG: 5 TABLET, FILM COATED ORAL at 08:12

## 2024-12-22 RX ADMIN — CEFADROXIL 500 MG: 500 CAPSULE ORAL at 08:12

## 2024-12-22 NOTE — HPI
Flako Ovalles is a 75 yo female with a hx of OAB, remote DVT while pregnant, hx of atrial septal defect repair (in 1976 age 26), and recent TKA (11/11/24) who presents today with fatigue and dyspnea on exertion for 3 days. She regularly exercises and states these new symptoms are unusual for her. Prior to the onset of these symptoms she had nausea and vomiting which stopped once she was transitioned from bactrim to cefadroxil. She recently underwent a TKA on 11/11/24 and presented on 12/11 with wound dehiscence following a fall. Otherwise her post-op course has been unremarkable. She denies any SOB at rest, orthopnea, headaches, N/V/D since stopping bactrim, urinary frequency or pain, or peripheral edema. She does have mild swelling and pain on her R leg from her recent operation.     In the ED VS , /79, RR 20, Sat 99% on RA, afebrile. EKG with A-fib. UA with spec gravity >1.030, trace ketones & glucose, 2+ protein, trace leukocyte esterase and many bacteria. D-dimer 3. . AST/ALT elevated 84/63. Glucose 146 w/ A1c 5.1. CXR unremarkable. CTA chest and LE US negative for DVT/PE.

## 2024-12-22 NOTE — ED TRIAGE NOTES
Dia Ovalles, a 74 y.o. female presents to the ED w/ complaint of SOB, fatigue.     Pt had right knee replacement, in November, fell on it in December and had to get new stiches placed. Pt was placed on bactrium at that time and had episodes of nausea, vomiting, and diarrhea. Pt stopped abx.     For the last few days pt reports dizziness, fatigue and SOB.     Pt was sent from [facility], EKG shows Afib RVR.     Triage note:  Chief Complaint   Patient presents with    Nausea    Diarrhea    Dizziness    Shortness of Breath     Pt reports dizziness, nausea, fatigue, diarrhea and SOB for a week.     Review of patient's allergies indicates:   Allergen Reactions    Codeine Nausea Only     Past Medical History:   Diagnosis Date    Breast cyst     Cancer     SCC x 2 on arms    Cataract     Closed Ortiz's fracture of right radius 09/27/2017    Decreased ROM of right shoulder 03/20/2018    Decreased strength of upper extremity 03/20/2018    Deep vein thrombosis     GERD (gastroesophageal reflux disease)     History of cardiovascular stress test 10/29/2024    Joint pain     Lesion of left upper eyelid 05/02/2023    Nuclear sclerosis, bilateral 05/17/2021    OA (osteoarthritis) of shoulder 03/13/2019    OAB (overactive bladder) 03/08/2017    Osteoporosis 03/08/2017    Other chest pain 05/13/2022    Squamous cell carcinoma of skin 2012    right arm

## 2024-12-22 NOTE — ASSESSMENT & PLAN NOTE
74yF with remote hx of ASD repair and 6 weeks post-op from TKA presents with 1 week of fatigue and dizziness. In the ED she was found to be in A-fib with . Last echo from 2022 with normal EF, but noted in prior notes to have been developing HF prior to ASD repair. On admission D-dimer 3 and . CTA and LE US negative for DVT/PE. Chadsvasc of 3.    - f/u echo with bubble study  - begin anticoagulation with eliquis 5mg BID  - consult placed to cardiology  - HR currently controlled, consider metoprolol if becomes elevated

## 2024-12-22 NOTE — PROGRESS NOTES
"Subjective:      Patient ID: Dia Ovalles is a 74 y.o. female.    Vitals:  height is 5' 9" (1.753 m) and weight is 61.7 kg (136 lb). Her oral temperature is 97.6 °F (36.4 °C). Her blood pressure is 97/68 and her pulse is 125 (abnormal). Her respiration is 19 and oxygen saturation is 98%.     Chief Complaint: Other Misc (Ongoing symptoms related to Bactrim adverse reaction - Entered by patient) and Medication Reaction    Patient presents with a possible allergic medication reaction to bactrim. Patient fell on 12/11/2024 and went to the ER to get treated. Patient was prescribed bactrim to take post-ER visit. Patient states on 12/12 and took it for 7 days.  Patient states she started experiencing diarrhea, nausea and dizziness after day 5 of Bactrim.  States the diarrhea and nausea have resolved.  Now experiencing lot of fatigue.  Also reports SOB on walking.  Denies fever, chills, chest pain, headache, abdominal pain, dysuria.    Medication Reaction  This is a new problem. The current episode started in the past 7 days. The problem occurs constantly. The problem has been gradually worsening. Associated symptoms include fatigue, nausea, vertigo and weakness. Pertinent negatives include no congestion, coughing or numbness. Nothing aggravates the symptoms. Treatments tried: bactrim. The treatment provided no relief.       Constitution: Positive for fatigue.   HENT:  Negative for congestion.    Respiratory:  Negative for cough.    Gastrointestinal:  Positive for nausea.   Neurological:  Positive for history of vertigo. Negative for numbness.      Objective:     Physical Exam   Constitutional: She is oriented to person, place, and time. She appears well-developed. She is cooperative.   HENT:   Head: Normocephalic and atraumatic.   Ears:   Right Ear: Hearing, tympanic membrane, external ear and ear canal normal. no impacted cerumen  Left Ear: Hearing, tympanic membrane, external ear and ear canal normal. no impacted " cerumen  Nose: Nose normal. No mucosal edema, rhinorrhea, nasal deformity or congestion. No epistaxis. Right sinus exhibits no maxillary sinus tenderness and no frontal sinus tenderness. Left sinus exhibits no maxillary sinus tenderness and no frontal sinus tenderness.   Mouth/Throat: Uvula is midline, oropharynx is clear and moist and mucous membranes are normal. No trismus in the jaw. Normal dentition. No uvula swelling. No oropharyngeal exudate or posterior oropharyngeal erythema.   Eyes: Conjunctivae and lids are normal. Pupils are equal, round, and reactive to light. Extraocular movement intact   Neck: Trachea normal and phonation normal. Neck supple.   Cardiovascular: Normal rate, regular rhythm, normal heart sounds and normal pulses.   No murmur heard.  Pulmonary/Chest: Effort normal and breath sounds normal. No stridor. No respiratory distress. She has no wheezes. She has no rhonchi. She has no rales.   Abdominal: Normal appearance and bowel sounds are normal. She exhibits no distension. Soft. There is no abdominal tenderness.   Musculoskeletal: Normal range of motion.         General: Normal range of motion.      Cervical back: She exhibits no tenderness.   Lymphadenopathy:     She has no cervical adenopathy.   Neurological: She is alert and oriented to person, place, and time. She exhibits normal muscle tone.   Skin: Skin is warm, dry and intact.   Psychiatric: Her speech is normal and behavior is normal. Judgment and thought content normal.   Nursing note and vitals reviewed.      Assessment:     1. SOB (shortness of breath)    2. Fatigue, unspecified type    3. Atrial fibrillation, unspecified type        Plan:   Patient is referred to ER for further evaluation and management of atrial fibrillation and SOB.  ED at [facility] is notified.    Patient agreed to plan.    SOB (shortness of breath)  -     EKG 12-lead; Future  -     SARS Coronavirus 2 Antigen, POCT Manual Read  -     Refer to Emergency  Dept.    Fatigue, unspecified type  -     POCT Urinalysis(Instrument)  -     POCT Glucose, Hand-Held Device  -     Refer to Emergency Dept.    Atrial fibrillation, unspecified type  -     Refer to Emergency Dept.

## 2024-12-22 NOTE — ED NOTES
Pt identifiers Dia Hannonchecked and correct  LOC: The patient is awake, alert, aware of environment with an appropriate affect. Oriented x3, speaking appropriately  APPEARANCE: Pt resting comfortably, in no acute distress, pt is clean and well groomed, clothing properly fastened  SKIN: Skin warm, dry and intact, normal skin turgor, moist mucus membranes  RESPIRATORY: Airway is open and patent, respirations are spontaneous, even and unlabored, normal effort and rate. Pt reports SOB  CARDIAC: + tachycardia, EKG shows Afib RVR, no peripheral edema noted, capillary refill < 3 seconds, bilateral radial pulses 2+  ABDOMEN: Soft, nontender, nondistended. Bowel sounds present   NEUROLOGIC: PERRL, facial expression is symmetrical, patient moving all extremities spontaneously, normal sensation in all extremities when touched with a finger.  Follows all commands appropriately  MUSCULOSKELETAL: Right knee with bandage from recent surg, mild edema to RLE.

## 2024-12-22 NOTE — ASSESSMENT & PLAN NOTE
Atrial-septal defect repaired in 1976 at age 26. Noted to have been developing HF at the time.     - f/u echo with bubble

## 2024-12-22 NOTE — ASSESSMENT & PLAN NOTE
Remote history of DVT while pregnant. D-dimer 3 on admission. CTA chest negative for PE. LE US negative for DVT. Elevation likely 2/2 recent surgery or active a-fib.

## 2024-12-22 NOTE — ED PROVIDER NOTES
Encounter Date: 12/22/2024       History     Chief Complaint   Patient presents with    Nausea    Diarrhea    Dizziness    Shortness of Breath     Pt reports dizziness, nausea, fatigue, diarrhea and SOB for a week.     74-year-old female with below medical history including remote DVT when she was much younger and pregnant presents the ED sent from urgent Care for evaluation of new onset atrial fibrillation as well as fatigue and dyspnea on exertion.  Patient reports significant fatigue over the past week.  She reports dyspnea on exertion over the past couple of days.  Patient states that she exercises often and they symptoms are highly unusual for her.   Of note, patient had a knee replacement last month.  She has had some swelling in that leg since the surgery, but notes that it is improving.  She denies any calf pain to this leg.  Not currently on any exogenous hormones.  Patient mentions that she was prescribed Bactrim about 10 days ago after she had a fall onto her knee with a knee replacement.  She states that the wound opened up.  She had some nausea and diarrhea with the Bactrim.  Both of these symptoms have since resolved.  Patient also mentions that she had dizziness all day 3 days ago.  She states that the dizziness was somewhere in between a lightheadedness and feeling off balance.  She states that she had a couple of falls due to the dizziness.  The dizziness has since resolved. Denies fever, chest pain. Denies any urinary symptoms.  Patient also mentions that she was told her blood sugar was elevated at urgent care today.  She denies a history of diabetes.  Hemoglobin A1c from May of this year was 5.3.       Review of patient's allergies indicates:   Allergen Reactions    Codeine Nausea Only     Past Medical History:   Diagnosis Date    Atrial fibrillation 12/22/2024    Breast cyst     Cancer     SCC x 2 on arms    Cataract     Closed Ortiz's fracture of right radius 09/27/2017    Decreased ROM of  right shoulder 2018    Decreased strength of upper extremity 2018    Deep vein thrombosis     GERD (gastroesophageal reflux disease)     History of cardiovascular stress test 10/29/2024    Joint pain     Lesion of left upper eyelid 2023    Nuclear sclerosis, bilateral 2021    OA (osteoarthritis) of shoulder 2019    OAB (overactive bladder) 2017    Osteoporosis 2017    Other chest pain 2022    Squamous cell carcinoma of skin     right arm     Past Surgical History:   Procedure Laterality Date    ARTHROPLASTY, KNEE, TOTAL, USING COMPUTER-ASSISTED NAVIGATION  2024    Procedure: ARTHROPLASTY, KNEE, TOTAL, USING COMPUTER-ASSISTED NAVIGATION;  Surgeon: Keagan Oneal MD;  Location: [facility] OR;  Service: Orthopedics;;    ASD REPAIR      open heart surgery    BREAST BIOPSY      CATARACT EXTRACTION W/  INTRAOCULAR LENS IMPLANT Right 2021    Procedure: EXTRACTION, CATARACT, WITH IOL INSERTION;  Surgeon: Bruna Silva MD;  Location: [facility] OR;  Service: Ophthalmology;  Laterality: Right;    CATARACT EXTRACTION W/  INTRAOCULAR LENS IMPLANT Left 2021    Procedure: EXTRACTION, CATARACT, WITH IOL INSERTION;  Surgeon: Bruna Silva MD;  Location: [facility] OR;  Service: Ophthalmology;  Laterality: Left;     SECTION      x2    EYE SURGERY      KNEE SURGERY Right     meniscal repair    SKIN BIOPSY  2012    SKIN CANCER EXCISION       Family History   Problem Relation Name Age of Onset    Hypertension Mother      Asthma Mother      Osteoporosis Mother      Hypertension Father      Cancer Father          lung cancer    Heart disease Maternal Grandfather      Stroke Maternal Grandfather      Breast cancer Neg Hx      Colon cancer Neg Hx      Ovarian cancer Neg Hx       Social History     Tobacco Use    Smoking status: Never     Passive exposure: Never    Smokeless tobacco: Never   Substance Use Topics    Alcohol use: Yes     Alcohol/week: 4.0 standard drinks of  alcohol     Types: 4 Glasses of wine per week     Comment: social    Drug use: Yes     Types: Marijuana     Comment: gummies 2 days ago     Review of Systems    Physical Exam     Initial Vitals [12/22/24 1224]   BP Pulse Resp Temp SpO2   111/79 108 20 97.6 °F (36.4 °C) 99 %      MAP       --         Physical Exam    Nursing note and vitals reviewed.  Constitutional: She appears well-developed and well-nourished. She is not diaphoretic.  Non-toxic appearance. She does not appear ill. No distress.   HENT:   Head: Normocephalic and atraumatic.   Neck: Neck supple.   Cardiovascular:  Normal rate. An irregularly irregular rhythm present.     Exam reveals no gallop and no friction rub.       No murmur heard.  Pulmonary/Chest: Effort normal and breath sounds normal. No accessory muscle usage. No tachypnea. No respiratory distress. She has no decreased breath sounds. She has no wheezes. She has no rhonchi. She has no rales.   Abdominal: She exhibits no distension.   Musculoskeletal:      Cervical back: Neck supple.      Comments: Slight swelling throughout the lower leg and ankle on the lower right leg.  This is the side that patient had knee replacement.  No tenderness.      Neurological: She is alert.   Skin: No rash noted.   Psychiatric: She has a normal mood and affect. Her behavior is normal.         ED Course   Procedures  Labs Reviewed   CBC W/ AUTO DIFFERENTIAL - Abnormal       Result Value    WBC 8.47      RBC 4.59      Hemoglobin 14.1      Hematocrit 40.2      MCV 88      MCH 30.7      MCHC 35.1      RDW 13.5      Platelets 226      MPV 10.3      Immature Granulocytes 0.6 (*)     Gran # (ANC) 4.7      Immature Grans (Abs) 0.05 (*)     Lymph # 2.3      Mono # 1.2 (*)     Eos # 0.2      Baso # 0.07      nRBC 0      Gran % 55.0      Lymph % 27.0      Mono % 14.6      Eosinophil % 2.0      Basophil % 0.8      Differential Method Automated     COMPREHENSIVE METABOLIC PANEL - Abnormal    Sodium 132 (*)     Potassium 3.9       Chloride 100      CO2 19 (*)     Glucose 146 (*)     BUN 18      Creatinine 0.8      Calcium 9.0      Total Protein 6.9      Albumin 3.9      Total Bilirubin 0.3      Alkaline Phosphatase 64      AST 84 (*)     ALT 63 (*)     eGFR >60.0      Anion Gap 13     URINALYSIS, REFLEX TO URINE CULTURE - Abnormal    Specimen UA Urine, Clean Catch      Color, UA Yellow      Appearance, UA Hazy (*)     pH, UA 6.0      Specific Gravity, UA >1.030 (*)     Protein, UA 2+ (*)     Glucose, UA Trace (*)     Ketones, UA Trace (*)     Bilirubin (UA) Negative      Occult Blood UA Negative      Nitrite, UA Negative      Leukocytes, UA Trace (*)     Narrative:     Specimen Source->Urine   B-TYPE NATRIURETIC PEPTIDE - Abnormal     (*)    D DIMER, QUANTITATIVE - Abnormal    D-Dimer 3.00 (*)    URINALYSIS MICROSCOPIC - Abnormal    RBC, UA 4      WBC, UA 6 (*)     Bacteria Many (*)     Squam Epithel, UA 2      Hyaline Casts, UA 99 (*)     Microscopic Comment SEE COMMENT      Narrative:     Specimen Source->Urine   TROPONIN I HIGH SENSITIVITY    Troponin I High Sensitivity 11     HEMOGLOBIN A1C    Hemoglobin A1C 5.1      Estimated Avg Glucose 100            Imaging Results              CTA Chest Non-Coronary (PE Studies) (In process)                      X-Ray Chest PA And Lateral (Final result)  Result time 12/22/24 15:11:56      Final result by Brenda Henning MD (12/22/24 15:11:56)                   Impression:      No acute intrathoracic process seen.      Electronically signed by: Brenda Henning MD  Date:    12/22/2024  Time:    15:11               Narrative:    EXAMINATION:  XR CHEST PA AND LATERAL    CLINICAL HISTORY:  Dyspnea on exertion    TECHNIQUE:  PA and lateral views of the chest were performed.    COMPARISON:  09/10/2019    FINDINGS:  Sternotomy wires.    The cardiac silhouette is normal in size, midline.    The pulmonary vascularity is normal.    The pulmonary mariya appear normal bilaterally.    The  lungs are well expanded and clear.    No focal airspace disease.    No pleural effusion, no pneumothorax.    The osseous structures appear within normal limits for age.    Remote right 9th rib fracture.                                       Medications   metoprolol tartrate (LOPRESSOR) tablet 25 mg (has no administration in time range)   iohexoL (OMNIPAQUE 350) injection 75 mL (75 mLs Intravenous Given 12/22/24 1648)     Medical Decision Making  74-year-old female presents to the ED for evaluation of fatigue and dyspnea on exertion.  She is mildly tachycardic.  Vitals otherwise within normal limits.  She has an irregularly irregular cardiac rhythm.  Appears in no acute distress.  EKG obtained at urgent care showed AFib with a rate in the low 100s.   My differential diagnosis includes but is not limited to:  New onset AFib, heart failure, PE, pulmonary edema, pleural effusion, anemia, electrolyte abnormality, dehydration         Amount and/or Complexity of Data Reviewed  Labs: ordered. Decision-making details documented in ED Course.  Radiology: ordered.    Risk  Prescription drug management.               ED Course as of 12/22/24 1653   Sun Dec 22, 2024   1652 BNP(!): 272 [EH]   1652 Troponin I High Sensitivity: 11 [EH]   1652 D-Dimer(!): 3.00 [EH]   1652 Will order PE study. [EH]   1652 Given new onset atrial fibrillation with symptoms, will place patient in observation on Hospital Medicine service for further management.  Patient is agreeable.  [EH]   1653 Discussed with Hospital Medicine. [EH]      ED Course User Index  [EH] Farhana Alcantar PA-C                             Clinical Impression:  Final diagnoses:  [R00.0] Tachycardia  [R06.09] Dyspnea on exertion  [M79.89] Leg swelling  [I48.91] Atrial fibrillation, unspecified type (Primary)          ED Disposition Condition    Observation Stable                Farhana Alcantar PA-C  12/22/24 1653

## 2024-12-22 NOTE — ASSESSMENT & PLAN NOTE
Recent Labs     12/22/24  1440   AST 84*      Recent Labs     12/22/24  1440   ALT 63*     Elevated AST/ALT on admission.     - continue to monitor on daily cmp  - consider imaging if LFTs worsen

## 2024-12-22 NOTE — ASSESSMENT & PLAN NOTE
UTI noted on admission UA. Pt is asymptomatic denying any pain with urination, frequency, flank pain, or abdominal/pelvic pain. Will observe for now.

## 2024-12-22 NOTE — SUBJECTIVE & OBJECTIVE
Past Medical History:   Diagnosis Date    Atrial fibrillation 2024    Breast cyst     Cancer     SCC x 2 on arms    Cataract     Closed Ortiz's fracture of right radius 2017    Decreased ROM of right shoulder 2018    Decreased strength of upper extremity 2018    Deep vein thrombosis     GERD (gastroesophageal reflux disease)     History of cardiovascular stress test 10/29/2024    Joint pain     Lesion of left upper eyelid 2023    Nuclear sclerosis, bilateral 2021    OA (osteoarthritis) of shoulder 2019    OAB (overactive bladder) 2017    Osteoporosis 2017    Other chest pain 2022    Squamous cell carcinoma of skin 2012    right arm       Past Surgical History:   Procedure Laterality Date    ARTHROPLASTY, KNEE, TOTAL, USING COMPUTER-ASSISTED NAVIGATION  2024    Procedure: ARTHROPLASTY, KNEE, TOTAL, USING COMPUTER-ASSISTED NAVIGATION;  Surgeon: Keagan Oneal MD;  Location: [facility] OR;  Service: Orthopedics;;    ASD REPAIR      open heart surgery    BREAST BIOPSY      CATARACT EXTRACTION W/  INTRAOCULAR LENS IMPLANT Right 2021    Procedure: EXTRACTION, CATARACT, WITH IOL INSERTION;  Surgeon: Bruna Silva MD;  Location: [facility] OR;  Service: Ophthalmology;  Laterality: Right;    CATARACT EXTRACTION W/  INTRAOCULAR LENS IMPLANT Left 2021    Procedure: EXTRACTION, CATARACT, WITH IOL INSERTION;  Surgeon: Bruna Silva MD;  Location: [facility] OR;  Service: Ophthalmology;  Laterality: Left;     SECTION      x2    EYE SURGERY      KNEE SURGERY Right     meniscal repair    SKIN BIOPSY  2012    SKIN CANCER EXCISION         Review of patient's allergies indicates:   Allergen Reactions    Codeine Nausea Only       No current facility-administered medications on file prior to encounter.     Current Outpatient Medications on File Prior to Encounter   Medication Sig    acetaminophen (TYLENOL) 650 MG TbSR Take 1 tablet (650 mg total) by mouth every 8  (eight) hours.    biotin 1 mg tablet Take 5,000 mcg by mouth once daily.    calcium carbonate (CALCIUM 600 ORAL) Take 1,200 mg by mouth once daily.     cefadroxil (DURICEF) 500 MG Cap Take 1 capsule (500 mg total) by mouth every 12 (twelve) hours. for 5 days    celecoxib (CELEBREX) 200 MG capsule Take 1 capsule (200 mg total) by mouth 2 (two) times daily as needed for Pain (knee pain).    clobetasoL (TEMOVATE) 0.05 % external solution Use on scalp one - two times daily as needed for scaling or itching    DULoxetine (CYMBALTA) 20 MG capsule TAKE 2 CAPSULES ONE TIME DAILY    multivitamin (THERAGRAN) per tablet Take 1 tablet by mouth once daily.    oxybutynin (DITROPAN-XL) 10 MG 24 hr tablet TAKE 1 TABLET EVERY DAY    oxyCODONE (ROXICODONE) 5 MG immediate release tablet Take 1 to 2 tablets by mouth  every 4 to 6 hours as needed for pain    raloxifene (EVISTA) 60 mg tablet Take 1 tablet (60 mg total) by mouth once daily.    UNKNOWN TO PATIENT Nutrafol- hair growth supplement    [DISCONTINUED] aspirin (ECOTRIN) 81 MG EC tablet Take 1 tablet (81 mg total) by mouth 2 (two) times a day.    [DISCONTINUED] celecoxib (CELEBREX) 200 MG capsule Take 1 capsule (200 mg total) by mouth once daily.    [DISCONTINUED] cholecalciferol, vitamin D3, (VITAMIN D3) 50 mcg (2,000 unit) Cap Take 1,000 Units by mouth every other day.  (Patient not taking: Reported on 12/22/2024)    [DISCONTINUED] magnesium oxide (MAG-OX) 400 mg (241.3 mg magnesium) tablet Take 400 mg by mouth once daily. (Patient not taking: Reported on 12/22/2024)    [DISCONTINUED] methocarbamoL (ROBAXIN) 750 MG Tab Take 1 tablet (750 mg total) by mouth 4 (four) times daily as needed (for muscle spasms). (Patient not taking: Reported on 12/22/2024)    [DISCONTINUED] pantoprazole (PROTONIX) 40 MG tablet Take 1 tablet (40 mg total) by mouth once daily. (Patient not taking: Reported on 12/22/2024)    [DISCONTINUED] senna-docusate 8.6-50 mg (SENNA WITH DOCUSATE SODIUM) 8.6-50 mg  per tablet Take 1 tablet by mouth once daily. (Patient not taking: Reported on 12/22/2024)    [DISCONTINUED] sulfamethoxazole-trimethoprim 800-160mg (BACTRIM DS) 800-160 mg Tab Take 1 tablet by mouth 2 (two) times daily. for 7 days (Patient not taking: Reported on 12/22/2024)     Family History       Problem Relation (Age of Onset)    Asthma Mother    Cancer Father    Heart disease Maternal Grandfather    Hypertension Mother, Father    Osteoporosis Mother    Stroke Maternal Grandfather          Tobacco Use    Smoking status: Never     Passive exposure: Never    Smokeless tobacco: Never   Substance and Sexual Activity    Alcohol use: Yes     Alcohol/week: 4.0 standard drinks of alcohol     Types: 4 Glasses of wine per week     Comment: social    Drug use: Yes     Types: Marijuana     Comment: gummies 2 days ago    Sexual activity: Not Currently     Partners: Male     Birth control/protection: Post-menopausal     Review of Systems   Constitutional:  Positive for activity change, appetite change and fatigue. Negative for fever.   Respiratory:  Negative for chest tightness and shortness of breath.    Cardiovascular:  Negative for chest pain and leg swelling.   Gastrointestinal:  Negative for abdominal pain, constipation, diarrhea, nausea and vomiting.        NVD stopped on 12/18 when stopping bactrim   Genitourinary:  Negative for dysuria and frequency.   Musculoskeletal:  Positive for arthralgias (post-op right knee).   Skin:  Positive for wound (surgical wound R knee).   Neurological:  Positive for weakness. Negative for headaches.     Objective:     Vital Signs (Most Recent):  Temp: 97.7 °F (36.5 °C) (12/22/24 1720)  Pulse: 99 (12/22/24 1720)  Resp: 16 (12/22/24 1720)  BP: 119/66 (12/22/24 1720)  SpO2: 100 % (12/22/24 1720) Vital Signs (24h Range):  Temp:  [97.6 °F (36.4 °C)-97.7 °F (36.5 °C)] 97.7 °F (36.5 °C)  Pulse:  [] 99  Resp:  [16-20] 16  SpO2:  [98 %-100 %] 100 %  BP: ()/(65-79) 119/66     Weight:  59 kg (130 lb)  Body mass index is 19.2 kg/m².     Physical Exam  Constitutional:       General: She is not in acute distress.     Appearance: Normal appearance. She is not ill-appearing.   Eyes:      Conjunctiva/sclera: Conjunctivae normal.   Cardiovascular:      Rate and Rhythm: Normal rate. Rhythm irregular.      Heart sounds: Normal heart sounds.   Pulmonary:      Effort: Pulmonary effort is normal. No respiratory distress.      Breath sounds: Normal breath sounds.   Abdominal:      General: Abdomen is flat. There is no distension.      Palpations: Abdomen is soft.      Tenderness: There is no abdominal tenderness.   Musculoskeletal:         General: Swelling (R knee) present.      Right lower leg: No edema.      Left lower leg: No edema.   Skin:     General: Skin is warm and dry.      Capillary Refill: Capillary refill takes less than 2 seconds.   Neurological:      General: No focal deficit present.      Mental Status: She is alert and oriented to person, place, and time.   Psychiatric:         Mood and Affect: Mood normal.         Thought Content: Thought content normal.                Significant Labs: All pertinent labs within the past 24 hours have been reviewed.  Recent Lab Results         12/22/24  1550   12/22/24  1440   12/22/24  1151   12/22/24  1134        Albumin   3.9           ALP   64           ALT   63           Anion Gap   13           Appearance, UA Hazy             AST   84           Bacteria, UA Many             Baso #   0.07           Basophil %   0.8           Bilirubin (UA) Negative             BILIRUBIN TOTAL   0.3  Comment: For infants and newborns, interpretation of results should be based  on gestational age, weight and in agreement with clinical  observations.    Premature Infant recommended reference ranges:  Up to 24 hours.............<8.0 mg/dL  Up to 48 hours............<12.0 mg/dL  3-5 days..................<15.0 mg/dL  6-29 days.................<15.0 mg/dL             BNP    272  Comment: Values of less than 100 pg/ml are consistent with non-CHF populations.           BUN   18           Calcium   9.0           Chloride   100           CO2   19           Color, UA Yellow             Creatinine   0.8           D-Dimer   3.00  Comment: The quantitative D-dimer assay should be used as an aid in   the diagnosis of deep vein thrombosis and pulmonary embolism  in patients with the appropriate presentation and clinical  history. The upper limit of the reference interval and the clinical   cut off   point are identical. Causes of a positive (>0.50 mg/L FEU) D-Dimer   test  include, but are not limited to: DVT, PE, DIC, thrombolytic   therapy, anticoagulant therapy, recent surgery, trauma, or   pregnancy, disseminated malignancy, aortic aneurysm, cirrhosis,  and severe infection. False negative results may occur in   patients with distal DVT.             Differential Method   Automated           eGFR   >60.0           Eos #   0.2           Eos %   2.0           Estimated Avg Glucose   100           Glucose   146           Glucose, UA Trace             Gran # (ANC)   4.7           Gran %   55.0           Hematocrit   40.2           Hemoglobin   14.1           Hemoglobin A1C External   5.1  Comment: ADA Screening Guidelines:  5.7-6.4%  Consistent with prediabetes  >or=6.5%  Consistent with diabetes    High levels of fetal hemoglobin interfere with the HbA1C  assay. Heterozygous hemoglobin variants (HbS, HgC, etc)do  not significantly interfere with this assay.   However, presence of multiple variants may affect accuracy.             Hyaline Casts, UA 99             Immature Grans (Abs)   0.05  Comment: Mild elevation in immature granulocytes is non specific and   can be seen in a variety of conditions including stress response,   acute inflammation, trauma and pregnancy. Correlation with other   laboratory and clinical findings is essential.             Immature Granulocytes   0.6            Ketones, UA Trace             Leukocyte Esterase, UA Trace             Lymph #   2.3           Lymph %   27.0           MCH   30.7           MCHC   35.1           MCV   88           Microscopic Comment SEE COMMENT  Comment: Other formed elements not mentioned in the report are not   present in the microscopic examination.                Mono #   1.2           Mono %   14.6           MPV   10.3           NITRITE UA Negative             nRBC   0           Blood, UA Negative             pH, UA 6.0             Platelet Count   226           POC Glucose       197       Potassium   3.9           PROTEIN TOTAL   6.9           Protein, UA 2+  Comment: Recommend a 24 hour urine protein or a urine   protein/creatinine ratio if globulin induced proteinuria is  clinically suspected.                Acceptable     Yes         RBC   4.59           RBC, UA 4             RDW   13.5           SARS Coronavirus 2 Antigen     Negative         Sodium   132           Spec Grav UA >1.030             Specimen UA Urine, Clean Catch             Squam Epithel, UA 2             Troponin I High Sensitivity   11           WBC, UA 6             WBC   8.47                   Significant Imaging: I have reviewed all pertinent imaging results/findings within the past 24 hours.    CXR: No acute intrathoracic process seen.     CTA Chest: No evidence of central or segmental/subsegmental artery thromboembolism.  No acute intrathoracic process.  Remote right-sided rib fracture.    US LE: No evidence of deep venous thrombosis in right lower extremity.

## 2024-12-23 ENCOUNTER — ANESTHESIA (OUTPATIENT)
Dept: MEDSURG UNIT | Facility: HOSPITAL | Age: 74
End: 2024-12-23
Payer: MEDICARE

## 2024-12-23 ENCOUNTER — ANESTHESIA EVENT (OUTPATIENT)
Dept: MEDSURG UNIT | Facility: HOSPITAL | Age: 74
End: 2024-12-23
Payer: MEDICARE

## 2024-12-23 ENCOUNTER — PATIENT MESSAGE (OUTPATIENT)
Dept: REHABILITATION | Facility: OTHER | Age: 74
End: 2024-12-23
Payer: MEDICARE

## 2024-12-23 VITALS
HEIGHT: 69 IN | DIASTOLIC BLOOD PRESSURE: 67 MMHG | RESPIRATION RATE: 18 BRPM | BODY MASS INDEX: 19.26 KG/M2 | TEMPERATURE: 98 F | HEART RATE: 77 BPM | WEIGHT: 130 LBS | OXYGEN SATURATION: 100 % | SYSTOLIC BLOOD PRESSURE: 117 MMHG

## 2024-12-23 PROBLEM — E87.1 HYPONATREMIA: Status: ACTIVE | Noted: 2024-12-23

## 2024-12-23 PROBLEM — D64.9 ANEMIA: Status: ACTIVE | Noted: 2024-12-23

## 2024-12-23 LAB
ALBUMIN SERPL BCP-MCNC: 3.2 G/DL (ref 3.5–5.2)
ALP SERPL-CCNC: 54 U/L (ref 40–150)
ALT SERPL W/O P-5'-P-CCNC: 49 U/L (ref 10–44)
ANION GAP SERPL CALC-SCNC: 8 MMOL/L (ref 8–16)
ANISOCYTOSIS BLD QL SMEAR: SLIGHT
ASCENDING AORTA: 2.61 CM
ASCENDING AORTA: 2.9 CM
AST SERPL-CCNC: 56 U/L (ref 10–40)
AV AREA BY CONTINUOUS VTI: 1.4 CM2
AV INDEX (PROSTH): 0.54
AV LVOT MEAN GRADIENT: 1 MMHG
AV LVOT PEAK GRADIENT: 3 MMHG
AV MEAN GRADIENT: 4.1 MMHG
AV PEAK GRADIENT: 6.8 MMHG
AV VALVE AREA BY VELOCITY RATIO: 1.6 CM²
AV VALVE AREA: 1.4 CM2
AV VELOCITY RATIO: 0.62
BASOPHILS # BLD AUTO: 0.03 K/UL (ref 0–0.2)
BASOPHILS # BLD AUTO: 0.03 K/UL (ref 0–0.2)
BASOPHILS NFR BLD: 0.5 % (ref 0–1.9)
BASOPHILS NFR BLD: 0.6 % (ref 0–1.9)
BILIRUB SERPL-MCNC: 0.3 MG/DL (ref 0.1–1)
BSA FOR ECHO PROCEDURE: 1.69 M2
BSA FOR ECHO PROCEDURE: 1.73 M2
BUN SERPL-MCNC: 12 MG/DL (ref 8–23)
CALCIUM SERPL-MCNC: 8.4 MG/DL (ref 8.7–10.5)
CHLORIDE SERPL-SCNC: 105 MMOL/L (ref 95–110)
CO2 SERPL-SCNC: 22 MMOL/L (ref 23–29)
CREAT SERPL-MCNC: 0.8 MG/DL (ref 0.5–1.4)
CV ECHO LV RWT: 0.33 CM
DACRYOCYTES BLD QL SMEAR: ABNORMAL
DIFFERENTIAL METHOD BLD: ABNORMAL
DIFFERENTIAL METHOD BLD: ABNORMAL
DOHLE BOD BLD QL SMEAR: PRESENT
DOP CALC AO PEAK VEL: 1.3 M/S
DOP CALC AO VTI: 21.1 CM
DOP CALC LVOT AREA: 2.5 CM2
DOP CALC LVOT DIAMETER: 1.8 CM
DOP CALC LVOT PEAK VEL: 0.8 M/S
DOP CALC LVOT STROKE VOLUME: 28.7 CM3
DOP CALCLVOT PEAK VEL VTI: 11.3 CM
E/E' RATIO: 6.29 M/S
ECHO EF ESTIMATED: 57 %
ECHO LV POSTERIOR WALL: 0.6 CM (ref 0.6–1.1)
EOSINOPHIL # BLD AUTO: 0.1 K/UL (ref 0–0.5)
EOSINOPHIL # BLD AUTO: 0.2 K/UL (ref 0–0.5)
EOSINOPHIL NFR BLD: 2.3 % (ref 0–8)
EOSINOPHIL NFR BLD: 3.7 % (ref 0–8)
ERYTHROCYTE [DISTWIDTH] IN BLOOD BY AUTOMATED COUNT: 13.2 % (ref 11.5–14.5)
ERYTHROCYTE [DISTWIDTH] IN BLOOD BY AUTOMATED COUNT: 13.2 % (ref 11.5–14.5)
EST. GFR  (NO RACE VARIABLE): >60 ML/MIN/1.73 M^2
FRACTIONAL SHORTENING: 27.8 % (ref 28–44)
GIANT PLATELETS BLD QL SMEAR: PRESENT
GLUCOSE SERPL-MCNC: 106 MG/DL (ref 70–110)
HCT VFR BLD AUTO: 36.6 % (ref 37–48.5)
HCT VFR BLD AUTO: 37.7 % (ref 37–48.5)
HGB BLD-MCNC: 11.9 G/DL (ref 12–16)
HGB BLD-MCNC: 12.5 G/DL (ref 12–16)
HYPOCHROMIA BLD QL SMEAR: ABNORMAL
IMM GRANULOCYTES # BLD AUTO: 0.03 K/UL (ref 0–0.04)
IMM GRANULOCYTES # BLD AUTO: 0.03 K/UL (ref 0–0.04)
IMM GRANULOCYTES NFR BLD AUTO: 0.5 % (ref 0–0.5)
IMM GRANULOCYTES NFR BLD AUTO: 0.6 % (ref 0–0.5)
INTERVENTRICULAR SEPTUM: 0.6 CM (ref 0.6–1.1)
LA MAJOR: 5.55 CM
LA MINOR: 5.47 CM
LA WIDTH: 3.54 CM
LEFT ATRIUM SIZE: 3.34 CM
LEFT ATRIUM VOLUME INDEX MOD: 30.8 ML/M2
LEFT ATRIUM VOLUME INDEX: 32.2 ML/M2
LEFT ATRIUM VOLUME MOD: 53 ML
LEFT ATRIUM VOLUME: 55.37 CM3
LEFT INTERNAL DIMENSION IN SYSTOLE: 2.6 CM (ref 2.1–4)
LEFT VENTRICLE DIASTOLIC VOLUME INDEX: 31.7 ML/M2
LEFT VENTRICLE DIASTOLIC VOLUME: 54.52 ML
LEFT VENTRICLE MASS INDEX: 31.3 G/M2
LEFT VENTRICLE SYSTOLIC VOLUME INDEX: 13.7 ML/M2
LEFT VENTRICLE SYSTOLIC VOLUME: 23.54 ML
LEFT VENTRICULAR INTERNAL DIMENSION IN DIASTOLE: 3.6 CM (ref 3.5–6)
LEFT VENTRICULAR MASS: 53.8 G
LV LATERAL E/E' RATIO: 5.5 M/S
LV SEPTAL E/E' RATIO: 7.33 M/S
LYMPHOCYTES # BLD AUTO: 1.6 K/UL (ref 1–4.8)
LYMPHOCYTES # BLD AUTO: 1.8 K/UL (ref 1–4.8)
LYMPHOCYTES NFR BLD: 32.1 % (ref 18–48)
LYMPHOCYTES NFR BLD: 33.1 % (ref 18–48)
MAGNESIUM SERPL-MCNC: 1.6 MG/DL (ref 1.6–2.6)
MCH RBC QN AUTO: 28.5 PG (ref 27–31)
MCH RBC QN AUTO: 28.6 PG (ref 27–31)
MCHC RBC AUTO-ENTMCNC: 32.5 G/DL (ref 32–36)
MCHC RBC AUTO-ENTMCNC: 33.2 G/DL (ref 32–36)
MCV RBC AUTO: 86 FL (ref 82–98)
MCV RBC AUTO: 88 FL (ref 82–98)
MONOCYTES # BLD AUTO: 0.9 K/UL (ref 0.3–1)
MONOCYTES # BLD AUTO: 0.9 K/UL (ref 0.3–1)
MONOCYTES NFR BLD: 15.9 % (ref 4–15)
MONOCYTES NFR BLD: 19.1 % (ref 4–15)
MV PEAK E VEL: 0.88 M/S
NEUTROPHILS # BLD AUTO: 2.1 K/UL (ref 1.8–7.7)
NEUTROPHILS # BLD AUTO: 2.7 K/UL (ref 1.8–7.7)
NEUTROPHILS NFR BLD: 42.9 % (ref 38–73)
NEUTROPHILS NFR BLD: 48.7 % (ref 38–73)
NRBC BLD-RTO: 0 /100 WBC
NRBC BLD-RTO: 0 /100 WBC
OHS QRS DURATION: 78 MS
OHS QTC CALCULATION: 430 MS
OVALOCYTES BLD QL SMEAR: ABNORMAL
PHOSPHATE SERPL-MCNC: 3 MG/DL (ref 2.7–4.5)
PISA TR MAX VEL: 2.05 M/S
PISA TR RADIUS: 0.55 CM
PISA TR VN NYQUIST: 0.22 M/S
PLATELET # BLD AUTO: 173 K/UL (ref 150–450)
PLATELET # BLD AUTO: 205 K/UL (ref 150–450)
PLATELET BLD QL SMEAR: ABNORMAL
PMV BLD AUTO: 10.3 FL (ref 9.2–12.9)
PMV BLD AUTO: 10.4 FL (ref 9.2–12.9)
POCT GLUCOSE: 112 MG/DL (ref 70–110)
POCT GLUCOSE: 113 MG/DL (ref 70–110)
POCT GLUCOSE: 98 MG/DL (ref 70–110)
POIKILOCYTOSIS BLD QL SMEAR: SLIGHT
POLYCHROMASIA BLD QL SMEAR: ABNORMAL
POTASSIUM SERPL-SCNC: 4.4 MMOL/L (ref 3.5–5.1)
PROT SERPL-MCNC: 5.7 G/DL (ref 6–8.4)
RA MAJOR: 4.35 CM
RA PRESSURE ESTIMATED: 3 MMHG
RA WIDTH: 3.92 CM
RBC # BLD AUTO: 4.17 M/UL (ref 4–5.4)
RBC # BLD AUTO: 4.37 M/UL (ref 4–5.4)
RV TB RVSP: 5 MMHG
RV TISSUE DOPPLER FREE WALL SYSTOLIC VELOCITY 1 (APICAL 4 CHAMBER VIEW): 11.35 CM/S
SCHISTOCYTES BLD QL SMEAR: ABNORMAL
SCHISTOCYTES BLD QL SMEAR: PRESENT
SINUS: 2.84 CM
SINUS: 2.9 CM
SMUDGE CELLS BLD QL SMEAR: PRESENT
SODIUM SERPL-SCNC: 135 MMOL/L (ref 136–145)
SPHEROCYTES BLD QL SMEAR: ABNORMAL
STJ: 2.17 CM
STJ: 2.5 CM
TDI LATERAL: 0.16 M/S
TDI SEPTAL: 0.12 M/S
TDI: 0.14 M/S
TOXIC GRANULES BLD QL SMEAR: PRESENT
TR MAX PG: 17 MMHG
TRICUSPID ANNULAR PLANE SYSTOLIC EXCURSION: 1.44 CM
TV PEAK GRADIENT: 17 MMHG
TV REST PULMONARY ARTERY PRESSURE: 20 MMHG
WBC # BLD AUTO: 4.92 K/UL (ref 3.9–12.7)
WBC # BLD AUTO: 5.55 K/UL (ref 3.9–12.7)
WBC TOXIC VACUOLES BLD QL SMEAR: PRESENT
Z-SCORE OF LEFT VENTRICULAR DIMENSION IN END DIASTOLE: -2.82
Z-SCORE OF LEFT VENTRICULAR DIMENSION IN END SYSTOLE: -1.01

## 2024-12-23 PROCEDURE — 83735 ASSAY OF MAGNESIUM: CPT | Mod: HCNC

## 2024-12-23 PROCEDURE — 94761 N-INVAS EAR/PLS OXIMETRY MLT: CPT | Mod: HCNC

## 2024-12-23 PROCEDURE — 99204 OFFICE O/P NEW MOD 45 MIN: CPT | Mod: 25,HCNC,, | Performed by: INTERNAL MEDICINE

## 2024-12-23 PROCEDURE — 36415 COLL VENOUS BLD VENIPUNCTURE: CPT | Mod: HCNC

## 2024-12-23 PROCEDURE — 92960 CARDIOVERSION ELECTRIC EXT: CPT | Mod: HCNC,,, | Performed by: INTERNAL MEDICINE

## 2024-12-23 PROCEDURE — 37000008 HC ANESTHESIA 1ST 15 MINUTES: Mod: HCNC | Performed by: INTERNAL MEDICINE

## 2024-12-23 PROCEDURE — 63600175 PHARM REV CODE 636 W HCPCS: Mod: HCNC

## 2024-12-23 PROCEDURE — 25000003 PHARM REV CODE 250: Mod: HCNC | Performed by: STUDENT IN AN ORGANIZED HEALTH CARE EDUCATION/TRAINING PROGRAM

## 2024-12-23 PROCEDURE — 25000003 PHARM REV CODE 250: Mod: HCNC

## 2024-12-23 PROCEDURE — G0378 HOSPITAL OBSERVATION PER HR: HCPCS | Mod: HCNC

## 2024-12-23 PROCEDURE — 25500020 PHARM REV CODE 255: Mod: HCNC | Performed by: INTERNAL MEDICINE

## 2024-12-23 PROCEDURE — 85025 COMPLETE CBC W/AUTO DIFF WBC: CPT | Mod: 91,HCNC

## 2024-12-23 PROCEDURE — 37000009 HC ANESTHESIA EA ADD 15 MINS: Mod: HCNC | Performed by: INTERNAL MEDICINE

## 2024-12-23 PROCEDURE — 80053 COMPREHEN METABOLIC PANEL: CPT | Mod: HCNC

## 2024-12-23 PROCEDURE — 92960 CARDIOVERSION ELECTRIC EXT: CPT | Mod: HCNC | Performed by: INTERNAL MEDICINE

## 2024-12-23 PROCEDURE — 84100 ASSAY OF PHOSPHORUS: CPT | Mod: HCNC

## 2024-12-23 RX ORDER — LIDOCAINE HYDROCHLORIDE 20 MG/ML
INJECTION, SOLUTION EPIDURAL; INFILTRATION; INTRACAUDAL; PERINEURAL
Status: DISCONTINUED | OUTPATIENT
Start: 2024-12-23 | End: 2024-12-23

## 2024-12-23 RX ORDER — SODIUM CHLORIDE 0.9 % (FLUSH) 0.9 %
3 SYRINGE (ML) INJECTION
OUTPATIENT
Start: 2024-12-23

## 2024-12-23 RX ORDER — ACETAMINOPHEN 325 MG/1
650 TABLET ORAL EVERY 6 HOURS PRN
Status: DISCONTINUED | OUTPATIENT
Start: 2024-12-23 | End: 2024-12-23 | Stop reason: HOSPADM

## 2024-12-23 RX ORDER — LIDOCAINE HYDROCHLORIDE 20 MG/ML
SOLUTION OROPHARYNGEAL
Status: DISCONTINUED | OUTPATIENT
Start: 2024-12-23 | End: 2024-12-23

## 2024-12-23 RX ORDER — PROPOFOL 10 MG/ML
VIAL (ML) INTRAVENOUS
Status: DISCONTINUED | OUTPATIENT
Start: 2024-12-23 | End: 2024-12-23

## 2024-12-23 RX ORDER — MAGNESIUM SULFATE HEPTAHYDRATE 40 MG/ML
2 INJECTION, SOLUTION INTRAVENOUS ONCE
Status: COMPLETED | OUTPATIENT
Start: 2024-12-23 | End: 2024-12-23

## 2024-12-23 RX ORDER — PHENYLEPHRINE HYDROCHLORIDE 10 MG/ML
INJECTION INTRAVENOUS
Status: DISCONTINUED | OUTPATIENT
Start: 2024-12-23 | End: 2024-12-23

## 2024-12-23 RX ORDER — METOPROLOL SUCCINATE 25 MG/1
25 TABLET, EXTENDED RELEASE ORAL DAILY
Qty: 90 TABLET | Refills: 3 | Status: SHIPPED | OUTPATIENT
Start: 2024-12-23 | End: 2025-12-23

## 2024-12-23 RX ORDER — GLUCAGON 1 MG
1 KIT INJECTION
OUTPATIENT
Start: 2024-12-23

## 2024-12-23 RX ORDER — METOPROLOL SUCCINATE 25 MG/1
25 TABLET, EXTENDED RELEASE ORAL DAILY
Status: DISCONTINUED | OUTPATIENT
Start: 2024-12-23 | End: 2024-12-23 | Stop reason: HOSPADM

## 2024-12-23 RX ADMIN — RALOXIFENE HYDROCHLORIDE 60 MG: 60 TABLET, FILM COATED ORAL at 09:12

## 2024-12-23 RX ADMIN — CEFADROXIL 500 MG: 500 CAPSULE ORAL at 08:12

## 2024-12-23 RX ADMIN — OXYBUTYNIN CHLORIDE 10 MG: 5 TABLET, EXTENDED RELEASE ORAL at 08:12

## 2024-12-23 RX ADMIN — LIDOCAINE HYDROCHLORIDE 15 ML: 20 SOLUTION ORAL at 12:12

## 2024-12-23 RX ADMIN — LIDOCAINE HYDROCHLORIDE 100 MG: 20 INJECTION, SOLUTION EPIDURAL; INFILTRATION; INTRACAUDAL; PERINEURAL at 01:12

## 2024-12-23 RX ADMIN — PROPOFOL 70 MG: 10 INJECTION, EMULSION INTRAVENOUS at 01:12

## 2024-12-23 RX ADMIN — MAGNESIUM SULFATE HEPTAHYDRATE 2 G: 40 INJECTION, SOLUTION INTRAVENOUS at 02:12

## 2024-12-23 RX ADMIN — PHENYLEPHRINE HYDROCHLORIDE 200 MCG: 10 INJECTION INTRAVENOUS at 01:12

## 2024-12-23 RX ADMIN — METOPROLOL SUCCINATE 25 MG: 25 TABLET, EXTENDED RELEASE ORAL at 02:12

## 2024-12-23 RX ADMIN — DULOXETINE HYDROCHLORIDE 40 MG: 20 CAPSULE, DELAYED RELEASE ORAL at 08:12

## 2024-12-23 RX ADMIN — APIXABAN 5 MG: 5 TABLET, FILM COATED ORAL at 08:12

## 2024-12-23 RX ADMIN — HUMAN ALBUMIN MICROSPHERES AND PERFLUTREN 0.66 MG: 10; .22 INJECTION, SOLUTION INTRAVENOUS at 01:12

## 2024-12-23 RX ADMIN — PROPOFOL 150 MCG/KG/MIN: 10 INJECTION, EMULSION INTRAVENOUS at 01:12

## 2024-12-23 NOTE — PROGRESS NOTES
Procedure explained. Bubble study done x 2 with and without valsalva via left forearm sl. Tolerated well. Sl flushed before and after with 10 cc ns.

## 2024-12-23 NOTE — HOSPITAL COURSE
Admitted for management of new onset Afib. HR remained in low 100's. Started on metoprolol succinate 25mg daily and eliquis 5mg BID. Cardioversion successfully performed 12/23 restoring NSR.

## 2024-12-23 NOTE — ASSESSMENT & PLAN NOTE
Recent Labs     12/22/24  1440 12/23/24  0519   AST 84* 56*        Recent Labs     12/22/24  1440 12/23/24  0519   ALT 63* 49*       Elevated AST/ALT on admission.     - continue to monitor on daily cmp  - Transaminitis resolving

## 2024-12-23 NOTE — HPI
Flako Ovalles is a 75 yo female with a hx of OAB, remote DVT while pregnant, hx of atrial septal defect repair (in 1976 age 26), and recent TKA (11/11/24) who presents today with fatigue and dyspnea on exertion for 5-7 days. She regularly exercises and states these new symptoms are unusual for her. She recently underwent a TKA on 11/11/24 and presented on 12/11/24 with wound dehiscence following a fall by tripping. She was started on Bactrim for wound dehiscence, that she took for 5-7 days and then developed nausea, vomiting and diarrhea, therefore bactrim was switched to cefadroxil (to be taken till 12/24/24). She denies any N/V/D currently. But she reports two episodes of fall preceded by lighhededness in last 1 week associated with shortness of breath and fatigue. She drinks wine 2 glasses 3-4 times a week, drinks couple of cups of coffee daily, also she reports she snores at night. She did not have any cardiac ailments after ASD repair, no history of atrial fibrillation. Electrophysiology (EP) consulted for evaluation of  new onset of Atrial Fibrillation with Rapid Ventricular Response

## 2024-12-23 NOTE — H&P
Ju[facility]ragon - Cardiology [facility] Medicine  History & Physical    Patient Name: Dia Ovalles  MRN: [id number]  Patient Class: OP- Observation  Admission Date: 12/22/2024  Attending Physician: Umair Meza MD   Primary Care Provider: Shelley Leija MD         Patient information was obtained from patient and ER records.     Subjective:     Principal Problem:Atrial fibrillation    Chief Complaint:   Chief Complaint   Patient presents with    Nausea    Diarrhea    Dizziness    Shortness of Breath     Pt reports dizziness, nausea, fatigue, diarrhea and SOB for a week.        HPI: Flako Ovalles is a 73 yo female with a hx of OAB, remote DVT while pregnant, hx of atrial septal defect repair (in 1976 age 26), and recent TKA (11/11/24) who presents today with fatigue and dyspnea on exertion for 3 days. She regularly exercises and states these new symptoms are unusual for her. Prior to the onset of these symptoms she had nausea and vomiting which stopped once she was transitioned from bactrim to cefadroxil. She recently underwent a TKA on 11/11/24 and presented on 12/11 with wound dehiscence following a fall. Otherwise her post-op course has been unremarkable. She denies any SOB at rest, orthopnea, headaches, N/V/D since stopping bactrim, urinary frequency or pain, or peripheral edema. She does have mild swelling and pain on her R leg from her recent operation.     In the ED VS , /79, RR 20, Sat 99% on RA, afebrile. EKG with A-fib. UA with spec gravity >1.030, trace ketones & glucose, 2+ protein, trace leukocyte esterase and many bacteria. D-dimer 3. . AST/ALT elevated 84/63. Glucose 146 w/ A1c 5.1. CXR unremarkable. CTA chest and LE US negative for DVT/PE.    Past Medical History:   Diagnosis Date    Atrial fibrillation 12/22/2024    Breast cyst     Cancer     SCC x 2 on arms    Cataract     Closed Ortiz's fracture of right radius 09/27/2017    Decreased ROM of right shoulder 03/20/2018     Decreased strength of upper extremity 2018    Deep vein thrombosis     GERD (gastroesophageal reflux disease)     History of cardiovascular stress test 10/29/2024    Joint pain     Lesion of left upper eyelid 2023    Nuclear sclerosis, bilateral 2021    OA (osteoarthritis) of shoulder 2019    OAB (overactive bladder) 2017    Osteoporosis 2017    Other chest pain 2022    Squamous cell carcinoma of skin 2012    right arm       Past Surgical History:   Procedure Laterality Date    ARTHROPLASTY, KNEE, TOTAL, USING COMPUTER-ASSISTED NAVIGATION  2024    Procedure: ARTHROPLASTY, KNEE, TOTAL, USING COMPUTER-ASSISTED NAVIGATION;  Surgeon: Keagan Oneal MD;  Location: [facility] OR;  Service: Orthopedics;;    ASD REPAIR      open heart surgery    BREAST BIOPSY      CATARACT EXTRACTION W/  INTRAOCULAR LENS IMPLANT Right 2021    Procedure: EXTRACTION, CATARACT, WITH IOL INSERTION;  Surgeon: Bruna Silva MD;  Location: [facility] OR;  Service: Ophthalmology;  Laterality: Right;    CATARACT EXTRACTION W/  INTRAOCULAR LENS IMPLANT Left 2021    Procedure: EXTRACTION, CATARACT, WITH IOL INSERTION;  Surgeon: Bruna Silva MD;  Location: [facility] OR;  Service: Ophthalmology;  Laterality: Left;     SECTION      x2    EYE SURGERY      KNEE SURGERY Right     meniscal repair    SKIN BIOPSY  2012    SKIN CANCER EXCISION         Review of patient's allergies indicates:   Allergen Reactions    Codeine Nausea Only       No current facility-administered medications on file prior to encounter.     Current Outpatient Medications on File Prior to Encounter   Medication Sig    acetaminophen (TYLENOL) 650 MG TbSR Take 1 tablet (650 mg total) by mouth every 8 (eight) hours.    biotin 1 mg tablet Take 5,000 mcg by mouth once daily.    calcium carbonate (CALCIUM 600 ORAL) Take 1,200 mg by mouth once daily.     cefadroxil (DURICEF) 500 MG Cap Take 1 capsule (500 mg total) by mouth every 12  (twelve) hours. for 5 days    celecoxib (CELEBREX) 200 MG capsule Take 1 capsule (200 mg total) by mouth 2 (two) times daily as needed for Pain (knee pain).    clobetasoL (TEMOVATE) 0.05 % external solution Use on scalp one - two times daily as needed for scaling or itching    DULoxetine (CYMBALTA) 20 MG capsule TAKE 2 CAPSULES ONE TIME DAILY    multivitamin (THERAGRAN) per tablet Take 1 tablet by mouth once daily.    oxybutynin (DITROPAN-XL) 10 MG 24 hr tablet TAKE 1 TABLET EVERY DAY    oxyCODONE (ROXICODONE) 5 MG immediate release tablet Take 1 to 2 tablets by mouth  every 4 to 6 hours as needed for pain    raloxifene (EVISTA) 60 mg tablet Take 1 tablet (60 mg total) by mouth once daily.    UNKNOWN TO PATIENT Nutrafol- hair growth supplement    [DISCONTINUED] aspirin (ECOTRIN) 81 MG EC tablet Take 1 tablet (81 mg total) by mouth 2 (two) times a day.    [DISCONTINUED] celecoxib (CELEBREX) 200 MG capsule Take 1 capsule (200 mg total) by mouth once daily.    [DISCONTINUED] cholecalciferol, vitamin D3, (VITAMIN D3) 50 mcg (2,000 unit) Cap Take 1,000 Units by mouth every other day.  (Patient not taking: Reported on 12/22/2024)    [DISCONTINUED] magnesium oxide (MAG-OX) 400 mg (241.3 mg magnesium) tablet Take 400 mg by mouth once daily. (Patient not taking: Reported on 12/22/2024)    [DISCONTINUED] methocarbamoL (ROBAXIN) 750 MG Tab Take 1 tablet (750 mg total) by mouth 4 (four) times daily as needed (for muscle spasms). (Patient not taking: Reported on 12/22/2024)    [DISCONTINUED] pantoprazole (PROTONIX) 40 MG tablet Take 1 tablet (40 mg total) by mouth once daily. (Patient not taking: Reported on 12/22/2024)    [DISCONTINUED] senna-docusate 8.6-50 mg (SENNA WITH DOCUSATE SODIUM) 8.6-50 mg per tablet Take 1 tablet by mouth once daily. (Patient not taking: Reported on 12/22/2024)    [DISCONTINUED] sulfamethoxazole-trimethoprim 800-160mg (BACTRIM DS) 800-160 mg Tab Take 1 tablet by mouth 2 (two) times daily. for 7 days  (Patient not taking: Reported on 12/22/2024)     Family History       Problem Relation (Age of Onset)    Asthma Mother    Cancer Father    Heart disease Maternal Grandfather    Hypertension Mother, Father    Osteoporosis Mother    Stroke Maternal Grandfather          Tobacco Use    Smoking status: Never     Passive exposure: Never    Smokeless tobacco: Never   Substance and Sexual Activity    Alcohol use: Yes     Alcohol/week: 4.0 standard drinks of alcohol     Types: 4 Glasses of wine per week     Comment: social    Drug use: Yes     Types: Marijuana     Comment: gummies 2 days ago    Sexual activity: Not Currently     Partners: Male     Birth control/protection: Post-menopausal     Review of Systems   Constitutional:  Positive for activity change, appetite change and fatigue. Negative for fever.   Respiratory:  Negative for chest tightness and shortness of breath.    Cardiovascular:  Negative for chest pain and leg swelling.   Gastrointestinal:  Negative for abdominal pain, constipation, diarrhea, nausea and vomiting.        NVD stopped on 12/18 when stopping bactrim   Genitourinary:  Negative for dysuria and frequency.   Musculoskeletal:  Positive for arthralgias (post-op right knee).   Skin:  Positive for wound (surgical wound R knee).   Neurological:  Positive for weakness. Negative for headaches.     Objective:     Vital Signs (Most Recent):  Temp: 97.7 °F (36.5 °C) (12/22/24 1720)  Pulse: 99 (12/22/24 1720)  Resp: 16 (12/22/24 1720)  BP: 119/66 (12/22/24 1720)  SpO2: 100 % (12/22/24 1720) Vital Signs (24h Range):  Temp:  [97.6 °F (36.4 °C)-97.7 °F (36.5 °C)] 97.7 °F (36.5 °C)  Pulse:  [] 99  Resp:  [16-20] 16  SpO2:  [98 %-100 %] 100 %  BP: ()/(65-79) 119/66     Weight: 59 kg (130 lb)  Body mass index is 19.2 kg/m².     Physical Exam  Constitutional:       General: She is not in acute distress.     Appearance: Normal appearance. She is not ill-appearing.   Eyes:      Conjunctiva/sclera:  Conjunctivae normal.   Cardiovascular:      Rate and Rhythm: Normal rate. Rhythm irregular.      Heart sounds: Normal heart sounds.   Pulmonary:      Effort: Pulmonary effort is normal. No respiratory distress.      Breath sounds: Normal breath sounds.   Abdominal:      General: Abdomen is flat. There is no distension.      Palpations: Abdomen is soft.      Tenderness: There is no abdominal tenderness.   Musculoskeletal:         General: Swelling (R knee) present.      Right lower leg: No edema.      Left lower leg: No edema.   Skin:     General: Skin is warm and dry.      Capillary Refill: Capillary refill takes less than 2 seconds.   Neurological:      General: No focal deficit present.      Mental Status: She is alert and oriented to person, place, and time.   Psychiatric:         Mood and Affect: Mood normal.         Thought Content: Thought content normal.                Significant Labs: All pertinent labs within the past 24 hours have been reviewed.  Recent Lab Results         12/22/24  1550   12/22/24  1440   12/22/24  1151   12/22/24  1134        Albumin   3.9           ALP   64           ALT   63           Anion Gap   13           Appearance, UA Hazy             AST   84           Bacteria, UA Many             Baso #   0.07           Basophil %   0.8           Bilirubin (UA) Negative             BILIRUBIN TOTAL   0.3  Comment: For infants and newborns, interpretation of results should be based  on gestational age, weight and in agreement with clinical  observations.    Premature Infant recommended reference ranges:  Up to 24 hours.............<8.0 mg/dL  Up to 48 hours............<12.0 mg/dL  3-5 days..................<15.0 mg/dL  6-29 days.................<15.0 mg/dL             BNP   272  Comment: Values of less than 100 pg/ml are consistent with non-CHF populations.           BUN   18           Calcium   9.0           Chloride   100           CO2   19           Color, UA Yellow             Creatinine    0.8           D-Dimer   3.00  Comment: The quantitative D-dimer assay should be used as an aid in   the diagnosis of deep vein thrombosis and pulmonary embolism  in patients with the appropriate presentation and clinical  history. The upper limit of the reference interval and the clinical   cut off   point are identical. Causes of a positive (>0.50 mg/L FEU) D-Dimer   test  include, but are not limited to: DVT, PE, DIC, thrombolytic   therapy, anticoagulant therapy, recent surgery, trauma, or   pregnancy, disseminated malignancy, aortic aneurysm, cirrhosis,  and severe infection. False negative results may occur in   patients with distal DVT.             Differential Method   Automated           eGFR   >60.0           Eos #   0.2           Eos %   2.0           Estimated Avg Glucose   100           Glucose   146           Glucose, UA Trace             Gran # (ANC)   4.7           Gran %   55.0           Hematocrit   40.2           Hemoglobin   14.1           Hemoglobin A1C External   5.1  Comment: ADA Screening Guidelines:  5.7-6.4%  Consistent with prediabetes  >or=6.5%  Consistent with diabetes    High levels of fetal hemoglobin interfere with the HbA1C  assay. Heterozygous hemoglobin variants (HbS, HgC, etc)do  not significantly interfere with this assay.   However, presence of multiple variants may affect accuracy.             Hyaline Casts, UA 99             Immature Grans (Abs)   0.05  Comment: Mild elevation in immature granulocytes is non specific and   can be seen in a variety of conditions including stress response,   acute inflammation, trauma and pregnancy. Correlation with other   laboratory and clinical findings is essential.             Immature Granulocytes   0.6           Ketones, UA Trace             Leukocyte Esterase, UA Trace             Lymph #   2.3           Lymph %   27.0           MCH   30.7           MCHC   35.1           MCV   88           Microscopic Comment SEE COMMENT  Comment: Other  formed elements not mentioned in the report are not   present in the microscopic examination.                Mono #   1.2           Mono %   14.6           MPV   10.3           NITRITE UA Negative             nRBC   0           Blood, UA Negative             pH, UA 6.0             Platelet Count   226           POC Glucose       197       Potassium   3.9           PROTEIN TOTAL   6.9           Protein, UA 2+  Comment: Recommend a 24 hour urine protein or a urine   protein/creatinine ratio if globulin induced proteinuria is  clinically suspected.                Acceptable     Yes         RBC   4.59           RBC, UA 4             RDW   13.5           SARS Coronavirus 2 Antigen     Negative         Sodium   132           Spec Grav UA >1.030             Specimen UA Urine, Clean Catch             Squam Epithel, UA 2             Troponin I High Sensitivity   11           WBC, UA 6             WBC   8.47                   Significant Imaging: I have reviewed all pertinent imaging results/findings within the past 24 hours.    CXR: No acute intrathoracic process seen.     CTA Chest: No evidence of central or segmental/subsegmental artery thromboembolism.  No acute intrathoracic process.  Remote right-sided rib fracture.    US LE: No evidence of deep venous thrombosis in right lower extremity.   Assessment/Plan:     * Atrial fibrillation  74yF with remote hx of ASD repair and 6 weeks post-op from TKA presents with 1 week of fatigue and dizziness. In the ED she was found to be in A-fib with . Last echo from 2022 with normal EF, but noted in prior notes to have been developing HF prior to ASD repair. On admission D-dimer 3 and . CTA and LE US negative for DVT/PE. Chadsvasc of 3.    - f/u echo with bubble study  - begin anticoagulation with eliquis 5mg BID  - consult placed to cardiology  - HR currently controlled, consider metoprolol if becomes elevated    Hx of total knee arthroplasty  Recent TKA  on 11/11/24 with dehiscence on 12/11/24. Was treated with course of bactrim but developed profuse nausea, vomiting, and diarrhea. Bactrim was switched to cefadroxil until 12/24    - complete cefadroxil course     Elevated d-dimer  Remote history of DVT while pregnant. D-dimer 3 on admission. CTA chest negative for PE. LE US negative for DVT. Elevation likely 2/2 recent surgery or active a-fib.     Transaminitis  Recent Labs     12/22/24  1440   AST 84*      Recent Labs     12/22/24  1440   ALT 63*     Elevated AST/ALT on admission.     - continue to monitor on daily cmp  - consider imaging if LFTs worsen      Acute cystitis  UTI noted on admission UA. Pt is asymptomatic denying any pain with urination, frequency, flank pain, or abdominal/pelvic pain. Will observe for now.      OAB (overactive bladder)  Continue home oxybutinin      H/O atrial septal defect repair  Atrial-septal defect repaired in 1976 at age 26. Noted to have been developing HF at the time.     - f/u echo with bubble      VTE Risk Mitigation (From admission, onward)           Ordered     apixaban tablet 5 mg  2 times daily         12/22/24 1735     IP VTE HIGH RISK PATIENT  Once         12/22/24 1716     Place sequential compression device  Until discontinued         12/22/24 1716     Reason for No Pharmacological VTE Prophylaxis  Once        Question:  Reasons:  Answer:  Patient is Ambulatory    12/22/24 1716                      Sherry Isidro MD  Department of Hospital Medicine  [facility] - Cardiology Stepdown

## 2024-12-23 NOTE — ASSESSMENT & PLAN NOTE
Recent Labs     12/22/24  1440 12/23/24  0519   AST 84* 56*      Recent Labs     12/22/24  1440 12/23/24  0519   ALT 63* 49*     Elevated AST/ALT on admission.     - continue to monitor on daily cmp  - consider imaging if LFTs worsen

## 2024-12-23 NOTE — CONSULTS
[facility]  Cardiac Electrophysiology  Consult Note    Admission Date: 12/22/2024  Code Status: Full Code   Attending Provider: Umair Meza MD  Consulting Provider: Vito Ludwig MD  Principal Problem:Atrial fibrillation    Inpatient consult to Electrophysiology  Consult performed by: Vito Ludwig MD  Consult ordered by: Sherry Isidro MD  Reason for consult: Atrial Fibrillation with Rapid Ventricular Response        Subjective:     HPI:   Flako Ovalles is a 73 yo female with a hx of OAB, remote DVT while pregnant, hx of atrial septal defect repair (in 1976 age 26), and recent TKA (11/11/24) who presents today with fatigue and dyspnea on exertion for 5-7 days. She regularly exercises and states these new symptoms are unusual for her. She recently underwent a TKA on 11/11/24 and presented on 12/11/24 with wound dehiscence following a fall by tripping. She was started on Bactrim for wound dehiscence, that she took for 5-7 days and then developed nausea, vomiting and diarrhea, therefore bactrim was switched to cefadroxil (to be taken till 12/24/24). She denies any N/V/D currently. But she reports two episodes of fall preceded by lighhededness in last 1 week associated with shortness of breath and fatigue. She drinks wine 2 glasses 3-4 times a week, drinks couple of cups of coffee daily, also she reports she snores at night. She did not have any cardiac ailments after ASD repair, no history of atrial fibrillation. Electrophysiology (EP) consulted for evaluation of  new onset of Atrial Fibrillation with Rapid Ventricular Response        Past Medical History:   Diagnosis Date    Atrial fibrillation 12/22/2024    Breast cyst     Cancer     SCC x 2 on arms    Cataract     Closed Ortiz's fracture of right radius 09/27/2017    Decreased ROM of right shoulder 03/20/2018    Decreased strength of upper extremity 03/20/2018    Deep vein thrombosis     GERD (gastroesophageal reflux disease)     History of  cardiovascular stress test 10/29/2024    Joint pain     Lesion of left upper eyelid 2023    Nuclear sclerosis, bilateral 2021    OA (osteoarthritis) of shoulder 2019    OAB (overactive bladder) 2017    Osteoporosis 2017    Other chest pain 2022    Squamous cell carcinoma of skin 2012    right arm       Past Surgical History:   Procedure Laterality Date    ARTHROPLASTY, KNEE, TOTAL, USING COMPUTER-ASSISTED NAVIGATION  2024    Procedure: ARTHROPLASTY, KNEE, TOTAL, USING COMPUTER-ASSISTED NAVIGATION;  Surgeon: Keagan Oneal MD;  Location: [facility] OR;  Service: Orthopedics;;    ASD REPAIR      open heart surgery    BREAST BIOPSY      CATARACT EXTRACTION W/  INTRAOCULAR LENS IMPLANT Right 2021    Procedure: EXTRACTION, CATARACT, WITH IOL INSERTION;  Surgeon: Bruna Silva MD;  Location: [facility] OR;  Service: Ophthalmology;  Laterality: Right;    CATARACT EXTRACTION W/  INTRAOCULAR LENS IMPLANT Left 2021    Procedure: EXTRACTION, CATARACT, WITH IOL INSERTION;  Surgeon: Bruna Silva MD;  Location: [facility] OR;  Service: Ophthalmology;  Laterality: Left;     SECTION      x2    EYE SURGERY      KNEE SURGERY Right     meniscal repair    SKIN BIOPSY  2012    SKIN CANCER EXCISION         Review of patient's allergies indicates:   Allergen Reactions    Codeine Nausea Only       No current facility-administered medications on file prior to encounter.     Current Outpatient Medications on File Prior to Encounter   Medication Sig    acetaminophen (TYLENOL) 650 MG TbSR Take 1 tablet (650 mg total) by mouth every 8 (eight) hours.    biotin 1 mg tablet Take 5,000 mcg by mouth once daily.    calcium carbonate (CALCIUM 600 ORAL) Take 1,200 mg by mouth once daily.     cefadroxil (DURICEF) 500 MG Cap Take 1 capsule (500 mg total) by mouth every 12 (twelve) hours. for 5 days    celecoxib (CELEBREX) 200 MG capsule Take 1 capsule (200 mg total) by mouth 2 (two) times daily as needed  for Pain (knee pain).    clobetasoL (TEMOVATE) 0.05 % external solution Use on scalp one - two times daily as needed for scaling or itching    DULoxetine (CYMBALTA) 20 MG capsule TAKE 2 CAPSULES ONE TIME DAILY    multivitamin (THERAGRAN) per tablet Take 1 tablet by mouth once daily.    oxybutynin (DITROPAN-XL) 10 MG 24 hr tablet TAKE 1 TABLET EVERY DAY    oxyCODONE (ROXICODONE) 5 MG immediate release tablet Take 1 to 2 tablets by mouth  every 4 to 6 hours as needed for pain    raloxifene (EVISTA) 60 mg tablet Take 1 tablet (60 mg total) by mouth once daily.    UNKNOWN TO PATIENT Nutrafol- hair growth supplement     Family History       Problem Relation (Age of Onset)    Asthma Mother    Cancer Father    Heart disease Maternal Grandfather    Hypertension Mother, Father    Osteoporosis Mother    Stroke Maternal Grandfather          Tobacco Use    Smoking status: Never     Passive exposure: Never    Smokeless tobacco: Never   Substance and Sexual Activity    Alcohol use: Yes     Alcohol/week: 4.0 standard drinks of alcohol     Types: 4 Glasses of wine per week     Comment: social    Drug use: Yes     Types: Marijuana     Comment: gummies 2 days ago    Sexual activity: Not Currently     Partners: Male     Birth control/protection: Post-menopausal     Review of Systems   Constitutional:  Positive for activity change, appetite change and fatigue. Negative for fever.   Respiratory:  Negative for chest tightness and shortness of breath.    Cardiovascular:  Negative for chest pain and leg swelling.   Gastrointestinal:  Negative for abdominal pain, constipation, diarrhea, nausea and vomiting.        NVD stopped on 12/18 when stopping bactrim   Genitourinary:  Negative for dysuria and frequency.   Musculoskeletal:  Positive for arthralgias (post-op right knee).   Skin:  Positive for wound (surgical wound R knee).   Neurological:  Positive for weakness. Negative for headaches.     Objective:     Vital Signs (Most  Recent):  Temp: 98.2 °F (36.8 °C) (12/23/24 1114)  Pulse: (!) 120 (12/23/24 1114)  Resp: 20 (12/23/24 1114)  BP: 110/74 (12/23/24 1114)  SpO2: 100 % (12/23/24 1114) Vital Signs (24h Range):  Temp:  [97.6 °F (36.4 °C)-98.2 °F (36.8 °C)] 98.2 °F (36.8 °C)  Pulse:  [] 120  Resp:  [16-20] 20  SpO2:  [96 %-100 %] 100 %  BP: ()/(65-79) 110/74     Weight: 59 kg (130 lb)  Body mass index is 19.2 kg/m².     Physical Exam  Constitutional:       General: She is not in acute distress.     Appearance: Normal appearance. She is not ill-appearing.   Eyes:      Conjunctiva/sclera: Conjunctivae normal.   Cardiovascular:      Rate and Rhythm: Normal rate. Rhythm irregular.      Heart sounds: Normal heart sounds.   Pulmonary:      Effort: Pulmonary effort is normal. No respiratory distress.      Breath sounds: Normal breath sounds.   Abdominal:      General: Abdomen is flat. There is no distension.      Palpations: Abdomen is soft.      Tenderness: There is no abdominal tenderness.   Musculoskeletal:         General: Swelling (R knee) present.      Right lower leg: No edema.      Left lower leg: No edema.   Skin:     General: Skin is warm and dry.      Capillary Refill: Capillary refill takes less than 2 seconds.   Neurological:      General: No focal deficit present.      Mental Status: She is alert and oriented to person, place, and time.   Psychiatric:         Mood and Affect: Mood normal.         Thought Content: Thought content normal.                Significant Labs: All pertinent labs within the past 24 hours have been reviewed.  Recent Lab Results  (Last 5 results in the past 24 hours)        12/23/24  0626   12/23/24  0519   12/22/24 2005 12/22/24  1827   12/22/24  1550        Albumin   3.2             ALP   54             ALT   49             Anion Gap   8             Appearance, UA         Hazy       AST   56             Bacteria, UA         Many       Baso #   0.03             Basophil %   0.6              Bilirubin (UA)         Negative       BILIRUBIN TOTAL   0.3  Comment: For infants and newborns, interpretation of results should be based  on gestational age, weight and in agreement with clinical  observations.    Premature Infant recommended reference ranges:  Up to 24 hours.............<8.0 mg/dL  Up to 48 hours............<12.0 mg/dL  3-5 days..................<15.0 mg/dL  6-29 days.................<15.0 mg/dL               BUN   12             Calcium   8.4             Chloride   105             CO2   22             Color, UA         Yellow       Creatinine   0.8             Differential Method   Automated             eGFR   >60.0             Eos #   0.2             Eos %   3.7             Glucose   106             Glucose, UA         Trace       Gran # (ANC)   2.1             Gran %   42.9             Hematocrit   36.6             Hemoglobin   11.9             Hyaline Casts, UA         99       Immature Grans (Abs)   0.03  Comment: Mild elevation in immature granulocytes is non specific and   can be seen in a variety of conditions including stress response,   acute inflammation, trauma and pregnancy. Correlation with other   laboratory and clinical findings is essential.               Immature Granulocytes   0.6             Ketones, UA         Trace       Leukocyte Esterase, UA         Trace       Lymph #   1.6             Lymph %   33.1             Magnesium    1.6             MCH   28.5             MCHC   32.5             MCV   88             Microscopic Comment         SEE COMMENT  Comment: Other formed elements not mentioned in the report are not   present in the microscopic examination.          Mono #   0.9             Mono %   19.1             MPV   10.4             NITRITE UA         Negative       nRBC   0             Blood, UA         Negative       pH, UA         6.0       Phosphorus Level   3.0             Platelet Count   173             POCT Glucose 112     115           Potassium   4.4              PROTEIN TOTAL   5.7             Protein, UA         2+  Comment: Recommend a 24 hour urine protein or a urine   protein/creatinine ratio if globulin induced proteinuria is  clinically suspected.         RBC   4.17             RBC, UA         4       RDW   13.2             Sodium   135             Spec Grav UA         >1.030       Specimen UA         Urine, Clean Catch       Squam Epithel, UA         2       TSH       1.413         WBC, UA         6       WBC   4.92                                   CHADS2 for A-FIB Stroke Risk  Age?: 75 years old or older  CHF history: No  HTN history: No  Previous Stroke Sx or TIA: No  Vascular Disease History?: Yes  Diabetes Mellitus History: No  Female?: Yes  [id number] Total Score: 4      Assessment and Plan:     * Atrial fibrillation  Flako Ovalles is a 73 yo female with a hx of OAB, remote DVT while pregnant, hx of atrial septal defect repair (in 1976 age 26), and recent TKA (11/11/24) who presents today with fatigue and dyspnea on exertion for 5-7 days. Electrophysiology (EP) consulted for evaluation of  new onset of Atrial Fibrillation with Rapid Ventricular Response    EKG: atrial fibrillation rapid ventricular response 100  Tele: atrial fibrillation rapid ventricular response 100s  Limit half to one drink a day  Limit 1-2 cups of coffee daily  Also get evaluated for HECTOR as outpatient  Get updated TTE [Last TTE LVEF  5/25/22 71%]  Continue her daily exercise regimen  Continue eliquis 5 mg Twice a day  Keep NPO for SOL-DCCV today  Plan to discharge on Toprol 25 mg daily (adjust according to HR, BP)        Thank you for your consult.     Vito Ludwig MD  Cardiac Electrophysiology  [facility]

## 2024-12-23 NOTE — SUBJECTIVE & OBJECTIVE
Past Medical History:   Diagnosis Date    Atrial fibrillation 2024    Breast cyst     Cancer     SCC x 2 on arms    Cataract     Closed Ortiz's fracture of right radius 2017    Decreased ROM of right shoulder 2018    Decreased strength of upper extremity 2018    Deep vein thrombosis     GERD (gastroesophageal reflux disease)     History of cardiovascular stress test 10/29/2024    Joint pain     Lesion of left upper eyelid 2023    Nuclear sclerosis, bilateral 2021    OA (osteoarthritis) of shoulder 2019    OAB (overactive bladder) 2017    Osteoporosis 2017    Other chest pain 2022    Squamous cell carcinoma of skin 2012    right arm       Past Surgical History:   Procedure Laterality Date    ARTHROPLASTY, KNEE, TOTAL, USING COMPUTER-ASSISTED NAVIGATION  2024    Procedure: ARTHROPLASTY, KNEE, TOTAL, USING COMPUTER-ASSISTED NAVIGATION;  Surgeon: Keagan Oneal MD;  Location: [facility] OR;  Service: Orthopedics;;    ASD REPAIR      open heart surgery    BREAST BIOPSY      CATARACT EXTRACTION W/  INTRAOCULAR LENS IMPLANT Right 2021    Procedure: EXTRACTION, CATARACT, WITH IOL INSERTION;  Surgeon: Bruna Silva MD;  Location: [facility] [place], operated by [facility] OR;  Service: Ophthalmology;  Laterality: Right;    CATARACT EXTRACTION W/  INTRAOCULAR LENS IMPLANT Left 2021    Procedure: EXTRACTION, CATARACT, WITH IOL INSERTION;  Surgeon: Bruna Silva MD;  Location: [facility], [place], operated by [facility] OR;  Service: Ophthalmology;  Laterality: Left;     SECTION      x2    EYE SURGERY      KNEE SURGERY Right     meniscal repair    SKIN BIOPSY  2012    SKIN CANCER EXCISION         Review of patient's allergies indicates:   Allergen Reactions    Codeine Nausea Only       No current facility-administered medications on file prior to encounter.     Current Outpatient Medications on File Prior to Encounter   Medication Sig    acetaminophen (TYLENOL) 650 MG TbSR Take 1 tablet (650 mg total) by mouth every 8  (eight) hours.    biotin 1 mg tablet Take 5,000 mcg by mouth once daily.    calcium carbonate (CALCIUM 600 ORAL) Take 1,200 mg by mouth once daily.     cefadroxil (DURICEF) 500 MG Cap Take 1 capsule (500 mg total) by mouth every 12 (twelve) hours. for 5 days    celecoxib (CELEBREX) 200 MG capsule Take 1 capsule (200 mg total) by mouth 2 (two) times daily as needed for Pain (knee pain).    clobetasoL (TEMOVATE) 0.05 % external solution Use on scalp one - two times daily as needed for scaling or itching    DULoxetine (CYMBALTA) 20 MG capsule TAKE 2 CAPSULES ONE TIME DAILY    multivitamin (THERAGRAN) per tablet Take 1 tablet by mouth once daily.    oxybutynin (DITROPAN-XL) 10 MG 24 hr tablet TAKE 1 TABLET EVERY DAY    oxyCODONE (ROXICODONE) 5 MG immediate release tablet Take 1 to 2 tablets by mouth  every 4 to 6 hours as needed for pain    raloxifene (EVISTA) 60 mg tablet Take 1 tablet (60 mg total) by mouth once daily.    UNKNOWN TO PATIENT Nutrafol- hair growth supplement     Family History       Problem Relation (Age of Onset)    Asthma Mother    Cancer Father    Heart disease Maternal Grandfather    Hypertension Mother, Father    Osteoporosis Mother    Stroke Maternal Grandfather          Tobacco Use    Smoking status: Never     Passive exposure: Never    Smokeless tobacco: Never   Substance and Sexual Activity    Alcohol use: Yes     Alcohol/week: 4.0 standard drinks of alcohol     Types: 4 Glasses of wine per week     Comment: social    Drug use: Yes     Types: Marijuana     Comment: gummies 2 days ago    Sexual activity: Not Currently     Partners: Male     Birth control/protection: Post-menopausal     Review of Systems   Constitutional:  Positive for activity change, appetite change and fatigue. Negative for fever.   Respiratory:  Negative for chest tightness and shortness of breath.    Cardiovascular:  Negative for chest pain and leg swelling.   Gastrointestinal:  Negative for abdominal pain, constipation,  diarrhea, nausea and vomiting.        NVD stopped on 12/18 when stopping bactrim   Genitourinary:  Negative for dysuria and frequency.   Musculoskeletal:  Positive for arthralgias (post-op right knee).   Skin:  Positive for wound (surgical wound R knee).   Neurological:  Positive for weakness. Negative for headaches.     Objective:     Vital Signs (Most Recent):  Temp: 98.2 °F (36.8 °C) (12/23/24 1114)  Pulse: (!) 120 (12/23/24 1114)  Resp: 20 (12/23/24 1114)  BP: 110/74 (12/23/24 1114)  SpO2: 100 % (12/23/24 1114) Vital Signs (24h Range):  Temp:  [97.6 °F (36.4 °C)-98.2 °F (36.8 °C)] 98.2 °F (36.8 °C)  Pulse:  [] 120  Resp:  [16-20] 20  SpO2:  [96 %-100 %] 100 %  BP: ()/(65-79) 110/74     Weight: 59 kg (130 lb)  Body mass index is 19.2 kg/m².     Physical Exam  Constitutional:       General: She is not in acute distress.     Appearance: Normal appearance. She is not ill-appearing.   Eyes:      Conjunctiva/sclera: Conjunctivae normal.   Cardiovascular:      Rate and Rhythm: Normal rate. Rhythm irregular.      Heart sounds: Normal heart sounds.   Pulmonary:      Effort: Pulmonary effort is normal. No respiratory distress.      Breath sounds: Normal breath sounds.   Abdominal:      General: Abdomen is flat. There is no distension.      Palpations: Abdomen is soft.      Tenderness: There is no abdominal tenderness.   Musculoskeletal:         General: Swelling (R knee) present.      Right lower leg: No edema.      Left lower leg: No edema.   Skin:     General: Skin is warm and dry.      Capillary Refill: Capillary refill takes less than 2 seconds.   Neurological:      General: No focal deficit present.      Mental Status: She is alert and oriented to person, place, and time.   Psychiatric:         Mood and Affect: Mood normal.         Thought Content: Thought content normal.                Significant Labs: All pertinent labs within the past 24 hours have been reviewed.  Recent Lab Results  (Last 5 results  in the past 24 hours)        12/23/24  0626   12/23/24  0519   12/22/24 2005 12/22/24  1827   12/22/24  1550        Albumin   3.2             ALP   54             ALT   49             Anion Gap   8             Appearance, UA         Hazy       AST   56             Bacteria, UA         Many       Baso #   0.03             Basophil %   0.6             Bilirubin (UA)         Negative       BILIRUBIN TOTAL   0.3  Comment: For infants and newborns, interpretation of results should be based  on gestational age, weight and in agreement with clinical  observations.    Premature Infant recommended reference ranges:  Up to 24 hours.............<8.0 mg/dL  Up to 48 hours............<12.0 mg/dL  3-5 days..................<15.0 mg/dL  6-29 days.................<15.0 mg/dL               BUN   12             Calcium   8.4             Chloride   105             CO2   22             Color, UA         Yellow       Creatinine   0.8             Differential Method   Automated             eGFR   >60.0             Eos #   0.2             Eos %   3.7             Glucose   106             Glucose, UA         Trace       Gran # (ANC)   2.1             Gran %   42.9             Hematocrit   36.6             Hemoglobin   11.9             Hyaline Casts, UA         99       Immature Grans (Abs)   0.03  Comment: Mild elevation in immature granulocytes is non specific and   can be seen in a variety of conditions including stress response,   acute inflammation, trauma and pregnancy. Correlation with other   laboratory and clinical findings is essential.               Immature Granulocytes   0.6             Ketones, UA         Trace       Leukocyte Esterase, UA         Trace       Lymph #   1.6             Lymph %   33.1             Magnesium    1.6             MCH   28.5             MCHC   32.5             MCV   88             Microscopic Comment         SEE COMMENT  Comment: Other formed elements not mentioned in the report are not   present  in the microscopic examination.          Mono #   0.9             Mono %   19.1             MPV   10.4             NITRITE UA         Negative       nRBC   0             Blood, UA         Negative       pH, UA         6.0       Phosphorus Level   3.0             Platelet Count   173             POCT Glucose 112     115           Potassium   4.4             PROTEIN TOTAL   5.7             Protein, UA         2+  Comment: Recommend a 24 hour urine protein or a urine   protein/creatinine ratio if globulin induced proteinuria is  clinically suspected.         RBC   4.17             RBC, UA         4       RDW   13.2             Sodium   135             Spec Grav UA         >1.030       Specimen UA         Urine, Clean Catch       Squam Epithel, UA         2       TSH       1.413         WBC, UA         6       WBC   4.92                                    Significant Imaging: I have reviewed all pertinent imaging results/findings within the past 24 hours.    CXR: No acute intrathoracic process seen.     CTA Chest: No evidence of central or segmental/subsegmental artery thromboembolism.  No acute intrathoracic process.  Remote right-sided rib fracture.    US LE: No evidence of deep venous thrombosis in right lower extremity.   Review of Systems   Constitutional: Positive for activity change, appetite change and fatigue. Negative for fever.   Cardiovascular:  Negative for chest pain and leg swelling.   Respiratory:  Negative for chest tightness and shortness of breath.    Skin:  Positive for wound (surgical wound R knee).   Musculoskeletal:  Positive for arthralgias (post-op right knee).   Gastrointestinal:  Negative for abdominal pain, constipation, diarrhea, nausea and vomiting.        NVD stopped on 12/18 when stopping bactrim   Genitourinary:  Negative for dysuria and frequency.   Neurological:  Positive for weakness. Negative for headaches.     Physical Exam  Constitutional:       General: She is not in acute  distress.     Appearance: Normal appearance. She is not ill-appearing.   Eyes:      Conjunctiva/sclera: Conjunctivae normal.   Cardiovascular:      Rate and Rhythm: Normal rate. Rhythm irregular.      Heart sounds: Normal heart sounds.   Pulmonary:      Effort: Pulmonary effort is normal. No respiratory distress.      Breath sounds: Normal breath sounds.   Abdominal:      General: Abdomen is flat. There is no distension.      Palpations: Abdomen is soft.      Tenderness: There is no abdominal tenderness.   Musculoskeletal:         General: Swelling (R knee) present.      Right lower leg: No edema.      Left lower leg: No edema.   Skin:     General: Skin is warm and dry.      Capillary Refill: Capillary refill takes less than 2 seconds.   Neurological:      General: No focal deficit present.      Mental Status: She is alert and oriented to person, place, and time.   Psychiatric:         Mood and Affect: Mood normal.         Thought Content: Thought content normal.

## 2024-12-23 NOTE — PROGRESS NOTES
[facility] - Cardiology [facility] Medicine  Progress Note    Patient Name: Dia Ovalles  MRN: [id number]  Patient Class: OP- Observation   Admission Date: 12/22/2024  Length of Stay: 0 days  Attending Physician: Umair Meza MD  Primary Care Provider: Shelley Leija MD        Subjective     Principal Problem:Atrial fibrillation        HPI:  Flako Ovalles is a 73 yo female with a hx of OAB, remote DVT while pregnant, hx of atrial septal defect repair (in 1976 age 26), and recent TKA (11/11/24) who presents today with fatigue and dyspnea on exertion for 3 days. She regularly exercises and states these new symptoms are unusual for her. Prior to the onset of these symptoms she had nausea and vomiting which stopped once she was transitioned from bactrim to cefadroxil. She recently underwent a TKA on 11/11/24 and presented on 12/11 with wound dehiscence following a fall. Otherwise her post-op course has been unremarkable. She denies any SOB at rest, orthopnea, headaches, N/V/D since stopping bactrim, urinary frequency or pain, or peripheral edema. She does have mild swelling and pain on her R leg from her recent operation.     In the ED VS , /79, RR 20, Sat 99% on RA, afebrile. EKG with A-fib. UA with spec gravity >1.030, trace ketones & glucose, 2+ protein, trace leukocyte esterase and many bacteria. D-dimer 3. . AST/ALT elevated 84/63. Glucose 146 w/ A1c 5.1. CXR unremarkable. CTA chest and LE US negative for DVT/PE.    Overview/Hospital Course:  Admitted for management of new onset Afib. HR remained in low 100's. Cardioversion scheduled to be done on 12/23. Started on metoprolol succinate 25mg daily and eliquis 5mg BID.     Interval History: Remained in Afib without RVR. Denies any symptoms this morning. Cardiology will perform cardioversion today.    Review of Systems   Constitutional:  Negative for activity change and fever.   Respiratory:  Negative for chest tightness and shortness of  breath.    Cardiovascular:  Negative for chest pain and leg swelling.   Gastrointestinal:  Negative for abdominal pain, constipation, diarrhea, nausea and vomiting.        NVD stopped on 12/18 when stopping bactrim   Genitourinary:  Negative for dysuria and frequency.   Musculoskeletal:  Positive for arthralgias (post-op right knee).   Skin:  Positive for wound (surgical wound R knee).   Neurological:  Negative for weakness and headaches.     Objective:     Vital Signs (Most Recent):  Temp: 98.2 °F (36.8 °C) (12/23/24 1114)  Pulse: (!) 111 (12/23/24 1216)  Resp: 20 (12/23/24 1114)  BP: 110/74 (12/23/24 1114)  SpO2: 100 % (12/23/24 1114) Vital Signs (24h Range):  Temp:  [97.7 °F (36.5 °C)-98.2 °F (36.8 °C)] 98.2 °F (36.8 °C)  Pulse:  [] 111  Resp:  [16-20] 20  SpO2:  [96 %-100 %] 100 %  BP: ()/(65-74) 110/74     Weight: 59 kg (130 lb)  Body mass index is 19.2 kg/m².  No intake or output data in the 24 hours ending 12/23/24 1228      Physical Exam  Constitutional:       General: She is not in acute distress.     Appearance: Normal appearance. She is not ill-appearing.   Eyes:      Conjunctiva/sclera: Conjunctivae normal.   Cardiovascular:      Rate and Rhythm: Normal rate. Rhythm irregular.      Heart sounds: Normal heart sounds.   Pulmonary:      Effort: Pulmonary effort is normal. No respiratory distress.      Breath sounds: Normal breath sounds.   Abdominal:      General: Abdomen is flat. There is no distension.      Palpations: Abdomen is soft.      Tenderness: There is no abdominal tenderness.   Musculoskeletal:         General: Swelling (R knee) present.      Right lower leg: No edema.      Left lower leg: No edema.      Comments: ambulatory   Skin:     General: Skin is warm and dry.      Capillary Refill: Capillary refill takes less than 2 seconds.   Neurological:      General: No focal deficit present.      Mental Status: She is alert and oriented to person, place, and time.   Psychiatric:          Mood and Affect: Mood normal.         Thought Content: Thought content normal.             Significant Labs: All pertinent labs within the past 24 hours have been reviewed.  CBC:   Recent Labs   Lab 12/22/24  1440 12/23/24  0519   WBC 8.47 4.92   HGB 14.1 11.9*   HCT 40.2 36.6*    173     CMP:   Recent Labs   Lab 12/22/24  1440 12/23/24  0519   * 135*   K 3.9 4.4    105   CO2 19* 22*   * 106   BUN 18 12   CREATININE 0.8 0.8   CALCIUM 9.0 8.4*   PROT 6.9 5.7*   ALBUMIN 3.9 3.2*   BILITOT 0.3 0.3   ALKPHOS 64 54   AST 84* 56*   ALT 63* 49*   ANIONGAP 13 8       Significant Imaging: I have reviewed all pertinent imaging results/findings within the past 24 hours.    Assessment and Plan     * Atrial fibrillation  74yF with remote hx of ASD repair and 6 weeks post-op from TKA presents with 1 week of fatigue and dizziness. In the ED she was found to be in A-fib with . Last echo from 2022 with normal EF, but noted in prior notes to have been developing HF prior to ASD repair. On admission D-dimer 3 and . CTA and LE US negative for DVT/PE. Chadsvasc of 3. TTE with bubble negative for shunt. Pending ЮЛИЯ cardioversion    - pending юлия cardioversion today  - start metoprolol succinate 25 daily per EP cardiology recs  - begin anticoagulation with eliquis 5mg BID  - HR currently controlled    Hx of total knee arthroplasty  Recent TKA on 11/11/24 with dehiscence on 12/11/24. Was treated with course of bactrim but developed profuse nausea, vomiting, and diarrhea. Bactrim was switched to cefadroxil until 12/24    - complete cefadroxil course   - transition from celebrex to tylenol    Elevated d-dimer  Remote history of DVT while pregnant. D-dimer 3 on admission. CTA chest negative for PE. LE US negative for DVT. Elevation likely 2/2 recent surgery or active a-fib.     Transaminitis  Recent Labs     12/22/24  1440 12/23/24  0519   AST 84* 56*      Recent Labs     12/22/24  1440 12/23/24  0519    ALT 63* 49*     Elevated AST/ALT on admission.     - continue to monitor on daily cmp  - consider imaging if LFTs worsen      Acute cystitis  UTI noted on admission UA. Pt is asymptomatic denying any pain with urination, frequency, flank pain, or abdominal/pelvic pain. Will observe for now.      OAB (overactive bladder)  Continue home oxybutinin      H/O atrial septal defect repair  Atrial-septal defect repaired in 1976 at age 26. Noted to have been developing HF at the time. Bubble study negative for shunt.       VTE Risk Mitigation (From admission, onward)           Ordered     apixaban tablet 5 mg  2 times daily         12/22/24 1735     Reason for No Pharmacological VTE Prophylaxis  Once        Question:  Reasons:  Answer:  Already adequately anticoagulated on oral Anticoagulants  Comment:  eliquis    12/22/24 1847     IP VTE HIGH RISK PATIENT  Once         12/22/24 1716     Place sequential compression device  Until discontinued         12/22/24 1716                    Discharge Planning   MARCE: 12/24/2024     Code Status: Full Code   Medical Readiness for Discharge Date:                            Sherry Isidro MD  Department of Hospital Medicine   [facility] - Cardiology Stepdown

## 2024-12-23 NOTE — DISCHARGE INSTRUCTIONS
Return to ER with any complications such as chest pain, rapid heart rate, Nausea/vomiting. Follow up appts.need to be resheduled as soon as possible if they are missed  
Statement Selected

## 2024-12-23 NOTE — TRANSFER OF CARE
"Anesthesia Transfer of Care Note    Patient: Dia Ovalles    Procedure(s) Performed: Procedure(s) (LRB):  Cardioversion or Defibrillation (N/A)  Transesophageal echo (SOL) intra-procedure log documentation (N/A)    Patient location: Other: Pt transported back to room on floor    Anesthesia Type: general    Transport from OR: Transported from OR on room air with adequate spontaneous ventilation    Post pain: adequate analgesia    Post assessment: no apparent anesthetic complications and tolerated procedure well    Post vital signs: stable    Level of consciousness: awake    Nausea/Vomiting: no nausea/vomiting    Complications: none    Transfer of care protocol was followed      Last vitals: Visit Vitals  /61 (BP Location: Left arm, Patient Position: Lying)   Pulse 79   Temp 36.8 °C (98.2 °F) (Oral)   Resp 17   Ht 5' 9" (1.753 m)   Wt 59 kg (130 lb 1.1 oz)   LMP  (LMP Unknown)   SpO2 100%   Breastfeeding No   BMI 19.21 kg/m²     "

## 2024-12-23 NOTE — CARE UPDATE
Unit [facility] Care Support Interaction      I have reviewed the chart of Dia Ovalles who is hospitalized for Atrial fibrillation. The patient is currently located in the following unit: CSU        I have assisted the primary physician in management of the following:      C. difficile - Diarrhea present on admission and tested within 48        Cassidy Kay PA-C  Unit Based [facility]

## 2024-12-23 NOTE — PLAN OF CARE
[facility] - Cardiology Stepdown  Discharge Final Note    Primary Care Provider: Shelley Leija MD    Expected Discharge Date: 12/23/2024    Patient discharged home with family. Family will provide transportation.     Patient's bedside nurse and pt notified of the above.    Discharge Plan A and Plan B have been determined by review of patient's clinical status, future medical and therapeutic needs, and coverage/benefits for post-acute care in coordination with multidisciplinary team members.        Final Discharge Note (most recent)       Final Note - 12/23/24 1549          Final Note    Assessment Type Final Discharge Note (P)      Anticipated Discharge Disposition Home or Self Care (P)      Hospital Resources/Appts/Education Provided Provided patient/caregiver with written discharge plan information (P)         Post-Acute Status    Discharge Delays None known at this time (P)                      Important Message from Medicare             Contact Info       Shelley Leija MD   Specialty: Family Medicine   Relationship: PCP - General    [address]  [facility] [address]   Phone: [phone]       Next Steps: Follow up in 1 week(s)            Future Appointments   Date Time Provider Department Center   12/24/2024 10:15 AM Dani Simmons PA-C [facility] ORTHO [facility]   1/13/2025 11:00 AM B[facility] MAMMO2 BX [facility] MAMMO [facility]   1/31/2025 11:00 AM Keagan Oneal MD [facility] ORTHO [facility] [address] [facility]   3/3/2025 11:30 AM Gopi Hidalgo MD [facility] DERM [facility], MSN  RN Case Management  [phone]

## 2024-12-23 NOTE — CONSULTS
[facility]  SOL Consult      Dia Ovalles  YOB: 1950  Medical Record Number:  [id number]  Attending Physician:  Umair Meza MD   Date of Admission: 12/22/2024       Hospital Day:  0  Current Principal Problem:  Atrial fibrillation      History     Cc: SOL for DCCV    HPI  75 yo female with a hx of OAB, remote DVT while pregnant, hx of atrial septal defect repair (in 1976 age 26), and recent TKA (11/11/24) who presents today with fatigue and dyspnea on exertion for 5-7 days. She regularly exercises and states these new symptoms are unusual for her. She recently underwent a TKA on 11/11/24 and presented on 12/11/24 with wound dehiscence following a fall by tripping. She was started on Bactrim for wound dehiscence, that she took for 5-7 days and then developed nausea, vomiting and diarrhea, therefore bactrim was switched to cefadroxil (to be taken till 12/24/24). She denies any N/V/D currently. But she reports two episodes of fall preceded by lighhededness in last 1 week associated with shortness of breath and fatigue. She drinks wine 2 glasses 3-4 times a week, drinks couple of cups of coffee daily, also she reports she snores at night. She did not have any cardiac ailments after ASD repair, no history of atrial fibrillation. Electrophysiology (EP) consulted for evaluation of new onset of Atrial Fibrillation with Rapid Ventricular Response and will proceed with SOL/DCCV today.    Anticoagulant/antiplatelets: eliquis  ECG: AF on tele, rates ~100s  Platelet count: 173  INR: 1.0  History of stroke:  no  Dysphagia or odynophagia:  no  Liver Disease, esophageal disease, or known varices:  no  Upper GI Bleeding:  no  Snoring:  no   Sleep Apnea:  no  Last oral intake: last pm   Able to move neck in all directions:  yes  Use of GLP-1:  no      Medications - Outpatient  Prior to Admission medications    Medication Sig Start Date End Date Taking? Authorizing Provider    acetaminophen (TYLENOL) 650 MG TbSR Take 1 tablet (650 mg total) by mouth every 8 (eight) hours. 11/11/24  Yes Janak Concepcion PA-C   biotin 1 mg tablet Take 5,000 mcg by mouth once daily.   Yes Provider, Historical   calcium carbonate (CALCIUM 600 ORAL) Take 1,200 mg by mouth once daily.    Yes Provider, Historical   cefadroxil (DURICEF) 500 MG Cap Take 1 capsule (500 mg total) by mouth every 12 (twelve) hours. for 5 days 12/19/24 12/24/24 Yes Keagan Oneal MD   clobetasoL (TEMOVATE) 0.05 % external solution Use on scalp one - two times daily as needed for scaling or itching 5/2/23  Yes Shelley Leija MD   DULoxetine (CYMBALTA) 20 MG capsule TAKE 2 CAPSULES ONE TIME DAILY 11/15/24  Yes Shelley Leija MD   multivitamin (THERAGRAN) per tablet Take 1 tablet by mouth once daily.   Yes Provider, Historical   oxybutynin (DITROPAN-XL) 10 MG 24 hr tablet TAKE 1 TABLET EVERY DAY 12/4/24  Yes Taisha Savage MD   oxyCODONE (ROXICODONE) 5 MG immediate release tablet Take 1 to 2 tablets by mouth  every 4 to 6 hours as needed for pain 12/9/24  Yes Jaelyn Gallardo NP   raloxifene (EVISTA) 60 mg tablet Take 1 tablet (60 mg total) by mouth once daily. 12/16/24  Yes Noemi Florian MD   UNKNOWN TO PATIENT Nutrafol- hair growth supplement   Yes Provider, Historical   celecoxib (CELEBREX) 200 MG capsule Take 1 capsule (200 mg total) by mouth 2 (two) times daily as needed for Pain (knee pain). 11/20/24 12/23/24 Yes Jaelyn Gallardo NP   apixaban (ELIQUIS) 5 mg Tab Take 1 tablet (5 mg total) by mouth 2 (two) times daily. 12/23/24 3/23/25  Lacey Catherine DO   metoprolol succinate (TOPROL-XL) 25 MG 24 hr tablet Take 1 tablet (25 mg total) by mouth once daily. 12/23/24 12/23/25  Lacey Catherine DO         Medications - Current  Scheduled Meds:   apixaban  5 mg Oral BID    cefadroxil  500 mg Oral Q12H    DULoxetine  40 mg Oral Daily    magnesium sulfate IVPB  2 g Intravenous Once    metoprolol succinate  25 mg Oral  Daily    oxybutynin  10 mg Oral Daily    raloxifene  60 mg Oral Daily     Continuous Infusions:  PRN Meds:.  Current Facility-Administered Medications:     acetaminophen, 650 mg, Oral, Q6H PRN    dextrose 50%, 12.5 g, Intravenous, PRN    dextrose 50%, 25 g, Intravenous, PRN    glucagon (human recombinant), 1 mg, Intramuscular, PRN    glucose, 16 g, Oral, PRN    glucose, 24 g, Oral, PRN    naloxone, 0.02 mg, Intravenous, PRN    sodium chloride 0.9%, 10 mL, Intravenous, Q12H PRN      Allergies  Review of patient's allergies indicates:   Allergen Reactions    Codeine Nausea Only         Past Medical History  Past Medical History:   Diagnosis Date    Atrial fibrillation 12/22/2024    Breast cyst     Cancer     SCC x 2 on arms    Cataract     Closed Ortiz's fracture of right radius 09/27/2017    Decreased ROM of right shoulder 03/20/2018    Decreased strength of upper extremity 03/20/2018    Deep vein thrombosis     GERD (gastroesophageal reflux disease)     History of cardiovascular stress test 10/29/2024    Joint pain     Lesion of left upper eyelid 05/02/2023    Nuclear sclerosis, bilateral 05/17/2021    OA (osteoarthritis) of shoulder 03/13/2019    OAB (overactive bladder) 03/08/2017    Osteoporosis 03/08/2017    Other chest pain 05/13/2022    Squamous cell carcinoma of skin 2012    right arm         Past Surgical History  Past Surgical History:   Procedure Laterality Date    ARTHROPLASTY, KNEE, TOTAL, USING COMPUTER-ASSISTED NAVIGATION  11/11/2024    Procedure: ARTHROPLASTY, KNEE, TOTAL, USING COMPUTER-ASSISTED NAVIGATION;  Surgeon: Keagan Oneal MD;  Location: [facility] OR;  Service: Orthopedics;;    ASD REPAIR      open heart surgery    BREAST BIOPSY      CATARACT EXTRACTION W/  INTRAOCULAR LENS IMPLANT Right 04/19/2021    Procedure: EXTRACTION, CATARACT, WITH IOL INSERTION;  Surgeon: Bruna Silva MD;  Location: [facility] OR;  Service: Ophthalmology;  Laterality: Right;    CATARACT EXTRACTION W/  INTRAOCULAR LENS  IMPLANT Left 2021    Procedure: EXTRACTION, CATARACT, WITH IOL INSERTION;  Surgeon: Bruna Silva MD;  Location: [facility];  Service: Ophthalmology;  Laterality: Left;     SECTION      x2    EYE SURGERY      KNEE SURGERY Right     meniscal repair    SKIN BIOPSY  2012    SKIN CANCER EXCISION           Social History  Social History     Socioeconomic History    Marital status:     Number of children: 2   Occupational History    Occupation: retired   Tobacco Use    Smoking status: Never     Passive exposure: Never    Smokeless tobacco: Never   Substance and Sexual Activity    Alcohol use: Yes     Alcohol/week: 4.0 standard drinks of alcohol     Types: 4 Glasses of wine per week     Comment: social    Drug use: Yes     Types: Marijuana     Comment: gummies 2 days ago    Sexual activity: Not Currently     Partners: Male     Birth control/protection: Post-menopausal   Other Topics Concern    Are you pregnant or think you may be? No    Breast-feeding No     Social Drivers of Health     Financial Resource Strain: Low Risk  (3/18/2024)    Overall Financial Resource Strain (CARDIA)     Difficulty of Paying Living Expenses: Not hard at all   Food Insecurity: No Food Insecurity (3/18/2024)    Hunger Vital Sign     Worried About Running Out of Food in the Last Year: Never true     Ran Out of Food in the Last Year: Never true   Transportation Needs: No Transportation Needs (3/18/2024)    PRAPARE - Transportation     Lack of Transportation (Medical): No     Lack of Transportation (Non-Medical): No   Physical Activity: Sufficiently Active (3/18/2024)    Exercise Vital Sign     Days of Exercise per Week: 6 days     Minutes of Exercise per Session: 60 min   Stress: No Stress Concern Present (3/18/2024)    [employer] - Occupational Stress Questionnaire     Feeling of Stress : Not at all   Housing Stability: Low Risk  (3/18/2024)    Housing Stability Vital Sign     Unable to Pay for  "Housing in the Last Year: No     Number of Places Lived in the Last Year: 1     Unstable Housing in the Last Year: No         ROS  10 point ROS performed and negative except as stated in HPI     Physical Examination     Vital Signs  24 Hour VS Range    Temp:  [97.7 °F (36.5 °C)-98.2 °F (36.8 °C)]   Pulse:  []   Resp:  [16-20]   BP: ()/(65-74)   SpO2:  [96 %-100 %]       Physical Exam:   Constitutional: no acute distress  Cardiovascular: irregularly irregular rate and rhythm  Pulmonary: Normal respiratory effort   Abdomen: nontender, non-distended   Neuro: alert and oriented, no focal deficits  Extremities: warm, no pitting edema, some swelling at R knee due to recent TKA/dressing present    Data       Recent Labs   Lab 12/22/24  1440 12/23/24  0519   WBC 8.47 4.92   HGB 14.1 11.9*   HCT 40.2 36.6*    173        No results for input(s): "PROTIME", "INR" in the last 168 hours.     Recent Labs   Lab 12/22/24  1440 12/23/24  0519   * 135*   K 3.9 4.4    105   CO2 19* 22*   BUN 18 12   CREATININE 0.8 0.8   ANIONGAP 13 8   CALCIUM 9.0 8.4*        Recent Labs   Lab 12/22/24  1440 12/23/24  0519   PROT 6.9 5.7*   ALBUMIN 3.9 3.2*   BILITOT 0.3 0.3   ALKPHOS 64 54   AST 84* 56*   ALT 63* 49*        No results for input(s): "TROPONINI" in the last 168 hours.     BNP (pg/mL)   Date Value   12/22/2024 272 (H)       No results for input(s): "LABBLOO" in the last 168 hours.         Assessment & Plan     #Atrial fibrillation  - proceed with SOL prior to DCCV.  -No absolute contraindications of esophageal stricture, tumor, perforation, laceration,or diverticulum and/or active GI bleed.  -The risks, benefits & alternatives of the procedure were explained to the patient.   -The risks of transesophageal echo include but are not limited to:  Dental trauma, esophageal trauma/perforation, bleeding, laryngospasm/brochospasm, aspiration, sore throat/hoarseness, & dislodgement of the endotracheal " tube/nasogastric tube (where applicable).  -The risks of moderate sedation include hypotension, respiratory depression, arrhythmias, bronchospasm, & death.    -Informed consent was obtained. The patient is agreeable to proceed with the procedure and all questions and concerns addressed.    Case was discussed with an attending physician in echocardiography lab prior to procedure.    Sadia Man PA-C  [facility]

## 2024-12-23 NOTE — ASSESSMENT & PLAN NOTE
Atrial-septal defect repaired in 1976 at age 26. Noted to have been developing HF at the time. Bubble study negative for shunt.

## 2024-12-23 NOTE — ASSESSMENT & PLAN NOTE
74yF with remote hx of ASD repair and 6 weeks post-op from TKA presents with 1 week of fatigue and dizziness. In the ED she was found to be in A-fib with . Last echo from 2022 with normal EF, but noted in prior notes to have been developing HF prior to ASD repair. On admission D-dimer 3 and . CTA and LE US negative for DVT/PE. Chadsvasc of 3. TTE with bubble negative for shunt. Pending SOL cardioversion    - SOL cardioversion successfully performed on 12/23  - start metoprolol succinate 25 daily per EP cardiology recs  - begin anticoagulation with eliquis 5mg BID  - HR currently controlled

## 2024-12-23 NOTE — ANESTHESIA PREPROCEDURE EVALUATION
12/23/2024  [facility]  Anesthesia Pre-Operative Evaluation         Patient Name: Dia Ovalles  YOB: 1950  MRN: [id number]    SUBJECTIVE:     Pre-operative evaluation for Procedure(s) (LRB):  Cardioversion or Defibrillation (N/A)  Transesophageal echo (SOL) intra-procedure log documentation (N/A)     12/23/2024    Dia Ovalles is a 74 y.o. female w/ a pertinent PMHx of new onset A Fib s/p TKR 11/11.     Full medical history listed below      LDA: None documented.    Prev airway: None documented.     GTTs: None documented.        Patient Active Problem List   Diagnosis    History of squamous cell carcinoma    H/O atrial septal defect repair    Osteoporosis    OAB (overactive bladder)    Range of motion deficit    Wrist pain, right    Decreased  strength of right hand    OA (osteoarthritis) of shoulder    Osteoarthritis of right knee    Senile purpura    Bilateral hand pain    History of cardiovascular stress test    Right leg weakness    Decreased range of motion (ROM) of right knee    Wound dehiscence    Atrial fibrillation    Acute cystitis    Transaminitis    Elevated d-dimer    Hx of total knee arthroplasty    Anemia    Hyponatremia       Review of patient's allergies indicates:   Allergen Reactions    Codeine Nausea Only       Current Inpatient Medications:   apixaban  5 mg Oral BID    cefadroxil  500 mg Oral Q12H    DULoxetine  40 mg Oral Daily    magnesium sulfate IVPB  2 g Intravenous Once    metoprolol succinate  25 mg Oral Daily    oxybutynin  10 mg Oral Daily    raloxifene  60 mg Oral Daily       No current facility-administered medications on file prior to encounter.     Current Outpatient Medications on File Prior to Encounter   Medication Sig Dispense Refill    acetaminophen (TYLENOL) 650 MG TbSR Take 1 tablet (650 mg total) by mouth every 8 (eight) hours.  120 tablet 0    biotin 1 mg tablet Take 5,000 mcg by mouth once daily.      calcium carbonate (CALCIUM 600 ORAL) Take 1,200 mg by mouth once daily.       cefadroxil (DURICEF) 500 MG Cap Take 1 capsule (500 mg total) by mouth every 12 (twelve) hours. for 5 days 10 capsule 0    clobetasoL (TEMOVATE) 0.05 % external solution Use on scalp one - two times daily as needed for scaling or itching 50 mL 3    DULoxetine (CYMBALTA) 20 MG capsule TAKE 2 CAPSULES ONE TIME DAILY 180 capsule 1    multivitamin (THERAGRAN) per tablet Take 1 tablet by mouth once daily.      oxybutynin (DITROPAN-XL) 10 MG 24 hr tablet TAKE 1 TABLET EVERY DAY 90 tablet 3    oxyCODONE (ROXICODONE) 5 MG immediate release tablet Take 1 to 2 tablets by mouth  every 4 to 6 hours as needed for pain 40 tablet 0    raloxifene (EVISTA) 60 mg tablet Take 1 tablet (60 mg total) by mouth once daily. 90 tablet 3    UNKNOWN TO PATIENT Nutrafol- hair growth supplement         Past Surgical History:   Procedure Laterality Date    ARTHROPLASTY, KNEE, TOTAL, USING COMPUTER-ASSISTED NAVIGATION  2024    Procedure: ARTHROPLASTY, KNEE, TOTAL, USING COMPUTER-ASSISTED NAVIGATION;  Surgeon: Keagan Oneal MD;  Location: [facility] OR;  Service: Orthopedics;;    ASD REPAIR      open heart surgery    BREAST BIOPSY      CATARACT EXTRACTION W/  INTRAOCULAR LENS IMPLANT Right 2021    Procedure: EXTRACTION, CATARACT, WITH IOL INSERTION;  Surgeon: Bruna Silva MD;  Location[facility];  Service: Ophthalmology;  Laterality: Right;    CATARACT EXTRACTION W/  INTRAOCULAR LENS IMPLANT Left 2021    Procedure: EXTRACTION, CATARACT, WITH IOL INSERTION;  Surgeon: Bruna Silva MD;  Location: [facility] OR;  Service: Ophthalmology;  Laterality: Left;     SECTION      x2    EYE SURGERY      KNEE SURGERY Right     meniscal repair    SKIN BIOPSY  2012    SKIN CANCER EXCISION         Social History     Socioeconomic History    Marital status:     Number of children: 2    Occupational History    Occupation: retired   Tobacco Use    Smoking status: Never     Passive exposure: Never    Smokeless tobacco: Never   Substance and Sexual Activity    Alcohol use: Yes     Alcohol/week: 4.0 standard drinks of alcohol     Types: 4 Glasses of wine per week     Comment: social    Drug use: Yes     Types: Marijuana     Comment: gummies 2 days ago    Sexual activity: Not Currently     Partners: Male     Birth control/protection: Post-menopausal   Other Topics Concern    Are you pregnant or think you may be? No    Breast-feeding No     Social Drivers of Health     Financial Resource Strain: Low Risk  (12/23/2024)    Overall Financial Resource Strain (CARDIA)     Difficulty of Paying Living Expenses: Not very hard   Food Insecurity: No Food Insecurity (12/23/2024)    Hunger Vital Sign     Worried About Running Out of Food in the Last Year: Never true     Ran Out of Food in the Last Year: Never true   Transportation Needs: No Transportation Needs (12/23/2024)    TRANSPORTATION NEEDS     Transportation : No   Physical Activity: Inactive (12/23/2024)    Exercise Vital Sign     Days of Exercise per Week: 0 days     Minutes of Exercise per Session: 0 min   Stress: No Stress Concern Present (12/23/2024)    [place] [employer] - Occupational Stress Questionnaire     Feeling of Stress : Not at all   Housing Stability: Low Risk  (12/23/2024)    Housing Stability Vital Sign     Unable to Pay for Housing in the Last Year: No     Homeless in the Last Year: No       OBJECTIVE:     Vital Signs Range (Last 24H):  Temp:  [36.5 °C (97.7 °F)-36.8 °C (98.2 °F)]   Pulse:  []   Resp:  [16-20]   BP: ()/(65-74)   SpO2:  [96 %-100 %]       CBC:   Recent Labs     12/22/24  1440 12/23/24  0519   WBC 8.47 4.92   RBC 4.59 4.17   HGB 14.1 11.9*   HCT 40.2 36.6*    173   MCV 88 88   MCH 30.7 28.5   MCHC 35.1 32.5       CMP:   Recent Labs     12/22/24  1440 12/23/24  0519   * 135*  "  K 3.9 4.4    105   CO2 19* 22*   BUN 18 12   CREATININE 0.8 0.8   * 106   MG  --  1.6   PHOS  --  3.0   CALCIUM 9.0 8.4*   ALBUMIN 3.9 3.2*   PROT 6.9 5.7*   ALKPHOS 64 54   ALT 63* 49*   AST 84* 56*   BILITOT 0.3 0.3       INR:  No results for input(s): "PT", "INR", "PROTIME", "APTT" in the last 72 hours.    Diagnostic Studies: No relevant studies.    EKG:   Results for orders placed or performed during the hospital encounter of 10/29/24   EKG 12-lead    Collection Time: 10/29/24  3:52 PM   Result Value Ref Range    QRS Duration 74 ms    [facility] QTC Calculation 446 ms    Narrative    Test Reason : Z01.818,    Vent. Rate : 068 BPM     Atrial Rate : 068 BPM     P-R Int : 190 ms          QRS Dur : 074 ms      QT Int : 420 ms       P-R-T Axes : 000 052 053 degrees     QTc Int : 446 ms    Normal sinus rhythm  Normal ECG  When compared with ECG of 26-APR-2022 12:19,  No significant change was found  Confirmed by Samara Salgado MD ([id number]) on 10/30/2024 8:52:47 AM    Referred By: HENRIK RITCHIE           Confirmed By:Samara Salgado MD        2D ECHO:   Results for orders placed during the hospital encounter of 12/22/24    Echo Saline Bubble? Yes    Interpretation Summary    Irregularly irregular rhythm was present during the study. Tachycardia was present during the study    Left Ventricle: The left ventricle is normal in size. Ventricular mass is normal. Normal wall thickness. Normal wall motion. There is normal systolic function with a visually estimated ejection fraction of 55 - 60%. Unable to assess diastolic function due to atrial fibrillation. Normal left ventricular filling pressure.    Right Ventricle: Normal right ventricular cavity size. Wall thickness is normal. Right ventricle wall motion  is normal. Systolic function is normal.    Visually there is biatrial enlargement    Aorta: Aortic root is normal in size measuring 2.84 cm. Ascending aorta is normal measuring 2.61 cm.    Pulmonary Artery: The " estimated pulmonary artery systolic pressure is 20 mmHg.    IVC/SVC: Normal venous pressure at 3 mmHg.    Pericardium: There is no pericardial effusion.    Hx of atrial septal defect repair (in 1976 age 26), no color flow seen across IAS and bubble study is negative for intracardiac shunt         ASSESSMENT/PLAN:           Pre-op Assessment    I have reviewed the Patient Summary Reports.     I have reviewed the Nursing Notes. I have reviewed the NPO Status.   I have reviewed the Medications.     Review of Systems  Anesthesia Hx:   History of prior surgery of interest to airway management or planning:          Denies Family Hx of Anesthesia complications.    Denies Personal Hx of Anesthesia complications.                        Physical Exam  General: Well nourished, Cooperative, Alert and Oriented    Airway:  Mallampati: II   Mouth Opening: Normal  TM Distance: Normal  Tongue: Normal  Neck ROM: Normal ROM    Dental:  Intact    Chest/Lungs:  Clear to auscultation, Normal Respiratory Rate    Heart:  Rate: Normal  Rhythm: Regular Rhythm        Anesthesia Plan  Type of Anesthesia, risks & benefits discussed:    Anesthesia Type: Gen Natural Airway  Intra-op Monitoring Plan: Standard ASA Monitors  Post Op Pain Control Plan: multimodal analgesia and IV/PO Opioids PRN  Induction:  IV  Informed Consent: Informed consent signed with the Patient and all parties understand the risks and agree with anesthesia plan.  All questions answered.   ASA Score: 3  Day of Surgery Review of History & Physical: H&P Update referred to the surgeon/provider.    Ready For Surgery From Anesthesia Perspective.     .

## 2024-12-23 NOTE — PLAN OF CARE
[facility] - Cardiology Stepdown  Initial Discharge Assessment       Primary Care Provider: Shelley Leija MD    Admission Diagnosis: A-fib [I48.91]  Dyspnea on exertion [R06.09]  Tachycardia [R00.0]  Leg swelling [M79.89]  Chest pain [R07.9]  Atrial fibrillation, unspecified type [I48.91]    Admission Date: 12/22/2024  Expected Discharge Date: 12/24/2024    Transition of Care Barriers: (P) None    Payor: HUMANA MANAGED MEDICARE / Plan: HUMANA MEDICARE SELECT PARTNER / Product Type: Medicare Advantage /     Extended Emergency Contact Information  Primary Emergency Contact: Aida Chaudhari  Mobile Phone: [phone]  Relation: Daughter  Preferred language: English   needed? No    Discharge Plan A: (P) Home with family  Discharge Plan B: (P) Home      [id number] [place] [address] [facility]  [address] [facility] [address]  Phone: [phone] Fax: [phone]    [facility] [address] [facility]  [address] [facility] [address]  Phone: [phone] Fax: [phone]    [facility] [address] [address]  Phone: [phone] Fax: [phone]    CM met with the pt at bedside. Pt answered all dc questions and given dc pamphlet. Family will provide transportation home when discharged. DC plan A is home with family. DC plan B is home.     Discharge Plan A and Plan B have been determined by review of patient's clinical status, future medical and therapeutic needs, and coverage/benefits for post-acute care in coordination with multidisciplinary team members.     Initial Assessment (most recent)       Adult Discharge Assessment - 12/23/24 1238          Discharge Assessment    Assessment Type Discharge Planning Assessment (P)      Confirmed/corrected address, phone number and insurance Yes (P)      Confirmed Demographics Correct on Facesheet (P)       Source of Information patient (P)      When was your last doctors appointment? -- (P)    may 2023    Does patient/caregiver understand observation status Yes (P)      Communicated MARCE with patient/caregiver Yes (P)      Reason For Admission Afib (P)      People in Home alone (P)      Facility Arrived From: home (P)      Do you expect to return to your current living situation? Yes (P)      Do you have help at home or someone to help you manage your care at home? Yes (P)      Who are your caregiver(s) and their phone number(s)? Aida Chaudhari (Daughter)  [phone] (P)      Prior to hospitilization cognitive status: Alert/Oriented (P)      Current cognitive status: Alert/Oriented (P)      Walking or Climbing Stairs Difficulty no (P)      Dressing/Bathing Difficulty no (P)      Equipment Currently Used at Home none (P)      Readmission within 30 days? No (P)      Patient currently being followed by outpatient case management? No (P)      Do you currently have service(s) that help you manage your care at home? No (P)      Do you take prescription medications? Yes (P)      Do you have prescription coverage? Yes (P)      Coverage HUMANA MANAGED MEDICARE - HUMANA MEDICARE SELECT PARTNER - CAPITATED (P)      Do you have any problems affording any of your prescribed medications? No (P)      Is the patient taking medications as prescribed? yes (P)      Who is going to help you get home at discharge? Aida Chaudhari (Daughter)  [phone] (P)      How do you get to doctors appointments? car, drives self (P)      Are you on dialysis? No (P)      Do you take coumadin? No (P)      Discharge Plan A Home with family (P)      Discharge Plan B Home (P)      DME Needed Upon Discharge  none (P)      Discharge Plan discussed with: Patient (P)      Transition of Care Barriers None (P)         Physical Activity    On average, how many days per week do you engage in moderate to strenuous exercise (like a brisk walk)? 0 days (P)       On average, how many minutes do you engage in exercise at this level? 0 min (P)         Financial Resource Strain    How hard is it for you to pay for the very basics like food, housing, medical care, and heating? Not very hard (P)         Housing Stability    In the last 12 months, was there a time when you were not able to pay the mortgage or rent on time? No (P)      At any time in the past 12 months, were you homeless or living in a shelter (including now)? No (P)         Transportation Needs    Has the lack of transportation kept you from medical appointments, meetings, work or from getting things needed for daily living? No (P)         Food Insecurity    Within the past 12 months, you worried that your food would run out before you got the money to buy more. Never true (P)      Within the past 12 months, the food you bought just didn't last and you didn't have money to get more. Never true (P)         Stress    Do you feel stress - tense, restless, nervous, or anxious, or unable to sleep at night because your mind is troubled all the time - these days? Not at all (P)         Social Isolation    How often do you feel lonely or isolated from those around you?  Never (P)         Alcohol Use    Q1: How often do you have a drink containing alcohol? Never (P)      Q2: How many drinks containing alcohol do you have on a typical day when you are drinking? Patient does not drink (P)      Q3: How often do you have six or more drinks on one occasion? Never (P)         Utilities    In the past 12 months has the electric, gas, oil, or water company threatened to shut off services in your home? No (P)         Health Literacy    How often do you need to have someone help you when you read instructions, pamphlets, or other written material from your doctor or pharmacy? Never (P)                    Homar Martino, MSN  RN Case Management  [phone]

## 2024-12-23 NOTE — SUBJECTIVE & OBJECTIVE
Interval History: Remained in Afib without RVR. Denies any symptoms this morning. Cardiology will perform cardioversion today.    Review of Systems   Constitutional:  Negative for activity change and fever.   Respiratory:  Negative for chest tightness and shortness of breath.    Cardiovascular:  Negative for chest pain and leg swelling.   Gastrointestinal:  Negative for abdominal pain, constipation, diarrhea, nausea and vomiting.        NVD stopped on 12/18 when stopping bactrim   Genitourinary:  Negative for dysuria and frequency.   Musculoskeletal:  Positive for arthralgias (post-op right knee).   Skin:  Positive for wound (surgical wound R knee).   Neurological:  Negative for weakness and headaches.     Objective:     Vital Signs (Most Recent):  Temp: 98.2 °F (36.8 °C) (12/23/24 1114)  Pulse: (!) 111 (12/23/24 1216)  Resp: 20 (12/23/24 1114)  BP: 110/74 (12/23/24 1114)  SpO2: 100 % (12/23/24 1114) Vital Signs (24h Range):  Temp:  [97.7 °F (36.5 °C)-98.2 °F (36.8 °C)] 98.2 °F (36.8 °C)  Pulse:  [] 111  Resp:  [16-20] 20  SpO2:  [96 %-100 %] 100 %  BP: ()/(65-74) 110/74     Weight: 59 kg (130 lb)  Body mass index is 19.2 kg/m².  No intake or output data in the 24 hours ending 12/23/24 1228      Physical Exam  Constitutional:       General: She is not in acute distress.     Appearance: Normal appearance. She is not ill-appearing.   Eyes:      Conjunctiva/sclera: Conjunctivae normal.   Cardiovascular:      Rate and Rhythm: Normal rate. Rhythm irregular.      Heart sounds: Normal heart sounds.   Pulmonary:      Effort: Pulmonary effort is normal. No respiratory distress.      Breath sounds: Normal breath sounds.   Abdominal:      General: Abdomen is flat. There is no distension.      Palpations: Abdomen is soft.      Tenderness: There is no abdominal tenderness.   Musculoskeletal:         General: Swelling (R knee) present.      Right lower leg: No edema.      Left lower leg: No edema.      Comments:  ambulatory   Skin:     General: Skin is warm and dry.      Capillary Refill: Capillary refill takes less than 2 seconds.   Neurological:      General: No focal deficit present.      Mental Status: She is alert and oriented to person, place, and time.   Psychiatric:         Mood and Affect: Mood normal.         Thought Content: Thought content normal.             Significant Labs: All pertinent labs within the past 24 hours have been reviewed.  CBC:   Recent Labs   Lab 12/22/24  1440 12/23/24  0519   WBC 8.47 4.92   HGB 14.1 11.9*   HCT 40.2 36.6*    173     CMP:   Recent Labs   Lab 12/22/24  1440 12/23/24  0519   * 135*   K 3.9 4.4    105   CO2 19* 22*   * 106   BUN 18 12   CREATININE 0.8 0.8   CALCIUM 9.0 8.4*   PROT 6.9 5.7*   ALBUMIN 3.9 3.2*   BILITOT 0.3 0.3   ALKPHOS 64 54   AST 84* 56*   ALT 63* 49*   ANIONGAP 13 8       Significant Imaging: I have reviewed all pertinent imaging results/findings within the past 24 hours.

## 2024-12-23 NOTE — ASSESSMENT & PLAN NOTE
74yF with remote hx of ASD repair and 6 weeks post-op from TKA presents with 1 week of fatigue and dizziness. In the ED she was found to be in A-fib with . Last echo from 2022 with normal EF, but noted in prior notes to have been developing HF prior to ASD repair. On admission D-dimer 3 and . CTA and LE US negative for DVT/PE. Chadsvasc of 3. TTE with bubble negative for shunt. Pending ЮЛИЯ cardioversion    - pending юлия cardioversion today  - start metoprolol succinate 25 daily per EP cardiology recs  - begin anticoagulation with eliquis 5mg BID  - HR currently controlled

## 2024-12-23 NOTE — ASSESSMENT & PLAN NOTE
Flako Ovalles is a 75 yo female with a hx of OAB, remote DVT while pregnant, hx of atrial septal defect repair (in 1976 age 26), and recent TKA (11/11/24) who presents today with fatigue and dyspnea on exertion for 5-7 days. Electrophysiology (EP) consulted for evaluation of  new onset of Atrial Fibrillation with Rapid Ventricular Response    EKG: atrial fibrillation rapid ventricular response 100  Tele: atrial fibrillation rapid ventricular response 100s  Limit half to one drink a day  Limit 1-2 cups of coffee daily  Also get evaluated for HECTOR as outpatient  Get updated TTE [Last TTE LVEF  5/25/22 71%]  Continue her daily exercise regimen  Continue eliquis 5 mg Twice a day  Keep NPO for SOL-DCCV today  Plan to discharge on Toprol 25 mg daily (adjust according to HR, BP)

## 2024-12-23 NOTE — DISCHARGE SUMMARY
[facility] - [facility] Medicine  Discharge Summary      Patient Name: Dia Ovalles  MRN: [id number]  FRED: [id number]  Patient Class: OP- Observation  Admission Date: 12/22/2024  Hospital Length of Stay: 0 days  Discharge Date and Time:  12/23/2024 3:03 PM  Attending Physician: Umair Meza MD   Discharging Provider: Sherry Isidro MD  Primary Care Provider: Shelley Leija MD  Hospital Medicine Team: [facility] 1 Sherry Isidro MD  Primary Care Team: [facility] 1    HPI:   Flako Ovalles is a 75 yo female with a hx of OAB, remote DVT while pregnant, hx of atrial septal defect repair (in 1976 age 26), and recent TKA (11/11/24) who presents today with fatigue and dyspnea on exertion for 3 days. She regularly exercises and states these new symptoms are unusual for her. Prior to the onset of these symptoms she had nausea and vomiting which stopped once she was transitioned from bactrim to cefadroxil. She recently underwent a TKA on 11/11/24 and presented on 12/11 with wound dehiscence following a fall. Otherwise her post-op course has been unremarkable. She denies any SOB at rest, orthopnea, headaches, N/V/D since stopping bactrim, urinary frequency or pain, or peripheral edema. She does have mild swelling and pain on her R leg from her recent operation.     In the ED VS , /79, RR 20, Sat 99% on RA, afebrile. EKG with A-fib. UA with spec gravity >1.030, trace ketones & glucose, 2+ protein, trace leukocyte esterase and many bacteria. D-dimer 3. . AST/ALT elevated 84/63. Glucose 146 w/ A1c 5.1. CXR unremarkable. CTA chest and LE US negative for DVT/PE.    Procedure(s) (LRB):  Cardioversion or Defibrillation (N/A)  Transesophageal echo (SOL) intra-procedure log documentation (N/A)      Hospital Course:   Admitted for management of new onset Afib. HR remained in low 100's. Started on metoprolol succinate 25mg daily and eliquis 5mg BID. Cardioversion successfully performed 12/23  restoring NSR.      Goals of Care Treatment Preferences:  Code Status: Full Code    Living Will: Yes          SDOH Screening:  The patient was screened for utility difficulties, food insecurity, transport difficulties, housing insecurity, and interpersonal safety and there were no concerns identified this admission.     Consults:   Consults (From admission, onward)          Status Ordering Provider     Inpatient consult to Electrophysiology  Once        Provider:  (Not yet assigned)    Completed MICHELLE MCKEON            * Atrial fibrillation  74yF with remote hx of ASD repair and 6 weeks post-op from TKA presents with 1 week of fatigue and dizziness. In the ED she was found to be in A-fib with . Last echo from 2022 with normal EF, but noted in prior notes to have been developing HF prior to ASD repair. On admission D-dimer 3 and . CTA and LE US negative for DVT/PE. Chadsvasc of 3. TTE with bubble negative for shunt. Pending SOL cardioversion    - SOL cardioversion successfully performed on 12/23  - start metoprolol succinate 25 daily per EP cardiology recs  - begin anticoagulation with eliquis 5mg BID  - HR currently controlled    Hx of total knee arthroplasty  Recent TKA on 11/11/24 with dehiscence on 12/11/24. Was treated with course of bactrim but developed profuse nausea, vomiting, and diarrhea. Bactrim was switched to cefadroxil until 12/24    - complete cefadroxil course   - transition from celebrex to tylenol    Elevated d-dimer  Remote history of DVT while pregnant. D-dimer 3 on admission. CTA chest negative for PE. LE US negative for DVT. Elevation likely 2/2 recent surgery or active a-fib.     Transaminitis  Recent Labs     12/22/24  1440 12/23/24  0519   AST 84* 56*        Recent Labs     12/22/24  1440 12/23/24  0519   ALT 63* 49*       Elevated AST/ALT on admission.     - continue to monitor on daily cmp  - Transaminitis resolving      Acute cystitis  UTI noted on admission UA. Pt is  asymptomatic denying any pain with urination, frequency, flank pain, or abdominal/pelvic pain. Will observe for now.      OAB (overactive bladder)  Continue home oxybutinin      H/O atrial septal defect repair  Atrial-septal defect repaired in 1976 at age 26. Noted to have been developing HF at the time. Bubble study negative for shunt.       Final Active Diagnoses:    Diagnosis Date Noted POA    PRINCIPAL PROBLEM:  Atrial fibrillation [I48.91] 12/22/2024 Yes    Anemia [D64.9] 12/23/2024 Yes    Hyponatremia [E87.1] 12/23/2024 Yes    Acute cystitis [N30.00] 12/22/2024 Yes    Transaminitis [R74.01] 12/22/2024 Yes    Elevated d-dimer [R79.89] 12/22/2024 Yes    Hx of total knee arthroplasty [Z96.659] 12/22/2024 Not Applicable    OAB (overactive bladder) [N32.81] 03/08/2017 Yes    H/O atrial septal defect repair [Z87.74] 03/08/2017 Not Applicable      Problems Resolved During this Admission:       Discharged Condition: good    Disposition:     Follow Up:   Follow-up Information       Shelley Leija MD Follow up in 1 week(s).    Specialty: Family Medicine  Contact information:  [address] [place] [address] [id number] [phone]                           Patient Instructions:      Ambulatory referral/consult to Sleep Disorders   Standing Status: Future   Referral Priority: Routine Referral Type: Consultation   Requested Specialty: Sleep Medicine   Number of Visits Requested: 1     Ambulatory referral/consult to Cardiology   Standing Status: Future   Referral Priority: Routine Referral Type: Consultation   Referral Reason: Specialty Services Required   Requested Specialty: Cardiology   Number of Visits Requested: 1     Ambulatory referral/consult to Cardiac Electrophysiology   Standing Status: Future   Referral Priority: Routine Referral Type: Consultation   Referral Reason: Specialty Services Required   Requested Specialty: Cardiology   Number of Visits Requested: 1       Significant Diagnostic Studies: Labs: CMP    Recent Labs   Lab 12/22/24  1440 12/23/24  0519   * 135*   K 3.9 4.4    105   CO2 19* 22*   * 106   BUN 18 12   CREATININE 0.8 0.8   CALCIUM 9.0 8.4*   PROT 6.9 5.7*   ALBUMIN 3.9 3.2*   BILITOT 0.3 0.3   ALKPHOS 64 54   AST 84* 56*   ALT 63* 49*   ANIONGAP 13 8    and CBC   Recent Labs   Lab 12/22/24  1440 12/23/24  0519   WBC 8.47 4.92   HGB 14.1 11.9*   HCT 40.2 36.6*    173       Pending Diagnostic Studies:       Procedure Component Value Units Date/Time    CBC auto differential [[id number]] Collected: 12/23/24 1441    Order Status: Sent Lab Status: In process Updated: 12/23/24 1456    Specimen: Blood     EKG 12-lead [[id number]]     Order Status: Sent Lab Status: No result            Medications:  Reconciled Home Medications:      Medication List        START taking these medications      apixaban 5 mg Tab  Commonly known as: ELIQUIS  Take 1 tablet (5 mg total) by mouth 2 (two) times daily.     metoprolol succinate 25 MG 24 hr tablet  Commonly known as: TOPROL-XL  Take 1 tablet (25 mg total) by mouth once daily.            CONTINUE taking these medications      acetaminophen 650 MG Tbsr  Commonly known as: TYLENOL  Take 1 tablet (650 mg total) by mouth every 8 (eight) hours.     biotin 1 mg tablet  Take 5,000 mcg by mouth once daily.     CALCIUM 600 ORAL  Take 1,200 mg by mouth once daily.     cefadroxil 500 MG Cap  Commonly known as: DURICEF  Take 1 capsule (500 mg total) by mouth every 12 (twelve) hours. for 5 days     clobetasoL 0.05 % external solution  Commonly known as: TEMOVATE  Use on scalp one - two times daily as needed for scaling or itching     DULoxetine 20 MG capsule  Commonly known as: CYMBALTA  TAKE 2 CAPSULES ONE TIME DAILY     multivitamin per tablet  Commonly known as: THERAGRAN  Take 1 tablet by mouth once daily.     oxybutynin 10 MG 24 hr tablet  Commonly known as: DITROPAN-XL  TAKE 1 TABLET EVERY DAY     oxyCODONE 5 MG immediate release tablet  Commonly known  as: ROXICODONE  Take 1 to 2 tablets by mouth  every 4 to 6 hours as needed for pain     raloxifene 60 mg tablet  Commonly known as: EVISTA  Take 1 tablet (60 mg total) by mouth once daily.     UNKNOWN TO PATIENT  Nutrafol- hair growth supplement            STOP taking these medications      celecoxib 200 MG capsule  Commonly known as: CeleBREX              Indwelling Lines/Drains at time of discharge:   Lines/Drains/Airways       None                   Time spent on the discharge of patient: 45 minutes         Sherry Isidro MD  Department of Hospital Medicine  [facility] - Cardiology Stepdown

## 2024-12-23 NOTE — ASSESSMENT & PLAN NOTE
Recent TKA on 11/11/24 with dehiscence on 12/11/24. Was treated with course of bactrim but developed profuse nausea, vomiting, and diarrhea. Bactrim was switched to cefadroxil until 12/24    - complete cefadroxil course

## 2024-12-23 NOTE — ASSESSMENT & PLAN NOTE
Recent TKA on 11/11/24 with dehiscence on 12/11/24. Was treated with course of bactrim but developed profuse nausea, vomiting, and diarrhea. Bactrim was switched to cefadroxil until 12/24    - complete cefadroxil course   - transition from celebrex to tylenol

## 2024-12-23 NOTE — ANESTHESIA POSTPROCEDURE EVALUATION
Anesthesia Post Evaluation    Patient: Dia Ovalles    Procedure(s) Performed: Procedure(s) (LRB):  Cardioversion or Defibrillation (N/A)  Transesophageal echo (SOL) intra-procedure log documentation (N/A)    Final Anesthesia Type: general      Patient location during evaluation: PACU  Patient participation: Yes- Able to Participate  Level of consciousness: awake and alert and oriented  Post-procedure vital signs: reviewed and stable  Pain management: adequate  Airway patency: patent    PONV status at discharge: No PONV  Anesthetic complications: no      Cardiovascular status: blood pressure returned to baseline and hemodynamically stable  Respiratory status: unassisted  Hydration status: euvolemic  Follow-up not needed.              Vitals Value Taken Time   /67 12/23/24 1411   Temp 36.8 °C (98.2 °F) 12/23/24 1411   Pulse 77 12/23/24 1411   Resp 18 12/23/24 1411   SpO2 100 % 12/23/24 1411         No case tracking events are documented in the log.      Pain/Debbie Score: Pain Rating Prior to Med Admin: 7 (12/22/2024  8:46 PM)  Debbie Score: 10 (12/23/2024  1:50 PM)

## 2024-12-24 ENCOUNTER — OFFICE VISIT (OUTPATIENT)
Dept: ORTHOPEDICS | Facility: CLINIC | Age: 74
End: 2024-12-24
Payer: MEDICARE

## 2024-12-24 VITALS — BODY MASS INDEX: 19.26 KG/M2 | HEIGHT: 69 IN | WEIGHT: 130.06 LBS

## 2024-12-24 DIAGNOSIS — Z96.651 S/P TOTAL KNEE REPLACEMENT, RIGHT: Primary | ICD-10-CM

## 2024-12-24 PROCEDURE — 1157F ADVNC CARE PLAN IN RCRD: CPT | Mod: HCNC,CPTII,S$GLB,

## 2024-12-24 PROCEDURE — 1160F RVW MEDS BY RX/DR IN RCRD: CPT | Mod: HCNC,CPTII,S$GLB,

## 2024-12-24 PROCEDURE — 99024 POSTOP FOLLOW-UP VISIT: CPT | Mod: HCNC,S$GLB,,

## 2024-12-24 PROCEDURE — 3044F HG A1C LEVEL LT 7.0%: CPT | Mod: HCNC,CPTII,S$GLB,

## 2024-12-24 PROCEDURE — 99999 PR PBB SHADOW E&M-EST. PATIENT-LVL IV: CPT | Mod: PBBFAC,HCNC,,

## 2024-12-24 PROCEDURE — 1125F AMNT PAIN NOTED PAIN PRSNT: CPT | Mod: HCNC,CPTII,S$GLB,

## 2024-12-24 PROCEDURE — 1159F MED LIST DOCD IN RCRD: CPT | Mod: HCNC,CPTII,S$GLB,

## 2024-12-24 RX ORDER — DEXTROMETHORPHAN HYDROBROMIDE, GUAIFENESIN 5; 100 MG/5ML; MG/5ML
650 LIQUID ORAL EVERY 8 HOURS
Qty: 120 TABLET | Refills: 0 | Status: SHIPPED | OUTPATIENT
Start: 2024-12-24

## 2024-12-24 NOTE — PROGRESS NOTES
"Dia Ovalles presents for post-operative visit following a right total knee arthroplasty performed by Dr. Oneal on 11/11/2024. She had a superficial wound dehiscence that was repaired with nylon suture in the ED on 12/11/2024. She was placed on bactrim for abx prophylaxis and reacted poorly, subsequently changed to Cefadroxil. S/p cardioversion on 12/23 and placed on   Eliquis.      Exam:   Height 5' 9" (1.753 m), weight 59 kg (130 lb 1.1 oz).   Ambulating well with assistive device.  Incision is clean and dry without drainage or erythema.   ROM:0-100    Initial post-operative radiographs reviewed today revealing a well fixed and aligned prosthesis.    A/P:  6 weeks s/p right total knee arthroplasty    - The patient was advised to keep the incision clean and dry for the next 24 hours after which she may wash the area with antibacterial soap in the shower. Will not submerge until the incision is completely healed.   - Suture removed today. New steri strips applied and advised to allow them to fall off on their own and leave incision open to air.   - Outpatient PT ongoing  - Continue Eliquis. Discontinue celebrex  - Pain medication: Tylenol prn.   - Reviewed antibiotic prophylaxis   - Follow up in 4 weeks with Dr. Oneal. Pt will call clinic with problems/concerns.      "

## 2024-12-27 ENCOUNTER — TELEPHONE (OUTPATIENT)
Dept: CARDIOLOGY | Facility: CLINIC | Age: 74
End: 2024-12-27
Payer: MEDICARE

## 2024-12-27 DIAGNOSIS — I48.0 PAROXYSMAL ATRIAL FIBRILLATION: Primary | ICD-10-CM

## 2024-12-30 ENCOUNTER — PATIENT OUTREACH (OUTPATIENT)
Dept: ADMINISTRATIVE | Facility: CLINIC | Age: 74
End: 2024-12-30
Payer: MEDICARE

## 2024-12-30 NOTE — PROGRESS NOTES
C3 nurse attempted to contact Dia Ovalles for a TCC post hospital discharge follow up call. No answer, left voicemail with callback information. The patient does not have a scheduled HOSFU appointment with her PCP, Shelley Leija MD within the first 5-7 days post DC date of 12/23/24. No messages routed at this time.

## 2024-12-31 RX ORDER — CELECOXIB 200 MG/1
200 CAPSULE ORAL
COMMUNITY

## 2024-12-31 NOTE — TELEPHONE ENCOUNTER
Spoke to pt re; hospital follow up. Scheduled pt for 01/28/2025 @ 11:00am. I advised pt that is the next available appointment with Dr.Rebecca Heladio MD and if she prefers to be seen sooner than that, I can schedule her with another provider. Pt declined and stated that she is okay with waiting until the 28th, because she has family in town visiting and will not be able to come in for a few weeks.     Pt expressed understanding and all questions were answered.

## 2024-12-31 NOTE — PROGRESS NOTES
C3 nurse spoke with Dia Ovalles  for a TCC post hospital discharge follow up call. The patient does not have a scheduled HOSFU appointment with Shelley Leija MD  within 5-7 days post hospital discharge date 12/23/2024. C3 nurse was unable to schedule HOSFU appointment in [facility].    Message sent to PCP staff requesting they contact patient and schedule follow up appointment.

## 2025-01-09 ENCOUNTER — PATIENT MESSAGE (OUTPATIENT)
Dept: ADMINISTRATIVE | Facility: OTHER | Age: 75
End: 2025-01-09
Payer: MEDICARE

## 2025-01-13 ENCOUNTER — HOSPITAL ENCOUNTER (OUTPATIENT)
Dept: RADIOLOGY | Facility: OTHER | Age: 75
Discharge: HOME | End: 2025-01-13
Attending: FAMILY MEDICINE
Payer: MEDICARE

## 2025-01-13 DIAGNOSIS — Z12.31 ENCOUNTER FOR SCREENING MAMMOGRAM FOR BREAST CANCER: ICD-10-CM

## 2025-01-13 PROCEDURE — 77063 BREAST TOMOSYNTHESIS BI: CPT | Mod: 26,,, | Performed by: RADIOLOGY

## 2025-01-13 PROCEDURE — 77067 SCR MAMMO BI INCL CAD: CPT | Mod: 26,,, | Performed by: RADIOLOGY

## 2025-01-13 PROCEDURE — 77067 SCR MAMMO BI INCL CAD: CPT | Mod: TC

## 2025-01-16 ENCOUNTER — PATIENT MESSAGE (OUTPATIENT)
Dept: ORTHOPEDICS | Facility: CLINIC | Age: 75
End: 2025-01-16
Payer: MEDICARE

## 2025-01-16 ENCOUNTER — PATIENT MESSAGE (OUTPATIENT)
Dept: REHABILITATION | Facility: OTHER | Age: 75
End: 2025-01-16
Payer: MEDICARE

## 2025-01-16 DIAGNOSIS — Z96.651 S/P TOTAL KNEE REPLACEMENT, RIGHT: Primary | ICD-10-CM

## 2025-01-17 ENCOUNTER — ON-DEMAND VIRTUAL (OUTPATIENT)
Dept: URGENT CARE | Facility: CLINIC | Age: 75
End: 2025-01-17
Payer: MEDICARE

## 2025-01-17 DIAGNOSIS — B00.1 FEVER BLISTER: ICD-10-CM

## 2025-01-17 DIAGNOSIS — H10.9 CONJUNCTIVITIS OF LEFT EYE, UNSPECIFIED CONJUNCTIVITIS TYPE: Primary | ICD-10-CM

## 2025-01-17 RX ORDER — ACYCLOVIR 200 MG/1
400 CAPSULE ORAL 3 TIMES DAILY
Qty: 42 CAPSULE | Refills: 0 | Status: SHIPPED | OUTPATIENT
Start: 2025-01-17 | End: 2025-01-28

## 2025-01-17 RX ORDER — TOBRAMYCIN 3 MG/ML
1 SOLUTION/ DROPS OPHTHALMIC EVERY 4 HOURS
Qty: 5 ML | Refills: 0 | Status: SHIPPED | OUTPATIENT
Start: 2025-01-17 | End: 2025-01-24

## 2025-01-17 NOTE — PROGRESS NOTES
Subjective:      Patient ID: Dia Ovalles is a 74 y.o. female.    Vitals:  vitals were not taken for this visit.     Chief Complaint: Eye Problem (Redness to left eye which began yesterday; also having some drainage, itchy.  Secondly, has lesion to right side of lower lip present x 10 days.  Using abreva.  Very slow improvement.)      Visit Type: TELE AUDIOVISUAL    Present with the patient at the time of consultation: TELEMED PRESENT WITH PATIENT: None    Past Medical History:   Diagnosis Date    Anemia 12/23/2024    Atrial fibrillation 12/22/2024    Breast cyst     Cancer     SCC x 2 on arms    Cataract     Closed Ortiz's fracture of right radius 09/27/2017    Decreased ROM of right shoulder 03/20/2018    Decreased strength of upper extremity 03/20/2018    Deep vein thrombosis     GERD (gastroesophageal reflux disease)     History of cardiovascular stress test 10/29/2024    Joint pain     Lesion of left upper eyelid 05/02/2023    Nuclear sclerosis, bilateral 05/17/2021    OA (osteoarthritis) of shoulder 03/13/2019    OAB (overactive bladder) 03/08/2017    Osteoporosis 03/08/2017    Other chest pain 05/13/2022    Squamous cell carcinoma of skin 2012    right arm     Past Surgical History:   Procedure Laterality Date    ARTHROPLASTY, KNEE, TOTAL, USING COMPUTER-ASSISTED NAVIGATION  11/11/2024    Procedure: ARTHROPLASTY, KNEE, TOTAL, USING COMPUTER-ASSISTED NAVIGATION;  Surgeon: Keagan Oneal MD;  Location: [facility] OR;  Service: Orthopedics;;    ASD REPAIR      open heart surgery    BREAST BIOPSY      CATARACT EXTRACTION W/  INTRAOCULAR LENS IMPLANT Right 04/19/2021    Procedure: EXTRACTION, CATARACT, WITH IOL INSERTION;  Surgeon: Bruna Silva MD;  Location: [facility] OR;  Service: Ophthalmology;  Laterality: Right;    CATARACT EXTRACTION W/  INTRAOCULAR LENS IMPLANT Left 05/17/2021    Procedure: EXTRACTION, CATARACT, WITH IOL INSERTION;  Surgeon: Bruna Silva MD;  Location: [facility] OR;  Service: Ophthalmology;   Laterality: Left;     SECTION      x2    EYE SURGERY      KNEE SURGERY Right     meniscal repair    SKIN BIOPSY  2012    SKIN CANCER EXCISION      TRANSESOPHAGEAL ECHOCARDIOGRAM WITH POSSIBLE CARDIOVERSION (SOL W/ POSS CARDIOVERSION) N/A 2024    Procedure: Transesophageal echo (SOL) intra-procedure log documentation;  Surgeon: Shelly Espinoza MD;  Location: [facility] EP LAB;  Service: Cardiology;  Laterality: N/A;    TREATMENT OF CARDIAC ARRHYTHMIA N/A 2024    Procedure: Cardioversion or Defibrillation;  Surgeon: Clifford Gonzalez MD;  Location: [facility] EP LAB;  Service: Cardiology;  Laterality: N/A;  AF, SOL/DCCV, ANES, SK, [id number]     Review of patient's allergies indicates:   Allergen Reactions    Codeine Nausea Only    Sulfa (sulfonamide antibiotics) Nausea And Vomiting     Current Outpatient Medications on File Prior to Visit   Medication Sig Dispense Refill    acetaminophen (TYLENOL) 650 MG TbSR Take 1 tablet (650 mg total) by mouth every 8 (eight) hours. 120 tablet 0    apixaban (ELIQUIS) 5 mg Tab Take 1 tablet (5 mg total) by mouth 2 (two) times daily. 60 tablet 2    biotin 1 mg tablet Take 5,000 mcg by mouth once daily.      calcium carbonate (CALCIUM 600 ORAL) Take 1,200 mg by mouth once daily.       celecoxib (CELEBREX) 200 MG capsule Take 200 mg by mouth as needed for Pain.      clobetasoL (TEMOVATE) 0.05 % external solution Use on scalp one - two times daily as needed for scaling or itching (Patient not taking: Reported on 2024) 50 mL 3    DULoxetine (CYMBALTA) 20 MG capsule TAKE 2 CAPSULES ONE TIME DAILY 180 capsule 1    metoprolol succinate (TOPROL-XL) 25 MG 24 hr tablet Take 1 tablet (25 mg total) by mouth once daily. 90 tablet 3    multivitamin (THERAGRAN) per tablet Take 1 tablet by mouth once daily.      oxybutynin (DITROPAN-XL) 10 MG 24 hr tablet TAKE 1 TABLET EVERY DAY 90 tablet 3    oxyCODONE (ROXICODONE) 5 MG immediate release tablet Take 1 to 2 tablets by mouth  every 4 to 6  hours as needed for pain (Patient not taking: Reported on 12/31/2024) 40 tablet 0    raloxifene (EVISTA) 60 mg tablet Take 1 tablet (60 mg total) by mouth once daily. 90 tablet 3    UNKNOWN TO PATIENT Nutrafol- hair growth supplement (Patient not taking: Reported on 12/31/2024)       No current facility-administered medications on file prior to visit.     Family History   Problem Relation Name Age of Onset    Hypertension Mother      Asthma Mother      Osteoporosis Mother      Hypertension Father      Cancer Father          lung cancer    Heart disease Maternal Grandfather      Stroke Maternal Grandfather      Breast cancer Neg Hx      Colon cancer Neg Hx      Ovarian cancer Neg Hx         Medications Ordered                [id number] - [place] [address] [employer] [facility] [address] [place] [address] [facility] [address]    Telephone: [phone]   Fax: [phone]   Hours: Not open 24 hours                         E-Prescribed (2 of 2)              acyclovir (ZOVIRAX) 200 MG capsule    Sig: Take 2 capsules (400 mg total) by mouth 3 (three) times daily. for 7 days       Start: 1/17/25     Quantity: 42 capsule Refills: 0                         tobramycin sulfate 0.3% (TOBREX) 0.3 % ophthalmic solution    Sig: Place 1 drop into the left eye every 4 (four) hours. for 7 days       Start: 1/17/25     Quantity: 5 mL Refills: 0                           Ohs Peq Odvv Intake    1/17/2025  4:51 PM CST - Filed by Patient   What is your current physical address in the event of a medical emergency? [address] [place] [id number]   Are you able to take your vital signs? Yes   Systolic Blood Pressure:    Diastolic Blood Pressure:    Weight: 128   Height: 68   Pulse: 78   Temperature: 98.6   Respiration rate:    Pulse Oxygen: 97   Please attach any relevant images or files    Is your employer contracted with [facility]? No         HPI     Eye Problem     Additional comments:  Redness to left eye which began yesterday; also   having some drainage, itchy.  Secondly, has lesion to right side of lower lip present x 10 days.  Using abreva.  Very slow improvement.  Two patient identifiers were used-name was repeated verbally as well as date of birth.  The patient was located in their home in the [place] [facility].          HENT:  Positive for mouth sores.    Eyes:  Positive for eye discharge, eye itching and eye redness.        Objective:   The physical exam was conducted virtually.  Physical Exam   Constitutional: She is oriented to person, place, and time. No distress.   HENT:   Head: Normocephalic and atraumatic.   Mouth/Throat:      Comments: Blister type lesion to right lower lip, red in color  Neck: Neck supple.   Pulmonary/Chest: Effort normal. No respiratory distress.   Abdominal: Normal appearance.   Musculoskeletal: Normal range of motion.         General: Normal range of motion.   Neurological: no focal deficit. She is alert and oriented to person, place, and time.   Skin: Skin is not pale.   Psychiatric: Her behavior is normal. Mood, judgment and thought content normal.       Assessment:     1. Conjunctivitis of left eye, unspecified conjunctivitis type    2. Fever blister        Plan:       Conjunctivitis of left eye, unspecified conjunctivitis type  -     acyclovir (ZOVIRAX) 200 MG capsule; Take 2 capsules (400 mg total) by mouth 3 (three) times daily. for 7 days  Dispense: 42 capsule; Refill: 0    Fever blister    Other orders  -     tobramycin sulfate 0.3% (TOBREX) 0.3 % ophthalmic solution; Place 1 drop into the left eye every 4 (four) hours. for 7 days  Dispense: 5 mL; Refill: 0    Meds as directed above.  F/u in clinic if symptoms fail to resolve.    You must understand that you've received a virtual Care treatment only and that you may be released before all your medical problems are known or treated. You, the patient, will arrange for follow up care as instructed.  If  your condition worsens we recommend that you receive another evaluation at an urgent care in person, the emergency room or contact your primary medical clinics after hours call service to discuss your concerns.

## 2025-01-27 DIAGNOSIS — Z96.651 S/P TOTAL KNEE REPLACEMENT, RIGHT: Primary | ICD-10-CM

## 2025-01-28 ENCOUNTER — OFFICE VISIT (OUTPATIENT)
Dept: PRIMARY CARE CLINIC | Facility: CLINIC | Age: 75
End: 2025-01-28
Payer: MEDICARE

## 2025-01-28 ENCOUNTER — LAB VISIT (OUTPATIENT)
Dept: LAB | Facility: HOSPITAL | Age: 75
End: 2025-01-28
Attending: FAMILY MEDICINE
Payer: MEDICARE

## 2025-01-28 VITALS
WEIGHT: 129.63 LBS | SYSTOLIC BLOOD PRESSURE: 118 MMHG | HEART RATE: 95 BPM | DIASTOLIC BLOOD PRESSURE: 60 MMHG | HEIGHT: 69 IN | BODY MASS INDEX: 19.2 KG/M2 | OXYGEN SATURATION: 99 %

## 2025-01-28 DIAGNOSIS — R74.8 ELEVATED LIVER ENZYMES: ICD-10-CM

## 2025-01-28 DIAGNOSIS — Z96.651 HISTORY OF TOTAL RIGHT KNEE REPLACEMENT: ICD-10-CM

## 2025-01-28 DIAGNOSIS — I48.91 NEW ONSET ATRIAL FIBRILLATION: ICD-10-CM

## 2025-01-28 DIAGNOSIS — Z12.11 SCREEN FOR COLON CANCER: ICD-10-CM

## 2025-01-28 DIAGNOSIS — Z09 HOSPITAL DISCHARGE FOLLOW-UP: Primary | ICD-10-CM

## 2025-01-28 DIAGNOSIS — M17.11 OSTEOARTHRITIS OF RIGHT KNEE, UNSPECIFIED OSTEOARTHRITIS TYPE: ICD-10-CM

## 2025-01-28 DIAGNOSIS — M19.019 OSTEOARTHRITIS OF SHOULDER, UNSPECIFIED LATERALITY, UNSPECIFIED OSTEOARTHRITIS TYPE: ICD-10-CM

## 2025-01-28 DIAGNOSIS — Z79.01 ON ANTICOAGULANT THERAPY: ICD-10-CM

## 2025-01-28 DIAGNOSIS — Z92.89 HISTORY OF CARDIOVERSION: ICD-10-CM

## 2025-01-28 PROBLEM — M25.531 WRIST PAIN, RIGHT: Status: RESOLVED | Noted: 2017-10-11 | Resolved: 2025-01-28

## 2025-01-28 PROBLEM — T81.30XA WOUND DEHISCENCE: Status: RESOLVED | Noted: 2024-12-12 | Resolved: 2025-01-28

## 2025-01-28 LAB
ALBUMIN SERPL BCP-MCNC: 3.9 G/DL (ref 3.5–5.2)
ALP SERPL-CCNC: 78 U/L (ref 40–150)
ALT SERPL W/O P-5'-P-CCNC: 15 U/L (ref 10–44)
ANION GAP SERPL CALC-SCNC: 11 MMOL/L (ref 8–16)
AST SERPL-CCNC: 23 U/L (ref 10–40)
BILIRUB SERPL-MCNC: 0.4 MG/DL (ref 0.1–1)
BUN SERPL-MCNC: 16 MG/DL (ref 8–23)
CALCIUM SERPL-MCNC: 9.7 MG/DL (ref 8.7–10.5)
CHLORIDE SERPL-SCNC: 105 MMOL/L (ref 95–110)
CO2 SERPL-SCNC: 23 MMOL/L (ref 23–29)
CREAT SERPL-MCNC: 0.7 MG/DL (ref 0.5–1.4)
EST. GFR  (NO RACE VARIABLE): >60 ML/MIN/1.73 M^2
GLUCOSE SERPL-MCNC: 82 MG/DL (ref 70–110)
POTASSIUM SERPL-SCNC: 4.1 MMOL/L (ref 3.5–5.1)
PROT SERPL-MCNC: 7.3 G/DL (ref 6–8.4)
SODIUM SERPL-SCNC: 139 MMOL/L (ref 136–145)

## 2025-01-28 PROCEDURE — 3078F DIAST BP <80 MM HG: CPT | Mod: CPTII,S$GLB,, | Performed by: FAMILY MEDICINE

## 2025-01-28 PROCEDURE — 3074F SYST BP LT 130 MM HG: CPT | Mod: CPTII,S$GLB,, | Performed by: FAMILY MEDICINE

## 2025-01-28 PROCEDURE — 99999 PR PBB SHADOW E&M-EST. PATIENT-LVL III: CPT | Mod: PBBFAC,,, | Performed by: FAMILY MEDICINE

## 2025-01-28 PROCEDURE — 1125F AMNT PAIN NOTED PAIN PRSNT: CPT | Mod: CPTII,S$GLB,, | Performed by: FAMILY MEDICINE

## 2025-01-28 PROCEDURE — 99215 OFFICE O/P EST HI 40 MIN: CPT | Mod: S$GLB,,, | Performed by: FAMILY MEDICINE

## 2025-01-28 PROCEDURE — 1160F RVW MEDS BY RX/DR IN RCRD: CPT | Mod: CPTII,S$GLB,, | Performed by: FAMILY MEDICINE

## 2025-01-28 PROCEDURE — 36415 COLL VENOUS BLD VENIPUNCTURE: CPT | Mod: PN | Performed by: FAMILY MEDICINE

## 2025-01-28 PROCEDURE — 3288F FALL RISK ASSESSMENT DOCD: CPT | Mod: CPTII,S$GLB,, | Performed by: FAMILY MEDICINE

## 2025-01-28 PROCEDURE — 80053 COMPREHEN METABOLIC PANEL: CPT | Performed by: FAMILY MEDICINE

## 2025-01-28 PROCEDURE — 1100F PTFALLS ASSESS-DOCD GE2>/YR: CPT | Mod: CPTII,S$GLB,, | Performed by: FAMILY MEDICINE

## 2025-01-28 PROCEDURE — 1159F MED LIST DOCD IN RCRD: CPT | Mod: CPTII,S$GLB,, | Performed by: FAMILY MEDICINE

## 2025-01-28 PROCEDURE — 1157F ADVNC CARE PLAN IN RCRD: CPT | Mod: CPTII,S$GLB,, | Performed by: FAMILY MEDICINE

## 2025-01-28 RX ORDER — DULOXETINE 40 MG/1
40 CAPSULE, DELAYED RELEASE ORAL DAILY
Qty: 90 CAPSULE | Refills: 1 | Status: SHIPPED | OUTPATIENT
Start: 2025-01-28
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
## 2025-01-30 ENCOUNTER — CLINICAL SUPPORT (OUTPATIENT)
Dept: REHABILITATION | Facility: OTHER | Age: 75
End: 2025-01-30
Attending: STUDENT IN AN ORGANIZED HEALTH CARE EDUCATION/TRAINING PROGRAM
Payer: MEDICARE

## 2025-01-30 DIAGNOSIS — Z96.651 S/P TOTAL KNEE REPLACEMENT, RIGHT: ICD-10-CM

## 2025-01-30 PROBLEM — Z79.01 ON ANTICOAGULANT THERAPY: Status: ACTIVE | Noted: 2025-01-30

## 2025-01-30 PROBLEM — M25.661 DECREASED RANGE OF MOTION (ROM) OF RIGHT KNEE: Status: RESOLVED | Noted: 2024-11-13 | Resolved: 2025-01-30

## 2025-01-30 PROCEDURE — 97112 NEUROMUSCULAR REEDUCATION: CPT | Mod: PN

## 2025-01-30 PROCEDURE — 97140 MANUAL THERAPY 1/> REGIONS: CPT | Mod: PN

## 2025-01-30 PROCEDURE — 97530 THERAPEUTIC ACTIVITIES: CPT | Mod: PN

## 2025-01-30 PROCEDURE — 97161 PT EVAL LOW COMPLEX 20 MIN: CPT | Mod: PN

## 2025-01-30 NOTE — PROGRESS NOTES
Ms. Ovalles is a patient of Dr. Gonzalez.      Subjective:   Patient ID:  Dia Ovalles is a 74 y.o. female who presents for follow up of Atrial Fibrillation  .     HPI:    Ms. Ovalles is a 74 y.o. female with OAB, remote DVT, ASD (s/p repair 1976), TKA, AF here for hospital follow up.     Background:    12/23/2024:  Flako Ovalles is a 73 yo female with a hx of OAB, remote DVT while pregnant, hx of atrial septal defect repair (in 1976 age 26), and recent TKA (11/11/24) who presents today with fatigue and dyspnea on exertion for 5-7 days. She regularly exercises and states these new symptoms are unusual for her. She recently underwent a TKA on 11/11/24 and presented on 12/11/24 with wound dehiscence following a fall by tripping. She was started on Bactrim for wound dehiscence, that she took for 5-7 days and then developed nausea, vomiting and diarrhea, therefore bactrim was switched to cefadroxil (to be taken till 12/24/24). She denies any N/V/D currently. But she reports two episodes of fall preceded by lighhededness in last 1 week associated with shortness of breath and fatigue. She drinks wine 2 glasses 3-4 times a week, drinks couple of cups of coffee daily, also she reports she snores at night. She did not have any cardiac ailments after ASD repair, no history of atrial fibrillation. Electrophysiology (EP) consulted for evaluation of  new onset of Atrial Fibrillation with Rapid Ventricular Response.    Remote history of DVT while pregnant. D-dimer 3 on admission. CTA chest negative for PE. LE US negative for DVT. Elevation likely 2/2 recent surgery or active a-fib.      EKG: atrial fibrillation rapid ventricular response 100  Tele: atrial fibrillation rapid ventricular response 100s  Limit half to one drink a day  Limit 1-2 cups of coffee daily  Also get evaluated for HECTOR as outpatient  Get updated TTE [Last TTE LVEF  5/25/22 71%]  Continue her daily exercise regimen  Continue eliquis 5 mg Twice a day  Keep NPO for  SOL-DCCV today  Plan to discharge on Toprol 25 mg daily (adjust according to HR, BP)    12/23/024: Cardioversion was successfully performed with restoration of normal sinus rhythm.      Update (02/03/2025):    Today she says she is feeling well. Was fatigued for a few days after cardioversion but is back to baseline.  Knee recovering - is in PT. Exercising more but not as much as prior to her knee surgery. No CP, worsening DIAZ, palps, LH, syncope reported.  Her AF symptoms were significant fatigue, DIAZ, and recurrent falls.    She is currently taking eliquis 5mg BID for stroke prophylaxis and denies significant bleeding episodes. She is currently being treated with toprol 25mg daily for HR control. Kidney function is stable, with a creatinine of 0.7 on 1/28/2025.    I have personally reviewed the patient's EKG today, which shows sinus rhythm with 1st deg AVB at 60bpm. NJ interval is 212. QRS is 86. QT is 418.    Relevant Cardiac Test Results:    SOL (12/23/2024):    SOL done prior to DCCV.    Left Atrium: Left atrium is dilated. No patent foramen ovale confirmed by Doppler. The left atrial appendage appears normal. The left atrial appendage has a windsock morphology There is a very prominent pectinate seen posteiroly that is partly calcificed.  Appendage velocity is normal at greater than 40 cm/sec. There is no thrombus in the left atrial appendage confirmed with contrast. The pulmonary veins have normal venous flow.    Left Ventricle: The left ventricle is normal in size. Normal wall thickness. Normal wall motion. There is normal systolic function with a visually estimated ejection fraction of 60 - 65%.    Right Ventricle: Normal right ventricular cavity size. Systolic function is normal.    Right Atrium: Right atrium is dilated.    Mitral Valve: The mitral valve is structurally normal. There is normal leaflet mobility. There is mild regurgitation.    Tricuspid Valve: The tricuspid valve is trileaflet. Findings  consistent with myxomatous degeneration .This is better appreciated on SOL than TTE.  Mild prolapse of the posterior, septal and anterior leaflets. There is moderate regurgitation with a centrally directed jet. Greater TR is appreciated on SOL    Aorta: Aortic root is normal in size measuring 2.9 cm. Ascending aorta is normal measuring 2.9 cm. Grade 1 small atherosclerosis with mild thickening.    Pericardium: There is no pericardial effusion.    Hx of atrial septal defect repair (in 1976 age 26), no color flow seen across IAS    Current Outpatient Medications   Medication Sig    acetaminophen (TYLENOL) 650 MG TbSR Take 1 tablet (650 mg total) by mouth every 8 (eight) hours.    apixaban (ELIQUIS) 5 mg Tab Take 1 tablet (5 mg total) by mouth 2 (two) times daily.    biotin 1 mg tablet Take 5,000 mcg by mouth once daily.    calcium carbonate (CALCIUM 600 ORAL) Take 1,200 mg by mouth once daily.     DULoxetine 40 mg CpDR Take 40 mg by mouth once daily.    metoprolol succinate (TOPROL-XL) 25 MG 24 hr tablet Take 1 tablet (25 mg total) by mouth once daily.    multivitamin (THERAGRAN) per tablet Take 1 tablet by mouth once daily.    oxybutynin (DITROPAN-XL) 10 MG 24 hr tablet TAKE 1 TABLET EVERY DAY    raloxifene (EVISTA) 60 mg tablet Take 1 tablet (60 mg total) by mouth once daily.    UNKNOWN TO PATIENT Nutrafol- hair growth supplement     No current facility-administered medications for this visit.     Review of Systems   Constitutional: Negative for malaise/fatigue.   Cardiovascular:  Negative for chest pain, dyspnea on exertion, irregular heartbeat, leg swelling and palpitations.   Respiratory:  Negative for shortness of breath.    Hematologic/Lymphatic: Negative for bleeding problem.   Skin:  Negative for rash.   Musculoskeletal:  Negative for myalgias.   Gastrointestinal:  Negative for hematemesis, hematochezia and nausea.   Genitourinary:  Negative for hematuria.   Neurological:  Negative for light-headedness.  "  Psychiatric/Behavioral:  Negative for altered mental status.    Allergic/Immunologic: Negative for persistent infections.       Objective:          /69 (Patient Position: Sitting)   Pulse 60   Ht 5' 9" (1.753 m)   Wt 59.1 kg (130 lb 4.7 oz)   LMP  (LMP Unknown)   BMI 19.24 kg/m²     Physical Exam  Vitals and nursing note reviewed.   Constitutional:       Appearance: Normal appearance. She is well-developed.   HENT:      Head: Normocephalic.      Nose: Nose normal.   Eyes:      Pupils: Pupils are equal, round, and reactive to light.   Cardiovascular:      Rate and Rhythm: Normal rate and regular rhythm.   Pulmonary:      Effort: No respiratory distress.   Musculoskeletal:         General: Normal range of motion.   Skin:     General: Skin is warm and dry.      Findings: No erythema.   Neurological:      Mental Status: She is alert and oriented to person, place, and time.   Psychiatric:         Speech: Speech normal.         Behavior: Behavior normal.           Lab Results   Component Value Date     01/28/2025    K 4.1 01/28/2025    MG 1.6 12/23/2024    BUN 16 01/28/2025    CREATININE 0.7 01/28/2025    ALT 15 01/28/2025    AST 23 01/28/2025    HGB 12.5 12/23/2024    HCT 37.7 12/23/2024    TSH 1.413 12/22/2024    LDLCALC 104.4 05/03/2024       Recent Labs   Lab 10/29/24  1408 12/11/24  1919   INR 1.0 1.0       Assessment:     1. Persistent atrial fibrillation    2. On anticoagulant therapy    3. History of cardioversion    4. Snoring      Plan:     In summary, Ms. Ovalles is a 74 y.o. female with OAB, remote DVT, ASD (s/p repair 1976), TKA, AF here for hospital follow up.   Pt is 1 mo s/p DCCV for new onset AF with RVR. She is doing well from a rhythm standpoint, with no documented or symptomatic recurrence of arrhythmia since procedure.  Not on AAD. SOL showed preserved LVEF. CHADSVASc 3. Refer to sleep medicine for HECTOR evaluation. Possible her AF episode was provoked by recent surgery and wound " dehiscence.  Will order event monitor to evaluate for additional AF. RTC to discuss long term OAC vs continued monitoring (smart watch or ILR). She has a new smartwatch.    Event monitor.   Sleep sppt as scheduled  Continue current meds for now.  RTC 3 mo, sooner if needed    *A copy of this note has been sent to Dr. Gonzalez*    Follow up in about 3 months (around 5/3/2025).    ------------------------------------------------------------------    BETY Whalen, NP-C  Cardiac Electrophysiology

## 2025-01-30 NOTE — PROGRESS NOTES
Outpatient Rehab    Physical Therapy Evaluation    Patient Name: Dia Ovalles  MRN: [id number]  YOB: 1950  Today's Date: 2025    Therapy Diagnosis:   Encounter Diagnosis   Name Primary?    S/P total knee replacement, right      Physician: Keagan Oneal MD    Physician Orders: Eval and Treat  Medical Diagnosis: S/P right TKA    Visit # / Visits Authorized:     Date of Evaluation:  2025   Insurance Authorization Period: 25 to 26  Plan of Care Certification:  2025 to 25      Time In:  1000a  Time Out:  1100a  Total Time:   60  Total Billable Time: 60         Subjective     History of current condition - Dia reports: right TKA 24    Driving, prolonged sitting cause discomfort    Currently walking 3 mi/day and there is pain after the walk    --    AM stiffness - difficulty with going down stairs and with sitting on/off low surfaces    Dia has been riding the recumbent bike and walking for exercise     Wants to get back to [employer] and yard work     Medical History:   Past Medical History:   Diagnosis Date    Anemia 2024    Atrial fibrillation 2024    Breast cyst     Cancer     SCC x 2 on arms    Cataract     Closed Ortiz's fracture of right radius 2017    Decreased ROM of right shoulder 2018    Decreased strength of upper extremity 2018    Deep vein thrombosis     GERD (gastroesophageal reflux disease)     History of cardiovascular stress test 10/29/2024    Joint pain     Lesion of left upper eyelid 2023    Nuclear sclerosis, bilateral 2021    OA (osteoarthritis) of shoulder 2019    OAB (overactive bladder) 2017    Osteoporosis 2017    Other chest pain 2022    Squamous cell carcinoma of skin 2012    right arm       Surgical History:   Dia Ovalles  has a past surgical history that includes Skin cancer excision; ASD repair;  section; Knee surgery (Right); Breast biopsy; Cataract  extraction w/  intraocular lens implant (Right, 04/19/2021); Cataract extraction w/  intraocular lens implant (Left, 05/17/2021); Eye surgery; Skin biopsy (2012); arthroplasty, knee, total, using computer-assisted navigation (11/11/2024); Treatment of cardiac arrhythmia (N/A, 12/23/2024); and transesophageal echocardiogram with possible cardioversion (юлия w/ poss cardioversion) (N/A, 12/23/2024).    Medications:   Dia has a current medication list which includes the following prescription(s): acetaminophen, apixaban, biotin, calcium carbonate, duloxetine, metoprolol succinate, multivitamin, oxybutynin, raloxifene, and UNKNOWN TO PATIENT.    Allergies:   Review of patient's allergies indicates:   Allergen Reactions    Codeine Nausea Only    Sulfa (sulfonamide antibiotics) Nausea And Vomiting        Imaging: FINDINGS:  The right knee demonstrates significant narrowing in the lateral compartment.  There is tricompartmental osteophytic spurring.  No evidence of fracture.  Small ossific densities again seen in the posterior soft tissues.  Joint effusion.    Prior Therapy: yes  Social History: 75 yo female lives alone  Occupation: retired  Prior Level of Function: walking 4 miles per day, yoga  Current Level of Function: limited with recreational activity    Pain:  Current 1/10, worst 2/10, best 0/10   Location: right knee  Description: Aching  Aggravating Factors: Walking  Easing Factors: ice and rest    Pts goals: Pt would like to return to recreational activity with no increase in knee pain.    Objective     WNL=within normal limits  WFL=within functional limits  NT=not tested  *=pain    Posture: WNL  Palpation: tenderness to palpation at lateral joint line  Sensation: intact  Deep tendon reflexes: NT      Range of Motion:   Knee Left active Left Passive Right Active R passive   Flexion 130 135 95 100   Extension 8 10 -6 -4           Lower Extremity Strength  Right LE   Left LE      Iliopsoas:  L2 4/5 Iliopsoas: L2  "4/5    Quadriceps:  L3 - femoral nerve 3+/5* Quadriceps: L3 - femoral nerve 4/5    Hip adduction:  L3 - obterator 4/5 Hip adduction: L3 - obterator 4/5    Hamstrings:  S2 3+/5 Hamstrings: S2 4/5    Ankle DF/EV:  L4 NT Ankle DF/EV: L4 NT    GT Ext:  L5 NT GT Ext L5 NT    Hip Abduction:  L5-S1 - Superior gluteal nerve 3+/5 Hip Abduction: L5-S1 - Superior gluteal nerve 4/5    Hip extension:  L5-S2 - Inferior gluteal nerve 3+/5 Hip extension: L5-S2 - Inferior gluteal nerve 4/5    Ankle PF:   S1 - tibial nerve NT Ankle PF S1 - tibial nerve NT               CMS Impairment/Limitation/Restriction for FOTO Survey    Therapist reviewed FOTO scores for Dia Ovalles on 1/30/2025.   FOTO documents entered into [facility] - see Media section.    Limitation Score: 66%  Predicted Goal: 33%    Category: Mobility     TREATMENT     Treatment Time In: 1020p  Treatment Time Out: 1100p  Total Treatment time separate from Evaluation: 40 minutes    manual therapy techniques: Joint mobilizations and Myofacial release were applied to the: right knee for 8 minutes, including:    Patellar mobilization  Infrapatellar fat pad mobilization  Tibiofemoral JM AP glides, Gr II/III  Knee extension mobilization grade IV    neuromuscular re-education activities to improve: Kinesthetic, Sense, and Proprioception for 13 minutes. The following activities were included:    Knee extension props 5# x3mins  NuStep L5 x 8 minutes  Bike full revolutions x 10 mins - seat #8  QS 30x5" hold   SLR x20      therapeutic activities to improve functional performance for 19 minutes, including:    Step ups 6" with slow eccentric step down 30x R/L  Sit to stand 2x10 18" (Seated on [employer])  TRX squats to chair tap 3x10 with slow eccentric phase  Seated knee flexion stretch with overpressure from LLE, 10x10"  Shuttle press 50# 3x10  SL shuttle press 12.5#  Stair training reciprocal pattern 10x  Lateral step down 3x10  Matrix LAQ vs 15# 3x10  Matrix hamstring curls vs 25# " 3x10        Home Exercises and Patient Education Provided:    Education provided:   - Findings; prognosis and plan of care (POC)  - Home exercise program (HEP)  - Modality options  - Therapist contact information    Written Home Exercises Provided: Yes  Exercises were reviewed and Dia was able to demonstrate them prior to the end of the session.  Dia demonstrated good understanding of the education provided.       Assessment     Dia is a 74 y.o. female referred to outpatient Physical Therapy with a medical diagnosis of S/P right TKA. Pt presents to PT with pain, decreased knee ROM, decreased strength and flexibility, poor posture, and functional deficits with recreational activity. These deficits are negatively impacting this patient's ability to complete their work duties and activities of daily living.     Pt prognosis is Good.   Pt will benefit from skilled outpatient Physical Therapy to address the deficits stated above and in the chart below, provide pt/family education, and to maximize pt's level of independence.     Plan of care discussed with patient: Yes  Pt's spiritual, cultural and educational needs considered and pt agreeable to plan of care and goals as stated below:     Anticipated Barriers for therapy: None      Medical Necessity is demonstrated by the following  History  Co-morbidities and personal factors that may impact the plan of care Co-morbidities:   advanced age    Personal Factors:   age     low   Examination  Body Structures and Functions, activity limitations and participation restrictions that may impact the plan of care Body Regions:   lower extremities    Body Systems:    ROM  strength  balance  gait  motor control    Participation Restrictions:   Walking    Activity limitations:   Learning and applying knowledge  no deficits    General Tasks and Commands  No Deficits    Communication  No Deficits    Mobility  walking    Self care  toileting  dressing    Domestic Life  No  Deficits    Interactions/Relationships  No Deficits    Life Areas  No Deficits    Community and Social Life  No Deficits         low   Clinical Presentation stable and uncomplicated low   Decision Making/ Complexity Score: low     Goals: GOALS:  Short Term Goals (4 Weeks):  1. Patient will be compliant with home exercise program to supplement therapy in restoring pain free function. (Progressing, not met)    2. Patient will improve impaired lower extremity manual muscle tests to >/= 4/5 to improve dynamic hip/knee support for functional tasks. (progressing, not met)    3. Pt will tolerate standing/walking for 30 minutes with no increase in knee pain to return to PLOF. (progressing, not met)    4. Pt will improve knee pain to 4/10 at worst for improved QOL. (progressing, not met)          Long Term Goals (8 Weeks):  1. Patient will improve FOTO score to </= 41% limited to decrease perceived limitation with mobility. (progressing, not met)    2. Patient will improve impaired lower extremity manual muscle tests to >/= 4+/5 to improve dynamic hip/knee support for functional tasks. (progressing, not met)    3. Patient will tolerate walking for 60 minutes with no increase in knee pain to return to PLOF. (progressing, not met)         PLAN     Plan of care Certification: 1/30/25 to 4/30/25    Focus on ROM and LE strength with emphasis on ADL performance     Outpatient Physical Therapy 2 times weekly for 6 weeks to include the following interventions: Therapeutic Exercises, Manual Therapeutic Technique, Neuromuscular Re Education, Therapeutic Activities. Modalities, Kinesiotape prn, and Functional Dry Needling as needed.      Clifford George, PT,  DPT, OCS

## 2025-01-31 ENCOUNTER — HOSPITAL ENCOUNTER (OUTPATIENT)
Dept: RADIOLOGY | Facility: HOSPITAL | Age: 75
Discharge: HOME | End: 2025-01-31
Attending: STUDENT IN AN ORGANIZED HEALTH CARE EDUCATION/TRAINING PROGRAM
Payer: MEDICARE

## 2025-01-31 ENCOUNTER — OFFICE VISIT (OUTPATIENT)
Dept: ORTHOPEDICS | Facility: CLINIC | Age: 75
End: 2025-01-31
Payer: MEDICARE

## 2025-01-31 VITALS — WEIGHT: 133.06 LBS | BODY MASS INDEX: 19.65 KG/M2

## 2025-01-31 DIAGNOSIS — Z96.651 S/P TOTAL KNEE REPLACEMENT, RIGHT: Primary | ICD-10-CM

## 2025-01-31 DIAGNOSIS — Z96.651 S/P TOTAL KNEE REPLACEMENT, RIGHT: ICD-10-CM

## 2025-01-31 PROCEDURE — 1125F AMNT PAIN NOTED PAIN PRSNT: CPT | Mod: CPTII,S$GLB,, | Performed by: STUDENT IN AN ORGANIZED HEALTH CARE EDUCATION/TRAINING PROGRAM

## 2025-01-31 PROCEDURE — 73560 X-RAY EXAM OF KNEE 1 OR 2: CPT | Mod: 26,RT,, | Performed by: RADIOLOGY

## 2025-01-31 PROCEDURE — 1157F ADVNC CARE PLAN IN RCRD: CPT | Mod: CPTII,S$GLB,, | Performed by: STUDENT IN AN ORGANIZED HEALTH CARE EDUCATION/TRAINING PROGRAM

## 2025-01-31 PROCEDURE — 77073 BONE LENGTH STUDIES: CPT | Mod: 26,,, | Performed by: RADIOLOGY

## 2025-01-31 PROCEDURE — 77073 BONE LENGTH STUDIES: CPT | Mod: TC

## 2025-01-31 PROCEDURE — 99024 POSTOP FOLLOW-UP VISIT: CPT | Mod: S$GLB,,, | Performed by: STUDENT IN AN ORGANIZED HEALTH CARE EDUCATION/TRAINING PROGRAM

## 2025-01-31 PROCEDURE — 1159F MED LIST DOCD IN RCRD: CPT | Mod: CPTII,S$GLB,, | Performed by: STUDENT IN AN ORGANIZED HEALTH CARE EDUCATION/TRAINING PROGRAM

## 2025-01-31 PROCEDURE — 99999 PR PBB SHADOW E&M-EST. PATIENT-LVL III: CPT | Mod: PBBFAC,,, | Performed by: STUDENT IN AN ORGANIZED HEALTH CARE EDUCATION/TRAINING PROGRAM

## 2025-01-31 PROCEDURE — 73560 X-RAY EXAM OF KNEE 1 OR 2: CPT | Mod: TC,RT

## 2025-01-31 NOTE — PROGRESS NOTES
Dia Ovalles presents for 12 wk post-operative visit following a right total knee arthroplasty performed on 11/11/2024.  At last visit we removed the sutures from her knee after she had a traumatic partial dehiscence at the middle aspect of her incision. Now she states the incision has healed well, no drainage or redness. She has some mild discomfort intermittently and is using OTC topical voltaren about every other day for this.  She had to hold off on PT due to the fall and wound dehiscence, but she has just restarted and is hoping to continue for the next month. She is interested in returning to Yoga at some point and being able to kneel on the knee. She is here today for routine 12 week visit.    Exam:   Ambulating well, nonantalgic gait, without assistive device.  Incision is well-healed, without induration or erythema.     ROM:7-100  Knee is stable to varus valgus stress    Follow up radiographs  obtained and reviewed today  by me reveal a well fixed and aligned  cemented total knee prosthesis.  No sign of subsidence, migration, loosening or fracture.    A/P:  12 weeks s/p right total knee arthroplasty,  doing well    -   Continue Outpatient PT - new Rx given - with emphasis on terminal ROM and stair navigation  - Continue topical voltaren for pain swelling as needed  - Kneeling protocol given  - Follow up in 9 mos for 1 year postop visit with x-rays at that time. Pt will contact us with problems/concerns in the interim.

## 2025-02-03 ENCOUNTER — TELEPHONE (OUTPATIENT)
Dept: CARDIOLOGY | Facility: CLINIC | Age: 75
End: 2025-02-03
Payer: MEDICARE

## 2025-02-03 ENCOUNTER — OFFICE VISIT (OUTPATIENT)
Dept: ELECTROPHYSIOLOGY | Facility: CLINIC | Age: 75
End: 2025-02-03
Payer: MEDICARE

## 2025-02-03 ENCOUNTER — HOSPITAL ENCOUNTER (OUTPATIENT)
Dept: CARDIOLOGY | Facility: CLINIC | Age: 75
Discharge: HOME | End: 2025-02-03
Payer: MEDICARE

## 2025-02-03 VITALS
HEIGHT: 69 IN | SYSTOLIC BLOOD PRESSURE: 128 MMHG | DIASTOLIC BLOOD PRESSURE: 69 MMHG | HEART RATE: 60 BPM | BODY MASS INDEX: 19.3 KG/M2 | WEIGHT: 130.31 LBS

## 2025-02-03 DIAGNOSIS — I48.19 PERSISTENT ATRIAL FIBRILLATION: Primary | ICD-10-CM

## 2025-02-03 DIAGNOSIS — Z92.89 HISTORY OF CARDIOVERSION: ICD-10-CM

## 2025-02-03 DIAGNOSIS — R06.83 SNORING: ICD-10-CM

## 2025-02-03 DIAGNOSIS — Z79.01 ON ANTICOAGULANT THERAPY: ICD-10-CM

## 2025-02-03 DIAGNOSIS — I48.0 PAROXYSMAL ATRIAL FIBRILLATION: ICD-10-CM

## 2025-02-03 LAB
OHS QRS DURATION: 86 MS
OHS QTC CALCULATION: 418 MS

## 2025-02-03 PROCEDURE — 99999 PR PBB SHADOW E&M-EST. PATIENT-LVL V: CPT | Mod: PBBFAC,,, | Performed by: NURSE PRACTITIONER

## 2025-02-03 PROCEDURE — 3288F FALL RISK ASSESSMENT DOCD: CPT | Mod: CPTII,S$GLB,, | Performed by: NURSE PRACTITIONER

## 2025-02-03 PROCEDURE — 1160F RVW MEDS BY RX/DR IN RCRD: CPT | Mod: CPTII,S$GLB,, | Performed by: NURSE PRACTITIONER

## 2025-02-03 PROCEDURE — 1157F ADVNC CARE PLAN IN RCRD: CPT | Mod: CPTII,S$GLB,, | Performed by: NURSE PRACTITIONER

## 2025-02-03 PROCEDURE — 1159F MED LIST DOCD IN RCRD: CPT | Mod: CPTII,S$GLB,, | Performed by: NURSE PRACTITIONER

## 2025-02-03 PROCEDURE — 99214 OFFICE O/P EST MOD 30 MIN: CPT | Mod: S$GLB,,, | Performed by: NURSE PRACTITIONER

## 2025-02-03 PROCEDURE — 93010 ELECTROCARDIOGRAM REPORT: CPT | Mod: S$GLB,,, | Performed by: INTERNAL MEDICINE

## 2025-02-03 PROCEDURE — 3078F DIAST BP <80 MM HG: CPT | Mod: CPTII,S$GLB,, | Performed by: NURSE PRACTITIONER

## 2025-02-03 PROCEDURE — 93005 ELECTROCARDIOGRAM TRACING: CPT | Mod: S$GLB,,, | Performed by: INTERNAL MEDICINE

## 2025-02-03 PROCEDURE — 1100F PTFALLS ASSESS-DOCD GE2>/YR: CPT | Mod: CPTII,S$GLB,, | Performed by: NURSE PRACTITIONER

## 2025-02-03 PROCEDURE — 1126F AMNT PAIN NOTED NONE PRSNT: CPT | Mod: CPTII,S$GLB,, | Performed by: NURSE PRACTITIONER

## 2025-02-03 PROCEDURE — 3074F SYST BP LT 130 MM HG: CPT | Mod: CPTII,S$GLB,, | Performed by: NURSE PRACTITIONER

## 2025-02-04 ENCOUNTER — OFFICE VISIT (OUTPATIENT)
Dept: CARDIOLOGY | Facility: CLINIC | Age: 75
End: 2025-02-04
Payer: MEDICARE

## 2025-02-04 VITALS
DIASTOLIC BLOOD PRESSURE: 61 MMHG | BODY MASS INDEX: 19.88 KG/M2 | OXYGEN SATURATION: 100 % | WEIGHT: 131.19 LBS | SYSTOLIC BLOOD PRESSURE: 116 MMHG | HEIGHT: 68 IN | HEART RATE: 70 BPM

## 2025-02-04 DIAGNOSIS — I48.91 ATRIAL FIBRILLATION, UNSPECIFIED TYPE: ICD-10-CM

## 2025-02-04 PROCEDURE — 99214 OFFICE O/P EST MOD 30 MIN: CPT | Mod: GC,S$GLB,, | Performed by: STUDENT IN AN ORGANIZED HEALTH CARE EDUCATION/TRAINING PROGRAM

## 2025-02-04 PROCEDURE — 1157F ADVNC CARE PLAN IN RCRD: CPT | Mod: CPTII,GC,S$GLB, | Performed by: STUDENT IN AN ORGANIZED HEALTH CARE EDUCATION/TRAINING PROGRAM

## 2025-02-04 PROCEDURE — 3078F DIAST BP <80 MM HG: CPT | Mod: CPTII,GC,S$GLB, | Performed by: STUDENT IN AN ORGANIZED HEALTH CARE EDUCATION/TRAINING PROGRAM

## 2025-02-04 PROCEDURE — 99999 PR PBB SHADOW E&M-EST. PATIENT-LVL IV: CPT | Mod: PBBFAC,GC,, | Performed by: STUDENT IN AN ORGANIZED HEALTH CARE EDUCATION/TRAINING PROGRAM

## 2025-02-04 PROCEDURE — 1159F MED LIST DOCD IN RCRD: CPT | Mod: CPTII,GC,S$GLB, | Performed by: STUDENT IN AN ORGANIZED HEALTH CARE EDUCATION/TRAINING PROGRAM

## 2025-02-04 PROCEDURE — 3074F SYST BP LT 130 MM HG: CPT | Mod: CPTII,GC,S$GLB, | Performed by: STUDENT IN AN ORGANIZED HEALTH CARE EDUCATION/TRAINING PROGRAM

## 2025-02-04 PROCEDURE — 1100F PTFALLS ASSESS-DOCD GE2>/YR: CPT | Mod: CPTII,GC,S$GLB, | Performed by: STUDENT IN AN ORGANIZED HEALTH CARE EDUCATION/TRAINING PROGRAM

## 2025-02-04 PROCEDURE — 3288F FALL RISK ASSESSMENT DOCD: CPT | Mod: CPTII,GC,S$GLB, | Performed by: STUDENT IN AN ORGANIZED HEALTH CARE EDUCATION/TRAINING PROGRAM

## 2025-02-04 PROCEDURE — 1126F AMNT PAIN NOTED NONE PRSNT: CPT | Mod: CPTII,GC,S$GLB, | Performed by: STUDENT IN AN ORGANIZED HEALTH CARE EDUCATION/TRAINING PROGRAM

## 2025-02-04 NOTE — PROGRESS NOTES
Clinic Note  2/4/2025      Subjective:       Patient ID:  Dia is a 74 y.o. female being seen for an established visit.    Chief Complaint: No chief complaint on file.    HPI  Patient is a 74 y.o female with h.o recently diagnosed pAF, ASD ( s/p surgical repair in 1976), DVT during pregnancy, recent TKA (11/11/24) who presents to the clinic to establish care with general cardiologist.  She states that unfortunately after her TKA, She fell, resulting in wound dehiscence. Placed on ABX. While on abx she was ( initially bactrim and later cefadroxil) she started having SOB and fatigue. These sxs prompted her hospital visit where she was found to be in Atrial fibrillation. Underwent SOL/DCCV on 12/23/24. Discharged home with eliquis 5 mg bid and Toprol 25 mg qd.  Has been compliant with medications.  She is very physically active and exercises regularly. Has been completely asx since discharge from the hospital.    Was seen by Nurse Peterson with EP a day ago who ordered an event monitor.    Review of Systems   Constitutional:  Negative for chills, fever and weight loss.   HENT: Negative.     Eyes:  Negative for blurred vision.   Respiratory:  Negative for cough and shortness of breath.    Cardiovascular:  Negative for chest pain and palpitations.   Gastrointestinal:  Negative for abdominal pain, blood in stool, constipation, diarrhea, melena, nausea and vomiting.   Musculoskeletal:  Negative for myalgias.   Skin: Negative.    Neurological:  Negative for dizziness, loss of consciousness and weakness.       Past Medical History:   Diagnosis Date    Anemia 12/23/2024    Atrial fibrillation 12/22/2024    Breast cyst     Cancer     SCC x 2 on arms    Cataract     Closed Ortiz's fracture of right radius 09/27/2017    Decreased ROM of right shoulder 03/20/2018    Decreased strength of upper extremity 03/20/2018    Deep vein thrombosis     GERD (gastroesophageal reflux disease)     History of cardiovascular stress test  10/29/2024    Joint pain     Lesion of left upper eyelid 05/02/2023    Nuclear sclerosis, bilateral 05/17/2021    OA (osteoarthritis) of shoulder 03/13/2019    OAB (overactive bladder) 03/08/2017    Osteoporosis 03/08/2017    Other chest pain 05/13/2022    Squamous cell carcinoma of skin 2012    right arm       Family History   Problem Relation Name Age of Onset    Hypertension Mother      Asthma Mother      Osteoporosis Mother      Hypertension Father      Cancer Father          lung cancer    Heart disease Maternal Grandfather      Stroke Maternal Grandfather      Breast cancer Neg Hx      Colon cancer Neg Hx      Ovarian cancer Neg Hx          reports that she has never smoked. She has never been exposed to tobacco smoke. She has never used smokeless tobacco. She reports current alcohol use of about 4.0 standard drinks of alcohol per week. She reports current drug use. Drug: Marijuana.    Medication List with Changes/Refills   Current Medications    ACETAMINOPHEN (TYLENOL) 650 MG TBSR    Take 1 tablet (650 mg total) by mouth every 8 (eight) hours.    APIXABAN (ELIQUIS) 5 MG TAB    Take 1 tablet (5 mg total) by mouth 2 (two) times daily.    BIOTIN 1 MG TABLET    Take 5,000 mcg by mouth once daily.    CALCIUM CARBONATE (CALCIUM 600 ORAL)    Take 1,200 mg by mouth once daily.     DULOXETINE 40 MG CPDR    Take 40 mg by mouth once daily.    METOPROLOL SUCCINATE (TOPROL-XL) 25 MG 24 HR TABLET    Take 1 tablet (25 mg total) by mouth once daily.    MULTIVITAMIN (THERAGRAN) PER TABLET    Take 1 tablet by mouth once daily.    OXYBUTYNIN (DITROPAN-XL) 10 MG 24 HR TABLET    TAKE 1 TABLET EVERY DAY    RALOXIFENE (EVISTA) 60 MG TABLET    Take 1 tablet (60 mg total) by mouth once daily.    UNKNOWN TO PATIENT    Nutrafol- hair growth supplement     Review of patient's allergies indicates:   Allergen Reactions    Codeine Nausea Only    Sulfa (sulfonamide antibiotics) Nausea And Vomiting       Patient Active Problem List  "  Diagnosis    History of squamous cell carcinoma    H/O atrial septal defect repair    Osteoporosis    OAB (overactive bladder)    Range of motion deficit    Decreased  strength of right hand    OA (osteoarthritis) of shoulder    Osteoarthritis of right knee    Senile purpura    Bilateral hand pain    History of cardiovascular stress test    Right leg weakness    Atrial fibrillation    Acute cystitis    Transaminitis    Elevated d-dimer    Hx of total knee arthroplasty    Anemia    Hyponatremia    On anticoagulant therapy           Objective:      /61   Pulse 70   Ht 5' 8" (1.727 m)   Wt 59.5 kg (131 lb 2.8 oz)   LMP  (LMP Unknown)   SpO2 100%   BMI 19.94 kg/m²   Estimated body mass index is 19.94 kg/m² as calculated from the following:    Height as of this encounter: 5' 8" (1.727 m).    Weight as of this encounter: 59.5 kg (131 lb 2.8 oz).  Physical Exam  Constitutional:       General: She is not in acute distress.     Appearance: She is not diaphoretic.   HENT:      Head: Normocephalic and atraumatic.      Mouth/Throat:      Pharynx: No oropharyngeal exudate.   Eyes:      General: No scleral icterus.     Conjunctiva/sclera: Conjunctivae normal.      Pupils: Pupils are equal, round, and reactive to light.   Neck:      Vascular: No JVD.      Trachea: No tracheal deviation.   Cardiovascular:      Rate and Rhythm: Normal rate and regular rhythm.      Heart sounds: No murmur heard.     No friction rub. No gallop.   Pulmonary:      Effort: Pulmonary effort is normal. No respiratory distress.      Breath sounds: Normal breath sounds. No wheezing.   Chest:      Chest wall: No tenderness.   Abdominal:      General: Bowel sounds are normal. There is no distension.      Tenderness: There is no abdominal tenderness. There is no rebound.   Musculoskeletal:         General: No deformity. Normal range of motion.      Cervical back: Normal range of motion and neck supple.   Lymphadenopathy:      Cervical: No " cervical adenopathy.   Skin:     General: Skin is warm and dry.   Neurological:      Mental Status: She is alert and oriented to person, place, and time.      Cranial Nerves: No cranial nerve deficit.   Psychiatric:         Mood and Affect: Affect normal.           Assessment and Plan:         Diagnoses and all orders for this visit:    Atrial fibrillation, unspecified type  [id number]  On eliquis for cva ppx  Toprol 25 mg qd for rate control  EF preserved    Agree with Eps plan on event monitor to evaluate the burden of AF    ASD  S/p repair in 1976      Troy Ramires MD  Cardiovascular medicine; PGY6  [facility]  [address] [place] [address]

## 2025-02-05 ENCOUNTER — CLINICAL SUPPORT (OUTPATIENT)
Dept: CARDIOLOGY | Facility: HOSPITAL | Age: 75
End: 2025-02-05
Attending: NURSE PRACTITIONER
Payer: MEDICARE

## 2025-02-05 ENCOUNTER — CLINICAL SUPPORT (OUTPATIENT)
Dept: REHABILITATION | Facility: OTHER | Age: 75
End: 2025-02-05
Payer: MEDICARE

## 2025-02-05 DIAGNOSIS — I48.19 PERSISTENT ATRIAL FIBRILLATION: ICD-10-CM

## 2025-02-05 DIAGNOSIS — Z96.651 S/P TOTAL KNEE REPLACEMENT, RIGHT: ICD-10-CM

## 2025-02-05 PROCEDURE — 97112 NEUROMUSCULAR REEDUCATION: CPT | Mod: PN

## 2025-02-05 PROCEDURE — 93270 REMOTE 30 DAY ECG REV/REPORT: CPT

## 2025-02-05 PROCEDURE — 97530 THERAPEUTIC ACTIVITIES: CPT | Mod: PN

## 2025-02-05 NOTE — PROGRESS NOTES
"[facility]   Physical Therapy Treatment Note     Name: Dia Ovalles  Clinic Number: [id number]    Therapy Diagnosis:   Encounter Diagnosis   Name Primary?    S/P total knee replacement, right      Physician: Keagan Oneal MD    Visit Date: 2/5/2025       Physician Orders: Eval and Treat  Medical Diagnosis: S/P right TKA     Visit # / Visits Authorized:  1 / 1   Date of Evaluation:  1/30/2025   Insurance Authorization Period: 1/31/25 to 1/31/26  Plan of Care Certification:  1/30/2025 to 4/30/25                 Time In:  1230pm  Time Out:  0132pm  Total Time:   62 minutes  Total Billable Time: 62 minutes    SUBJECTIVE     Pt reports: that she is doing well today. Pt states that she continues to walk 3 miles per day.  She was compliant with home exercise program.  Response to previous treatment: decreased knee pain  Functional change: improved tolerance to walking    Pain: 1/10  Location: right knee      OBJECTIVE     Objective Measures updated at progress report unless specified.       TREATMENT     Total Treatment time (time-based codes) separate from Evaluation: 60 minutes     Dia received the treatments listed below:      manual therapy techniques: Joint mobilizations and Myofacial release were applied to the: right knee for 2 minutes, including:     Patellar mobilization  Infrapatellar fat pad mobilization  Tibiofemoral JM VIOLET sanchez, Gr II/III  Knee extension mobilization grade IV     neuromuscular re-education activities to improve: Kinesthetic, Sense, and Proprioception for 15 minutes. The following activities were included:     Knee extension props 5# x3mins  Bike full revolutions x 10 mins - seat #8  QS 30x5" hold   SLR x20        therapeutic activities to improve functional performance for 45 minutes, including:     R Step ups 6" with slow eccentric step down 30x R/L  Sit to stand 2x10   Shuttle press 50# 3x10  R SL shuttle press 25#  Stair training reciprocal pattern " 10x  Lateral step down 3x10  Matrix LAQ vs 15# 3x10  Matrix hamstring curls vs 25# 3x10  +Hesitation marches on turf x2 laps  +marches through hurdles x4 laps on turf fwd/lateral each      PATIENT EDUCATION AND HOME EXERCISES     Home Exercises Provided and Patient Education Provided     Education provided:   PT educated pt on importance of compliance with their HEP this visit.     Written Home Exercises Provided: Patient instructed to cont prior HEP. Exercises were reviewed and Dia was able to demonstrate them prior to the end of the session.  Dia demonstrated good  understanding of the education provided. See EMR under Patient Instructions for exercises provided during therapy sessions    ASSESSMENT   Pt tolerated tx session well today and completed all therapeutic exercises with minimal/no increase in knee pain. Progressed hip and knee strengthening exercises this visit in closed chain today. Pt does perform lateral whipping motion with right foot during alanna exercise, demonstrating need for improved right knee flexion AROM. Pt demonstrates improvement in knee extension AROM following manual therapy intervention provided by PT today. Continue PT POC.    Dia Is progressing well towards her goals.   Pt prognosis is Good.     Pt will continue to benefit from skilled outpatient physical therapy to address the deficits listed in the problem list box on initial evaluation, provide pt/family education and to maximize pt's level of independence in the home and community environment.     Pt's spiritual, cultural and educational needs considered and pt agreeable to plan of care and goals.     Anticipated barriers to physical therapy: None    Goals: GOALS:  Short Term Goals (4 Weeks):  1. Patient will be compliant with home exercise program to supplement therapy in restoring pain free function. (Progressing, not met)    2. Patient will improve impaired lower extremity manual muscle tests to >/= 4/5 to improve  dynamic hip/knee support for functional tasks. (progressing, not met)    3. Pt will tolerate standing/walking for 30 minutes with no increase in knee pain to return to PLOF. (progressing, not met)    4. Pt will improve knee pain to 4/10 at worst for improved QOL. (progressing, not met)          Long Term Goals (8 Weeks):  1. Patient will improve FOTO score to </= 41% limited to decrease perceived limitation with mobility. (progressing, not met)    2. Patient will improve impaired lower extremity manual muscle tests to >/= 4+/5 to improve dynamic hip/knee support for functional tasks. (progressing, not met)    3. Patient will tolerate walking for 60 minutes with no increase in knee pain to return to PLOF. (progressing, not met)          PLAN      Plan of care Certification: 1/30/25 to 4/30/25     Focus on ROM and LE strength with emphasis on ADL performance     Outpatient Physical Therapy 2 times weekly for 6 weeks to include the following interventions: Therapeutic Exercises, Manual Therapeutic Technique, Neuromuscular Re Education, Therapeutic Activities. Modalities, Kinesiotape prn, and Functional Dry Needling as needed.            Nilda Zurita, PT

## 2025-02-06 PROBLEM — M79.605 PAIN IN BOTH LOWER EXTREMITIES: Status: ACTIVE | Noted: 2025-02-06

## 2025-02-06 PROBLEM — M25.562 CHRONIC PAIN OF BOTH KNEES: Status: ACTIVE | Noted: 2025-02-06

## 2025-02-06 PROBLEM — M79.604 PAIN IN BOTH LOWER EXTREMITIES: Status: ACTIVE | Noted: 2025-02-06

## 2025-02-06 PROBLEM — M25.561 CHRONIC PAIN OF BOTH KNEES: Status: ACTIVE | Noted: 2025-02-06

## 2025-02-06 PROBLEM — G89.29 CHRONIC PAIN OF BOTH KNEES: Status: ACTIVE | Noted: 2025-02-06

## 2025-02-06 NOTE — PROGRESS NOTES
"[facility]   Physical Therapy Treatment Note     Name: Dia Ovalles  Clinic Number: [id number]    Therapy Diagnosis:   Encounter Diagnoses   Name Primary?    Bilateral hand pain Yes    Chronic pain of both knees     Pain in both lower extremities      Physician: Keagan Oneal MD    Visit Date: 2/7/2025       Physician Orders: Eval and Treat  Medical Diagnosis: S/P right TKA     Visit # / Visits Authorized:  1 / 20  Date of Evaluation:  1/30/2025   Insurance Authorization Period: 1/31/25 to 1/31/26  Plan of Care Certification:  1/30/2025 to 4/30/25                 Time In: 1030am  Time Out:  1130am  Total Time:   62 minutes  Total Billable Time: 62 minutes    SUBJECTIVE     Pt reports: that she is doing well today. Pt states she still has mild medial R knee pain.   She was compliant with home exercise program.  Response to previous treatment: decreased knee pain  Functional change: improved tolerance to walking    Pain: 1/10  Location: right knee      OBJECTIVE     Objective Measures updated at progress report unless specified.       TREATMENT     Total Treatment time (time-based codes) separate from Evaluation: 60 minutes     Dia received the treatments listed below:      manual therapy techniques: Joint mobilizations and Myofacial release were applied to the: right knee for 2 minutes, including:     Patellar mobilization  Infrapatellar fat pad mobilization  Tibiofemoral CARLOS sanchez, Gr II/III  Knee extension mobilization grade IV     neuromuscular re-education activities to improve: Kinesthetic, Sense, and Proprioception for 15 minutes. The following activities were included:     Knee extension props 5# x3mins  Bike full revolutions x 10 mins - seat #8  QS 30x5" hold   SLR x20        therapeutic activities to improve functional performance for 45 minutes, including:     R Step ups 6" with slow eccentric step down 30x R/L  Sit to stand 2x10 with pilates ring  Shuttle press 75# " 3x10  R SL shuttle press 25#  Lateral step down 3x10  Matrix LAQ vs 15# 3x10  Matrix hamstring curls vs 25# 3x10  Hesitation marches on turf x2 laps  marches through hurdles x4 laps on turf fwd/lateral each  +SL balance with 5# DB pass 30x on R       PATIENT EDUCATION AND HOME EXERCISES     Home Exercises Provided and Patient Education Provided     Education provided:   PT educated pt on importance of compliance with their HEP this visit.     Written Home Exercises Provided: Patient instructed to cont prior HEP. Exercises were reviewed and Dia was able to demonstrate them prior to the end of the session.  Dia demonstrated good  understanding of the education provided. See EMR under Patient Instructions for exercises provided during therapy sessions    ASSESSMENT   Pt tolerated treatment session well today. Pt tolerated additional weight with shuttle squats today. Continue PT POC.       Dia Is progressing well towards her goals.   Pt prognosis is Good.     Pt will continue to benefit from skilled outpatient physical therapy to address the deficits listed in the problem list box on initial evaluation, provide pt/family education and to maximize pt's level of independence in the home and community environment.     Pt's spiritual, cultural and educational needs considered and pt agreeable to plan of care and goals.     Anticipated barriers to physical therapy: None    Goals: GOALS:  Short Term Goals (4 Weeks):  1. Patient will be compliant with home exercise program to supplement therapy in restoring pain free function. (Progressing, not met)    2. Patient will improve impaired lower extremity manual muscle tests to >/= 4/5 to improve dynamic hip/knee support for functional tasks. (progressing, not met)    3. Pt will tolerate standing/walking for 30 minutes with no increase in knee pain to return to PLOF. (progressing, not met)    4. Pt will improve knee pain to 4/10 at worst for improved QOL. (progressing, not  met)          Long Term Goals (8 Weeks):  1. Patient will improve FOTO score to </= 41% limited to decrease perceived limitation with mobility. (progressing, not met)    2. Patient will improve impaired lower extremity manual muscle tests to >/= 4+/5 to improve dynamic hip/knee support for functional tasks. (progressing, not met)    3. Patient will tolerate walking for 60 minutes with no increase in knee pain to return to PLOF. (progressing, not met)          PLAN      Plan of care Certification: 1/30/25 to 4/30/25     Focus on ROM and LE strength with emphasis on ADL performance     Outpatient Physical Therapy 2 times weekly for 6 weeks to include the following interventions: Therapeutic Exercises, Manual Therapeutic Technique, Neuromuscular Re Education, Therapeutic Activities. Modalities, Kinesiotape prn, and Functional Dry Needling as needed.            Nilda Zurita, PT

## 2025-02-07 ENCOUNTER — CLINICAL SUPPORT (OUTPATIENT)
Dept: REHABILITATION | Facility: OTHER | Age: 75
End: 2025-02-07
Payer: MEDICARE

## 2025-02-07 DIAGNOSIS — M79.642 BILATERAL HAND PAIN: Primary | ICD-10-CM

## 2025-02-07 DIAGNOSIS — G89.29 CHRONIC PAIN OF BOTH KNEES: ICD-10-CM

## 2025-02-07 DIAGNOSIS — M25.561 CHRONIC PAIN OF BOTH KNEES: ICD-10-CM

## 2025-02-07 DIAGNOSIS — M79.604 PAIN IN BOTH LOWER EXTREMITIES: ICD-10-CM

## 2025-02-07 DIAGNOSIS — M79.641 BILATERAL HAND PAIN: Primary | ICD-10-CM

## 2025-02-07 DIAGNOSIS — M79.605 PAIN IN BOTH LOWER EXTREMITIES: ICD-10-CM

## 2025-02-07 DIAGNOSIS — M25.562 CHRONIC PAIN OF BOTH KNEES: ICD-10-CM

## 2025-02-07 PROCEDURE — 97112 NEUROMUSCULAR REEDUCATION: CPT | Mod: PN

## 2025-02-07 PROCEDURE — 97530 THERAPEUTIC ACTIVITIES: CPT | Mod: PN

## 2025-02-11 ENCOUNTER — CLINICAL SUPPORT (OUTPATIENT)
Dept: REHABILITATION | Facility: OTHER | Age: 75
End: 2025-02-11
Payer: MEDICARE

## 2025-02-11 DIAGNOSIS — M25.561 CHRONIC PAIN OF BOTH KNEES: Primary | ICD-10-CM

## 2025-02-11 DIAGNOSIS — G89.29 CHRONIC PAIN OF BOTH KNEES: Primary | ICD-10-CM

## 2025-02-11 DIAGNOSIS — M25.562 CHRONIC PAIN OF BOTH KNEES: Primary | ICD-10-CM

## 2025-02-11 DIAGNOSIS — M81.0 OSTEOPOROSIS, UNSPECIFIED OSTEOPOROSIS TYPE, UNSPECIFIED PATHOLOGICAL FRACTURE PRESENCE: ICD-10-CM

## 2025-02-11 PROCEDURE — 97530 THERAPEUTIC ACTIVITIES: CPT | Mod: PN

## 2025-02-11 RX ORDER — RALOXIFENE HYDROCHLORIDE 60 MG/1
60 TABLET, FILM COATED ORAL DAILY
Qty: 90 TABLET | Refills: 3 | Status: SHIPPED | OUTPATIENT
Start: 2025-02-11

## 2025-02-11 NOTE — PROGRESS NOTES
"[facility]   Physical Therapy Treatment Note     Name: Dia Ovalles  Clinic Number: [id number]    Therapy Diagnosis:   Encounter Diagnosis   Name Primary?    Chronic pain of both knees Yes     Physician: Keagan Oneal MD    Visit Date: 2/11/2025       Physician Orders: Eval and Treat  Medical Diagnosis: S/P right TKA     Visit # / Visits Authorized:  1 / 20  Date of Evaluation:  1/30/2025   Insurance Authorization Period: 1/31/25 to 1/31/26  Plan of Care Certification:  1/30/2025 to 4/30/25                 Time In: 1030am  Time Out:  1130am  Total Time:   62 minutes  Total Billable Time: 45 minutes    SUBJECTIVE     Pt reports: that she is doing well today. Pt was sore after driving to [place], but was not sore from her last treatment session.   She was compliant with home exercise program.  Response to previous treatment: decreased knee pain  Functional change: improved tolerance to walking    Pain: 1/10  Location: right knee      OBJECTIVE     Objective Measures updated at progress report unless specified.       TREATMENT     Total Treatment time (time-based codes) separate from Evaluation: 60 minutes     Dia received the treatments listed below:      manual therapy techniques: Joint mobilizations and Myofacial release were applied to the: right knee for 2 minutes, including:     Patellar mobilization  Infrapatellar fat pad mobilization  Tibiofemoral CARLOS sanchez, [place] II/III  Knee extension mobilization grade IV     neuromuscular re-education activities to improve: Kinesthetic, Sense, and Proprioception for 15 minutes. The following activities were included:  Stationary bike x8 minutes  Knee extension props 5# x3mins  QS 30x5" hold   SLR x20        therapeutic activities to improve functional performance for 45 minutes, including:     R Step ups 6" with slow eccentric step down 30x R/L  Sit to stand 2x10 with pilates ring  Shuttle press 75# 3x10  R SL shuttle press 25#  Lateral " step down 3x10  Matrix LAQ vs 15# 3x10  Matrix hamstring curls vs 25# 3x10  Hesitation marches on turf x2 laps  marches through hurdles x4 laps on turf fwd/lateral each        PATIENT EDUCATION AND HOME EXERCISES     Home Exercises Provided and Patient Education Provided     Education provided:   PT educated pt on importance of compliance with their HEP this visit.     Written Home Exercises Provided: Patient instructed to cont prior HEP. Exercises were reviewed and Dia was able to demonstrate them prior to the end of the session.  Dia demonstrated good  understanding of the education provided. See EMR under Patient Instructions for exercises provided during therapy sessions    ASSESSMENT   Pt tolerated treatment session well today. Progressed alanna exercises with improved foot clearance and no increase in knee pain. Continue PT POC.      Dia Is progressing well towards her goals.   Pt prognosis is Good.     Pt will continue to benefit from skilled outpatient physical therapy to address the deficits listed in the problem list box on initial evaluation, provide pt/family education and to maximize pt's level of independence in the home and community environment.     Pt's spiritual, cultural and educational needs considered and pt agreeable to plan of care and goals.     Anticipated barriers to physical therapy: None    Goals: GOALS:  Short Term Goals (4 Weeks):  1. Patient will be compliant with home exercise program to supplement therapy in restoring pain free function. (Progressing, not met)    2. Patient will improve impaired lower extremity manual muscle tests to >/= 4/5 to improve dynamic hip/knee support for functional tasks. (progressing, not met)    3. Pt will tolerate standing/walking for 30 minutes with no increase in knee pain to return to PLOF. (progressing, not met)    4. Pt will improve knee pain to 4/10 at worst for improved QOL. (progressing, not met)          Long Term Goals (8 Weeks):  1.  Patient will improve FOTO score to </= 41% limited to decrease perceived limitation with mobility. (progressing, not met)    2. Patient will improve impaired lower extremity manual muscle tests to >/= 4+/5 to improve dynamic hip/knee support for functional tasks. (progressing, not met)    3. Patient will tolerate walking for 60 minutes with no increase in knee pain to return to PLOF. (progressing, not met)          PLAN      Plan of care Certification: 1/30/25 to 4/30/25     Focus on ROM and LE strength with emphasis on ADL performance     Outpatient Physical Therapy 2 times weekly for 6 weeks to include the following interventions: Therapeutic Exercises, Manual Therapeutic Technique, Neuromuscular Re Education, Therapeutic Activities. Modalities, Kinesiotape prn, and Functional Dry Needling as needed.            Nilda Zurita, PT

## 2025-02-14 ENCOUNTER — CLINICAL SUPPORT (OUTPATIENT)
Dept: REHABILITATION | Facility: OTHER | Age: 75
End: 2025-02-14
Payer: MEDICARE

## 2025-02-14 DIAGNOSIS — M25.561 CHRONIC PAIN OF BOTH KNEES: Primary | ICD-10-CM

## 2025-02-14 DIAGNOSIS — M25.562 CHRONIC PAIN OF BOTH KNEES: Primary | ICD-10-CM

## 2025-02-14 DIAGNOSIS — G89.29 CHRONIC PAIN OF BOTH KNEES: Primary | ICD-10-CM

## 2025-02-14 PROCEDURE — 97530 THERAPEUTIC ACTIVITIES: CPT | Mod: PN

## 2025-02-14 PROCEDURE — 97112 NEUROMUSCULAR REEDUCATION: CPT | Mod: PN

## 2025-02-14 NOTE — PROGRESS NOTES
"[facility]   Physical Therapy Treatment Note     Name: Dia Ovalles  Clinic Number: [id number]    Therapy Diagnosis:   Encounter Diagnosis   Name Primary?    Chronic pain of both knees Yes       Physician: Keagan Oneal MD    Visit Date: 2/14/2025       Physician Orders: Eval and Treat  Medical Diagnosis: S/P right TKA     Visit # / Visits Authorized:  1 / 20  Date of Evaluation:  1/30/2025   Insurance Authorization Period: 1/31/25 to 1/31/26  Plan of Care Certification:  1/30/2025 to 4/30/25                 Time In: 1130am  Time Out:  1230pm  Total Time:   60 minutes  Total Billable Time: 60 minutes    SUBJECTIVE     Pt reports: that she is doing well today. Pt states that her knee is feeling much stronger. She did not have pain after her last session.      She was compliant with home exercise program.  Response to previous treatment: decreased knee pain  Functional change: improved tolerance to walking    Pain: 1/10  Location: right knee      OBJECTIVE     Objective Measures updated at progress report unless specified.       TREATMENT     Total Treatment time (time-based codes) separate from Evaluation: 60 minutes     Dia received the treatments listed below:      manual therapy techniques: Joint mobilizations and Myofacial release were applied to the: right knee for 0 minutes, including:     Patellar mobilization  Infrapatellar fat pad mobilization  Tibiofemoral CARLOS sanchez, Gr II/III  Knee extension mobilization grade IV     neuromuscular re-education activities to improve: Kinesthetic, Sense, and Proprioception for 15 minutes. The following activities were included:  Stationary bike x8 minutes  Knee extension props 5# x3mins  QS 30x5" hold   SLR x20        therapeutic activities to improve functional performance for 45 minutes, including:     R Step ups 6" with slow eccentric step down 30x R/L  Sit to stand 2x10 with pilates ring  Shuttle press 75# 3x10  R SL shuttle press " 25#  Lateral step down 3x10  Matrix LAQ vs 15# 3x10  Matrix hamstring curls vs 25# 3x10  Hesitation marches on turf x2 laps  marches through hurdles x4 laps on turf fwd/lateral each        PATIENT EDUCATION AND HOME EXERCISES     Home Exercises Provided and Patient Education Provided     Education provided:   PT educated pt on importance of compliance with their HEP this visit.     Written Home Exercises Provided: Patient instructed to cont prior HEP. Exercises were reviewed and Dia was able to demonstrate them prior to the end of the session.  Dia demonstrated good  understanding of the education provided. See EMR under Patient Instructions for exercises provided during therapy sessions    ASSESSMENT   Pt tolerated treatment session well today. Pt continues to progress in her single leg balance and has less LOB during alanna exercises. Continue PT POC.       Dia Is progressing well towards her goals.   Pt prognosis is Good.     Pt will continue to benefit from skilled outpatient physical therapy to address the deficits listed in the problem list box on initial evaluation, provide pt/family education and to maximize pt's level of independence in the home and community environment.     Pt's spiritual, cultural and educational needs considered and pt agreeable to plan of care and goals.     Anticipated barriers to physical therapy: None    Goals: GOALS:  Short Term Goals (4 Weeks):  1. Patient will be compliant with home exercise program to supplement therapy in restoring pain free function. (Progressing, not met)    2. Patient will improve impaired lower extremity manual muscle tests to >/= 4/5 to improve dynamic hip/knee support for functional tasks. (progressing, not met)    3. Pt will tolerate standing/walking for 30 minutes with no increase in knee pain to return to PLOF. (progressing, not met)    4. Pt will improve knee pain to 4/10 at worst for improved QOL. (progressing, not met)          Long Term  Goals (8 Weeks):  1. Patient will improve FOTO score to </= 41% limited to decrease perceived limitation with mobility. (progressing, not met)    2. Patient will improve impaired lower extremity manual muscle tests to >/= 4+/5 to improve dynamic hip/knee support for functional tasks. (progressing, not met)    3. Patient will tolerate walking for 60 minutes with no increase in knee pain to return to PLOF. (progressing, not met)          PLAN      Plan of care Certification: 1/30/25 to 4/30/25     Focus on ROM and LE strength with emphasis on ADL performance     Outpatient Physical Therapy 2 times weekly for 6 weeks to include the following interventions: Therapeutic Exercises, Manual Therapeutic Technique, Neuromuscular Re Education, Therapeutic Activities. Modalities, Kinesiotape prn, and Functional Dry Needling as needed.            Nilda Zurita, PT

## 2025-02-17 ENCOUNTER — OFFICE VISIT (OUTPATIENT)
Dept: PSYCHIATRY | Facility: CLINIC | Age: 75
End: 2025-02-17
Payer: MEDICARE

## 2025-02-17 ENCOUNTER — PATIENT MESSAGE (OUTPATIENT)
Dept: CARDIOLOGY | Facility: CLINIC | Age: 75
End: 2025-02-17
Payer: MEDICARE

## 2025-02-17 DIAGNOSIS — F43.9 SITUATIONAL STRESS: ICD-10-CM
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
## 2025-02-18 ENCOUNTER — CLINICAL SUPPORT (OUTPATIENT)
Dept: REHABILITATION | Facility: OTHER | Age: 75
End: 2025-02-18
Payer: MEDICARE

## 2025-02-18 DIAGNOSIS — M25.561 CHRONIC PAIN OF BOTH KNEES: Primary | ICD-10-CM

## 2025-02-18 DIAGNOSIS — G89.29 CHRONIC PAIN OF BOTH KNEES: Primary | ICD-10-CM

## 2025-02-18 DIAGNOSIS — M25.562 CHRONIC PAIN OF BOTH KNEES: Primary | ICD-10-CM

## 2025-02-18 PROCEDURE — 97112 NEUROMUSCULAR REEDUCATION: CPT | Mod: PN,CQ

## 2025-02-18 PROCEDURE — 97530 THERAPEUTIC ACTIVITIES: CPT | Mod: PN,CQ

## 2025-02-18 NOTE — PROGRESS NOTES
"[facility]   Physical Therapy Treatment Note     Name: Dia Ovalles  Clinic Number: [id number]    Therapy Diagnosis:   Encounter Diagnosis   Name Primary?    Chronic pain of both knees Yes         Physician: Keagan Oneal MD    Visit Date: 2/18/2025       Physician Orders: Eval and Treat  Medical Diagnosis: S/P right TKA     Visit # / Visits Authorized:  1 / 20  Date of Evaluation:  1/30/2025   Insurance Authorization Period: 1/31/25 to 1/31/26  Plan of Care Certification:  1/30/2025 to 4/30/25                 Time In: 1000  Time Out:  1100  Total Time: 60 minutes  Total Billable Time: 60 minutes    SUBJECTIVE     Pt reports: that she is able to walk longer with less pain. Going down stairs are still a little troublesome.      She was compliant with home exercise program.  Response to previous treatment: decreased knee pain  Functional change: improved tolerance to walking    Pain: 1/10  Location: right knee      OBJECTIVE     Objective Measures updated at progress report unless specified.       TREATMENT     Dia received the treatments listed below:      manual therapy techniques: Joint mobilizations and Myofacial release were applied to the: right knee for 0 minutes, including:     Patellar mobilization  Infrapatellar fat pad mobilization  Tibiofemoral JM AP glides, Gr II/III  Knee extension mobilization grade IV     neuromuscular re-education activities to improve: Kinesthetic, Sense, and Proprioception for 15 minutes. The following activities were included:  Stationary bike x8 minutes L4  Knee extension props 5# x3mins  QS 30x5" hold   SLR x20        therapeutic activities to improve functional performance for 45 minutes, including:     R Step ups 8" with slow eccentric step down 30x R/L  Sit to stand from 18" box + 10# KB 3x10 - staggered stance  Shuttle press 75# 3x10  R SL shuttle press 25#  Lateral step down 3x10  Matrix LAQ vs 15# 3x10  Matrix hamstring curls vs 25# " 3x10  Hesitation marches on turf x2 laps + 10# KB  marches through hurdles x4 laps on turf fwd/lateral each + 10# KB  +R SLS cone taps on airex 30x  +Balance beam heel/toe walking with 10# KB 6 laps        PATIENT EDUCATION AND HOME EXERCISES     Home Exercises Provided and Patient Education Provided     Education provided:   PT educated pt on importance of compliance with their HEP this visit.     Written Home Exercises Provided: Patient instructed to cont prior HEP. Exercises were reviewed and Dia was able to demonstrate them prior to the end of the session.  Dia demonstrated good  understanding of the education provided. See EMR under Patient Instructions for exercises provided during therapy sessions    ASSESSMENT   Dia was progress with increased resistance balance /quad and glute exercise. She appears to be progressing well within her POC. ROM with knee flexion is progressing well.       Dia Is progressing well towards her goals.   Pt prognosis is Good.     Pt will continue to benefit from skilled outpatient physical therapy to address the deficits listed in the problem list box on initial evaluation, provide pt/family education and to maximize pt's level of independence in the home and community environment.     Pt's spiritual, cultural and educational needs considered and pt agreeable to plan of care and goals.     Anticipated barriers to physical therapy: None    Goals: GOALS:  Short Term Goals (4 Weeks):  1. Patient will be compliant with home exercise program to supplement therapy in restoring pain free function. (Progressing, not met)    2. Patient will improve impaired lower extremity manual muscle tests to >/= 4/5 to improve dynamic hip/knee support for functional tasks. (progressing, not met)    3. Pt will tolerate standing/walking for 30 minutes with no increase in knee pain to return to PLOF. (progressing, not met)    4. Pt will improve knee pain to 4/10 at worst for improved QOL.  (progressing, not met)          Long Term Goals (8 Weeks):  1. Patient will improve FOTO score to </= 41% limited to decrease perceived limitation with mobility. (progressing, not met)    2. Patient will improve impaired lower extremity manual muscle tests to >/= 4+/5 to improve dynamic hip/knee support for functional tasks. (progressing, not met)    3. Patient will tolerate walking for 60 minutes with no increase in knee pain to return to PLOF. (progressing, not met)          PLAN      Plan of care Certification: 1/30/25 to 4/30/25     Focus on ROM and LE strength with emphasis on ADL performance     Outpatient Physical Therapy 2 times weekly for 6 weeks to include the following interventions: Therapeutic Exercises, Manual Therapeutic Technique, Neuromuscular Re Education, Therapeutic Activities. Modalities, Kinesiotape prn, and Functional Dry Needling as needed.            Tobias Foster, PTA

## 2025-02-24 ENCOUNTER — RESULTS FOLLOW-UP (OUTPATIENT)
Dept: PRIMARY CARE CLINIC | Facility: CLINIC | Age: 75
End: 2025-02-24

## 2025-02-24 ENCOUNTER — CLINICAL SUPPORT (OUTPATIENT)
Dept: REHABILITATION | Facility: OTHER | Age: 75
End: 2025-02-24
Payer: MEDICARE

## 2025-02-24 DIAGNOSIS — M25.562 CHRONIC PAIN OF BOTH KNEES: Primary | ICD-10-CM

## 2025-02-24 DIAGNOSIS — M25.561 CHRONIC PAIN OF BOTH KNEES: Primary | ICD-10-CM

## 2025-02-24 DIAGNOSIS — G89.29 CHRONIC PAIN OF BOTH KNEES: Primary | ICD-10-CM

## 2025-02-24 PROCEDURE — 97530 THERAPEUTIC ACTIVITIES: CPT | Mod: PN,CQ

## 2025-02-24 PROCEDURE — 97112 NEUROMUSCULAR REEDUCATION: CPT | Mod: PN,CQ

## 2025-02-24 NOTE — PROGRESS NOTES
"[facility]   Physical Therapy Treatment Note     Name: Dia Ovalles  Clinic Number: [id number]    Therapy Diagnosis:   Encounter Diagnosis   Name Primary?    Chronic pain of both knees Yes         Physician: Keagan Oneal MD    Visit Date: 2/24/2025       Physician Orders: Eval and Treat  Medical Diagnosis: S/P right TKA     Visit # / Visits Authorized:  6 / 20  Date of Evaluation:  1/30/2025   Insurance Authorization Period: 1/31/25 to 1/31/26  Plan of Care Certification:  1/30/2025 to 4/30/25                 Time In: 0955  Time Out:  1100  Total Time: 30 minutes  Total Billable Time: 30 minutes    SUBJECTIVE     Pt reports: she is doing well today. She is walking better and no incidents since her last PT session.      She was compliant with home exercise program.  Response to previous treatment: decreased knee pain  Functional change: improved tolerance to walking    Pain: 1/10  Location: right knee      OBJECTIVE     Objective Measures updated at progress report unless specified.       TREATMENT     Dia received the treatments listed below:      manual therapy techniques: Joint mobilizations and Myofacial release were applied to the: right knee for 0 minutes, including:     Patellar mobilization  Infrapatellar fat pad mobilization  Tibiofemoral JM AP glides, Gr II/III  Knee extension mobilization grade IV     neuromuscular re-education activities to improve: Kinesthetic, Sense, and Proprioception for 10 minutes. The following activities were included:  Stationary bike x8 minutes L4  Knee extension props 5# x3mins  QS 30x5" hold   SLR x20        therapeutic activities to improve functional performance for 20 minutes, including:     R Step ups 8" with slow eccentric step down 30x R/L  Sit to stand from 18" box + 10# KB 3x10 - staggered stance  Shuttle press 75# 3x10  R SL shuttle press 25#  Lateral step down 3x10  Matrix LAQ vs 15# 3x10  Matrix hamstring curls vs 25# " 3x10  Hesitation marches on turf x2 laps + 10# KB  marches through hurdles x4 laps on turf fwd/lateral each + 10# KB  R SLS cone taps on airex 30x  Balance beam heel/toe walking with 10# KB 6 laps        PATIENT EDUCATION AND HOME EXERCISES     Home Exercises Provided and Patient Education Provided     Education provided:   PT educated pt on importance of compliance with their HEP this visit.     Written Home Exercises Provided: Patient instructed to cont prior HEP. Exercises were reviewed and Dia was able to demonstrate them prior to the end of the session.  Dia demonstrated good  understanding of the education provided. See EMR under Patient Instructions for exercises provided during therapy sessions    ASSESSMENT   Dia was progress with increased resistance balance /quad and glute exercise. She appears to be progressing well within her POC. ROM with knee flexion is progressing well.       Dia Is progressing well towards her goals.   Pt prognosis is Good.     Pt will continue to benefit from skilled outpatient physical therapy to address the deficits listed in the problem list box on initial evaluation, provide pt/family education and to maximize pt's level of independence in the home and community environment.     Pt's spiritual, cultural and educational needs considered and pt agreeable to plan of care and goals.     Anticipated barriers to physical therapy: None    Goals: GOALS:  Short Term Goals (4 Weeks):  1. Patient will be compliant with home exercise program to supplement therapy in restoring pain free function. (Progressing, not met)    2. Patient will improve impaired lower extremity manual muscle tests to >/= 4/5 to improve dynamic hip/knee support for functional tasks. (progressing, not met)    3. Pt will tolerate standing/walking for 30 minutes with no increase in knee pain to return to PLOF. (progressing, not met)    4. Pt will improve knee pain to 4/10 at worst for improved QOL.  (progressing, not met)          Long Term Goals (8 Weeks):  1. Patient will improve FOTO score to </= 41% limited to decrease perceived limitation with mobility. (progressing, not met)    2. Patient will improve impaired lower extremity manual muscle tests to >/= 4+/5 to improve dynamic hip/knee support for functional tasks. (progressing, not met)    3. Patient will tolerate walking for 60 minutes with no increase in knee pain to return to PLOF. (progressing, not met)          PLAN      Plan of care Certification: 1/30/25 to 4/30/25     Focus on ROM and LE strength with emphasis on ADL performance     Outpatient Physical Therapy 2 times weekly for 6 weeks to include the following interventions: Therapeutic Exercises, Manual Therapeutic Technique, Neuromuscular Re Education, Therapeutic Activities. Modalities, Kinesiotape prn, and Functional Dry Needling as needed.            Tobias Foster, PTA

## 2025-02-27 ENCOUNTER — CLINICAL SUPPORT (OUTPATIENT)
Dept: REHABILITATION | Facility: OTHER | Age: 75
End: 2025-02-27
Payer: MEDICARE

## 2025-02-27 DIAGNOSIS — G89.29 CHRONIC PAIN OF BOTH KNEES: Primary | ICD-10-CM

## 2025-02-27 DIAGNOSIS — M25.562 CHRONIC PAIN OF BOTH KNEES: Primary | ICD-10-CM

## 2025-02-27 DIAGNOSIS — M25.561 CHRONIC PAIN OF BOTH KNEES: Primary | ICD-10-CM

## 2025-02-27 PROCEDURE — 97112 NEUROMUSCULAR REEDUCATION: CPT | Mod: PN

## 2025-02-27 PROCEDURE — 97530 THERAPEUTIC ACTIVITIES: CPT | Mod: PN

## 2025-02-27 NOTE — PROGRESS NOTES
"[facility]   Physical Therapy Treatment Note     Name: Dia Ovalles  Clinic Number: [id number]    Therapy Diagnosis:   Encounter Diagnosis   Name Primary?    Chronic pain of both knees Yes         Physician: Keagan Oneal MD    Visit Date: 2/27/2025       Physician Orders: Eval and Treat  Medical Diagnosis: S/P right TKA     Visit # / Visits Authorized:  6 / 20  Date of Evaluation:  1/30/2025   Insurance Authorization Period: 1/31/25 to 1/31/26  Plan of Care Certification:  1/30/2025 to 4/30/25                 Time In: 1100am  Time Out:  1200pm  Total Time: 60 minutes  Total Billable Time: 60 minutes    SUBJECTIVE     Pt reports: she is doing well today. Pt states that her knee is much stronger and she completed yoga at the [facility]. However, she is having "stiffness" in her knee.      She was compliant with home exercise program.  Response to previous treatment: decreased knee pain  Functional change: improved tolerance to walking    Pain: 1/10  Location: right knee      OBJECTIVE     Objective Measures updated at progress report unless specified.       TREATMENT     Dia received the treatments listed below:      manual therapy techniques: Joint mobilizations and Myofacial release were applied to the: right knee for 0 minutes, including:     Patellar mobilization  Infrapatellar fat pad mobilization  Tibiofemoral JM AP laura, Gr II/III  Knee extension mobilization grade IV     neuromuscular re-education activities to improve: Kinesthetic, Sense, and Proprioception for 15 minutes. The following activities were included:  Stationary bike x8 minutes L4  Knee extension props 5# x3mins  QS 30x5" hold   SLR x20        therapeutic activities to improve functional performance for 45 minutes, including:     R Step ups 8" with slow eccentric step down 30x R/L  Sit to stand from 18" box + 10# KB 3x10 - staggered stance  Shuttle press 75# 3x10  R SL shuttle press 25#  Lateral step down " 3x10  Matrix LAQ vs 15# 3x10  Matrix hamstring curls vs 25# 3x10  Hesitation marches on turf x2 laps 15# KB  marches through hurdles x4 laps on turf fwd/lateral each + 10# KB  R SLS cone taps on airex 30x  Balance beam heel/toe walking with 10# KB 6 laps        PATIENT EDUCATION AND HOME EXERCISES     Home Exercises Provided and Patient Education Provided     Education provided:   PT educated pt on importance of compliance with their HEP this visit.     Written Home Exercises Provided: Patient instructed to cont prior HEP. Exercises were reviewed and Dia was able to demonstrate them prior to the end of the session.  Dia demonstrated good  understanding of the education provided. See EMR under Patient Instructions for exercises provided during therapy sessions    ASSESSMENT   Pt tolerated treatment session well today with no increase in knee pain. Added resistance with kettlebell march exercise today. Continue PT POC.       Dia Is progressing well towards her goals.   Pt prognosis is Good.     Pt will continue to benefit from skilled outpatient physical therapy to address the deficits listed in the problem list box on initial evaluation, provide pt/family education and to maximize pt's level of independence in the home and community environment.     Pt's spiritual, cultural and educational needs considered and pt agreeable to plan of care and goals.     Anticipated barriers to physical therapy: None    Goals: GOALS:  Short Term Goals (4 Weeks):  1. Patient will be compliant with home exercise program to supplement therapy in restoring pain free function. (Progressing, not met)    2. Patient will improve impaired lower extremity manual muscle tests to >/= 4/5 to improve dynamic hip/knee support for functional tasks. (progressing, not met)    3. Pt will tolerate standing/walking for 30 minutes with no increase in knee pain to return to PLOF. (progressing, not met)    4. Pt will improve knee pain to 4/10 at  worst for improved QOL. (progressing, not met)          Long Term Goals (8 Weeks):  1. Patient will improve FOTO score to </= 41% limited to decrease perceived limitation with mobility. (progressing, not met)    2. Patient will improve impaired lower extremity manual muscle tests to >/= 4+/5 to improve dynamic hip/knee support for functional tasks. (progressing, not met)    3. Patient will tolerate walking for 60 minutes with no increase in knee pain to return to PLOF. (progressing, not met)          PLAN      Plan of care Certification: 1/30/25 to 4/30/25     Focus on ROM and LE strength with emphasis on ADL performance     Outpatient Physical Therapy 2 times weekly for 6 weeks to include the following interventions: Therapeutic Exercises, Manual Therapeutic Technique, Neuromuscular Re Education, Therapeutic Activities. Modalities, Kinesiotape prn, and Functional Dry Needling as needed.            Nilda Zurita, PT

## 2025-03-10 ENCOUNTER — PATIENT MESSAGE (OUTPATIENT)
Dept: CARDIOLOGY | Facility: CLINIC | Age: 75
End: 2025-03-10
Payer: MEDICARE

## 2025-03-12 RX ORDER — METOPROLOL SUCCINATE 25 MG/1
25 TABLET, EXTENDED RELEASE ORAL DAILY
Qty: 90 TABLET | Refills: 3 | OUTPATIENT
Start: 2025-03-12 | End: 2026-03-12

## 2025-03-17 ENCOUNTER — RESULTS FOLLOW-UP (OUTPATIENT)
Dept: ELECTROPHYSIOLOGY | Facility: CLINIC | Age: 75
End: 2025-03-17

## 2025-03-17 ENCOUNTER — OFFICE VISIT (OUTPATIENT)
Dept: PSYCHIATRY | Facility: CLINIC | Age: 75
End: 2025-03-17
Payer: MEDICARE

## 2025-03-17 DIAGNOSIS — F43.9 SITUATIONAL STRESS: Primary | ICD-10-CM

## 2025-03-17 PROCEDURE — 1157F ADVNC CARE PLAN IN RCRD: CPT | Mod: CPTII,S$GLB,, | Performed by: SOCIAL WORKER

## 2025-03-17 PROCEDURE — 90834 PSYTX W PT 45 MINUTES: CPT | Mod: S$GLB,,, | Performed by: SOCIAL WORKER

## 2025-03-17 NOTE — PROGRESS NOTES
"Individual Psychotherapy (PhD/LCSW)    3/17/2025    Site:  [facility]         Therapeutic Intervention: Met with patient.  Outpatient - Insight oriented psychotherapy 45 min - CPT code 93870    Chief complaint/reason for encounter: interpersonal     Interval history and content of current session: Pt read 2 books on narcissim, found one relevant and is attending to the chapter on how to communicate with a narcissistic person. She also notes her daughter has always had difficulty with time management, turned everything in late during her school years but was "charming" and got teachers to extend deadlines. Daughter has had 7-8 jobs in her 10 years as an  due to interpersonal conflicts. Pt spoke with ex and got some ideas about how he avoids conflict with Sofia. Pt finds it beneficial to discuss options to reframe daughter's behavior and stop taking responsibility for the conflicts.    Treatment plan:  Target symptoms:  anxiety about communicating with daughter  Why chosen therapy is appropriate versus another modality: relevant to diagnosis  Outcome monitoring methods: self-report  Therapeutic intervention type: insight oriented psychotherapy    Risk parameters:  Patient reports no suicidal ideation  Patient reports no homicidal ideation  Patient reports no self-injurious behavior  Patient reports no violent behavior    Verbal deficits: None    Patient's response to intervention:  The patient's response to intervention is motivated.    Progress toward goals and other mental status changes:  The patient's progress toward goals is good.    Diagnosis:   Situational stress    Plan:  individual psychotherapy    Return to clinic: as scheduled    Length of Service (minutes): 45        "

## 2025-03-19 ENCOUNTER — OFFICE VISIT (OUTPATIENT)
Dept: SLEEP MEDICINE | Facility: CLINIC | Age: 75
End: 2025-03-19
Payer: MEDICARE

## 2025-03-19 DIAGNOSIS — R06.83 SNORING: Primary | ICD-10-CM

## 2025-03-19 DIAGNOSIS — I48.91 ATRIAL FIBRILLATION, UNSPECIFIED TYPE: ICD-10-CM

## 2025-03-19 DIAGNOSIS — F51.03 PARADOXICAL INSOMNIA: ICD-10-CM

## 2025-03-19 DIAGNOSIS — I48.91 ATRIAL FIBRILLATION: ICD-10-CM

## 2025-03-19 NOTE — PROGRESS NOTES
The patient location is: [place]  The chief complaint leading to consultation is: trouble with sleep    Visit type: audiovisual    30 minutes of total time spent on the encounter, which includes face to face time and non-face to face time preparing to see the patient (eg, review of tests), Obtaining and/or reviewing separately obtained history, Documenting clinical information in the electronic or other health record, Independently interpreting results (not separately reported) and communicating results to the patient/family/caregiver, or Care coordination (not separately reported).     Each patient to whom he or she provides medical services by telemedicine is:  (1) informed of the relationship between the physician and patient and the respective role of any other health care provider with respect to management of the patient; and (2) notified that he or she may decline to receive medical services by telemedicine and may withdraw from such care at any time.    Referred by Lacey Catherine DO     NEW PATIENT VISIT    Dia Ovalles  is a pleasant 74 y.o. female who presents in the Fall of 2024 for sleep evaluation.    See assessment below for further history.    Past Medical History:   Diagnosis Date    Anemia 12/23/2024    Atrial fibrillation 12/22/2024    Breast cyst     Cancer     SCC x 2 on arms    Cataract     Closed Ortiz's fracture of right radius 09/27/2017    Decreased ROM of right shoulder 03/20/2018    Decreased strength of upper extremity 03/20/2018    Deep vein thrombosis     GERD (gastroesophageal reflux disease)     History of cardiovascular stress test 10/29/2024    Joint pain     Lesion of left upper eyelid 05/02/2023    Nuclear sclerosis, bilateral 05/17/2021    OA (osteoarthritis) of shoulder 03/13/2019    OAB (overactive bladder) 03/08/2017    Osteoporosis 03/08/2017    Other chest pain 05/13/2022    Squamous cell carcinoma of skin 2012    right arm   Problem List[1]Current  Medications[2]    There were no vitals filed for this visit.  Physical Exam:    GEN:   Well-appearing  Psych:  Appropriate affect, demonstrates insight  SKIN:  No rash on the face or bridge of the nose      LABS:   Lab Results   Component Value Date    CO2 23 01/28/2025         Echo Saline Bubble? Yes  Addendum Date: 12/23/2024      Irregularly irregular rhythm was present during the study. Tachycardia   was present during the study    Left Ventricle: The left ventricle is normal in size. Ventricular mass   is normal. Normal wall thickness. Normal wall motion. There is normal   systolic function with a visually estimated ejection fraction of 55 - 60%.   Unable to assess diastolic function due to atrial fibrillation. Normal   left ventricular filling pressure.    Right Ventricle: Normal right ventricular cavity size. Wall thickness   is normal. Right ventricle wall motion  is normal. Systolic function is   normal.    Visually there is biatrial enlargement    Aorta: Aortic root is normal in size measuring 2.84 cm. Ascending aorta   is normal measuring 2.61 cm.    Pulmonary Artery: The estimated pulmonary artery systolic pressure is   20 mmHg.    Tricuspid valve: There is mild prolapse of septal leaflet with trace TR    IVC/SVC: Normal venous pressure at 3 mmHg.    Pericardium: There is no pericardial effusion.    Hx of atrial septal defect repair (in 1976 age 26), no color flow seen   across IAS and bubble study is negative for intracardiac shunt        Result Date: 12/23/2024    Irregularly irregular rhythm was present during the study. Tachycardia   was present during the study    Left Ventricle: The left ventricle is normal in size. Ventricular mass   is normal. Normal wall thickness. Normal wall motion. There is normal   systolic function with a visually estimated ejection fraction of 55 - 60%.   Unable to assess diastolic function due to atrial fibrillation. Normal   left ventricular filling pressure.    Right  "Ventricle: Normal right ventricular cavity size. Wall thickness   is normal. Right ventricle wall motion  is normal. Systolic function is   normal.    Visually there is biatrial enlargement    Aorta: Aortic root is normal in size measuring 2.84 cm. Ascending aorta   is normal measuring 2.61 cm.    Pulmonary Artery: The estimated pulmonary artery systolic pressure is   20 mmHg.    IVC/SVC: Normal venous pressure at 3 mmHg.    Pericardium: There is no pericardial effusion.    Hx of atrial septal defect repair (in 1976 age 26), no color flow seen   across IAS and bubble study is negative for intracardiac shunt           No results found for: "FERRITIN"    RECORDS REVIEWED:      ASSESSMENT    Sig PMH: AFIB  PROBLEM DESCRIPTION/ Sx on Presentation  STATUS PLAN     Screening for  HECTOR   Presentation:     Referred by cardiology for newly diagnosed AFIB; etiology still unclear and needs sleep study to rule out HECTOR    sometimes - snoring  yes - gasping arousals/coughing  no - witnessed apneas, pauses in breathing    no - night sweats    no - AM headaches    no - dry mouth/sore throat      new   -we discussed sleep testing to evaluate for HECTOR     -discussed possible treatments for HECTOR including CPAP therapy       Daytime symptoms       Does feel refreshed when waking up in the morning. Does not feel excessively sleepy during the day and/or periods of rest.     [place] Sleepiness Scale:  Sitting and reading:                     3  Watching TV:                              1  Passenger in a car x 1 hr:          0  Sitting quietly after lunch:           0  Lying down to rest in PM:           2  Sitting, inactive in public:            0  Sitting+ talking to someone:        0  Stopped in traffic:                        0  Total                                            6/24  ESS 6/24 on intake      new   -will reassess sleepiness after evaluation for HECTOR     Insomnia       no - difficulty with sleep onset    yes - difficulty with " sleep maintenance    SLEEP SCHEDULE   Duration    Wind- down    Envmnt    CBTi    Meds prior    Meds now    Bed Time 930-10PM   Lights out    Latency quickly   Arousals 3x (arthritic pain, adjusting position)   Back to sleep quickly   Stim. ctrl    Wake time 5-530AM   Caffeine    Naps 3x/week, 1 hr, refreshing   Nocturia 1-2x   Work                      new   -will reassess after evaluation for sleep-disordered breathing       Nocturia     x 1-2x per sleep period    new          RTC:  will arrange RTC depending on results of sleep testing         PLAN      -recommend sleep testing   -PSG ordered  -discussed trial therapy if HECTOR present and the patient is open to a trial of CPAP therapy  -discussed the etiology of obstructive sleep apnea as well as the potential ramifications of untreated sleep apnea, which could include daytime sleepiness, hypertension, heart disease and/or stroke. We discussed potential treatment options, which could include weight loss, body positioning, continuous positive airway pressure (CPAP), oral appliance, Inspire, or referral for surgical consideration.        Advised on plan of care. Answered all patient questions. Patient verbalized understanding and voiced agreement with plan of care.                           [1]   Patient Active Problem List  Diagnosis    History of squamous cell carcinoma    H/O atrial septal defect repair    Osteoporosis    OAB (overactive bladder)    Range of motion deficit    Decreased  strength of right hand    OA (osteoarthritis) of shoulder    Osteoarthritis of right knee    Senile purpura    Bilateral hand pain    History of cardiovascular stress test    Right leg weakness    Atrial fibrillation    Acute cystitis    Transaminitis    Elevated d-dimer    Hx of total knee arthroplasty    Anemia    Hyponatremia    On anticoagulant therapy    Chronic pain of both knees    Pain in both lower extremities   [2]   Current Outpatient Medications:     acetaminophen  (TYLENOL) 650 MG TbSR, Take 1 tablet (650 mg total) by mouth every 8 (eight) hours., Disp: 120 tablet, Rfl: 0    apixaban (ELIQUIS) 5 mg Tab, Take 1 tablet (5 mg total) by mouth 2 (two) times daily., Disp: 180 tablet, Rfl: 3    biotin 1 mg tablet, Take 5,000 mcg by mouth once daily., Disp: , Rfl:     calcium carbonate (CALCIUM 600 ORAL), Take 1,200 mg by mouth once daily. , Disp: , Rfl:     DULoxetine 40 mg CpDR, Take 40 mg by mouth once daily., Disp: 90 capsule, Rfl: 1    metoprolol succinate (TOPROL-XL) 25 MG 24 hr tablet, Take 1 tablet (25 mg total) by mouth once daily., Disp: 90 tablet, Rfl: 3    multivitamin (THERAGRAN) per tablet, Take 1 tablet by mouth once daily., Disp: , Rfl:     oxybutynin (DITROPAN-XL) 10 MG 24 hr tablet, TAKE 1 TABLET EVERY DAY, Disp: 90 tablet, Rfl: 3    raloxifene (EVISTA) 60 mg tablet, Take 1 tablet (60 mg total) by mouth once daily., Disp: 90 tablet, Rfl: 3    UNKNOWN TO PATIENT, Nutrafol- hair growth supplement, Disp: , Rfl:

## 2025-03-22 DIAGNOSIS — N39.41 URGE INCONTINENCE: ICD-10-CM

## 2025-03-24 RX ORDER — OXYBUTYNIN CHLORIDE 10 MG/1
10 TABLET, EXTENDED RELEASE ORAL DAILY
Qty: 90 TABLET | Refills: 2 | Status: SHIPPED | OUTPATIENT
Start: 2025-03-24

## 2025-03-24 NOTE — TELEPHONE ENCOUNTER
Refill Decision Note   Dia Ovalles  is requesting a refill authorization.  Brief Assessment and Rationale for Refill:  Approve     Medication Therapy Plan:         Comments:     Note composed:8:47 AM 03/24/2025

## 2025-03-31 ENCOUNTER — OFFICE VISIT (OUTPATIENT)
Dept: PSYCHIATRY | Facility: CLINIC | Age: 75
End: 2025-03-31
Payer: MEDICARE

## 2025-03-31 DIAGNOSIS — F43.9 SITUATIONAL STRESS: Primary | ICD-10-CM

## 2025-03-31 PROCEDURE — 90834 PSYTX W PT 45 MINUTES: CPT | Mod: S$GLB,,, | Performed by: SOCIAL WORKER

## 2025-03-31 PROCEDURE — 1157F ADVNC CARE PLAN IN RCRD: CPT | Mod: CPTII,S$GLB,, | Performed by: SOCIAL WORKER

## 2025-03-31 NOTE — PROGRESS NOTES
Individual Psychotherapy (PhD/LCSW)    3/31/2025    Site:  [facility]         Therapeutic Intervention: Met with patient.  Outpatient - Insight oriented psychotherapy 45 min - CPT code 57178    Chief complaint/reason for encounter: interpersonal     Interval history and content of current session: Pt learned from ex- who is a psychiatrist, and with whom daughter Sofia currently lives, that N has blown up at him for the first time with the intensity with which she blows up at pt. Ex believes N is bipolar. Pt feels validated that N's anger at her is not personal, but part of who N is. Pt feels better prepared to set limits, no further need for Tx at this time.    Treatment plan:  Target symptoms:  anxiety about communicating with daughter  Why chosen therapy is appropriate versus another modality: relevant to diagnosis  Outcome monitoring methods: self-report  Therapeutic intervention type: insight oriented psychotherapy    Risk parameters:  Patient reports no suicidal ideation  Patient reports no homicidal ideation  Patient reports no self-injurious behavior  Patient reports no violent behavior    Verbal deficits: None    Patient's response to intervention:  The patient's response to intervention is motivated.    Progress toward goals and other mental status changes:  The patient's progress toward goals is good.    Diagnosis:   Situational stress    Plan:  individual psychotherapy    Return to clinic: prn    Length of Service (minutes): 45

## 2025-03-31 NOTE — TELEPHONE ENCOUNTER
Refill Decision Note   Dia Ovalles  is requesting a refill authorization.  Brief Assessment and Rationale for Refill:  Approve     Medication Therapy Plan:         Comments:     Note composed:8:57 AM 11/15/2024             Attempted to call report to receiving nurse [facility]  at approximately 1905, was asked to have nurse call back due to the nurse getting report. This RN made aware that pt's transport was here but ok for receiving nurse to call. Received a call back from [facility] was placed on hold for another 10-15 minutes due to confusion about the pt room number. This RN confirmed patient name and room number prior to being placed on hold. This RN ended and returned call and placed on another 6-8 minute hold. This RN called back asking to speak with charge and placed on another 4-6 minute hold. At this time nursing supervisor was contacted confirming ok to send pt without giving report to receiving nurse at [facility].   TRANSFER - OUT REPORT:    Verbal report given to [facility] Staff on Lizette Rudd  being transferred to [facility] for routine progression of patient care       Report consisted of patient's Situation, Background, Assessment and   Recommendations(SBAR).     Information from the following report(s) Nurse Handoff Report, Index, ED Encounter Summary, ED SBAR, Adult Overview, Intake/Output, MAR, Recent Results, Med Rec Status, Cardiac Rhythm afib, and Event Log was reviewed with the receiving nurse.    [place] Assessment:                         Reassessment at this time unchanged. Pt stable for transport as ordered by Dr. Milan.  Lines:   Peripheral IV 03/31/25 Right Antecubital (Active)   Site Assessment Clean, dry & intact 03/31/25 1619   Line Status Blood return noted;Flushed 03/31/25 1619   Phlebitis Assessment No symptoms 03/31/25 1619   Infiltration Assessment 0 03/31/25 1619   Dressing Status New dressing applied;Clean, dry & intact 03/31/25 1619   Dressing Type Transparent 03/31/25 1619   Dressing Intervention New 03/31/25 1619       Peripheral IV 03/31/25 Left Antecubital (Active)        Opportunity for questions and clarification was provided.      Patient transported with:  Monitor  Amniodarone 33ml/hr        TRANSFER - OUT REPORT:    Verbal report given to NICK Mendez on Lizette Rudd  being transferred to [facility] for routine progression of patient care       Report consisted of patient's Situation, Background, Assessment and   Recommendations(SBAR).     Information from the following report(s) Nurse Handoff Report, ED Encounter Summary, ED SBAR, Adult Overview, Intake/Output, MAR, Recent Results, Med Rec Status, Cardiac Rhythm afib, Neuro Assessment, and Event Log was reviewed with the receiving nurse.    [place] Assessment:                         Reassessment at this time unchanged Pt stable for transport as ordered by Dr. Milan     Lines:   Peripheral IV 03/31/25 Right Antecubital (Active)   Site Assessment Clean, dry & intact 03/31/25 1619   Line Status Blood return noted;Flushed 03/31/25 1619   Phlebitis Assessment No symptoms 03/31/25 1619   Infiltration Assessment 0 03/31/25 1619   Dressing Status New dressing applied;Clean, dry & intact 03/31/25 1619   Dressing Type Transparent 03/31/25 1619   Dressing Intervention New 03/31/25 1619       Peripheral IV 03/31/25 Left Antecubital (Active)        Opportunity for questions and clarification was provided.      Patient transported with:  Monitor  Amniodarone 33.33ml/hr        30-Oct-2020 17:37

## 2025-04-02 ENCOUNTER — TELEPHONE (OUTPATIENT)
Dept: SLEEP MEDICINE | Facility: OTHER | Age: 75
End: 2025-04-02
Payer: MEDICARE

## 2025-04-04 ENCOUNTER — TELEPHONE (OUTPATIENT)
Dept: SLEEP MEDICINE | Facility: OTHER | Age: 75
End: 2025-04-04
Payer: MEDICARE

## 2025-04-30 ENCOUNTER — HOSPITAL ENCOUNTER (OUTPATIENT)
Dept: SLEEP MEDICINE | Facility: OTHER | Age: 75
Discharge: HOME | End: 2025-04-30
Attending: NURSE PRACTITIONER
Payer: MEDICARE

## 2025-04-30 DIAGNOSIS — F51.03 PARADOXICAL INSOMNIA: ICD-10-CM

## 2025-04-30 DIAGNOSIS — R06.83 SNORING: ICD-10-CM

## 2025-04-30 DIAGNOSIS — I48.91 ATRIAL FIBRILLATION, UNSPECIFIED TYPE: ICD-10-CM

## 2025-04-30 DIAGNOSIS — I48.91 ATRIAL FIBRILLATION: ICD-10-CM

## 2025-04-30 PROCEDURE — 95810 POLYSOM 6/> YRS 4/> PARAM: CPT

## 2025-05-01 NOTE — PROGRESS NOTES
An overnight diagnostic sleep study was performed on Dia Ovalles. The following was explained to the pt prior to the study: the time to bed and wake time, the set-up process timeframe and the purpose of each sensor, the reason (if sensors fall off) the technician will need to enter the room during the night, the possibility of the tech fitting a PAP mask on pt for treatment in the middle of the night, and how to call out for assistance during the night. A post-study letter was handed to the pt in the morning.

## 2025-05-07 ENCOUNTER — RESULTS FOLLOW-UP (OUTPATIENT)
Dept: SLEEP MEDICINE | Facility: CLINIC | Age: 75
End: 2025-05-07

## 2025-05-07 PROBLEM — R06.83 SNORING: Status: ACTIVE | Noted: 2025-05-07

## 2025-05-07 NOTE — PROCEDURES
"[facility]    Polysomnography Interpretation Report    Patient Name:  Dia Ovalles  MRN#:  [id number]  :  1950  Study Date:  2025  Referring Provider:  Lizett Emmanuel    Indications for Polysomnography:  The patient is a 74 year old Male who is 5' 8" and weighs 131.0 lbs.  His BMI equals 20.1.  [place] was - and Neck Circumference was -.  A full night polysomnogram was performed to evaluate for -.    Polysomnogram Data  A full night polysomnogram recorded the standard physiologic parameters including EEG, EOG, EMG, EKG, nasal and oral airflow.  Respiratory parameters of chest and abdominal movements were recorded with (RIP) Respiratory Inductance Plethsmography.  Oxygen saturation was recorded by pulse oximetry.    Sleep Architecture  The total recording time of the polysomnogram was 416.3 minutes.  The total sleep time was 310.5 minutes.  The patient spent 15.6% of total sleep time in Stage N1, 68.6% in Stage N2, 8.5% in Stages N3, and 7.2% in REM.  Sleep latency was 27.4 minutes.  REM latency was 318.0 minutes.  Sleep Efficiency was 74.6%.  Wake after sleep onset was 78.0 minutes.    Respiratory Events  The polysomnogram revealed a presence of - obstructive, 1 central, and - mixed apneas resulting in a Total Apnea index of 0.2 events per hour.  There were 5 hypopneas resulting in a Total Hypopnea index of 1.0 events per hour.  The combined Apnea/Hypopnea index was 1.2 events per hour.  There were a total of 28 RERA events resulting in a Respiratory Disturbance Index (RDI) of 6.6 events per hour.     Mean oxygen saturation was 94.9%.  The lowest oxygen saturation during sleep was 88.0%.  Time spent <=88% oxygen saturation was 0.3 minutes (0.1%).    End Tidal CO2 during sleep ranged from - to - mmHg. End Tidal CO2 was greater than 50 mmHg for - minutes and greater than 55 mmHg for - minutes.  Transcutaneous CO2 during sleep ranged from - to - mmHg. Transcutaneous CO2 was greater than 50 " "mmHg for - minutes and greater than 55 mmHg for - minutes.    Limb Activity  There were 55 limb movements recorded.  Of this total, 55 were classified as PLMs.  Of the PLMs, 10 were associated with arousals.  The Limb Movement index was 10.6 per hour while the PLM index was 10.6 per hour and PLM with arousals index was 1.9 per hour.    Cardiac:  single lead EKG revealed normal sinus rhythm with occasional PACs and PVCs    Oxygenation:  No significant hypoxemia was observed     Impression:  -mild obstructive sleep apnea by RDI criteria     Recommendations:  -treatment for the HECTOR seen in this study is recommended if the patient has sleep-related complaints or significant comorbidities  -the patient has follow up with Sleep Medicine          Gideon Pederson MD    (This Sleep Study was interpreted by a Board Certified Sleep Specialist who conducted an epoch-by-epoch review of the entire raw data recording.)  (The indication for this sleep study was reviewed and deemed appropriate by AASM Practice Parameters or other reasons by a Board Certified Sleep Specialist.)        [facility]     Diagnostic PSG Report     Patient Name: Dia Ovalles Study Date: 4/30/2025   YOB: 1950 MRN #: [id number]   Age: 74 year FRED #: -   Sex: Male Referring Provider: Lizett Emmanuel   Height: 5' 8" Recording Tech: Geovany Clements RPSGT   Weight: 131.0 lbs Scoring Tech: Jose Avelar RRT RPSGT   BMI: 20.1 Interpreting Physician: Giedon Pederson MD   ESS: - Neck Circumference: -      Study Overview     Lights Off: 10:08:49 PM   Count Index   Lights On: 05:05:05 AM Awakenings: 35 6.8   Time in Bed: 416.3 min. Arousals: 83 16.0   Total Sleep Time: 310.5 min. Apneas & Hypopneas: 6 1.2    Sleep Efficiency: 74.6% Limb Movements: 55 10.6   Sleep Latency: 27.4 min. Snores: - -   Wake After Sleep Onset: 78.0 min. Desaturations: 5 1.0    REM Latency from Sleep Onset: 318.0 min. Minimum SpO2 TST: 88.0%        Sleep " Architecture                                                                                                                           % of Time in Bed     Stages Time (mins) % Sleep Time   Wake 106.0     Stage N1 48.5 15.6%   Stage N2 213.0 68.6%   Stage N3 26.5 8.5%   REM 22.5 7.2%         Arousal Summary       NREM REM Sleep Index   Respiratory Arousals 9 2 11 2.1   PLM Arousals 10 - 10 1.9   Isolated Limb Movement Arousals - - - -   Spontaneous Arousals 55 6 61 11.8   Total 75 8 83 16.0         Limb Movement Summary       Count Index   Isolated Limb Movements - -   Periodic Limb Movements (PLMs) 55 10.6   Total Limb Movements 55 10.6          Respiratory Summary       By Sleep Stage By Body Position Total    NREM REM Supine Non-Supine    Time (min) 288.0 22.5 45.5 265.0 310.5                 Obstructive Apnea - - - - -   Mixed Apnea - - - - -   Central Apnea 1 - 1 - 1   Central Apnea Index 0.2 - 1.3 - 0.2   Total Apneas 1 - 1 - 1   Total Apnea Index 0.2 - 1.3 - 0.2                 Hypopnea 3 2 3 2 5   Hypopnea Index 0.6 5.3 4.0 0.5 1.0                 Apnea & Hypopnea 4 2 4 2 6   Apnea & Hypopnea Index 0.8 5.3 5.3 0.5 1.2                 RERAs 26 2 5 23 28   RERA Index 5.4 5.3 6.6 5.2 5.4                 RDI 6.3 10.7 11.9 5.7 6.6      Scoring Criteria: Hypopneas scored at Choose an item.% desaturation criteria.     Respiratory Event Durations       Apnea Hypopnea    NREM REM NREM REM   Average (seconds) 13.1 - 16.7 18.7   Maximum (seconds) 13.1 - 20.0 21.2         Oxygen Saturation Summary       Wake NREM REM TST TIB   Average SpO2 95.9% 94.6% 95.1% 94.6% 94.9%   Minimum SpO2 86.0% 88.0% 93.0% 88.0% 86.0%   Maximum SpO2 99.0% 97.0% 98.0% 98.0% 99.0%         Oxygen Saturation Distribution     Range (%) Time in range (min) Time in range (%)   90.0 - 100.0 411.8 99.8%   80.0 - 90.0 0.6 0.2%   70.0 - 80.0 - -   60.0 - 70.0 - -   50.0 - 60.0 - -   0.0 - 50.0 - -   Time Spent <=88% SpO2     Range (%) Time in range  (min) Time in range (%)   0.0 - 88.0 0.3 0.1%              Count Index   Desaturations 5 1.0           Cardiac Summary       Wake NREM REM Sleep Total   Average Pulse Rate (BPM) 61.1 60.9 60.6 60.8 60.9   Minimum Pulse Rate (BPM) 56.0 57.0 57.0 57.0 56.0   Maximum Pulse Rate (BPM) 82.0 90.0 67.0 90.0 90.0      Pulse Rate Distribution     Range (bpm) Time in range (min) Time in range (%)   0.0 - 40.0 - -   40.0 - 60.0 166.4 40.3%   60.0 - 80.0 246.4 59.7%   80.0 - 100.0 0.1 0.0%   100.0 - 120.0 - -   120.0 - 140.0 - -   140.0 - 200.0 - -      EtCO2 Summary     Stage Min (mmHg) Average (mmHg) Max (mmHg)   Wake - - -   NREM(1+2+3) - - -   REM - - -      Range (mmHg) Time in range (min) Time in range (%)   20.0 - 40.0 - -   40.0 - 50.0 - -   50.0 - 55.0 - -   55.0 - 100.0 - -      TcCO2 Summary     Stage Min (mmHg) Average (mmHg) Max (mmHg)   Wake - - -   NREM(1+2+3) - - -   REM - - -      Range (mmHg) Time in range (min) Time in range (%)   20.0 - 40.0 - -   40.0 - 50.0 - -   50.0 - 55.0 - -   55.0 - 100.0 - -   Excluded data <20.0 & >65.0 416.5 100.0%      Comments     -

## 2025-05-09 ENCOUNTER — PATIENT MESSAGE (OUTPATIENT)
Dept: PRIMARY CARE CLINIC | Facility: CLINIC | Age: 75
End: 2025-05-09
Payer: MEDICARE

## 2025-05-09 DIAGNOSIS — M17.11 OSTEOARTHRITIS OF RIGHT KNEE, UNSPECIFIED OSTEOARTHRITIS TYPE: ICD-10-CM

## 2025-05-09 DIAGNOSIS — M19.019 OSTEOARTHRITIS OF SHOULDER, UNSPECIFIED LATERALITY, UNSPECIFIED OSTEOARTHRITIS TYPE: Primary | ICD-10-CM

## 2025-05-09 RX ORDER — DULOXETIN HYDROCHLORIDE 20 MG/1
40 CAPSULE, DELAYED RELEASE ORAL DAILY
Qty: 180 CAPSULE | Refills: 1 | Status: SHIPPED | OUTPATIENT
Start: 2025-05-09 | End: 2026-05-09

## 2025-05-09 NOTE — TELEPHONE ENCOUNTER
LOV with Shelley Leija MD , 1/28/2025 (Hosp f/u)   Pt requesting to revert back to 20 mgx2 (40mg total) duloxetine d/t lack of insurance coverage for 40 mg capsule.  Order pended, if appropriate.

## 2025-05-09 NOTE — TELEPHONE ENCOUNTER
No care due was identified.  [facility] Embedded Care Due Messages. Reference number: [id number].   5/09/2025 1:23:24 PM CDT

## 2025-05-12 ENCOUNTER — HOSPITAL ENCOUNTER (OUTPATIENT)
Dept: CARDIOLOGY | Facility: CLINIC | Age: 75
Discharge: HOME | End: 2025-05-12
Payer: MEDICARE

## 2025-05-12 ENCOUNTER — TELEPHONE (OUTPATIENT)
Dept: ELECTROPHYSIOLOGY | Facility: CLINIC | Age: 75
End: 2025-05-12
Payer: MEDICARE

## 2025-05-12 ENCOUNTER — PATIENT MESSAGE (OUTPATIENT)
Dept: CARDIOLOGY | Facility: CLINIC | Age: 75
End: 2025-05-12
Payer: MEDICARE

## 2025-05-12 ENCOUNTER — PATIENT MESSAGE (OUTPATIENT)
Dept: ELECTROPHYSIOLOGY | Facility: CLINIC | Age: 75
End: 2025-05-12
Payer: MEDICARE

## 2025-05-12 ENCOUNTER — OFFICE VISIT (OUTPATIENT)
Dept: DERMATOLOGY | Facility: CLINIC | Age: 75
End: 2025-05-12
Payer: MEDICARE

## 2025-05-12 DIAGNOSIS — L82.1 SEBORRHEIC KERATOSES: ICD-10-CM

## 2025-05-12 DIAGNOSIS — L81.4 LENTIGO: ICD-10-CM

## 2025-05-12 DIAGNOSIS — I48.0 PAROXYSMAL ATRIAL FIBRILLATION: ICD-10-CM

## 2025-05-12 DIAGNOSIS — D18.01 CHERRY ANGIOMA: ICD-10-CM

## 2025-05-12 DIAGNOSIS — Z12.83 SCREENING EXAM FOR SKIN CANCER: ICD-10-CM

## 2025-05-12 DIAGNOSIS — D22.9 MULTIPLE BENIGN NEVI: ICD-10-CM

## 2025-05-12 DIAGNOSIS — K13.0 ANGULAR CHEILITIS: Primary | ICD-10-CM

## 2025-05-12 DIAGNOSIS — Z85.828 HISTORY OF NONMELANOMA SKIN CANCER: ICD-10-CM

## 2025-05-12 LAB
OHS QRS DURATION: 72 MS
OHS QTC CALCULATION: 427 MS

## 2025-05-12 PROCEDURE — 93005 ELECTROCARDIOGRAM TRACING: CPT | Mod: S$GLB,,, | Performed by: INTERNAL MEDICINE

## 2025-05-12 PROCEDURE — 93010 ELECTROCARDIOGRAM REPORT: CPT | Mod: S$GLB,,, | Performed by: INTERNAL MEDICINE

## 2025-05-12 PROCEDURE — 99999 PR PBB SHADOW E&M-EST. PATIENT-LVL III: CPT | Mod: PBBFAC,,, | Performed by: STUDENT IN AN ORGANIZED HEALTH CARE EDUCATION/TRAINING PROGRAM

## 2025-05-12 RX ORDER — NYSTATIN 100000 U/G
OINTMENT TOPICAL
Qty: 30 G | Refills: 6 | Status: SHIPPED | OUTPATIENT
Start: 2025-05-12

## 2025-05-12 NOTE — PROGRESS NOTES
Subjective:      Patient ID:  Dia Ovalles is a 74 y.o. female who presents for No chief complaint on file.    History of Present Illness: The patient presents for follow up of skin check.    The patient was last seen on: 8/14/2023 for TBSE  H/o SCC - right arm, 2012     This is a high risk patient here to check for the development of new lesions.    Pt denies any new changing, bleeding or growing lesions today.        Review of Systems    Objective:   Physical Exam   Constitutional: She appears well-developed and well-nourished. No distress.   Neurological: She is alert and oriented to person, place, and time. She is not disoriented.   Psychiatric: She has a normal mood and affect.   Skin:   Areas Examined (abnormalities noted in diagram):   Scalp / Hair Palpated and Inspected  Head / Face Inspection Performed  Neck Inspection Performed  Chest / Axilla Inspection Performed  Abdomen Inspection Performed  Back Inspection Performed  RUE Inspected  LUE Inspection Performed  RLE Inspected  LLE Inspection Performed  Nails and Digits Inspection Performed                 Diagram Legend     Erythematous scaling macule/papule c/w actinic keratosis       Vascular papule c/w angioma      Pigmented verrucoid papule/plaque c/w seborrheic keratosis      Yellow umbilicated papule c/w sebaceous hyperplasia      Irregularly shaped tan macule c/w lentigo     1-2 mm smooth white papules consistent with Milia      Movable subcutaneous cyst with punctum c/w epidermal inclusion cyst      Subcutaneous movable cyst c/w pilar cyst      Firm pink to brown papule c/w dermatofibroma      Pedunculated fleshy papule(s) c/w skin tag(s)      Evenly pigmented macule c/w junctional nevus     Mildly variegated pigmented, slightly irregular-bordered macule c/w mildly atypical nevus      Flesh colored to evenly pigmented papule c/w intradermal nevus       Pink pearly papule/plaque c/w basal cell carcinoma      Erythematous hyperkeratotic cursted  plaque c/w SCC      Surgical scar with no sign of skin cancer recurrence      Open and closed comedones      Inflammatory papules and pustules      Verrucoid papule consistent consistent with wart     Erythematous eczematous patches and plaques     Dystrophic onycholytic nail with subungual debris c/w onychomycosis     Umbilicated papule    Erythematous-base heme-crusted tan verrucoid plaque consistent with inflamed seborrheic keratosis     Erythematous Silvery Scaling Plaque c/w Psoriasis     See annotation      Assessment / Plan:        Angular cheilitis  -     nystatin (MYCOSTATIN) ointment; Apply to corners of mouth bid when flaring and qhs for maintenance  Dispense: 30 g; Refill: 6    Lentigo  Chatman angioma  Seborrheic keratoses  Multiple benign nevi  Reassurance given to patient. No treatment is necessary.   Treatment of benign, asymptomatic lesions may be considered cosmetic.    History of nonmelanoma skin cancer  Screening exam for skin cancer  Area of previous NMSC examined. Site well healed with no signs of recurrence.    Total body skin examination performed today including at least 12 points as noted in physical examination. No lesions suspicious for malignancy noted.    Recommend daily sun protection/avoidance, use of at least SPF 30, broad spectrum sunscreen (OTC drug), skin self examinations, and routine physician surveillance to optimize early detection             Follow up in about 1 year (around 5/12/2026) for TBSE.

## 2025-05-12 NOTE — PATIENT INSTRUCTIONS
Sunscreen Guidelines  Sunscreen protects your skin by absorbing and reflecting ultraviolet rays. All sunscreens have a sun protection factor (SPF) rating that indicates how long a sunscreen will remain effective on the skin.    Why protect your skin?  The sun's rays are composed of many different wavelengths, including UVA, UVB, and visible light that each affect the skin differently.    UVB: sunburn, photoaging, skin cancer (melanoma, basal cell, and squamous cell carcinomas) and modulation of the skin's immune system.    UVA: similar to above but thought to contribute more to aging; at the same dose of UVB it is less powerful however the sun has 10-20 times the levels of UVA as compared with UVB.  Visible light: implicated in causing unwanted darkening of skin, such as melasma and post-inflammatory hyperpigmentation in darker skin types     If I have dark skin, do I need to worry about the sun?    More darkly pigmented skin is more protected against UV-induced skin cancer, sunburn, and photoaging, though may still suffer from sun-related conditions, including melasma, hyperpigmentation, and other dark spots.    Sun avoidance  As a general rule, stay out of the sun as much as possible between 10 a.m. - 4 p.m.    Download the EPA UV index ritika to track the UV index by hour in your zip code.      Which sunscreen should I choose?  The best sunscreen to use varies by individual. The one that feels best on your skin and fits your lifestyle will be the one you will likely use most regularly.   Active ingredients of sunscreen vary by , and may be a chemical (such as avobenzone or oxybenzone) or physical agent (such as zinc oxide or titanium dioxide). I recommend a physical agent.  A water-resistant sunscreen is one that maintains the SPF level after 40 minutes of water exposure. A very water-resistant sunscreen maintains the SPF level after 80 minutes of water exposure.    Sunscreen: this is the last layer in  "sun protection   Be generous: 1 shot glass of sunscreen for your body, ½ teaspoon for your face/neck  Reapply every 2 hours  Broad spectrum (provides UVA/UVB protection), SPF 50 or above  Avoid spray sunscreens: less effective and have been found to contain benzene (carcinogen)    Sun protective clothing  Although sunscreen helps minimize sun damage, no sunscreen completely blocks all wavelengths of UV light. Wearing sun protective clothing such as hats, rashguards or swim shirts, and long sleeves and/or pants, as well as avoiding sun exposure from 10 a.m. to 4 p.m. will help protect your skin from overexposure and minimize sun damage. Seek shade.  Long sleeved clothing, hats, and sunglasses: makes sun protection easier, more effective, and can even be more affordable, since sunscreen needs to be reapplied frequently.    Solumbra (www.sunprectautions.AtTask)  [employer]els (www.OraMetrix)  Coolibar (www.Soulstice Endeavors.AtTask)  Land's end (www.Crono)  Hats from [employer] (www.helenkaminski.com)    My Favorite Sunscreens:  Physical blockers: Can have a "white case" but in general are more effective  - Face: CeraVe tinted mineral sunscreen, Bare Minerals complexion rescue (20 shades), Elta MD (UV elements, UV physical, UV restore, etc), Tizo ultra zinc tinted, Cetaphil Sheer Mineral Face Liquid Sunscreen  - Body: Blue Lizard, Neutrogena Sheer Zinc, Eucerin Daily Protection, Aveeno Baby   "

## 2025-05-12 NOTE — TELEPHONE ENCOUNTER
----- Message from Dahlia sent at 5/12/2025  9:34 AM CDT -----  Regarding: Elevated HR/Afib  I have a pt in Afib  per min, would you like the call? Please call the pt at [phone] and she says she's been in Afib since 6:00 a.mThanks  ----- Message -----  From: Dahlia Hutton  Sent: 5/12/2025   9:39 AM CDT  To: Sarah Del Castillo RN  Subject: Elevated HR/Afib                                 I have a pt in Afib  per min, would you like the call? Please call the pt at [phone]

## 2025-05-12 NOTE — TELEPHONE ENCOUNTER
Outbound call to patient to schedule SOL/DCCV with Dr. Gonzalez. No answer, left voicemail requesting a call back at [phone].

## 2025-05-12 NOTE — TELEPHONE ENCOUNTER
Spoke with patient who states that she has had a few episodes of Afib noted on her apple watch this patient which showed that her heart rate was up to 130's. Patient states that she is asymptomatic now as well as at the time of the episodes. Confirms compliance with medications as prescribed including Metoprolol Succinate 25 mg daily and Eliquis 5 mg BID. Patient rechecked her heart rate while on the phone and noted it to be 120.  Patient has an appointment scheduled for another provided here at [facility] at 1 pm, therefore I have instructed patient that I am scheduling her for an EKG appointment at 12:30 pm prior to her other scheduled appointment. Patient instructed to take an additional dose of Metoprolol 25 mg now, advised that once Dr Gonzalez is able to review her EKG, she will be contacted with further instruction. Also advised that if at anytime her heart rate increases further or she should become symptomatic, she should proceed to the ER for further evaluation. Understanding verbalized.

## 2025-05-13 ENCOUNTER — PATIENT MESSAGE (OUTPATIENT)
Dept: ELECTROPHYSIOLOGY | Facility: CLINIC | Age: 75
End: 2025-05-13
Payer: MEDICARE

## 2025-05-13 ENCOUNTER — TELEPHONE (OUTPATIENT)
Dept: ELECTROPHYSIOLOGY | Facility: CLINIC | Age: 75
End: 2025-05-13
Payer: MEDICARE

## 2025-05-13 DIAGNOSIS — I48.19 PERSISTENT ATRIAL FIBRILLATION: Primary | ICD-10-CM

## 2025-05-13 NOTE — TELEPHONE ENCOUNTER
Spoke with patient and scheduled procedure: SOL/DCCV on 5/21/25 with an arrival time of 9AM. Informed pt that the SOL may be cancelled if she is 100%compliant with OAC.   Reviewed procedure with patient.  Labs to be done at: non-fasting blood work at [facility] on 5/14/25.  Confirmed that patient is not on GLP-1 agonist  Confirmed with patient that she is taking Eliquis 5mg BID and denies missing any doses. Instructed patient to take Eliquis in the AM day of procedure prior to arrival time with water.   Confirmed that patient does not have any implanted device that has remote or monitor that could cause electrical interference  Instructions will be sent via portal, as requested  Patient verbalized understanding.

## 2025-05-13 NOTE — TELEPHONE ENCOUNTER
----- Message from JEFERSON Smith sent at 5/13/2025 10:11 AM CDT -----  Regarding: FW: Schedule procedure    ----- Message -----  From: Renu Hernandez  Sent: 5/13/2025   8:34 AM CDT  To: Sarah Del Castillo RN  Subject: Schedule procedure                               Pt is calling to schedule a cardioversion and can be reached at [phone].Thank you -Marysol

## 2025-05-14 ENCOUNTER — LAB VISIT (OUTPATIENT)
Dept: LAB | Facility: OTHER | Age: 75
End: 2025-05-14
Attending: INTERNAL MEDICINE
Payer: MEDICARE

## 2025-05-14 DIAGNOSIS — I48.19 PERSISTENT ATRIAL FIBRILLATION: ICD-10-CM

## 2025-05-14 LAB
ANION GAP (OHS): 7 MMOL/L (ref 8–16)
APTT PPP: 24.8 SECONDS (ref 21–32)
BUN SERPL-MCNC: 17 MG/DL (ref 8–23)
CALCIUM SERPL-MCNC: 9.1 MG/DL (ref 8.7–10.5)
CHLORIDE SERPL-SCNC: 106 MMOL/L (ref 95–110)
CO2 SERPL-SCNC: 25 MMOL/L (ref 23–29)
CREAT SERPL-MCNC: 0.8 MG/DL (ref 0.5–1.4)
ERYTHROCYTE [DISTWIDTH] IN BLOOD BY AUTOMATED COUNT: 14 % (ref 11.5–14.5)
GFR SERPLBLD CREATININE-BSD FMLA CKD-EPI: >60 ML/MIN/1.73/M2
GLUCOSE SERPL-MCNC: 119 MG/DL (ref 70–110)
HCT VFR BLD AUTO: 35.9 % (ref 37–48.5)
HGB BLD-MCNC: 11.7 GM/DL (ref 12–16)
INR PPP: 1.1 (ref 0.8–1.2)
MCH RBC QN AUTO: 28.9 PG (ref 27–31)
MCHC RBC AUTO-ENTMCNC: 32.6 G/DL (ref 32–36)
MCV RBC AUTO: 89 FL (ref 82–98)
PLATELET # BLD AUTO: 252 K/UL (ref 150–450)
PMV BLD AUTO: 9.9 FL (ref 9.2–12.9)
POTASSIUM SERPL-SCNC: 4 MMOL/L (ref 3.5–5.1)
PROTHROMBIN TIME: 11.8 SECONDS (ref 9–12.5)
RBC # BLD AUTO: 4.05 M/UL (ref 4–5.4)
SODIUM SERPL-SCNC: 138 MMOL/L (ref 136–145)
WBC # BLD AUTO: 8.28 K/UL (ref 3.9–12.7)

## 2025-05-14 PROCEDURE — 36415 COLL VENOUS BLD VENIPUNCTURE: CPT

## 2025-05-14 PROCEDURE — 85027 COMPLETE CBC AUTOMATED: CPT

## 2025-05-14 PROCEDURE — 85610 PROTHROMBIN TIME: CPT

## 2025-05-14 PROCEDURE — 85730 THROMBOPLASTIN TIME PARTIAL: CPT

## 2025-05-14 PROCEDURE — 84520 ASSAY OF UREA NITROGEN: CPT

## 2025-05-19 NOTE — PATIENT INSTRUCTIONS
Return to Urgent Care or go to ER if symptoms worsen or fail to improve.  Follow up with PCP as recommended for further management.     THE FOLLOWING ARE RECOMMENDED TO HELP YOU MANAGE YOUR SYMPTOMS:    Take dextromethorphan according to label instructions for cough.     Take Ibuprofen or Acetaminophen according to label instructions for fever, throat pain, headache, and body aches    Use warm salt water gargles to ease your throat pain. Warm salt water gargles as needed for sore throat-  1/2 tsp salt to 1 cup warm water, gargle as desired.         general/bruising

## 2025-05-20 ENCOUNTER — ANESTHESIA EVENT (OUTPATIENT)
Dept: MEDSURG UNIT | Facility: HOSPITAL | Age: 75
End: 2025-05-20

## 2025-05-20 ENCOUNTER — TELEPHONE (OUTPATIENT)
Dept: ELECTROPHYSIOLOGY | Facility: CLINIC | Age: 75
End: 2025-05-20
Payer: MEDICARE

## 2025-05-20 NOTE — TELEPHONE ENCOUNTER
Spoke to patient.     CONFIRMED procedure arrival time of 9AM on 5/21/25.     Reiterated instructions including:  -Directions to check in desk  -NPO after midnight night prior to procedure; may drink water until 7AM on day of procedure.   -Confirmed compliance of Eliquis. Pt confirmed that she takes Eliquis BID and denies missing any doses. Instructed pt to take Eliquis in the AM with water prior to arrival time.   -Pre-procedure LABS reviewed; no significant alerts noted.   -Confirmed absence of implanted device/stimulator reviewed.   -Confirmed no fever, cough, or shortness of breath in the past 30 days  -Do not wear mascara day of procedure  -Patient confirmed that she has a caregiver available to bring her home post procedure.   Patient verbalized understanding of above and appreciated the call.

## 2025-05-21 ENCOUNTER — HOSPITAL ENCOUNTER (OUTPATIENT)
Facility: HOSPITAL | Age: 75
Discharge: HOME | End: 2025-05-21
Attending: INTERNAL MEDICINE | Admitting: INTERNAL MEDICINE
Payer: MEDICARE

## 2025-05-21 ENCOUNTER — ANESTHESIA (OUTPATIENT)
Dept: MEDSURG UNIT | Facility: HOSPITAL | Age: 75
End: 2025-05-21

## 2025-05-21 DIAGNOSIS — I48.91 ATRIAL FIBRILLATION: ICD-10-CM

## 2025-05-21 DIAGNOSIS — I48.19 PERSISTENT ATRIAL FIBRILLATION: ICD-10-CM

## 2025-05-21 LAB
OHS QRS DURATION: 74 MS
OHS QTC CALCULATION: 435 MS

## 2025-05-21 PROCEDURE — 93010 ELECTROCARDIOGRAM REPORT: CPT | Mod: ,,, | Performed by: INTERNAL MEDICINE

## 2025-05-21 PROCEDURE — 93005 ELECTROCARDIOGRAM TRACING: CPT

## 2025-05-21 RX ORDER — FLECAINIDE ACETATE 50 MG/1
50 TABLET ORAL EVERY 12 HOURS
Qty: 180 TABLET | Refills: 3 | Status: SHIPPED | OUTPATIENT
Start: 2025-05-21 | End: 2026-05-21

## 2025-05-21 NOTE — HOSPITAL COURSE
Patient presented for SOL/DCCV. Noted to be in sinus rhythm on admission ECG. Dr. Gonzalez notified. Procedure cancelled. Plan to start flecainide 50mg BID; continue all other home medications. Discharge instructions were discussed with patient and all questions were answered. Patient was discharged home in stable condition.

## 2025-05-21 NOTE — DISCHARGE SUMMARY
[facility] - Short Stay Cardiac Unit  Cardiology  Discharge Summary      Patient Name: Dia Ovalles  MRN: [id number]  Admission Date: 5/21/2025  Hospital Length of Stay: 0 days  Discharge Date and Time: 05/21/2025 9:16 AM  Attending Physician: Clifford Gonzalez MD    Discharging Provider: Sadia Man PA-C  Primary Care Physician: Shelley Leija MD    HPI:   Patient of Dr. Gonzalez. Last OV with Cher Peterson NP on 2/3/25.   Ms. Ovalles is a 74 y.o. female with OAB, remote DVT, ASD (s/p repair 1976), TKA, AF.     She underwent a TKA on 11/11/24 and presented on 12/11/24 with wound dehiscence following a fall by tripping. She reported two episodes of fall preceded by lighhededness in last 1 week associated with shortness of breath and fatigue. She drinks wine 2 glasses 3-4 times a week, drinks couple of cups of coffee daily, also she reports she snores at night. She did not have any cardiac ailments after ASD repair, no history of atrial fibrillation. Electrophysiology was consulted for evaluation of  new onset of AF.  12/23/024: Cardioversion was successfully performed with restoration of normal sinus rhythm.       She remained in SR at her last OV.     Earlier this month, her Apple Watch noted a few episodes of AF with HR up to the 130s. EKG on 5/12/25 confirmed AF, so she was scheduled for a SOL/DCCV today.   Admission ECG today shows sinus bradycardia.         Indwelling Lines/Drains at time of discharge:  None       Hospital Course:  Patient presented for SOL/DCCV. Noted to be in sinus rhythm on admission ECG. Dr. Gonzalez notified. Procedure cancelled. Plan to start flecainide 50mg BID; continue all other home medications. Discharge instructions were discussed with patient and all questions were answered. Patient was discharged home in stable condition.         Goals of Care Treatment Preferences:  Code Status: Full Code    Living Will: Yes          Significant Diagnostic Studies: N/A    Pending Diagnostic  Studies:       None            There are no hospital problems to display for this patient.    No new Assessment & Plan notes have been filed under this hospital service since the last note was generated.  Service: Cardiology      Discharged Condition: stable    Disposition: Home or Self Care    Patient Instructions:   No discharge procedures on file.  Medications:  Reconciled Home Medications:      Medication List        START taking these medications      flecainide 50 MG Tab  Commonly known as: TAMBOCOR  Take 1 tablet (50 mg total) by mouth every 12 (twelve) hours.            CONTINUE taking these medications      apixaban 5 mg Tab  Commonly known as: ELIQUIS  Take 1 tablet (5 mg total) by mouth 2 (two) times daily.     biotin 1 mg tablet  Take 5,000 mcg by mouth once daily.     CALCIUM 600 ORAL  Take 1,200 mg by mouth once daily.     * DULoxetine 40 mg Cpdr  Take 40 mg by mouth once daily.     * DULoxetine 20 MG capsule  Commonly known as: CYMBALTA  Take 2 capsules (40 mg total) by mouth once daily.     metoprolol succinate 25 MG 24 hr tablet  Commonly known as: TOPROL-XL  Take 1 tablet (25 mg total) by mouth once daily.     multivitamin per tablet  Commonly known as: THERAGRAN  Take 1 tablet by mouth once daily.     nystatin ointment  Commonly known as: MYCOSTATIN  Apply to corners of mouth bid when flaring and qhs for maintenance     oxybutynin 10 MG 24 hr tablet  Commonly known as: DITROPAN-XL  Take 1 tablet (10 mg total) by mouth once daily.     raloxifene 60 mg tablet  Commonly known as: EVISTA  Take 1 tablet (60 mg total) by mouth once daily.           * This list has 2 medication(s) that are the same as other medications prescribed for you. Read the directions carefully, and ask your doctor or other care provider to review them with you.                ASK your doctor about these medications      UNKNOWN TO PATIENT  Nutrafol- hair growth supplement              Time spent on the discharge of patient: 35  minutes    Sadia Man PA-C  Cardiology  [facility] - Short Stay Cardiac Unit

## 2025-05-21 NOTE — H&P
[facility]  Cardiology  SOL/DCCV History & Physical      Dia Ovalles  YOB: 1950  Medical Record Number:  [id number]  Attending Physician:  Clifford Gonzalez MD   Date of Admission: 5/21/2025       Hospital Day:  0  Current Principal Problem:  Atrial fibrillation      History     Cc: SOL/DCCV for AF    HPI  Patient of Dr. Gonzalez. Last OV with Cher Peterson NP on 2/3/25.   Ms. Ovalles is a 74 y.o. female with OAB, remote DVT, ASD (s/p repair 1976), TKA, AF.    She underwent a TKA on 11/11/24 and presented on 12/11/24 with wound dehiscence following a fall by tripping. She reported two episodes of fall preceded by lighhededness in last 1 week associated with shortness of breath and fatigue. She drinks wine 2 glasses 3-4 times a week, drinks couple of cups of coffee daily, also she reports she snores at night. She did not have any cardiac ailments after ASD repair, no history of atrial fibrillation. Electrophysiology was consulted for evaluation of  new onset of AF.  12/23/024: Cardioversion was successfully performed with restoration of normal sinus rhythm.       She remained in SR at her last OV.    Earlier this month, her Apple Watch noted a few episodes of AF with HR up to the 130s. EKG on 5/12/25 confirmed AF, so she was scheduled for a SOL/DCCV today.   Admission ECG today shows sinus bradycardia.       Medications - Outpatient  Prior to Admission medications    Medication Sig Start Date End Date Taking? Authorizing Provider   apixaban (ELIQUIS) 5 mg Tab Take 1 tablet (5 mg total) by mouth 2 (two) times daily. 2/20/25 2/15/26  Troy Ramires MD   biotin 1 mg tablet Take 5,000 mcg by mouth once daily.    Provider, Historical   calcium carbonate (CALCIUM 600 ORAL) Take 1,200 mg by mouth once daily.     Provider, Historical   DULoxetine (CYMBALTA) 20 MG capsule Take 2 capsules (40 mg total) by mouth once daily. 5/9/25 5/9/26  Moe Kearney MD   DULoxetine 40  mg CpDR Take 40 mg by mouth once daily. 1/28/25   Shelley Leija MD   metoprolol succinate (TOPROL-XL) 25 MG 24 hr tablet Take 1 tablet (25 mg total) by mouth once daily. 3/12/25 3/12/26  Troy Ramires MD   multivitamin (THERAGRAN) per tablet Take 1 tablet by mouth once daily.    Provider, Historical   nystatin (MYCOSTATIN) ointment Apply to corners of mouth bid when flaring and qhs for maintenance 5/12/25   Gopi Hidalgo MD   oxybutynin (DITROPAN-XL) 10 MG 24 hr tablet Take 1 tablet (10 mg total) by mouth once daily. 3/24/25   Taisha Savage MD   raloxifene (EVISTA) 60 mg tablet Take 1 tablet (60 mg total) by mouth once daily. 2/11/25   Noemi Florian MD   UNKNOWN TO PATIENT Nutrafol- hair growth supplement    Provider, Historical         Medications - Current  Scheduled Meds:  Continuous Infusions:  PRN Meds:.      Allergies  Review of patient's allergies indicates:   Allergen Reactions    Codeine Nausea Only    Sulfa (sulfonamide antibiotics) Nausea And Vomiting         Past Medical History  Past Medical History:   Diagnosis Date    Anemia 12/23/2024    Atrial fibrillation 12/22/2024    Breast cyst     Cancer     SCC x 2 on arms    Cataract     Closed Ortiz's fracture of right radius 09/27/2017    Decreased ROM of right shoulder 03/20/2018    Decreased strength of upper extremity 03/20/2018    Deep vein thrombosis     GERD (gastroesophageal reflux disease)     History of cardiovascular stress test 10/29/2024    Joint pain     Lesion of left upper eyelid 05/02/2023    Nuclear sclerosis, bilateral 05/17/2021    OA (osteoarthritis) of shoulder 03/13/2019    OAB (overactive bladder) 03/08/2017    Osteoporosis 03/08/2017    Other chest pain 05/13/2022    Squamous cell carcinoma of skin 2012    right arm         Past Surgical History  Past Surgical History:   Procedure Laterality Date    ARTHROPLASTY, KNEE, TOTAL, USING COMPUTER-ASSISTED NAVIGATION  11/11/2024    Procedure: ARTHROPLASTY, KNEE, TOTAL,  USING COMPUTER-ASSISTED NAVIGATION;  Surgeon: Keagan Oneal MD;  Location: [facility] OR;  Service: Orthopedics;;    ASD REPAIR      open heart surgery    BREAST BIOPSY      CATARACT EXTRACTION W/  INTRAOCULAR LENS IMPLANT Right 2021    Procedure: EXTRACTION, CATARACT, WITH IOL INSERTION;  Surgeon: Bruna Silva MD;  Location: [facility] OR;  Service: Ophthalmology;  Laterality: Right;    CATARACT EXTRACTION W/  INTRAOCULAR LENS IMPLANT Left 2021    Procedure: EXTRACTION, CATARACT, WITH IOL INSERTION;  Surgeon: Bruna Silva MD;  Location: [facility] OR;  Service: Ophthalmology;  Laterality: Left;     SECTION      x2    EYE SURGERY      KNEE SURGERY Right     meniscal repair    SKIN BIOPSY  2012    SKIN CANCER EXCISION      TRANSESOPHAGEAL ECHOCARDIOGRAM WITH POSSIBLE CARDIOVERSION (SOL W/ POSS CARDIOVERSION) N/A 2024    Procedure: Transesophageal echo (SOL) intra-procedure log documentation;  Surgeon: Shelly Espinoza MD;  Location: [facility] EP LAB;  Service: Cardiology;  Laterality: N/A;    TREATMENT OF CARDIAC ARRHYTHMIA N/A 2024    Procedure: Cardioversion or Defibrillation;  Surgeon: Clifford Gonzalez MD;  Location: [facility] EP LAB;  Service: Cardiology;  Laterality: N/A;  AF, SOL/DCCV, ANES, SK, [id number]         Social History  Social History     Socioeconomic History    Marital status:     Number of children: 2   Occupational History    Occupation: retired   Tobacco Use    Smoking status: Never     Passive exposure: Never    Smokeless tobacco: Never   Substance and Sexual Activity    Alcohol use: Yes     Alcohol/week: 4.0 standard drinks of alcohol     Types: 4 Glasses of wine per week     Comment: social    Drug use: Yes     Types: Marijuana     Comment: gummies 2 days ago    Sexual activity: Not Currently     Partners: Male     Birth control/protection: Post-menopausal   Other Topics Concern    Are you pregnant or think you may be? No    Breast-feeding No     Social Drivers of Health      Financial Resource Strain: Low Risk  (5/12/2025)    Overall Financial Resource Strain (CARDIA)     Difficulty of Paying Living Expenses: Not hard at all   Food Insecurity: No Food Insecurity (5/12/2025)    Hunger Vital Sign     Worried About Running Out of Food in the Last Year: Never true     Ran Out of Food in the Last Year: Never true   Transportation Needs: No Transportation Needs (5/12/2025)    PRAPARE - Transportation     Lack of Transportation (Medical): No     Lack of Transportation (Non-Medical): No   Physical Activity: Sufficiently Active (5/12/2025)    Exercise Vital Sign     Days of Exercise per Week: 6 days     Minutes of Exercise per Session: 60 min   Stress: No Stress Concern Present (5/12/2025)    [employer] - Occupational Stress Questionnaire     Feeling of Stress : Only a little   Housing Stability: Low Risk  (5/12/2025)    Housing Stability Vital Sign     Unable to Pay for Housing in the Last Year: No     Number of Times Moved in the Last Year: 0     Homeless in the Last Year: No         ROS  Review of Systems   Constitutional: Negative for chills.   HENT: Negative.     Eyes: Negative.    Cardiovascular:  Negative for chest pain, dyspnea on exertion and palpitations.   Respiratory: Negative.  Negative for shortness of breath and sleep disturbances due to breathing.    Endocrine: Negative.    Musculoskeletal: Negative.    Gastrointestinal:  Negative for hematemesis, melena, nausea and vomiting.   Genitourinary: Negative.    Neurological: Negative.    Psychiatric/Behavioral: Negative.  Negative for altered mental status.    Allergic/Immunologic: Negative.    Physical Examination     Vital Signs  24 Hour VS Range           Physical Exam:   Physical Exam  Constitutional:       General: She is not in acute distress.     Appearance: Normal appearance.   Cardiovascular:      Rate and Rhythm: Normal rate and regular rhythm.   Pulmonary:      Effort: Pulmonary effort is  "normal.   Neurological:      Mental Status: She is alert and oriented to person, place, and time.           Data       Recent Labs   Lab 05/14/25  1513   WBC 8.28   HGB 11.7*   HCT 35.9*           Recent Labs   Lab 05/14/25  1513   PROTIME 11.8   INR 1.1        Recent Labs   Lab 05/14/25  1513      K 4.0      CO2 25   BUN 17   CREATININE 0.8   ANIONGAP 7*   CALCIUM 9.1        No results for input(s): "PROT", "ALBUMIN", "BILITOT", "ALKPHOS", "AST", "ALT" in the last 168 hours.     No results for input(s): "TROPONINI" in the last 168 hours.     BNP (pg/mL)   Date Value   12/22/2024 272 (H)       No results for input(s): "LABBLOO" in the last 168 hours.         Assessment & Plan     #Atrial fibrillation  - presented today for SOL/DCCV. Admit ECG shows sinus bradycardia. Discussed with Dr. Gonzalez. Procedure cancelled.   - will start flecainide 50mg BID      Sadia Man PA-C  [facility]   "

## 2025-06-05 ENCOUNTER — HOSPITAL ENCOUNTER (OUTPATIENT)
Dept: RADIOLOGY | Facility: CLINIC | Age: 75
Discharge: HOME | End: 2025-06-05
Attending: INTERNAL MEDICINE
Payer: MEDICARE

## 2025-06-05 DIAGNOSIS — M81.0 OSTEOPOROSIS, UNSPECIFIED OSTEOPOROSIS TYPE, UNSPECIFIED PATHOLOGICAL FRACTURE PRESENCE: ICD-10-CM

## 2025-06-05 PROCEDURE — 77080 DXA BONE DENSITY AXIAL: CPT | Mod: TC

## 2025-06-06 ENCOUNTER — RESULTS FOLLOW-UP (OUTPATIENT)
Dept: ENDOCRINOLOGY | Facility: CLINIC | Age: 75
End: 2025-06-06

## 2025-06-09 ENCOUNTER — RESULTS FOLLOW-UP (OUTPATIENT)
Dept: ENDOCRINOLOGY | Facility: CLINIC | Age: 75
End: 2025-06-09

## 2025-06-18 ENCOUNTER — TELEPHONE (OUTPATIENT)
Dept: ELECTROPHYSIOLOGY | Facility: CLINIC | Age: 75
End: 2025-06-18
Payer: MEDICARE

## 2025-06-18 NOTE — TELEPHONE ENCOUNTER
Attempted to call patient to schedule f/u appointment. No answer. Voice message left for patient to call to schedule f/u appt.

## 2025-06-19 ENCOUNTER — TELEPHONE (OUTPATIENT)
Dept: CARDIOLOGY | Facility: CLINIC | Age: 75
End: 2025-06-19
Payer: MEDICARE

## 2025-06-19 NOTE — TELEPHONE ENCOUNTER
Spoke with patient to schedule f/u appointment. Appointment made. Patient accepted datedand time/ Slips mailed.

## 2025-06-25 ENCOUNTER — PATIENT MESSAGE (OUTPATIENT)
Dept: ENDOCRINOLOGY | Facility: CLINIC | Age: 75
End: 2025-06-25
Payer: MEDICARE

## 2025-07-01 ENCOUNTER — HOSPITAL ENCOUNTER (OUTPATIENT)
Dept: CARDIOLOGY | Facility: CLINIC | Age: 75
Discharge: HOME | End: 2025-07-01
Payer: MEDICARE

## 2025-07-01 ENCOUNTER — OFFICE VISIT (OUTPATIENT)
Dept: ELECTROPHYSIOLOGY | Facility: CLINIC | Age: 75
End: 2025-07-01
Payer: MEDICARE

## 2025-07-01 VITALS
WEIGHT: 133.63 LBS | SYSTOLIC BLOOD PRESSURE: 118 MMHG | DIASTOLIC BLOOD PRESSURE: 60 MMHG | HEART RATE: 60 BPM | BODY MASS INDEX: 20.25 KG/M2 | HEIGHT: 68 IN

## 2025-07-01 DIAGNOSIS — Z79.01 ON ANTICOAGULANT THERAPY: ICD-10-CM

## 2025-07-01 DIAGNOSIS — I48.0 PAROXYSMAL ATRIAL FIBRILLATION: ICD-10-CM

## 2025-07-01 DIAGNOSIS — I48.0 PAROXYSMAL ATRIAL FIBRILLATION: Primary | ICD-10-CM

## 2025-07-01 LAB
OHS QRS DURATION: 80 MS
OHS QTC CALCULATION: 436 MS

## 2025-07-01 PROCEDURE — 1126F AMNT PAIN NOTED NONE PRSNT: CPT | Mod: CPTII,S$GLB,, | Performed by: NURSE PRACTITIONER

## 2025-07-01 PROCEDURE — 99999 PR PBB SHADOW E&M-EST. PATIENT-LVL III: CPT | Mod: PBBFAC,,, | Performed by: NURSE PRACTITIONER

## 2025-07-01 PROCEDURE — 1100F PTFALLS ASSESS-DOCD GE2>/YR: CPT | Mod: CPTII,S$GLB,, | Performed by: NURSE PRACTITIONER

## 2025-07-01 PROCEDURE — 3288F FALL RISK ASSESSMENT DOCD: CPT | Mod: CPTII,S$GLB,, | Performed by: NURSE PRACTITIONER

## 2025-07-01 PROCEDURE — 3078F DIAST BP <80 MM HG: CPT | Mod: CPTII,S$GLB,, | Performed by: NURSE PRACTITIONER

## 2025-07-01 PROCEDURE — 1157F ADVNC CARE PLAN IN RCRD: CPT | Mod: CPTII,S$GLB,, | Performed by: NURSE PRACTITIONER

## 2025-07-01 PROCEDURE — 99214 OFFICE O/P EST MOD 30 MIN: CPT | Mod: S$GLB,,, | Performed by: NURSE PRACTITIONER

## 2025-07-01 PROCEDURE — 3074F SYST BP LT 130 MM HG: CPT | Mod: CPTII,S$GLB,, | Performed by: NURSE PRACTITIONER

## 2025-07-01 PROCEDURE — 1159F MED LIST DOCD IN RCRD: CPT | Mod: CPTII,S$GLB,, | Performed by: NURSE PRACTITIONER

## 2025-07-01 PROCEDURE — 93010 ELECTROCARDIOGRAM REPORT: CPT | Mod: S$GLB,,, | Performed by: INTERNAL MEDICINE

## 2025-07-01 NOTE — PROGRESS NOTES
Ms. Ovalles is a patient of Dr. Gonzalez and was last seen in clinic 2/3/2025 with RYAN Kwon.    Subjective:   Patient ID:  Dia Ovalles is a 74 y.o. female who presents for follow-up of atrial fibrillation.    HPI: Ms. Ovalles is a 74 y.o. female with a past medical history of OAB, remote DVT, ASD (s/p repair 1976), TKA, AF.     Background:  12/23/2024:  Flako Ovalles is a 73 yo female with a hx of OAB, remote DVT while pregnant, hx of atrial septal defect repair (in 1976 age 26), and recent TKA (11/11/24) who presents today with fatigue and dyspnea on exertion for 5-7 days. She regularly exercises and states these new symptoms are unusual for her. She recently underwent a TKA on 11/11/24 and presented on 12/11/24 with wound dehiscence following a fall by tripping. She was started on Bactrim for wound dehiscence, that she took for 5-7 days and then developed nausea, vomiting and diarrhea, therefore bactrim was switched to cefadroxil (to be taken till 12/24/24). She denies any N/V/D currently. But she reports two episodes of fall preceded by lighhededness in last 1 week associated with shortness of breath and fatigue. She drinks wine 2 glasses 3-4 times a week, drinks couple of cups of coffee daily, also she reports she snores at night. She did not have any cardiac ailments after ASD repair, no history of atrial fibrillation. Electrophysiology (EP) consulted for evaluation of  new onset of Atrial Fibrillation with Rapid Ventricular Response.     Remote history of DVT while pregnant. D-dimer 3 on admission. CTA chest negative for PE. LE US negative for DVT. Elevation likely 2/2 recent surgery or active a-fib.      EKG: atrial fibrillation rapid ventricular response 100  Tele: atrial fibrillation rapid ventricular response 100s  Limit half to one drink a day  Limit 1-2 cups of coffee daily  Also get evaluated for HECTOR as outpatient  Get updated TTE [Last TTE LVEF  5/25/22 71%]  Continue her daily exercise  regimen  Continue eliquis 5 mg Twice a day  Keep NPO for SOL-DCCV today  Plan to discharge on Toprol 25 mg daily (adjust according to HR, BP)     12/23/024: Cardioversion was successfully performed with restoration of normal sinus rhythm.      2/3/2025: Today she says she is feeling well. Was fatigued for a few days after cardioversion but is back to baseline.  Knee recovering - is in PT. Exercising more but not as much as prior to her knee surgery. No CP, worsening DIAZ, palps, LH, syncope reported.  Her AF symptoms were significant fatigue, DIAZ, and recurrent falls.  She is currently taking eliquis 5mg BID for stroke prophylaxis and denies significant bleeding episodes. She is currently being treated with toprol 25mg daily for HR control. Kidney function is stable, with a creatinine of 0.7 on 1/28/2025.  I have personally reviewed the patient's EKG today, which shows sinus rhythm with 1st deg AVB at 60bpm. UT interval is 212. QRS is 86. QT is 418.  In summary, Ms. Ovalles is a 74 y.o. female with OAB, remote DVT, ASD (s/p repair 1976), TKA, AF here for hospital follow up.   Pt is 1 mo s/p DCCV for new onset AF with RVR. She is doing well from a rhythm standpoint, with no documented or symptomatic recurrence of arrhythmia since procedure.  Not on AAD. SOL showed preserved LVEF. CHADSVASc 3. Refer to sleep medicine for HECTOR evaluation. Possible her AF episode was provoked by recent surgery and wound dehiscence.  Will order event monitor to evaluate for additional AF. RTC to discuss long term OAC vs continued monitoring (smart watch or ILR). She has a new smartwatch.  Event monitor.   Sleep sppt as scheduled  Continue current meds for now.  RTC 3 mo, sooner if needed    5/21/20205:Patient presented for SOL/DCCV. Noted to be in sinus rhythm on admission ECG. Dr. Gonzalez notified. Procedure cancelled. Plan to start flecainide 50mg BID; continue all other home medications.     Update (07/01/2025):  Ms. Ovalles presents for  follow up of atrial fibrillation s/p canceled DCCV. Ms. Ovalles is doing very well from an arrhythmia standpoint. She presented 5/12/25 for DCCV but converted to sinus rhythm on her own. Procedure was canceled and she was started on flecainide. Today, she remains in normal rhythm and denies chest pain with exertion or at rest, palpitations, SOB, DIAZ, dizziness, or syncope.    Ms. Ovalles is currently taking eliquis 5 mg BID for stroke prophylaxis and denies significant bleeding episodes. She is currently being treated with flecainide 50 gm BID for rhythm control and metoprolol succinate 25 mg daily for HR control. Kidney function is stable, with a creatinine of 0.8 on 5/14/25.    I have personally reviewed the patient's EKG today, which shows sinus rhythm with first degree AV block at 60 bpm. SD interval is 226 ms. QRS interval is 80 ms. QT/QTc is 436/436 ms.    Recent Cardiac Tests:  Cardiac Monitor (2/5/2025):    Sinus rhythm    Nonsustained AT (nonsustained AF?)    2 strips end with an AT/AF but strips are short in duration    12 beat nonsustained VT (rate 115 bpm)    SOL (12/23/2024):    SOL done prior to DCCV.    Left Atrium: Left atrium is dilated. No patent foramen ovale confirmed by Doppler. The left atrial appendage appears normal. The left atrial appendage has a windsock morphology There is a very prominent pectinate seen posteiroly that is partly calcificed.  Appendage velocity is normal at greater than 40 cm/sec. There is no thrombus in the left atrial appendage confirmed with contrast. The pulmonary veins have normal venous flow.    Left Ventricle: The left ventricle is normal in size. Normal wall thickness. Normal wall motion. There is normal systolic function with a visually estimated ejection fraction of 60 - 65%.    Right Ventricle: Normal right ventricular cavity size. Systolic function is normal.    Right Atrium: Right atrium is dilated.    Mitral Valve: The mitral valve is structurally normal.  There is normal leaflet mobility. There is mild regurgitation.    Tricuspid Valve: The tricuspid valve is trileaflet. Findings consistent with myxomatous degeneration .This is better appreciated on SOL than TTE.  Mild prolapse of the posterior, septal and anterior leaflets. There is moderate regurgitation with a centrally directed jet. Greater TR is appreciated on SOL    Aorta: Aortic root is normal in size measuring 2.9 cm. Ascending aorta is normal measuring 2.9 cm. Grade 1 small atherosclerosis with mild thickening.    Pericardium: There is no pericardial effusion.    Hx of atrial septal defect repair (in 1976 age 26), no color flow seen across IAS    Past Medical History:   Diagnosis Date    Anemia 12/23/2024    Atrial fibrillation 12/22/2024    Breast cyst     Cancer     SCC x 2 on arms    Cataract     Closed Ortiz's fracture of right radius 09/27/2017    Decreased ROM of right shoulder 03/20/2018    Decreased strength of upper extremity 03/20/2018    Deep vein thrombosis     GERD (gastroesophageal reflux disease)     History of cardiovascular stress test 10/29/2024    Joint pain     Lesion of left upper eyelid 05/02/2023    Nuclear sclerosis, bilateral 05/17/2021    OA (osteoarthritis) of shoulder 03/13/2019    OAB (overactive bladder) 03/08/2017    Osteoporosis 03/08/2017    Other chest pain 05/13/2022    Squamous cell carcinoma of skin 2012    right arm       Past Surgical History:   Procedure Laterality Date    ARTHROPLASTY, KNEE, TOTAL, USING COMPUTER-ASSISTED NAVIGATION  11/11/2024    Procedure: ARTHROPLASTY, KNEE, TOTAL, USING COMPUTER-ASSISTED NAVIGATION;  Surgeon: Keagan Oneal MD;  Location[facility] OR;  Service: Orthopedics;;    ASD REPAIR      open heart surgery    BREAST BIOPSY      CATARACT EXTRACTION W/  INTRAOCULAR LENS IMPLANT Right 04/19/2021    Procedure: EXTRACTION, CATARACT, WITH IOL INSERTION;  Surgeon: Bruna Silva MD;  Location: [facility] OR;  Service: Ophthalmology;  Laterality: Right;     CATARACT EXTRACTION W/  INTRAOCULAR LENS IMPLANT Left 2021    Procedure: EXTRACTION, CATARACT, WITH IOL INSERTION;  Surgeon: Bruna Silva MD;  Location: [facility];  Service: Ophthalmology;  Laterality: Left;     SECTION      x2    EYE SURGERY      KNEE SURGERY Right     meniscal repair    SKIN BIOPSY  2012    SKIN CANCER EXCISION      TRANSESOPHAGEAL ECHOCARDIOGRAM WITH POSSIBLE CARDIOVERSION (SOL W/ POSS CARDIOVERSION) N/A 2024    Procedure: Transesophageal echo (SOL) intra-procedure log documentation;  Surgeon: Shelly Espinoza MD;  Location: [facility] EP LAB;  Service: Cardiology;  Laterality: N/A;    TREATMENT OF CARDIAC ARRHYTHMIA N/A 2024    Procedure: Cardioversion or Defibrillation;  Surgeon: Clifford Gonzalez MD;  Location: [facility] EP LAB;  Service: Cardiology;  Laterality: N/A;  AF, SOL/DCCV, ANES, SK, [id number]       Current Outpatient Medications   Medication Sig    apixaban (ELIQUIS) 5 mg Tab Take 1 tablet (5 mg total) by mouth 2 (two) times daily.    biotin 1 mg tablet Take 5,000 mcg by mouth once daily.    calcium carbonate (CALCIUM 600 ORAL) Take 1,200 mg by mouth once daily.     DULoxetine (CYMBALTA) 20 MG capsule Take 2 capsules (40 mg total) by mouth once daily.    flecainide (TAMBOCOR) 50 MG Tab Take 1 tablet (50 mg total) by mouth every 12 (twelve) hours.    metoprolol succinate (TOPROL-XL) 25 MG 24 hr tablet Take 1 tablet (25 mg total) by mouth once daily.    multivitamin (THERAGRAN) per tablet Take 1 tablet by mouth once daily.    nystatin (MYCOSTATIN) ointment Apply to corners of mouth bid when flaring and qhs for maintenance    oxybutynin (DITROPAN-XL) 10 MG 24 hr tablet Take 1 tablet (10 mg total) by mouth once daily.    raloxifene (EVISTA) 60 mg tablet Take 1 tablet (60 mg total) by mouth once daily.    DULoxetine 40 mg CpDR Take 40 mg by mouth once daily.    UNKNOWN TO PATIENT Nutrafol- hair growth supplement     No current facility-administered medications for this visit.  "      Review of Systems   Constitutional: Negative for chills, fever and malaise/fatigue.   HENT:  Negative for congestion and nosebleeds.    Eyes:  Negative for blurred vision.   Cardiovascular:  Negative for chest pain, dyspnea on exertion, irregular heartbeat, leg swelling, near-syncope, orthopnea, palpitations, paroxysmal nocturnal dyspnea and syncope.   Respiratory:  Negative for cough, hemoptysis, shortness of breath, sleep disturbances due to breathing and wheezing.    Endocrine: Negative for polyphagia.   Hematologic/Lymphatic: Negative for bleeding problem. Does not bruise/bleed easily.   Gastrointestinal:  Negative for abdominal pain and hematemesis.   Genitourinary:  Negative for hematuria.   Neurological:  Negative for dizziness, focal weakness, headaches, light-headedness, loss of balance and weakness.   Psychiatric/Behavioral:  Negative for altered mental status. The patient is not nervous/anxious.      Objective:        /60 (Patient Position: Sitting)   Pulse 60   Ht 5' 8" (1.727 m)   Wt 60.6 kg (133 lb 9.6 oz)   LMP  (LMP Unknown)   BMI 20.31 kg/m²     Physical Exam  Vitals and nursing note reviewed.   Constitutional:       General: She is not in acute distress.     Appearance: Normal appearance. She is well-developed. She is not ill-appearing or diaphoretic.   HENT:      Head: Normocephalic and atraumatic.   Cardiovascular:      Rate and Rhythm: Normal rate and regular rhythm.      Pulses:           Radial pulses are 2+ on the right side and 2+ on the left side.      Heart sounds: Normal heart sounds.   Pulmonary:      Effort: Pulmonary effort is normal. No accessory muscle usage or respiratory distress.      Breath sounds: Normal breath sounds. No wheezing.   Musculoskeletal:         General: No swelling. Normal range of motion.      Cervical back: Normal range of motion and neck supple.   Skin:     General: Skin is warm and dry.      Findings: No bruising or erythema.   Neurological:    "   Mental Status: She is alert and oriented to person, place, and time. She is not disoriented.      Gait: Gait normal.   Psychiatric:         Mood and Affect: Mood normal.         Speech: Speech normal.         Behavior: Behavior normal.         Thought Content: Thought content normal.         Judgment: Judgment normal.     Lab Results   Component Value Date     06/05/2025     01/28/2025    K 4.2 06/05/2025    K 4.1 01/28/2025    MG 1.6 12/23/2024    BUN 18 06/05/2025    CREATININE 0.8 06/05/2025    ALT 15 01/28/2025    AST 23 01/28/2025    HGB 11.7 (L) 05/14/2025    HGB 12.5 12/23/2024    HCT 35.9 (L) 05/14/2025    HCT 37.7 12/23/2024    TSH 1.413 12/22/2024    LDLCALC 104.4 05/03/2024       Recent Labs   Lab 10/29/24  1408 12/11/24  1919 05/14/25  1513   INR 1.0 1.0 1.1   PT  --   --  11.8       Assessment:     1. Paroxysmal atrial fibrillation    2. On anticoagulant therapy      Plan:   In summary, Ms. Ovalles is a 74 y.o. female with a past medical history of OAB, remote DVT, ASD (s/p repair 1976), TKA, AF who presents for follow up of atrial fibrillation s/p canceled DCCV.     Ms. Ovalles is doing very well from an arrhythmia standpoint. She presented 5/12/25 for DCCV but converted to sinus rhythm on her own. Procedure was canceled and she was started on flecainide. Today, she remains in normal rhythm and denies chest pain with exertion or at rest, palpitations, SOB, DIAZ, dizziness, or syncope.    Ms. Ovalles is currently taking eliquis 5 mg BID for stroke prophylaxis and denies significant bleeding episodes. She is currently being treated with flecainide 50 gm BID for rhythm control and metoprolol succinate 25 mg daily for HR control. Kidney function is stable, with a creatinine of 0.8 on 5/14/25.    I have personally reviewed the patient's EKG today, which shows sinus rhythm with first degree AV block at 60 bpm. VT interval is 226 ms. QRS interval is 80 ms. QT/QTc is 436/436 ms.    Atrial  Fibrillation  -Continue current medication regimen  -Follow up in about 6 months in Electrophysiology/Arrhythmia Clinic, sooner as needed    BETY Morgan, CONCHA-C  Cardiology-Electrophysiology/Arrhythmia NP [facility]   ------------------------------------------------------------------  *A copy of this note has been sent to Dr. Gonzalez*

## 2025-07-07 ENCOUNTER — OFFICE VISIT (OUTPATIENT)
Dept: ENDOCRINOLOGY | Facility: CLINIC | Age: 75
End: 2025-07-07
Payer: MEDICARE

## 2025-07-07 DIAGNOSIS — M81.0 AGE-RELATED OSTEOPOROSIS WITHOUT CURRENT PATHOLOGICAL FRACTURE: Primary | ICD-10-CM

## 2025-07-07 PROCEDURE — 98006 SYNCH AUDIO-VIDEO EST MOD 30: CPT | Mod: 95,,, | Performed by: INTERNAL MEDICINE

## 2025-07-07 PROCEDURE — 1157F ADVNC CARE PLAN IN RCRD: CPT | Mod: CPTII,95,, | Performed by: INTERNAL MEDICINE

## 2025-07-07 NOTE — PATIENT INSTRUCTIONS
"Osteoporosis   Loss of bone density while on treatment     Anabolic medications  1) forteo or tymlos   2) evenity     We discussed using Evenity for your osteoporosis.   Based on loss of bone density, age and high risk for fractures.   Evenity, is a once monthly injection that we use for 12 months.  Afterwards we must "seal in the bone gains" by using reclast or prolia. Since you do not have kidney disease, we recommend using reclast  Afterwards, we will check your bone density and re-assess.     Forteo/tymlos:   I would recommend starting a medicine called Forteo, which is given as a subcutaneous injection once per day. The medication will help to rebuild your bone density and hopefully prevent further fractures. It's given for a total of two years, followed by an alternative medication to consolidate the gains from the Forteo.   Side effects are usually very mild. The most common thing is light headedness that can occur within an hour of taking the medication. Usually this is mild and can be remedied by taking the medication at night time before bed. In rat studies using much higher doses than what we use clinically, the medication was linked to a rare bone cancer called osteosarcoma. There are observational studies looking for this type of cancer in patients who have used forteo, and to date, none have been reported. This is an ongoing study. But to be cautious we do not use in patients who have other risk factors for bone cancer such as direct radiation to the bone.     Please let me know what questions/concerns you have and if you are in agreement to starting the medication.    [email or web address]  Please don't hesitate to contact me if you have any questions or concerns.        "

## 2025-07-07 NOTE — PROGRESS NOTES
Audiovisual Virtual Visit Established Patient    Subjective:      Chief Complaint: No chief complaint on file.      HPI: Dia Ovalles is a 74 y.o. female who is seen for a follow up evaluation via audiovisual telemedicine for osteoporosis    Reviewed past medical, family, social history and updated as appropriate.    Knee replacement one month ago   Denies fractures   Falls: denies   Tripped on the gasoline hose  Tripped  over a side walk.  Balance is not great but better since knee surgery.      Currently on evista 60 mg daily, started treatment in 2014. Tolerating raloxifene well. Denies DVT, hot flashing.   High risk meds:   Pantoprazole      Denies height loss or back pain.      Previously used:   alendronate 70 mg once weekly for ten or more years     Supplements:   Vitamin D and calcium chewable daily 650 mg ad 500 IUs (12.5 mcg)  MVI chewable (2000 IUs daily)   Vitamin D3 1000 IUs daily    Lab Results   Component Value Date    TAIFLLTS34LK 60 06/05/2025    MRIFAQJL13YM 72 04/01/2021      Exercise:  Yoga  Walking regularly  Active - new knee      DXA:   2025: T score -2.5 at FN, loss 10% FRAX 22 and 6.9%  2023:T score -2.9 at LS   3/2021: osteoporosis of the lumbar spine, no changes when compared to previous, FRAX is 18% and 6.9%.   3/2019: Osteoporosis of the lumbar spine. ([place])   3/2017: osteoporosis of the lumbar spine ([place])     Independently reviewed bone density images, region of interests appropriate at all three sites.   T scores consistent with diagnosis of osteoporosis, lowest T score - 2.5 at FN  Loss of bone density at hip -10.5%  FRAX 22/6.9%    Review of Systems  No recent illness    Objective:     BP Readings from Last 5 Encounters:   07/01/25 118/60   02/04/25 116/61   02/03/25 128/69   01/28/25 118/60   12/23/24 117/67       Physical exam was not performed and vitals were not obtained due to this being a telemedicine (virtual) visit.    Wt Readings from Last 10 Encounters:   07/01/25  "1445 60.6 kg (133 lb 9.6 oz)   02/04/25 1123 59.5 kg (131 lb 2.8 oz)   02/03/25 1014 59.1 kg (130 lb 4.7 oz)   01/31/25 1131 60.3 kg (133 lb 0.8 oz)   01/28/25 1100 58.8 kg (129 lb 10.1 oz)   12/24/24 1008 59 kg (130 lb 1.1 oz)   12/23/24 1411 59 kg (130 lb)   12/23/24 1247 59 kg (130 lb 1.1 oz)   12/22/24 1224 59 kg (130 lb)   12/22/24 1056 61.7 kg (136 lb)   12/17/24 1033 62.1 kg (136 lb 14.5 oz)   12/11/24 1652 62.1 kg (137 lb)       Lab Results   Component Value Date    HGBA1C 5.1 12/22/2024    HGBA1C 5.3 05/03/2024    HGBA1C 5.1 04/28/2023    HGBA1C 5.2 04/13/2022     Lab Results   Component Value Date    CHOL 179 05/03/2024    CHOL 160 04/28/2023    HDL 63 05/03/2024    HDL 58 04/28/2023    LDLCALC 104.4 05/03/2024    LDLCALC 94.6 04/28/2023    TRIG 58 05/03/2024    TRIG 37 04/28/2023    CHOLHDL 35.2 05/03/2024    CHOLHDL 36.3 04/28/2023     Lab Results   Component Value Date     06/05/2025    K 4.2 06/05/2025     06/05/2025    CO2 25 06/05/2025    GLU 84 06/05/2025    BUN 18 06/05/2025    CREATININE 0.8 06/05/2025    CALCIUM 8.9 06/05/2025    PROT 7.3 01/28/2025    ALBUMIN 3.8 06/05/2025    BILITOT 0.4 01/28/2025    ALKPHOS 78 01/28/2025    AST 23 01/28/2025    ALT 15 01/28/2025    ANIONGAP 7 (L) 06/05/2025    ESTGFRAFRICA >60 04/13/2022    EGFRNONAA >60 04/13/2022    TSH 1.413 12/22/2024      No results found for: "LABMICR", "CREATRANDUR", "MICALBCREAT"    Assessment/Plan:     Osteoporosis  Risk factors:   Postmenopausal, family history of osteoporosis, two years of MHT  Current treatment: raloxifene daily   Balance is better but not good  Continue yoga for balance, continue walking     Vitamin D 2500 IUs daily   Continue calcium daily via diet     DXA scan 6/2025 [facility] demonstrates loss of bone density at FN  FRAX is high    Consider anabolic (TBS degraded BMD at LS)        Noemi Florian MD                      "

## 2025-07-07 NOTE — ASSESSMENT & PLAN NOTE
Risk factors:   Postmenopausal, family history of osteoporosis, two years of MHT  Current treatment: raloxifene daily   Balance is better but not good  Continue yoga for balance, continue walking     Vitamin D 2500 IUs daily   Continue calcium daily via diet     DXA scan 6/2025 main [facility] demonstrates loss of bone density at FN  FRAX is high    Consider anabolic (TBS degraded BMD at LS)

## 2025-07-21 ENCOUNTER — PATIENT MESSAGE (OUTPATIENT)
Dept: ENDOCRINOLOGY | Facility: CLINIC | Age: 75
End: 2025-07-21
Payer: MEDICARE

## 2025-08-12 ENCOUNTER — PATIENT MESSAGE (OUTPATIENT)
Dept: ENDOCRINOLOGY | Facility: CLINIC | Age: 75
End: 2025-08-12
Payer: MEDICARE

## 2025-08-26 DIAGNOSIS — M81.0 AGE-RELATED OSTEOPOROSIS WITHOUT CURRENT PATHOLOGICAL FRACTURE: Primary | ICD-10-CM

## 2025-08-26 RX ORDER — TERIPARATIDE 250 UG/ML
20 INJECTION, SOLUTION SUBCUTANEOUS DAILY
Qty: 2.4 ML | Refills: 11 | Status: ACTIVE | OUTPATIENT
Start: 2025-08-26 | End: 2026-08-26

## (undated) DEVICE — COVER LIGHT HANDLE 80/CA

## (undated) DEVICE — SOL BETADINE 5%

## (undated) DEVICE — GLOVE BIOGEL SKINSENSE PI 7.5

## (undated) DEVICE — ELECTRODE REM PLYHSV RETURN 9

## (undated) DEVICE — KIT DRAPE RIO ONE PIECE W/POCK

## (undated) DEVICE — SUT 1 36IN COATED VICRYL UN

## (undated) DEVICE — TAPE SILK 3IN

## (undated) DEVICE — BLADE MAKO STANDARD

## (undated) DEVICE — SOL IRRI STRL WATER 1000ML

## (undated) DEVICE — KIT VIZADISC KNEE TRACKING

## (undated) DEVICE — STAPLER SKIN REGULAR

## (undated) DEVICE — SYR SLIP TIP 1CC

## (undated) DEVICE — SOL NACL IRR 1000ML BTL

## (undated) DEVICE — BLADE DUAL CUT SAG 35X64X.89MM

## (undated) DEVICE — ALCOHOL 70% ANTISEPTIC ISO 4OZ

## (undated) DEVICE — DRAPE THREE-QTR REINF 53X77IN

## (undated) DEVICE — CASSETTE INFINITI

## (undated) DEVICE — GOWN ECLIPSE REINF LV4 XLNG XL

## (undated) DEVICE — BANDAGE ESMARK 6X12

## (undated) DEVICE — SUT 2/0 27IN PDS II VIO MO

## (undated) DEVICE — TOURNIQUET SB QC DP 34X4IN

## (undated) DEVICE — SUT STRATAFIX 3-0 30CM

## (undated) DEVICE — GOWN ECLIPSE POLY REINF 3XL

## (undated) DEVICE — TOWEL OR DISP STRL BLUE 4/PK

## (undated) DEVICE — DRAPE TOP 53X102IN

## (undated) DEVICE — GLOVE BIOGEL SKINSENSE PI 8.0

## (undated) DEVICE — PRESSURIZER HI VAC HIP KIT

## (undated) DEVICE — Device

## (undated) DEVICE — MARKER SKIN RULER STERILE

## (undated) DEVICE — PIN FIXATION BONE 140X3.2MM
Type: IMPLANTABLE DEVICE | Site: KNEE | Status: NON-FUNCTIONAL
Removed: 2024-11-11

## (undated) DEVICE — BLADE SAGITTAL 18 X 1.27 X 90M

## (undated) DEVICE — SYS IRRISEPT 450ML0.05% CHG

## (undated) DEVICE — BLADE SURG CARBON STEEL #10

## (undated) DEVICE — NDL HYPO STD REG BVL 18GX1.5IN

## (undated) DEVICE — BLADE RECIP DS OFST 70X1X12.5

## (undated) DEVICE — DRESSING AQUACEL AG ADV 3.5X12

## (undated) DEVICE — COVER CAMERA OPERATING ROOM

## (undated) DEVICE — PAD DEFIB CADENCE ADULT R2

## (undated) DEVICE — UNDERGLOVES BIOGEL PI SIZE 8

## (undated) DEVICE — KIT CHECKPOINT MAKO

## (undated) DEVICE — SPONGE COTTON TRAY 4X4IN

## (undated) DEVICE — KIT TOTAL KNEE TKOFG OMC

## (undated) DEVICE — INTERPULSE SET

## (undated) DEVICE — HOOD T7 W/ PEEL AWAY LENS

## (undated) DEVICE — ADHESIVE DERMABOND ADVANCED

## (undated) DEVICE — PIN BONE 3.2X110MM
Type: IMPLANTABLE DEVICE | Site: KNEE | Status: NON-FUNCTIONAL
Removed: 2024-11-11

## (undated) DEVICE — SYR 30CC LUER LOCK